# Patient Record
Sex: FEMALE | Race: WHITE | Employment: OTHER | URBAN - METROPOLITAN AREA
[De-identification: names, ages, dates, MRNs, and addresses within clinical notes are randomized per-mention and may not be internally consistent; named-entity substitution may affect disease eponyms.]

---

## 2017-01-10 ENCOUNTER — OFFICE VISIT (OUTPATIENT)
Dept: INTERNAL MEDICINE CLINIC | Age: 72
End: 2017-01-10

## 2017-01-10 VITALS
HEART RATE: 62 BPM | SYSTOLIC BLOOD PRESSURE: 120 MMHG | OXYGEN SATURATION: 97 % | DIASTOLIC BLOOD PRESSURE: 78 MMHG | TEMPERATURE: 97.8 F

## 2017-01-10 DIAGNOSIS — E66.01 MORBID OBESITY DUE TO EXCESS CALORIES (HCC): ICD-10-CM

## 2017-01-10 DIAGNOSIS — E11.9 TYPE 2 DIABETES MELLITUS WITHOUT COMPLICATION, WITHOUT LONG-TERM CURRENT USE OF INSULIN (HCC): ICD-10-CM

## 2017-01-10 DIAGNOSIS — J20.9 ACUTE BRONCHITIS DUE TO INFECTION: Primary | ICD-10-CM

## 2017-01-10 DIAGNOSIS — J45.20 MILD INTERMITTENT ASTHMA WITHOUT COMPLICATION: ICD-10-CM

## 2017-01-10 PROCEDURE — 4040F PNEUMOC VAC/ADMIN/RCVD: CPT | Performed by: FAMILY MEDICINE

## 2017-01-10 PROCEDURE — 1090F PRES/ABSN URINE INCON ASSESS: CPT | Performed by: FAMILY MEDICINE

## 2017-01-10 PROCEDURE — G8399 PT W/DXA RESULTS DOCUMENT: HCPCS | Performed by: FAMILY MEDICINE

## 2017-01-10 PROCEDURE — 1036F TOBACCO NON-USER: CPT | Performed by: FAMILY MEDICINE

## 2017-01-10 PROCEDURE — G8482 FLU IMMUNIZE ORDER/ADMIN: HCPCS | Performed by: FAMILY MEDICINE

## 2017-01-10 PROCEDURE — 3044F HG A1C LEVEL LT 7.0%: CPT | Performed by: FAMILY MEDICINE

## 2017-01-10 PROCEDURE — G8428 CUR MEDS NOT DOCUMENT: HCPCS | Performed by: FAMILY MEDICINE

## 2017-01-10 PROCEDURE — 99213 OFFICE O/P EST LOW 20 MIN: CPT | Performed by: FAMILY MEDICINE

## 2017-01-10 PROCEDURE — 1123F ACP DISCUSS/DSCN MKR DOCD: CPT | Performed by: FAMILY MEDICINE

## 2017-01-10 PROCEDURE — 3017F COLORECTAL CA SCREEN DOC REV: CPT | Performed by: FAMILY MEDICINE

## 2017-01-10 PROCEDURE — 3014F SCREEN MAMMO DOC REV: CPT | Performed by: FAMILY MEDICINE

## 2017-01-10 PROCEDURE — G8419 CALC BMI OUT NRM PARAM NOF/U: HCPCS | Performed by: FAMILY MEDICINE

## 2017-01-10 RX ORDER — PREDNISONE 20 MG/1
20 TABLET ORAL DAILY
Qty: 10 TABLET | Refills: 0 | Status: SHIPPED | OUTPATIENT
Start: 2017-01-10 | End: 2017-01-20

## 2017-01-10 RX ORDER — GUAIFENESIN AND CODEINE PHOSPHATE 100; 10 MG/5ML; MG/5ML
5 SOLUTION ORAL 4 TIMES DAILY PRN
Qty: 240 ML | Refills: 0 | Status: SHIPPED | OUTPATIENT
Start: 2017-01-10 | End: 2017-01-17

## 2017-01-10 RX ORDER — AMOXICILLIN 875 MG/1
875 TABLET, COATED ORAL 2 TIMES DAILY
Qty: 20 TABLET | Refills: 0 | Status: SHIPPED | OUTPATIENT
Start: 2017-01-10 | End: 2017-01-20

## 2017-01-10 ASSESSMENT — ENCOUNTER SYMPTOMS
SHORTNESS OF BREATH: 1
RHINORRHEA: 1
COUGH: 1
SINUS PRESSURE: 1
WHEEZING: 1

## 2017-01-15 ASSESSMENT — ENCOUNTER SYMPTOMS
DIARRHEA: 0
BLOOD IN STOOL: 0
ABDOMINAL PAIN: 0
CONSTIPATION: 0

## 2017-01-23 RX ORDER — ACETAMINOPHEN AND CODEINE PHOSPHATE 300; 30 MG/1; MG/1
TABLET ORAL
Qty: 90 TABLET | Refills: 0 | Status: SHIPPED | OUTPATIENT
Start: 2017-01-23 | End: 2017-03-08 | Stop reason: SDUPTHER

## 2017-01-26 RX ORDER — ATENOLOL 25 MG/1
TABLET ORAL
Qty: 30 TABLET | Refills: 3 | Status: SHIPPED | OUTPATIENT
Start: 2017-01-26 | End: 2017-05-31 | Stop reason: SDUPTHER

## 2017-01-31 RX ORDER — METHYLPREDNISOLONE 4 MG/1
TABLET ORAL
Qty: 1 KIT | Refills: 0 | Status: SHIPPED | OUTPATIENT
Start: 2017-01-31 | End: 2017-02-06

## 2017-02-01 ENCOUNTER — OFFICE VISIT (OUTPATIENT)
Dept: PULMONOLOGY | Age: 72
End: 2017-02-01

## 2017-02-01 VITALS
HEART RATE: 71 BPM | BODY MASS INDEX: 44.57 KG/M2 | TEMPERATURE: 97.6 F | HEIGHT: 60 IN | DIASTOLIC BLOOD PRESSURE: 60 MMHG | WEIGHT: 227 LBS | RESPIRATION RATE: 16 BRPM | SYSTOLIC BLOOD PRESSURE: 130 MMHG | OXYGEN SATURATION: 92 %

## 2017-02-01 DIAGNOSIS — J45.901 ASTHMA EXACERBATION: ICD-10-CM

## 2017-02-01 DIAGNOSIS — J40 BRONCHITIS: Primary | ICD-10-CM

## 2017-02-01 PROCEDURE — 1123F ACP DISCUSS/DSCN MKR DOCD: CPT | Performed by: INTERNAL MEDICINE

## 2017-02-01 PROCEDURE — 4040F PNEUMOC VAC/ADMIN/RCVD: CPT | Performed by: INTERNAL MEDICINE

## 2017-02-01 PROCEDURE — 99214 OFFICE O/P EST MOD 30 MIN: CPT | Performed by: INTERNAL MEDICINE

## 2017-02-01 PROCEDURE — 3014F SCREEN MAMMO DOC REV: CPT | Performed by: INTERNAL MEDICINE

## 2017-02-01 PROCEDURE — 3017F COLORECTAL CA SCREEN DOC REV: CPT | Performed by: INTERNAL MEDICINE

## 2017-02-01 PROCEDURE — 1090F PRES/ABSN URINE INCON ASSESS: CPT | Performed by: INTERNAL MEDICINE

## 2017-02-01 PROCEDURE — 1036F TOBACCO NON-USER: CPT | Performed by: INTERNAL MEDICINE

## 2017-02-01 PROCEDURE — G8399 PT W/DXA RESULTS DOCUMENT: HCPCS | Performed by: INTERNAL MEDICINE

## 2017-02-01 PROCEDURE — G8427 DOCREV CUR MEDS BY ELIG CLIN: HCPCS | Performed by: INTERNAL MEDICINE

## 2017-02-01 PROCEDURE — G8419 CALC BMI OUT NRM PARAM NOF/U: HCPCS | Performed by: INTERNAL MEDICINE

## 2017-02-01 PROCEDURE — G8482 FLU IMMUNIZE ORDER/ADMIN: HCPCS | Performed by: INTERNAL MEDICINE

## 2017-02-01 RX ORDER — AZITHROMYCIN 250 MG/1
250 TABLET, FILM COATED ORAL DAILY
Qty: 6 TABLET | Refills: 0 | Status: SHIPPED | OUTPATIENT
Start: 2017-02-01 | End: 2017-03-21 | Stop reason: ALTCHOICE

## 2017-02-01 RX ORDER — PREDNISONE 10 MG/1
TABLET ORAL
Qty: 30 TABLET | Refills: 0 | Status: SHIPPED | OUTPATIENT
Start: 2017-02-01 | End: 2017-03-21 | Stop reason: ALTCHOICE

## 2017-02-01 ASSESSMENT — ASTHMA QUESTIONNAIRES
QUESTION_3 LAST FOUR WEEKS HOW OFTEN DID YOUR ASTHMA SYMPTOMS (WHEEZING, COUGHING, SHORTNESS OF BREATH, CHEST TIGHTNESS OR PAIN) WAKE YOU UP AT NIGHT OR EARLIER THAN USUAL IN THE MORNING: 1
QUESTION_1 LAST FOUR WEEKS HOW MUCH OF THE TIME DID YOUR ASTHMA KEEP YOU FROM GETTING AS MUCH DONE AT WORK, SCHOOL OR AT HOME: 2
QUESTION_4 LAST FOUR WEEKS HOW OFTEN HAVE YOU USED YOUR RESCUE INHALER OR NEBULIZER MEDICATION (SUCH AS ALBUTEROL): 2
QUESTION_5 LAST FOUR WEEKS HOW WOULD YOU RATE YOUR ASTHMA CONTROL: 1
ACT_TOTALSCORE: 7
QUESTION_2 LAST FOUR WEEKS HOW OFTEN HAVE YOU HAD SHORTNESS OF BREATH: 1

## 2017-02-15 RX ORDER — DULOXETIN HYDROCHLORIDE 60 MG/1
CAPSULE, DELAYED RELEASE ORAL
Qty: 90 CAPSULE | Refills: 1 | Status: SHIPPED | OUTPATIENT
Start: 2017-02-15 | End: 2018-03-09 | Stop reason: SDUPTHER

## 2017-02-17 RX ORDER — GABAPENTIN 600 MG/1
TABLET ORAL
Qty: 360 TABLET | Refills: 1 | Status: SHIPPED | OUTPATIENT
Start: 2017-02-17 | End: 2017-03-21 | Stop reason: SDUPTHER

## 2017-02-17 RX ORDER — ATORVASTATIN CALCIUM 40 MG/1
TABLET, FILM COATED ORAL
Qty: 90 TABLET | Refills: 1 | Status: SHIPPED | OUTPATIENT
Start: 2017-02-17 | End: 2017-09-18 | Stop reason: SDUPTHER

## 2017-02-17 RX ORDER — GLIMEPIRIDE 4 MG/1
TABLET ORAL
Qty: 90 TABLET | Refills: 1 | Status: SHIPPED | OUTPATIENT
Start: 2017-02-17 | End: 2017-08-24 | Stop reason: SDUPTHER

## 2017-02-17 RX ORDER — LOSARTAN POTASSIUM AND HYDROCHLOROTHIAZIDE 25; 100 MG/1; MG/1
TABLET ORAL
Qty: 90 TABLET | Refills: 1 | Status: SHIPPED | OUTPATIENT
Start: 2017-02-17 | End: 2017-09-19 | Stop reason: SDUPTHER

## 2017-03-08 RX ORDER — ACETAMINOPHEN AND CODEINE PHOSPHATE 300; 30 MG/1; MG/1
TABLET ORAL
Qty: 90 TABLET | Refills: 0 | Status: SHIPPED | OUTPATIENT
Start: 2017-03-08 | End: 2017-04-24 | Stop reason: SDUPTHER

## 2017-03-13 ENCOUNTER — TELEPHONE (OUTPATIENT)
Dept: INTERNAL MEDICINE CLINIC | Age: 72
End: 2017-03-13

## 2017-03-16 RX ORDER — GUAIFENESIN AND CODEINE PHOSPHATE 100; 10 MG/5ML; MG/5ML
SOLUTION ORAL
Qty: 240 ML | Refills: 0 | Status: SHIPPED | OUTPATIENT
Start: 2017-03-16 | End: 2017-03-21 | Stop reason: ALTCHOICE

## 2017-03-21 ENCOUNTER — OFFICE VISIT (OUTPATIENT)
Dept: INTERNAL MEDICINE CLINIC | Age: 72
End: 2017-03-21

## 2017-03-21 VITALS
WEIGHT: 228 LBS | RESPIRATION RATE: 20 BRPM | DIASTOLIC BLOOD PRESSURE: 62 MMHG | OXYGEN SATURATION: 96 % | BODY MASS INDEX: 44.53 KG/M2 | SYSTOLIC BLOOD PRESSURE: 130 MMHG | HEART RATE: 58 BPM

## 2017-03-21 DIAGNOSIS — I10 ESSENTIAL HYPERTENSION: ICD-10-CM

## 2017-03-21 DIAGNOSIS — Z99.89 OSA ON CPAP: ICD-10-CM

## 2017-03-21 DIAGNOSIS — E66.01 MORBID OBESITY DUE TO EXCESS CALORIES (HCC): ICD-10-CM

## 2017-03-21 DIAGNOSIS — E11.42 DM TYPE 2 WITH DIABETIC PERIPHERAL NEUROPATHY (HCC): Primary | ICD-10-CM

## 2017-03-21 DIAGNOSIS — G47.33 OSA ON CPAP: ICD-10-CM

## 2017-03-21 DIAGNOSIS — G25.81 RESTLESS LEGS SYNDROME (RLS): ICD-10-CM

## 2017-03-21 LAB — HBA1C MFR BLD: 6.8 %

## 2017-03-21 PROCEDURE — G8417 CALC BMI ABV UP PARAM F/U: HCPCS | Performed by: FAMILY MEDICINE

## 2017-03-21 PROCEDURE — 4040F PNEUMOC VAC/ADMIN/RCVD: CPT | Performed by: FAMILY MEDICINE

## 2017-03-21 PROCEDURE — G8427 DOCREV CUR MEDS BY ELIG CLIN: HCPCS | Performed by: FAMILY MEDICINE

## 2017-03-21 PROCEDURE — 1090F PRES/ABSN URINE INCON ASSESS: CPT | Performed by: FAMILY MEDICINE

## 2017-03-21 PROCEDURE — G8482 FLU IMMUNIZE ORDER/ADMIN: HCPCS | Performed by: FAMILY MEDICINE

## 2017-03-21 PROCEDURE — 3044F HG A1C LEVEL LT 7.0%: CPT | Performed by: FAMILY MEDICINE

## 2017-03-21 PROCEDURE — 83036 HEMOGLOBIN GLYCOSYLATED A1C: CPT | Performed by: FAMILY MEDICINE

## 2017-03-21 PROCEDURE — 1036F TOBACCO NON-USER: CPT | Performed by: FAMILY MEDICINE

## 2017-03-21 PROCEDURE — 99214 OFFICE O/P EST MOD 30 MIN: CPT | Performed by: FAMILY MEDICINE

## 2017-03-21 PROCEDURE — 3017F COLORECTAL CA SCREEN DOC REV: CPT | Performed by: FAMILY MEDICINE

## 2017-03-21 PROCEDURE — 1123F ACP DISCUSS/DSCN MKR DOCD: CPT | Performed by: FAMILY MEDICINE

## 2017-03-21 PROCEDURE — 3014F SCREEN MAMMO DOC REV: CPT | Performed by: FAMILY MEDICINE

## 2017-03-21 PROCEDURE — G8399 PT W/DXA RESULTS DOCUMENT: HCPCS | Performed by: FAMILY MEDICINE

## 2017-03-21 RX ORDER — GABAPENTIN 600 MG/1
600 TABLET ORAL 2 TIMES DAILY
Qty: 180 TABLET | Refills: 1 | Status: SHIPPED | OUTPATIENT
Start: 2017-03-21 | End: 2017-11-21 | Stop reason: SDUPTHER

## 2017-03-21 ASSESSMENT — ENCOUNTER SYMPTOMS
ABDOMINAL PAIN: 0
SHORTNESS OF BREATH: 0
DIARRHEA: 0
BLOOD IN STOOL: 0
CONSTIPATION: 0

## 2017-03-30 RX ORDER — AMLODIPINE BESYLATE 5 MG/1
TABLET ORAL
Qty: 90 TABLET | Refills: 1 | Status: SHIPPED | OUTPATIENT
Start: 2017-03-30 | End: 2017-09-17 | Stop reason: SDUPTHER

## 2017-04-04 ENCOUNTER — OFFICE VISIT (OUTPATIENT)
Dept: INTERNAL MEDICINE CLINIC | Age: 72
End: 2017-04-04

## 2017-04-04 VITALS
SYSTOLIC BLOOD PRESSURE: 130 MMHG | HEART RATE: 66 BPM | RESPIRATION RATE: 18 BRPM | OXYGEN SATURATION: 96 % | DIASTOLIC BLOOD PRESSURE: 70 MMHG

## 2017-04-04 DIAGNOSIS — Z99.89 OSA ON CPAP: ICD-10-CM

## 2017-04-04 DIAGNOSIS — E78.2 MIXED HYPERLIPIDEMIA: ICD-10-CM

## 2017-04-04 DIAGNOSIS — J45.20 MILD INTERMITTENT ASTHMA WITHOUT COMPLICATION: ICD-10-CM

## 2017-04-04 DIAGNOSIS — E11.9 TYPE 2 DIABETES MELLITUS WITHOUT COMPLICATION, WITHOUT LONG-TERM CURRENT USE OF INSULIN (HCC): ICD-10-CM

## 2017-04-04 DIAGNOSIS — I10 ESSENTIAL HYPERTENSION: ICD-10-CM

## 2017-04-04 DIAGNOSIS — G25.81 RESTLESS LEGS SYNDROME (RLS): ICD-10-CM

## 2017-04-04 DIAGNOSIS — G47.33 OSA ON CPAP: ICD-10-CM

## 2017-04-04 PROCEDURE — 99214 OFFICE O/P EST MOD 30 MIN: CPT | Performed by: FAMILY MEDICINE

## 2017-04-04 PROCEDURE — 3014F SCREEN MAMMO DOC REV: CPT | Performed by: FAMILY MEDICINE

## 2017-04-04 PROCEDURE — 3044F HG A1C LEVEL LT 7.0%: CPT | Performed by: FAMILY MEDICINE

## 2017-04-04 PROCEDURE — 3017F COLORECTAL CA SCREEN DOC REV: CPT | Performed by: FAMILY MEDICINE

## 2017-04-04 PROCEDURE — 4040F PNEUMOC VAC/ADMIN/RCVD: CPT | Performed by: FAMILY MEDICINE

## 2017-04-04 PROCEDURE — G8417 CALC BMI ABV UP PARAM F/U: HCPCS | Performed by: FAMILY MEDICINE

## 2017-04-04 PROCEDURE — G8427 DOCREV CUR MEDS BY ELIG CLIN: HCPCS | Performed by: FAMILY MEDICINE

## 2017-04-04 PROCEDURE — 1036F TOBACCO NON-USER: CPT | Performed by: FAMILY MEDICINE

## 2017-04-04 PROCEDURE — 1123F ACP DISCUSS/DSCN MKR DOCD: CPT | Performed by: FAMILY MEDICINE

## 2017-04-04 PROCEDURE — 1090F PRES/ABSN URINE INCON ASSESS: CPT | Performed by: FAMILY MEDICINE

## 2017-04-04 PROCEDURE — G8399 PT W/DXA RESULTS DOCUMENT: HCPCS | Performed by: FAMILY MEDICINE

## 2017-04-04 ASSESSMENT — ENCOUNTER SYMPTOMS
DIARRHEA: 0
ABDOMINAL PAIN: 0
CONSTIPATION: 0
SHORTNESS OF BREATH: 0
BLOOD IN STOOL: 0

## 2017-04-24 RX ORDER — NAPROXEN 500 MG/1
TABLET ORAL
Qty: 60 TABLET | Refills: 2 | Status: SHIPPED | OUTPATIENT
Start: 2017-04-24 | End: 2017-04-24 | Stop reason: SDUPTHER

## 2017-04-24 RX ORDER — ACETAMINOPHEN AND CODEINE PHOSPHATE 300; 30 MG/1; MG/1
TABLET ORAL
Qty: 90 TABLET | Refills: 0 | Status: SHIPPED | OUTPATIENT
Start: 2017-04-24 | End: 2017-06-09 | Stop reason: SDUPTHER

## 2017-04-25 RX ORDER — NAPROXEN 500 MG/1
500 TABLET ORAL 3 TIMES DAILY PRN
Qty: 270 TABLET | Refills: 1 | Status: SHIPPED | OUTPATIENT
Start: 2017-04-25 | End: 2017-07-07

## 2017-05-31 RX ORDER — ATENOLOL 25 MG/1
TABLET ORAL
Qty: 30 TABLET | Refills: 3 | Status: SHIPPED | OUTPATIENT
Start: 2017-05-31 | End: 2017-08-14 | Stop reason: ALTCHOICE

## 2017-06-11 RX ORDER — ACETAMINOPHEN AND CODEINE PHOSPHATE 300; 30 MG/1; MG/1
TABLET ORAL
Qty: 90 TABLET | Refills: 0 | Status: SHIPPED | OUTPATIENT
Start: 2017-06-11 | End: 2017-07-25 | Stop reason: SDUPTHER

## 2017-06-14 RX ORDER — AZITHROMYCIN 250 MG/1
TABLET, FILM COATED ORAL
Qty: 1 PACKET | Refills: 0 | Status: SHIPPED | OUTPATIENT
Start: 2017-06-14 | End: 2017-06-20

## 2017-06-20 ENCOUNTER — OFFICE VISIT (OUTPATIENT)
Dept: INTERNAL MEDICINE CLINIC | Age: 72
End: 2017-06-20

## 2017-06-20 VITALS
BODY MASS INDEX: 43.55 KG/M2 | WEIGHT: 223 LBS | DIASTOLIC BLOOD PRESSURE: 62 MMHG | OXYGEN SATURATION: 98 % | SYSTOLIC BLOOD PRESSURE: 138 MMHG | HEART RATE: 62 BPM | RESPIRATION RATE: 18 BRPM

## 2017-06-20 DIAGNOSIS — Z99.89 OSA ON CPAP: ICD-10-CM

## 2017-06-20 DIAGNOSIS — G25.81 RESTLESS LEGS SYNDROME (RLS): ICD-10-CM

## 2017-06-20 DIAGNOSIS — J45.20 MILD INTERMITTENT ASTHMA WITHOUT COMPLICATION: ICD-10-CM

## 2017-06-20 DIAGNOSIS — I10 ESSENTIAL HYPERTENSION: ICD-10-CM

## 2017-06-20 DIAGNOSIS — E11.9 TYPE 2 DIABETES MELLITUS WITHOUT COMPLICATION, WITHOUT LONG-TERM CURRENT USE OF INSULIN (HCC): Primary | ICD-10-CM

## 2017-06-20 DIAGNOSIS — G47.33 OSA ON CPAP: ICD-10-CM

## 2017-06-20 DIAGNOSIS — E78.2 MIXED HYPERLIPIDEMIA: ICD-10-CM

## 2017-06-20 LAB — HBA1C MFR BLD: 6.6 %

## 2017-06-20 PROCEDURE — 1036F TOBACCO NON-USER: CPT | Performed by: FAMILY MEDICINE

## 2017-06-20 PROCEDURE — 4040F PNEUMOC VAC/ADMIN/RCVD: CPT | Performed by: FAMILY MEDICINE

## 2017-06-20 PROCEDURE — 1090F PRES/ABSN URINE INCON ASSESS: CPT | Performed by: FAMILY MEDICINE

## 2017-06-20 PROCEDURE — G8417 CALC BMI ABV UP PARAM F/U: HCPCS | Performed by: FAMILY MEDICINE

## 2017-06-20 PROCEDURE — G8399 PT W/DXA RESULTS DOCUMENT: HCPCS | Performed by: FAMILY MEDICINE

## 2017-06-20 PROCEDURE — 3046F HEMOGLOBIN A1C LEVEL >9.0%: CPT | Performed by: FAMILY MEDICINE

## 2017-06-20 PROCEDURE — 3017F COLORECTAL CA SCREEN DOC REV: CPT | Performed by: FAMILY MEDICINE

## 2017-06-20 PROCEDURE — G8427 DOCREV CUR MEDS BY ELIG CLIN: HCPCS | Performed by: FAMILY MEDICINE

## 2017-06-20 PROCEDURE — 3014F SCREEN MAMMO DOC REV: CPT | Performed by: FAMILY MEDICINE

## 2017-06-20 PROCEDURE — 99214 OFFICE O/P EST MOD 30 MIN: CPT | Performed by: FAMILY MEDICINE

## 2017-06-20 PROCEDURE — 83036 HEMOGLOBIN GLYCOSYLATED A1C: CPT | Performed by: FAMILY MEDICINE

## 2017-06-20 PROCEDURE — 1123F ACP DISCUSS/DSCN MKR DOCD: CPT | Performed by: FAMILY MEDICINE

## 2017-06-20 RX ORDER — CARBIDOPA/LEVODOPA 25MG-250MG
TABLET ORAL
Qty: 360 TABLET | Refills: 3 | Status: SHIPPED | OUTPATIENT
Start: 2017-06-20 | End: 2017-06-20 | Stop reason: SDUPTHER

## 2017-06-20 RX ORDER — CARBIDOPA/LEVODOPA 25MG-250MG
TABLET ORAL
Qty: 720 TABLET | Refills: 1 | Status: SHIPPED | OUTPATIENT
Start: 2017-06-20 | End: 2017-12-19 | Stop reason: SDUPTHER

## 2017-06-20 ASSESSMENT — ENCOUNTER SYMPTOMS
SHORTNESS OF BREATH: 0
CONSTIPATION: 0
BLOOD IN STOOL: 0
DIARRHEA: 0
ABDOMINAL PAIN: 0

## 2017-07-07 ENCOUNTER — OFFICE VISIT (OUTPATIENT)
Dept: INTERNAL MEDICINE CLINIC | Age: 72
End: 2017-07-07

## 2017-07-07 VITALS
WEIGHT: 220.6 LBS | RESPIRATION RATE: 18 BRPM | HEART RATE: 66 BPM | BODY MASS INDEX: 43.08 KG/M2 | DIASTOLIC BLOOD PRESSURE: 48 MMHG | OXYGEN SATURATION: 98 % | SYSTOLIC BLOOD PRESSURE: 134 MMHG

## 2017-07-07 DIAGNOSIS — E11.9 TYPE 2 DIABETES MELLITUS WITHOUT COMPLICATION, WITHOUT LONG-TERM CURRENT USE OF INSULIN (HCC): ICD-10-CM

## 2017-07-07 DIAGNOSIS — M25.511 ACUTE PAIN OF RIGHT SHOULDER: ICD-10-CM

## 2017-07-07 DIAGNOSIS — I10 ESSENTIAL HYPERTENSION: ICD-10-CM

## 2017-07-07 DIAGNOSIS — G25.81 RESTLESS LEGS SYNDROME (RLS): ICD-10-CM

## 2017-07-07 DIAGNOSIS — Z99.89 OSA ON CPAP: ICD-10-CM

## 2017-07-07 DIAGNOSIS — G47.33 OSA ON CPAP: ICD-10-CM

## 2017-07-07 PROCEDURE — 1123F ACP DISCUSS/DSCN MKR DOCD: CPT | Performed by: FAMILY MEDICINE

## 2017-07-07 PROCEDURE — 1036F TOBACCO NON-USER: CPT | Performed by: FAMILY MEDICINE

## 2017-07-07 PROCEDURE — 1090F PRES/ABSN URINE INCON ASSESS: CPT | Performed by: FAMILY MEDICINE

## 2017-07-07 PROCEDURE — 3014F SCREEN MAMMO DOC REV: CPT | Performed by: FAMILY MEDICINE

## 2017-07-07 PROCEDURE — G8399 PT W/DXA RESULTS DOCUMENT: HCPCS | Performed by: FAMILY MEDICINE

## 2017-07-07 PROCEDURE — G8417 CALC BMI ABV UP PARAM F/U: HCPCS | Performed by: FAMILY MEDICINE

## 2017-07-07 PROCEDURE — 3017F COLORECTAL CA SCREEN DOC REV: CPT | Performed by: FAMILY MEDICINE

## 2017-07-07 PROCEDURE — G8427 DOCREV CUR MEDS BY ELIG CLIN: HCPCS | Performed by: FAMILY MEDICINE

## 2017-07-07 PROCEDURE — 3046F HEMOGLOBIN A1C LEVEL >9.0%: CPT | Performed by: FAMILY MEDICINE

## 2017-07-07 PROCEDURE — 99214 OFFICE O/P EST MOD 30 MIN: CPT | Performed by: FAMILY MEDICINE

## 2017-07-07 PROCEDURE — 4040F PNEUMOC VAC/ADMIN/RCVD: CPT | Performed by: FAMILY MEDICINE

## 2017-07-07 RX ORDER — PREDNISONE 20 MG/1
20 TABLET ORAL DAILY
Qty: 10 TABLET | Refills: 0 | Status: SHIPPED | OUTPATIENT
Start: 2017-07-07 | End: 2017-07-17

## 2017-07-07 RX ORDER — NAPROXEN 375 MG/1
TABLET ORAL
Qty: 270 TABLET | Refills: 1 | Status: SHIPPED | OUTPATIENT
Start: 2017-07-07 | End: 2017-11-03

## 2017-07-07 RX ORDER — NAPROXEN 375 MG/1
375 TABLET ORAL 3 TIMES DAILY PRN
Qty: 60 TABLET | Refills: 1 | Status: SHIPPED | OUTPATIENT
Start: 2017-07-07 | End: 2017-07-07

## 2017-07-07 ASSESSMENT — ENCOUNTER SYMPTOMS
ABDOMINAL PAIN: 0
SHORTNESS OF BREATH: 0
BLOOD IN STOOL: 0
DIARRHEA: 0
CONSTIPATION: 0

## 2017-07-27 RX ORDER — ACETAMINOPHEN AND CODEINE PHOSPHATE 300; 30 MG/1; MG/1
TABLET ORAL
Qty: 90 TABLET | Refills: 0 | Status: SHIPPED | OUTPATIENT
Start: 2017-07-27 | End: 2017-09-07 | Stop reason: SDUPTHER

## 2017-08-11 RX ORDER — DULOXETIN HYDROCHLORIDE 60 MG/1
CAPSULE, DELAYED RELEASE ORAL
Qty: 90 CAPSULE | Refills: 1 | Status: SHIPPED | OUTPATIENT
Start: 2017-08-11 | End: 2017-08-14 | Stop reason: SDUPTHER

## 2017-08-14 ENCOUNTER — OFFICE VISIT (OUTPATIENT)
Dept: PULMONOLOGY | Age: 72
End: 2017-08-14

## 2017-08-14 VITALS
DIASTOLIC BLOOD PRESSURE: 60 MMHG | TEMPERATURE: 98 F | HEIGHT: 60 IN | SYSTOLIC BLOOD PRESSURE: 120 MMHG | WEIGHT: 224 LBS | OXYGEN SATURATION: 96 % | BODY MASS INDEX: 43.98 KG/M2 | HEART RATE: 76 BPM | RESPIRATION RATE: 16 BRPM

## 2017-08-14 DIAGNOSIS — Z99.89 OSA ON CPAP: Primary | ICD-10-CM

## 2017-08-14 DIAGNOSIS — J45.909 MILD ASTHMA: ICD-10-CM

## 2017-08-14 DIAGNOSIS — H61.22 IMPACTED CERUMEN OF LEFT EAR: ICD-10-CM

## 2017-08-14 DIAGNOSIS — G47.33 OSA ON CPAP: Primary | ICD-10-CM

## 2017-08-14 PROCEDURE — G8399 PT W/DXA RESULTS DOCUMENT: HCPCS | Performed by: INTERNAL MEDICINE

## 2017-08-14 PROCEDURE — 1036F TOBACCO NON-USER: CPT | Performed by: INTERNAL MEDICINE

## 2017-08-14 PROCEDURE — 1123F ACP DISCUSS/DSCN MKR DOCD: CPT | Performed by: INTERNAL MEDICINE

## 2017-08-14 PROCEDURE — G8427 DOCREV CUR MEDS BY ELIG CLIN: HCPCS | Performed by: INTERNAL MEDICINE

## 2017-08-14 PROCEDURE — G8417 CALC BMI ABV UP PARAM F/U: HCPCS | Performed by: INTERNAL MEDICINE

## 2017-08-14 PROCEDURE — 3014F SCREEN MAMMO DOC REV: CPT | Performed by: INTERNAL MEDICINE

## 2017-08-14 PROCEDURE — 3017F COLORECTAL CA SCREEN DOC REV: CPT | Performed by: INTERNAL MEDICINE

## 2017-08-14 PROCEDURE — 99214 OFFICE O/P EST MOD 30 MIN: CPT | Performed by: INTERNAL MEDICINE

## 2017-08-14 PROCEDURE — 1090F PRES/ABSN URINE INCON ASSESS: CPT | Performed by: INTERNAL MEDICINE

## 2017-08-14 PROCEDURE — 4040F PNEUMOC VAC/ADMIN/RCVD: CPT | Performed by: INTERNAL MEDICINE

## 2017-08-14 RX ORDER — METOPROLOL SUCCINATE 25 MG/1
TABLET, EXTENDED RELEASE ORAL
COMMUNITY
Start: 2017-06-27 | End: 2017-12-31 | Stop reason: SDUPTHER

## 2017-08-14 ASSESSMENT — SLEEP AND FATIGUE QUESTIONNAIRES
HOW LIKELY ARE YOU TO NOD OFF OR FALL ASLEEP WHILE LYING DOWN TO REST IN THE AFTERNOON WHEN CIRCUMSTANCES PERMIT: 2
HOW LIKELY ARE YOU TO NOD OFF OR FALL ASLEEP WHILE SITTING AND READING: 2
HOW LIKELY ARE YOU TO NOD OFF OR FALL ASLEEP WHILE SITTING AND TALKING TO SOMEONE: 0
ESS TOTAL SCORE: 8
HOW LIKELY ARE YOU TO NOD OFF OR FALL ASLEEP IN A CAR, WHILE STOPPED FOR A FEW MINUTES IN TRAFFIC: 0
HOW LIKELY ARE YOU TO NOD OFF OR FALL ASLEEP WHEN YOU ARE A PASSENGER IN A CAR FOR AN HOUR WITHOUT A BREAK: 1
HOW LIKELY ARE YOU TO NOD OFF OR FALL ASLEEP WHILE SITTING QUIETLY AFTER LUNCH WITHOUT ALCOHOL: 0
HOW LIKELY ARE YOU TO NOD OFF OR FALL ASLEEP WHILE SITTING INACTIVE IN A PUBLIC PLACE: 2
HOW LIKELY ARE YOU TO NOD OFF OR FALL ASLEEP WHILE WATCHING TV: 1
NECK CIRCUMFERENCE (INCHES): 19

## 2017-08-14 ASSESSMENT — ASTHMA QUESTIONNAIRES
ACT_TOTALSCORE: 22
QUESTION_4 LAST FOUR WEEKS HOW OFTEN HAVE YOU USED YOUR RESCUE INHALER OR NEBULIZER MEDICATION (SUCH AS ALBUTEROL): 5
QUESTION_1 LAST FOUR WEEKS HOW MUCH OF THE TIME DID YOUR ASTHMA KEEP YOU FROM GETTING AS MUCH DONE AT WORK, SCHOOL OR AT HOME: 5
QUESTION_5 LAST FOUR WEEKS HOW WOULD YOU RATE YOUR ASTHMA CONTROL: 5
QUESTION_3 LAST FOUR WEEKS HOW OFTEN DID YOUR ASTHMA SYMPTOMS (WHEEZING, COUGHING, SHORTNESS OF BREATH, CHEST TIGHTNESS OR PAIN) WAKE YOU UP AT NIGHT OR EARLIER THAN USUAL IN THE MORNING: 2
QUESTION_2 LAST FOUR WEEKS HOW OFTEN HAVE YOU HAD SHORTNESS OF BREATH: 5

## 2017-08-25 RX ORDER — LOSARTAN POTASSIUM AND HYDROCHLOROTHIAZIDE 25; 100 MG/1; MG/1
TABLET ORAL
Qty: 90 TABLET | Refills: 1 | Status: SHIPPED | OUTPATIENT
Start: 2017-08-25 | End: 2018-03-09 | Stop reason: SDUPTHER

## 2017-08-25 RX ORDER — GLIMEPIRIDE 4 MG/1
TABLET ORAL
Qty: 90 TABLET | Refills: 1 | Status: SHIPPED | OUTPATIENT
Start: 2017-08-25 | End: 2018-02-27 | Stop reason: SDUPTHER

## 2017-08-25 RX ORDER — ATORVASTATIN CALCIUM 40 MG/1
TABLET, FILM COATED ORAL
Qty: 90 TABLET | Refills: 1 | Status: SHIPPED | OUTPATIENT
Start: 2017-08-25 | End: 2018-03-09 | Stop reason: SDUPTHER

## 2017-09-10 RX ORDER — ACETAMINOPHEN AND CODEINE PHOSPHATE 300; 30 MG/1; MG/1
TABLET ORAL
Qty: 90 TABLET | Refills: 0 | Status: SHIPPED | OUTPATIENT
Start: 2017-09-10 | End: 2017-10-24 | Stop reason: SDUPTHER

## 2017-09-18 RX ORDER — AMLODIPINE BESYLATE 5 MG/1
TABLET ORAL
Qty: 90 TABLET | Refills: 1 | Status: SHIPPED | OUTPATIENT
Start: 2017-09-18 | End: 2018-03-20 | Stop reason: SDUPTHER

## 2017-09-19 ENCOUNTER — OFFICE VISIT (OUTPATIENT)
Dept: INTERNAL MEDICINE CLINIC | Age: 72
End: 2017-09-19

## 2017-09-19 VITALS
OXYGEN SATURATION: 98 % | WEIGHT: 219 LBS | DIASTOLIC BLOOD PRESSURE: 70 MMHG | HEART RATE: 74 BPM | SYSTOLIC BLOOD PRESSURE: 148 MMHG | BODY MASS INDEX: 42.77 KG/M2 | RESPIRATION RATE: 18 BRPM

## 2017-09-19 DIAGNOSIS — J45.20 MILD INTERMITTENT ASTHMA WITHOUT COMPLICATION: ICD-10-CM

## 2017-09-19 DIAGNOSIS — Z99.89 OSA ON CPAP: ICD-10-CM

## 2017-09-19 DIAGNOSIS — G47.33 OSA ON CPAP: ICD-10-CM

## 2017-09-19 DIAGNOSIS — I10 ESSENTIAL HYPERTENSION: ICD-10-CM

## 2017-09-19 DIAGNOSIS — E11.9 TYPE 2 DIABETES MELLITUS WITHOUT COMPLICATION, WITHOUT LONG-TERM CURRENT USE OF INSULIN (HCC): ICD-10-CM

## 2017-09-19 DIAGNOSIS — G25.81 RESTLESS LEGS SYNDROME (RLS): ICD-10-CM

## 2017-09-19 DIAGNOSIS — Z23 NEED FOR INFLUENZA VACCINATION: ICD-10-CM

## 2017-09-19 LAB — HBA1C MFR BLD: 6.5 %

## 2017-09-19 PROCEDURE — 1123F ACP DISCUSS/DSCN MKR DOCD: CPT | Performed by: FAMILY MEDICINE

## 2017-09-19 PROCEDURE — G8417 CALC BMI ABV UP PARAM F/U: HCPCS | Performed by: FAMILY MEDICINE

## 2017-09-19 PROCEDURE — 4040F PNEUMOC VAC/ADMIN/RCVD: CPT | Performed by: FAMILY MEDICINE

## 2017-09-19 PROCEDURE — 3046F HEMOGLOBIN A1C LEVEL >9.0%: CPT | Performed by: FAMILY MEDICINE

## 2017-09-19 PROCEDURE — 90662 IIV NO PRSV INCREASED AG IM: CPT | Performed by: FAMILY MEDICINE

## 2017-09-19 PROCEDURE — G0008 ADMIN INFLUENZA VIRUS VAC: HCPCS | Performed by: FAMILY MEDICINE

## 2017-09-19 PROCEDURE — G8427 DOCREV CUR MEDS BY ELIG CLIN: HCPCS | Performed by: FAMILY MEDICINE

## 2017-09-19 PROCEDURE — 99214 OFFICE O/P EST MOD 30 MIN: CPT | Performed by: FAMILY MEDICINE

## 2017-09-19 PROCEDURE — G8399 PT W/DXA RESULTS DOCUMENT: HCPCS | Performed by: FAMILY MEDICINE

## 2017-09-19 PROCEDURE — 1090F PRES/ABSN URINE INCON ASSESS: CPT | Performed by: FAMILY MEDICINE

## 2017-09-19 PROCEDURE — 3014F SCREEN MAMMO DOC REV: CPT | Performed by: FAMILY MEDICINE

## 2017-09-19 PROCEDURE — 3017F COLORECTAL CA SCREEN DOC REV: CPT | Performed by: FAMILY MEDICINE

## 2017-09-19 PROCEDURE — 83036 HEMOGLOBIN GLYCOSYLATED A1C: CPT | Performed by: FAMILY MEDICINE

## 2017-09-19 PROCEDURE — 1036F TOBACCO NON-USER: CPT | Performed by: FAMILY MEDICINE

## 2017-09-19 ASSESSMENT — ENCOUNTER SYMPTOMS
DIARRHEA: 0
ABDOMINAL PAIN: 0
CONSTIPATION: 0
BLOOD IN STOOL: 0
SHORTNESS OF BREATH: 0

## 2017-10-25 RX ORDER — ACETAMINOPHEN AND CODEINE PHOSPHATE 300; 30 MG/1; MG/1
TABLET ORAL
Qty: 90 TABLET | Refills: 0 | Status: SHIPPED | OUTPATIENT
Start: 2017-10-25 | End: 2017-12-07 | Stop reason: SDUPTHER

## 2017-11-03 ENCOUNTER — OFFICE VISIT (OUTPATIENT)
Dept: INTERNAL MEDICINE CLINIC | Age: 72
End: 2017-11-03

## 2017-11-03 VITALS
BODY MASS INDEX: 42.18 KG/M2 | HEART RATE: 86 BPM | DIASTOLIC BLOOD PRESSURE: 58 MMHG | OXYGEN SATURATION: 98 % | SYSTOLIC BLOOD PRESSURE: 109 MMHG | WEIGHT: 216 LBS

## 2017-11-03 DIAGNOSIS — E11.9 TYPE 2 DIABETES MELLITUS WITHOUT COMPLICATION, WITHOUT LONG-TERM CURRENT USE OF INSULIN (HCC): ICD-10-CM

## 2017-11-03 DIAGNOSIS — I10 ESSENTIAL HYPERTENSION: ICD-10-CM

## 2017-11-03 DIAGNOSIS — Z99.89 OSA ON CPAP: ICD-10-CM

## 2017-11-03 DIAGNOSIS — G25.81 RESTLESS LEGS SYNDROME (RLS): ICD-10-CM

## 2017-11-03 DIAGNOSIS — E66.01 MORBID OBESITY DUE TO EXCESS CALORIES (HCC): ICD-10-CM

## 2017-11-03 DIAGNOSIS — K21.9 CHRONIC GERD: ICD-10-CM

## 2017-11-03 DIAGNOSIS — G47.33 OSA ON CPAP: ICD-10-CM

## 2017-11-03 LAB
CREATININE URINE POCT: 45.9
MICROALBUMIN/CREAT 24H UR: <5 MG/G{CREAT}
MICROALBUMIN/CREAT UR-RTO: <10.9

## 2017-11-03 PROCEDURE — 1090F PRES/ABSN URINE INCON ASSESS: CPT | Performed by: FAMILY MEDICINE

## 2017-11-03 PROCEDURE — 3014F SCREEN MAMMO DOC REV: CPT | Performed by: FAMILY MEDICINE

## 2017-11-03 PROCEDURE — G8484 FLU IMMUNIZE NO ADMIN: HCPCS | Performed by: FAMILY MEDICINE

## 2017-11-03 PROCEDURE — G8417 CALC BMI ABV UP PARAM F/U: HCPCS | Performed by: FAMILY MEDICINE

## 2017-11-03 PROCEDURE — 3044F HG A1C LEVEL LT 7.0%: CPT | Performed by: FAMILY MEDICINE

## 2017-11-03 PROCEDURE — 1123F ACP DISCUSS/DSCN MKR DOCD: CPT | Performed by: FAMILY MEDICINE

## 2017-11-03 PROCEDURE — 99214 OFFICE O/P EST MOD 30 MIN: CPT | Performed by: FAMILY MEDICINE

## 2017-11-03 PROCEDURE — 1036F TOBACCO NON-USER: CPT | Performed by: FAMILY MEDICINE

## 2017-11-03 PROCEDURE — G8427 DOCREV CUR MEDS BY ELIG CLIN: HCPCS | Performed by: FAMILY MEDICINE

## 2017-11-03 PROCEDURE — 82044 UR ALBUMIN SEMIQUANTITATIVE: CPT | Performed by: FAMILY MEDICINE

## 2017-11-03 PROCEDURE — 3017F COLORECTAL CA SCREEN DOC REV: CPT | Performed by: FAMILY MEDICINE

## 2017-11-03 PROCEDURE — 4040F PNEUMOC VAC/ADMIN/RCVD: CPT | Performed by: FAMILY MEDICINE

## 2017-11-03 PROCEDURE — G8399 PT W/DXA RESULTS DOCUMENT: HCPCS | Performed by: FAMILY MEDICINE

## 2017-11-03 RX ORDER — PANTOPRAZOLE SODIUM 40 MG/1
40 TABLET, DELAYED RELEASE ORAL DAILY
Qty: 90 TABLET | Refills: 1 | Status: SHIPPED | OUTPATIENT
Start: 2017-11-03 | End: 2018-03-09 | Stop reason: SDUPTHER

## 2017-11-03 RX ORDER — PANTOPRAZOLE SODIUM 40 MG/1
40 TABLET, DELAYED RELEASE ORAL DAILY
Qty: 30 TABLET | Refills: 3 | Status: SHIPPED | OUTPATIENT
Start: 2017-11-03 | End: 2017-11-03 | Stop reason: SDUPTHER

## 2017-11-03 ASSESSMENT — ENCOUNTER SYMPTOMS
SHORTNESS OF BREATH: 0
DIARRHEA: 0
ABDOMINAL PAIN: 0
BLOOD IN STOOL: 0
CONSTIPATION: 0

## 2017-11-21 RX ORDER — GABAPENTIN 600 MG/1
TABLET ORAL
Qty: 180 TABLET | Refills: 1 | Status: SHIPPED | OUTPATIENT
Start: 2017-11-21 | End: 2018-06-06

## 2017-12-08 RX ORDER — ACETAMINOPHEN AND CODEINE PHOSPHATE 300; 30 MG/1; MG/1
TABLET ORAL
Qty: 90 TABLET | Refills: 0 | Status: SHIPPED | OUTPATIENT
Start: 2017-12-08 | End: 2018-01-23 | Stop reason: SDUPTHER

## 2017-12-14 ENCOUNTER — OFFICE VISIT (OUTPATIENT)
Dept: INTERNAL MEDICINE CLINIC | Age: 72
End: 2017-12-14

## 2017-12-14 VITALS
SYSTOLIC BLOOD PRESSURE: 112 MMHG | RESPIRATION RATE: 20 BRPM | OXYGEN SATURATION: 98 % | BODY MASS INDEX: 41.99 KG/M2 | DIASTOLIC BLOOD PRESSURE: 62 MMHG | WEIGHT: 215 LBS | HEART RATE: 74 BPM

## 2017-12-14 DIAGNOSIS — G25.81 RESTLESS LEGS SYNDROME (RLS): ICD-10-CM

## 2017-12-14 DIAGNOSIS — E11.9 TYPE 2 DIABETES MELLITUS WITHOUT COMPLICATION, WITHOUT LONG-TERM CURRENT USE OF INSULIN (HCC): ICD-10-CM

## 2017-12-14 DIAGNOSIS — L30.9 DERMATITIS: ICD-10-CM

## 2017-12-14 DIAGNOSIS — J45.20 MILD INTERMITTENT ASTHMA WITHOUT COMPLICATION: ICD-10-CM

## 2017-12-14 DIAGNOSIS — I10 ESSENTIAL HYPERTENSION: ICD-10-CM

## 2017-12-14 LAB — HBA1C MFR BLD: 6.6 %

## 2017-12-14 PROCEDURE — 3014F SCREEN MAMMO DOC REV: CPT | Performed by: FAMILY MEDICINE

## 2017-12-14 PROCEDURE — 1036F TOBACCO NON-USER: CPT | Performed by: FAMILY MEDICINE

## 2017-12-14 PROCEDURE — G8427 DOCREV CUR MEDS BY ELIG CLIN: HCPCS | Performed by: FAMILY MEDICINE

## 2017-12-14 PROCEDURE — 83036 HEMOGLOBIN GLYCOSYLATED A1C: CPT | Performed by: FAMILY MEDICINE

## 2017-12-14 PROCEDURE — G8417 CALC BMI ABV UP PARAM F/U: HCPCS | Performed by: FAMILY MEDICINE

## 2017-12-14 PROCEDURE — 4040F PNEUMOC VAC/ADMIN/RCVD: CPT | Performed by: FAMILY MEDICINE

## 2017-12-14 PROCEDURE — 99214 OFFICE O/P EST MOD 30 MIN: CPT | Performed by: FAMILY MEDICINE

## 2017-12-14 PROCEDURE — 3017F COLORECTAL CA SCREEN DOC REV: CPT | Performed by: FAMILY MEDICINE

## 2017-12-14 PROCEDURE — 3044F HG A1C LEVEL LT 7.0%: CPT | Performed by: FAMILY MEDICINE

## 2017-12-14 PROCEDURE — G8484 FLU IMMUNIZE NO ADMIN: HCPCS | Performed by: FAMILY MEDICINE

## 2017-12-14 PROCEDURE — 1123F ACP DISCUSS/DSCN MKR DOCD: CPT | Performed by: FAMILY MEDICINE

## 2017-12-14 PROCEDURE — G8399 PT W/DXA RESULTS DOCUMENT: HCPCS | Performed by: FAMILY MEDICINE

## 2017-12-14 PROCEDURE — 1090F PRES/ABSN URINE INCON ASSESS: CPT | Performed by: FAMILY MEDICINE

## 2017-12-14 ASSESSMENT — ENCOUNTER SYMPTOMS
CONSTIPATION: 0
DIARRHEA: 0
ABDOMINAL PAIN: 0
BLOOD IN STOOL: 0
SHORTNESS OF BREATH: 0

## 2017-12-14 NOTE — PROGRESS NOTES
Toprol-XL and amlodipine   4. Mild intermittent asthma without complication - stable continue Symbicort and the albuterol rescue inhaler   5. Dermatitis - Rx Kenalog ointment        Plan:      As above.  RTC in 3 mos and PRN

## 2017-12-20 RX ORDER — CARBIDOPA/LEVODOPA 25MG-250MG
TABLET ORAL
Qty: 720 TABLET | Refills: 1 | Status: SHIPPED | OUTPATIENT
Start: 2017-12-20 | End: 2018-06-26 | Stop reason: SDUPTHER

## 2018-01-02 RX ORDER — METOPROLOL SUCCINATE 25 MG/1
TABLET, EXTENDED RELEASE ORAL
Qty: 90 TABLET | Refills: 1 | Status: SHIPPED | OUTPATIENT
Start: 2018-01-02 | End: 2018-06-26 | Stop reason: SDUPTHER

## 2018-01-09 ENCOUNTER — OFFICE VISIT (OUTPATIENT)
Dept: INTERNAL MEDICINE CLINIC | Age: 73
End: 2018-01-09

## 2018-01-09 VITALS
SYSTOLIC BLOOD PRESSURE: 106 MMHG | OXYGEN SATURATION: 97 % | DIASTOLIC BLOOD PRESSURE: 58 MMHG | HEART RATE: 78 BPM | TEMPERATURE: 98.1 F | RESPIRATION RATE: 20 BRPM

## 2018-01-09 DIAGNOSIS — E66.01 MORBID OBESITY WITH BMI OF 40.0-44.9, ADULT (HCC): ICD-10-CM

## 2018-01-09 DIAGNOSIS — E66.01 MORBID OBESITY DUE TO EXCESS CALORIES (HCC): ICD-10-CM

## 2018-01-09 DIAGNOSIS — E11.9 TYPE 2 DIABETES MELLITUS WITHOUT COMPLICATION, WITHOUT LONG-TERM CURRENT USE OF INSULIN (HCC): ICD-10-CM

## 2018-01-09 DIAGNOSIS — I10 ESSENTIAL HYPERTENSION: ICD-10-CM

## 2018-01-09 DIAGNOSIS — J20.9 ACUTE BRONCHITIS DUE TO INFECTION: Primary | ICD-10-CM

## 2018-01-09 LAB
INFLUENZA A ANTIBODY: NEGATIVE
INFLUENZA B ANTIBODY: NEGATIVE

## 2018-01-09 PROCEDURE — 1036F TOBACCO NON-USER: CPT | Performed by: FAMILY MEDICINE

## 2018-01-09 PROCEDURE — G8417 CALC BMI ABV UP PARAM F/U: HCPCS | Performed by: FAMILY MEDICINE

## 2018-01-09 PROCEDURE — G8427 DOCREV CUR MEDS BY ELIG CLIN: HCPCS | Performed by: FAMILY MEDICINE

## 2018-01-09 PROCEDURE — 3046F HEMOGLOBIN A1C LEVEL >9.0%: CPT | Performed by: FAMILY MEDICINE

## 2018-01-09 PROCEDURE — 87804 INFLUENZA ASSAY W/OPTIC: CPT | Performed by: FAMILY MEDICINE

## 2018-01-09 PROCEDURE — 3014F SCREEN MAMMO DOC REV: CPT | Performed by: FAMILY MEDICINE

## 2018-01-09 PROCEDURE — 3017F COLORECTAL CA SCREEN DOC REV: CPT | Performed by: FAMILY MEDICINE

## 2018-01-09 PROCEDURE — 99214 OFFICE O/P EST MOD 30 MIN: CPT | Performed by: FAMILY MEDICINE

## 2018-01-09 PROCEDURE — G8484 FLU IMMUNIZE NO ADMIN: HCPCS | Performed by: FAMILY MEDICINE

## 2018-01-09 PROCEDURE — 4040F PNEUMOC VAC/ADMIN/RCVD: CPT | Performed by: FAMILY MEDICINE

## 2018-01-09 PROCEDURE — 1090F PRES/ABSN URINE INCON ASSESS: CPT | Performed by: FAMILY MEDICINE

## 2018-01-09 PROCEDURE — G8399 PT W/DXA RESULTS DOCUMENT: HCPCS | Performed by: FAMILY MEDICINE

## 2018-01-09 PROCEDURE — 1123F ACP DISCUSS/DSCN MKR DOCD: CPT | Performed by: FAMILY MEDICINE

## 2018-01-09 RX ORDER — AZITHROMYCIN 250 MG/1
TABLET, FILM COATED ORAL
Qty: 1 PACKET | Refills: 0 | Status: SHIPPED | OUTPATIENT
Start: 2018-01-09 | End: 2018-01-19

## 2018-01-09 RX ORDER — PROMETHAZINE HYDROCHLORIDE AND CODEINE PHOSPHATE 6.25; 1 MG/5ML; MG/5ML
10 SYRUP ORAL NIGHTLY PRN
Qty: 120 ML | Refills: 0 | Status: SHIPPED | OUTPATIENT
Start: 2018-01-09 | End: 2018-01-16

## 2018-01-09 RX ORDER — PREDNISONE 20 MG/1
20 TABLET ORAL DAILY
Qty: 10 TABLET | Refills: 0 | Status: SHIPPED | OUTPATIENT
Start: 2018-01-09 | End: 2018-01-19

## 2018-01-09 ASSESSMENT — ENCOUNTER SYMPTOMS
SHORTNESS OF BREATH: 0
ABDOMINAL PAIN: 0
DIARRHEA: 0
CONSTIPATION: 0
BLOOD IN STOOL: 0

## 2018-01-10 NOTE — PROGRESS NOTES
Z-Chinmay, Phenergan with codeine  when necessary for cough and and prednisone 20 mg daily    2. Type 2 diabetes mellitus without complication, without long-term current use of insulin ([facility]) -Controlled. The last HbA1c was 6.6%. Continue current medication    3. Morbid obesity due to excess calories ([address] - advised diet and exercise    4. Essential hypertension -Controlled continue Toprol-XL 25 mg daily amlodipine 5 mg daily and losartan- HCT  100-25 mg/day            Plan:      As above.  Request to come back in one week if not better otherwise keep up regular appointment

## 2018-01-23 DIAGNOSIS — G89.4 CHRONIC PAIN SYNDROME: Primary | ICD-10-CM

## 2018-01-24 RX ORDER — ACETAMINOPHEN AND CODEINE PHOSPHATE 300; 30 MG/1; MG/1
TABLET ORAL
Qty: 90 TABLET | Refills: 0 | Status: SHIPPED | OUTPATIENT
Start: 2018-01-24 | End: 2018-03-09 | Stop reason: SDUPTHER

## 2018-02-06 RX ORDER — DULOXETIN HYDROCHLORIDE 60 MG/1
CAPSULE, DELAYED RELEASE ORAL
Qty: 90 CAPSULE | Refills: 1 | Status: SHIPPED | OUTPATIENT
Start: 2018-02-06 | End: 2018-08-14

## 2018-02-27 RX ORDER — PANTOPRAZOLE SODIUM 40 MG/1
40 TABLET, DELAYED RELEASE ORAL DAILY
Qty: 90 TABLET | Refills: 1 | Status: SHIPPED | OUTPATIENT
Start: 2018-02-27 | End: 2018-08-28 | Stop reason: SDUPTHER

## 2018-02-27 RX ORDER — LOSARTAN POTASSIUM AND HYDROCHLOROTHIAZIDE 25; 100 MG/1; MG/1
TABLET ORAL
Qty: 90 TABLET | Refills: 1 | Status: SHIPPED | OUTPATIENT
Start: 2018-02-27 | End: 2018-09-19

## 2018-02-27 RX ORDER — ATORVASTATIN CALCIUM 40 MG/1
TABLET, FILM COATED ORAL
Qty: 90 TABLET | Refills: 1 | Status: SHIPPED | OUTPATIENT
Start: 2018-02-27 | End: 2018-09-19

## 2018-02-27 RX ORDER — GLIMEPIRIDE 4 MG/1
TABLET ORAL
Qty: 90 TABLET | Refills: 1 | Status: SHIPPED | OUTPATIENT
Start: 2018-02-27 | End: 2018-08-28 | Stop reason: SDUPTHER

## 2018-03-09 DIAGNOSIS — G89.4 CHRONIC PAIN SYNDROME: ICD-10-CM

## 2018-03-13 DIAGNOSIS — G89.4 CHRONIC PAIN SYNDROME: ICD-10-CM

## 2018-03-13 RX ORDER — ACETAMINOPHEN AND CODEINE PHOSPHATE 300; 30 MG/1; MG/1
TABLET ORAL
Qty: 90 TABLET | Refills: 0 | Status: SHIPPED | OUTPATIENT
Start: 2018-03-13 | End: 2018-04-24 | Stop reason: SDUPTHER

## 2018-03-13 RX ORDER — ACETAMINOPHEN AND CODEINE PHOSPHATE 300; 30 MG/1; MG/1
TABLET ORAL
Qty: 90 TABLET | Refills: 0 | Status: SHIPPED | OUTPATIENT
Start: 2018-03-13 | End: 2018-03-14 | Stop reason: SDUPTHER

## 2018-03-15 ENCOUNTER — OFFICE VISIT (OUTPATIENT)
Dept: INTERNAL MEDICINE CLINIC | Age: 73
End: 2018-03-15

## 2018-03-15 VITALS
BODY MASS INDEX: 41.93 KG/M2 | HEIGHT: 59 IN | DIASTOLIC BLOOD PRESSURE: 62 MMHG | OXYGEN SATURATION: 98 % | HEART RATE: 61 BPM | WEIGHT: 208 LBS | SYSTOLIC BLOOD PRESSURE: 112 MMHG

## 2018-03-15 DIAGNOSIS — L91.8 SKIN TAG: ICD-10-CM

## 2018-03-15 DIAGNOSIS — I10 ESSENTIAL HYPERTENSION: ICD-10-CM

## 2018-03-15 DIAGNOSIS — E55.9 VITAMIN D DEFICIENCY: ICD-10-CM

## 2018-03-15 DIAGNOSIS — E11.9 TYPE 2 DIABETES MELLITUS WITHOUT COMPLICATION, WITHOUT LONG-TERM CURRENT USE OF INSULIN (HCC): Primary | ICD-10-CM

## 2018-03-15 DIAGNOSIS — L53.9 ERYTHEMATOUS CONDITION: ICD-10-CM

## 2018-03-15 DIAGNOSIS — J45.20 MILD INTERMITTENT ASTHMA WITHOUT COMPLICATION: ICD-10-CM

## 2018-03-15 DIAGNOSIS — G47.33 OSA ON CPAP: ICD-10-CM

## 2018-03-15 DIAGNOSIS — Z11.59 NEED FOR HEPATITIS C SCREENING TEST: ICD-10-CM

## 2018-03-15 DIAGNOSIS — Z99.89 OSA ON CPAP: ICD-10-CM

## 2018-03-15 DIAGNOSIS — E78.2 MIXED HYPERLIPIDEMIA: ICD-10-CM

## 2018-03-15 DIAGNOSIS — G25.81 RESTLESS LEGS SYNDROME (RLS): ICD-10-CM

## 2018-03-15 LAB
A/G RATIO: 2.1 (ref 1.1–2.2)
ALBUMIN SERPL-MCNC: 3.9 G/DL (ref 3.4–5)
ALP BLD-CCNC: 84 U/L (ref 40–129)
ALT SERPL-CCNC: 8 U/L (ref 10–40)
ANION GAP SERPL CALCULATED.3IONS-SCNC: 15 MMOL/L (ref 3–16)
AST SERPL-CCNC: 15 U/L (ref 15–37)
BASOPHILS ABSOLUTE: 0.1 K/UL (ref 0–0.2)
BASOPHILS RELATIVE PERCENT: 0.7 %
BILIRUB SERPL-MCNC: <0.2 MG/DL (ref 0–1)
BUN BLDV-MCNC: 13 MG/DL (ref 7–20)
CALCIUM SERPL-MCNC: 9.1 MG/DL (ref 8.3–10.6)
CHLORIDE BLD-SCNC: 99 MMOL/L (ref 99–110)
CHOLESTEROL, TOTAL: 140 MG/DL (ref 0–199)
CO2: 26 MMOL/L (ref 21–32)
CREAT SERPL-MCNC: 0.5 MG/DL (ref 0.6–1.2)
EOSINOPHILS ABSOLUTE: 0.2 K/UL (ref 0–0.6)
EOSINOPHILS RELATIVE PERCENT: 2.6 %
GFR AFRICAN AMERICAN: >60
GFR NON-AFRICAN AMERICAN: >60
GLOBULIN: 1.9 G/DL
GLUCOSE BLD-MCNC: 96 MG/DL (ref 70–99)
HCT VFR BLD CALC: 36 % (ref 36–48)
HDLC SERPL-MCNC: 62 MG/DL (ref 40–60)
HEMOGLOBIN: 11.7 G/DL (ref 12–16)
LDL CHOLESTEROL CALCULATED: 56 MG/DL
LYMPHOCYTES ABSOLUTE: 1.8 K/UL (ref 1–5.1)
LYMPHOCYTES RELATIVE PERCENT: 21.7 %
MCH RBC QN AUTO: 27 PG (ref 26–34)
MCHC RBC AUTO-ENTMCNC: 32.4 G/DL (ref 31–36)
MCV RBC AUTO: 83.2 FL (ref 80–100)
MONOCYTES ABSOLUTE: 0.7 K/UL (ref 0–1.3)
MONOCYTES RELATIVE PERCENT: 8.9 %
NEUTROPHILS ABSOLUTE: 5.4 K/UL (ref 1.7–7.7)
NEUTROPHILS RELATIVE PERCENT: 66.1 %
PDW BLD-RTO: 14.4 % (ref 12.4–15.4)
PLATELET # BLD: 339 K/UL (ref 135–450)
PMV BLD AUTO: 8.3 FL (ref 5–10.5)
POTASSIUM SERPL-SCNC: 3.9 MMOL/L (ref 3.5–5.1)
RBC # BLD: 4.32 M/UL (ref 4–5.2)
SODIUM BLD-SCNC: 140 MMOL/L (ref 136–145)
TOTAL PROTEIN: 5.8 G/DL (ref 6.4–8.2)
TRIGL SERPL-MCNC: 112 MG/DL (ref 0–150)
VLDLC SERPL CALC-MCNC: 22 MG/DL
WBC # BLD: 8.1 K/UL (ref 4–11)

## 2018-03-15 PROCEDURE — 1036F TOBACCO NON-USER: CPT | Performed by: FAMILY MEDICINE

## 2018-03-15 PROCEDURE — 11200 RMVL SKIN TAGS UP TO&INC 15: CPT | Performed by: FAMILY MEDICINE

## 2018-03-15 PROCEDURE — 1123F ACP DISCUSS/DSCN MKR DOCD: CPT | Performed by: FAMILY MEDICINE

## 2018-03-15 PROCEDURE — G8482 FLU IMMUNIZE ORDER/ADMIN: HCPCS | Performed by: FAMILY MEDICINE

## 2018-03-15 PROCEDURE — 99214 OFFICE O/P EST MOD 30 MIN: CPT | Performed by: FAMILY MEDICINE

## 2018-03-15 PROCEDURE — 1090F PRES/ABSN URINE INCON ASSESS: CPT | Performed by: FAMILY MEDICINE

## 2018-03-15 PROCEDURE — G8427 DOCREV CUR MEDS BY ELIG CLIN: HCPCS | Performed by: FAMILY MEDICINE

## 2018-03-15 PROCEDURE — G8417 CALC BMI ABV UP PARAM F/U: HCPCS | Performed by: FAMILY MEDICINE

## 2018-03-15 PROCEDURE — 3014F SCREEN MAMMO DOC REV: CPT | Performed by: FAMILY MEDICINE

## 2018-03-15 PROCEDURE — 36415 COLL VENOUS BLD VENIPUNCTURE: CPT | Performed by: FAMILY MEDICINE

## 2018-03-15 PROCEDURE — G8399 PT W/DXA RESULTS DOCUMENT: HCPCS | Performed by: FAMILY MEDICINE

## 2018-03-15 PROCEDURE — 4040F PNEUMOC VAC/ADMIN/RCVD: CPT | Performed by: FAMILY MEDICINE

## 2018-03-15 PROCEDURE — 3017F COLORECTAL CA SCREEN DOC REV: CPT | Performed by: FAMILY MEDICINE

## 2018-03-15 PROCEDURE — 3046F HEMOGLOBIN A1C LEVEL >9.0%: CPT | Performed by: FAMILY MEDICINE

## 2018-03-15 RX ORDER — ALBUTEROL SULFATE 2.5 MG/3ML
2.5 SOLUTION RESPIRATORY (INHALATION) 4 TIMES DAILY
Qty: 120 EACH | Refills: 5 | Status: SHIPPED | OUTPATIENT
Start: 2018-03-15 | End: 2019-09-12

## 2018-03-15 RX ORDER — ALBUTEROL SULFATE 2.5 MG/3ML
2.5 SOLUTION RESPIRATORY (INHALATION) 4 TIMES DAILY
Qty: 120 EACH | Refills: 5 | Status: SHIPPED | OUTPATIENT
Start: 2018-03-15 | End: 2018-03-15 | Stop reason: SDUPTHER

## 2018-03-15 ASSESSMENT — PATIENT HEALTH QUESTIONNAIRE - PHQ9
SUM OF ALL RESPONSES TO PHQ9 QUESTIONS 1 & 2: 0
2. FEELING DOWN, DEPRESSED OR HOPELESS: 0
SUM OF ALL RESPONSES TO PHQ QUESTIONS 1-9: 0
1. LITTLE INTEREST OR PLEASURE IN DOING THINGS: 0

## 2018-03-15 ASSESSMENT — ENCOUNTER SYMPTOMS
CONSTIPATION: 0
SHORTNESS OF BREATH: 0
BLOOD IN STOOL: 0
DIARRHEA: 0
ABDOMINAL PAIN: 0

## 2018-03-15 NOTE — PROGRESS NOTES
hyperlipidemia -Controlled continue Lipitor 40 mg daily. Will check lipid panel    6. Skin tag -3 skin tags were removed via electrocautery    7. Restless legs syndrome (RLS) -Stable continue Sinemet    8. Vitamin D deficiency -Controlled continue vitamin D supplement    9. Need for hepatitis C screening test -Draw blood for hepatitis C screening             Plan:      As above.  RTC in 3 mos

## 2018-03-15 NOTE — LETTER
reduced coughing, which are especially dangerous for patients with lung disease. Overdose or dangerous interactions with alcohol and other medications may occur, leading to death. Hyperalgesia may develop, in which patients receiving opioids for the treatment of pain may actually become more sensitive to certain painful stimuli, and in some cases, experience pain from ordinarily non-painful stimuli. Women between the ages of 14-53 who could become pregnant should carefully weigh the risks and benefits of opioids with their physicians, as these medications increase the risk of pregnancy complications, including miscarriage,  delivery and stillbirth. It is also possible for babies to be born addicted to opioids. Opioid dependence withdrawal symptoms may include; feelings of uneasiness, increased pain, irritability, belly pain, diarrhea, sweats and goose-flesh. Benzodiazepines and non-benzodiazepine sleep medications: These medications can lead to problems such as addiction/dependence, sedation, fatigue, lightheadedness, dizziness, incoordination, falls, depression, hallucinations, and impaired judgment, memory and concentration. The ability to drive and operate machinery may also be affected. Abnormal sleep-related behaviors have been reported, including sleep walking, driving, making telephone calls, eating, or having sex while not fully awake. These medications can suppress breathing and worsen sleep apnea, particularly when combined with alcohol or other sedating medications, potentially leading to death. Dependence withdrawal symptoms may include tremors, anxiety, hallucinations and seizures. Stimulants:  Common adverse effects include addiction/dependence, increased blood pressure and heart rate, decreased appetite, nausea, involuntary weight loss, insomnia, irritability, and headaches.   These risks may increase when these medications are combined with other stimulants, such as caffeine pills or energy drinks, certain weight loss supplements and oral decongestants. Dependence withdrawal symptoms may include depressed mood, loss of interest, suicidal thoughts, anxiety, fatigue, appetite changes and agitation. Testosterone replacement therapy:  Potential side effects include increased risk of stroke and heart attack, blood clots, increased blood pressure, increased cholesterol, enlarged prostate, sleep apnea, irritability/aggression and other mood disorders, and decreased fertility. Other:     1. I understand that I have the following responsibilities:  · I will take medications at the dose and frequency prescribed. · I will not increase or change how I take my medications without the approval of the health care provider who signs this Medication Agreement. · I will arrange for refills at the prescribed interval ONLY during regular office hours. I will not ask for refills earlier than agreed, after-hours, on holidays or on weekends. · I will obtain all refills for these medications at [place] [phone] (phone number ), with full consent for my provider and pharmacist to exchange information in writing or verbally. · I will not request any pain medications or controlled substances from other providers and will inform this provider of all other medications I am taking. · I will inform my other health care providers that I am taking these medications and of the existence of this [id number]. In the event of an emergency, I will provide the same information to the emergency department providers. · I will protect my prescriptions and medications. I understand that lost or misplaced prescriptions will not be replaced. · I will keep medications only for my own use and will not share them with others. I will keep all medications away from children.   · I agree to participate in any medical, psychological or psychiatric assessments recommended by my provider. · I will actively participate in any program designed to improve function, including social, physical, psychological and daily or work activities. 2. I will not use illegal or street drugs or another person's prescription. If I have an addiction problem with drugs or alcohol and my provider asks me to enter a program to address this issue, I agree to follow through. Such programs may include:  · 12-Step program and securing a sponsor  · Individual counseling   · Inpatient or outpatient treatment  · Other:_____________________________________________________________________________________________________________________________________________    If in treatment, I will request that a copy of the programs initial evaluation and treatment recommendations be sent to this provider and will not expect refills until that is received. I will also request written monthly updates be sent to this provider to verify my continuing treatment. 3. I will consent to drug screening upon my providers request to assure I am only taking the prescribed drugs, described in this MEDICATION AGREEMENT. I understand that a drug screen is a laboratory test in which a sample of my urine, blood or saliva is checked to see what drugs I have been taking. 4. I agree that I will treat the providers and staff at this office with respect at all times. I will keep all of my scheduled appointments, but if I need to cancel my appointment, I will do so a minimum of 24 hours before it is scheduled. 5. I understand that this provider may stop prescribing the medications listed if:  · I do not show any improvement in pain, or my activity has not improved. · I develop rapid tolerance or loss of improvement, as described in my treatment plan. · I develop significant side effects from the medication.   · My behavior is inconsistent with the responsibilities outlined above,

## 2018-03-16 LAB
ESTIMATED AVERAGE GLUCOSE: 137 MG/DL
HBA1C MFR BLD: 6.4 %
T4 FREE: 1 NG/DL (ref 0.9–1.8)
TSH SERPL DL<=0.05 MIU/L-ACNC: 2.44 UIU/ML (ref 0.27–4.2)
VITAMIN D 25-HYDROXY: 57.1 NG/ML

## 2018-03-17 LAB — HEPATITIS C ANTIBODY INTERPRETATION: NORMAL

## 2018-03-21 RX ORDER — AMLODIPINE BESYLATE 5 MG/1
TABLET ORAL
Qty: 90 TABLET | Refills: 1 | Status: SHIPPED | OUTPATIENT
Start: 2018-03-21 | End: 2018-09-21 | Stop reason: SDUPTHER

## 2018-04-24 DIAGNOSIS — G89.4 CHRONIC PAIN SYNDROME: ICD-10-CM

## 2018-04-25 RX ORDER — ACETAMINOPHEN AND CODEINE PHOSPHATE 300; 30 MG/1; MG/1
TABLET ORAL
Qty: 90 TABLET | Refills: 0 | Status: SHIPPED | OUTPATIENT
Start: 2018-04-25 | End: 2018-06-06 | Stop reason: SDUPTHER

## 2018-05-29 RX ORDER — GABAPENTIN 600 MG/1
TABLET ORAL
Qty: 180 TABLET | Refills: 1 | Status: SHIPPED | OUTPATIENT
Start: 2018-05-29 | End: 2018-12-19

## 2018-06-06 DIAGNOSIS — G89.4 CHRONIC PAIN SYNDROME: ICD-10-CM

## 2018-06-06 RX ORDER — ACETAMINOPHEN AND CODEINE PHOSPHATE 300; 30 MG/1; MG/1
TABLET ORAL
Qty: 90 TABLET | Refills: 0 | Status: SHIPPED | OUTPATIENT
Start: 2018-06-06 | End: 2018-07-24 | Stop reason: SDUPTHER

## 2018-06-21 ENCOUNTER — OFFICE VISIT (OUTPATIENT)
Dept: INTERNAL MEDICINE CLINIC | Age: 73
End: 2018-06-21

## 2018-06-21 VITALS
RESPIRATION RATE: 18 BRPM | BODY MASS INDEX: 42.73 KG/M2 | WEIGHT: 208 LBS | DIASTOLIC BLOOD PRESSURE: 61 MMHG | SYSTOLIC BLOOD PRESSURE: 117 MMHG | HEART RATE: 71 BPM | OXYGEN SATURATION: 91 %

## 2018-06-21 DIAGNOSIS — I10 ESSENTIAL HYPERTENSION: ICD-10-CM

## 2018-06-21 DIAGNOSIS — E78.2 MIXED HYPERLIPIDEMIA: ICD-10-CM

## 2018-06-21 DIAGNOSIS — Z99.89 OSA ON CPAP: ICD-10-CM

## 2018-06-21 DIAGNOSIS — E11.9 TYPE 2 DIABETES MELLITUS WITHOUT COMPLICATION, WITHOUT LONG-TERM CURRENT USE OF INSULIN (HCC): Primary | ICD-10-CM

## 2018-06-21 DIAGNOSIS — G47.33 OSA ON CPAP: ICD-10-CM

## 2018-06-21 DIAGNOSIS — J45.20 MILD INTERMITTENT ASTHMA WITHOUT COMPLICATION: ICD-10-CM

## 2018-06-21 DIAGNOSIS — E66.01 MORBID OBESITY DUE TO EXCESS CALORIES (HCC): ICD-10-CM

## 2018-06-21 LAB — HBA1C MFR BLD: 6.8 %

## 2018-06-21 PROCEDURE — 83036 HEMOGLOBIN GLYCOSYLATED A1C: CPT | Performed by: FAMILY MEDICINE

## 2018-06-21 PROCEDURE — 1090F PRES/ABSN URINE INCON ASSESS: CPT | Performed by: FAMILY MEDICINE

## 2018-06-21 PROCEDURE — G8417 CALC BMI ABV UP PARAM F/U: HCPCS | Performed by: FAMILY MEDICINE

## 2018-06-21 PROCEDURE — 4040F PNEUMOC VAC/ADMIN/RCVD: CPT | Performed by: FAMILY MEDICINE

## 2018-06-21 PROCEDURE — 1036F TOBACCO NON-USER: CPT | Performed by: FAMILY MEDICINE

## 2018-06-21 PROCEDURE — 99214 OFFICE O/P EST MOD 30 MIN: CPT | Performed by: FAMILY MEDICINE

## 2018-06-21 PROCEDURE — G8399 PT W/DXA RESULTS DOCUMENT: HCPCS | Performed by: FAMILY MEDICINE

## 2018-06-21 PROCEDURE — 1123F ACP DISCUSS/DSCN MKR DOCD: CPT | Performed by: FAMILY MEDICINE

## 2018-06-21 PROCEDURE — G8427 DOCREV CUR MEDS BY ELIG CLIN: HCPCS | Performed by: FAMILY MEDICINE

## 2018-06-21 PROCEDURE — 3044F HG A1C LEVEL LT 7.0%: CPT | Performed by: FAMILY MEDICINE

## 2018-06-21 PROCEDURE — 3017F COLORECTAL CA SCREEN DOC REV: CPT | Performed by: FAMILY MEDICINE

## 2018-06-21 PROCEDURE — 2022F DILAT RTA XM EVC RTNOPTHY: CPT | Performed by: FAMILY MEDICINE

## 2018-06-21 ASSESSMENT — ENCOUNTER SYMPTOMS
ABDOMINAL PAIN: 0
SHORTNESS OF BREATH: 0
CONSTIPATION: 0
DIARRHEA: 0
BLOOD IN STOOL: 0

## 2018-06-26 RX ORDER — METOPROLOL SUCCINATE 25 MG/1
25 TABLET, EXTENDED RELEASE ORAL DAILY
Qty: 90 TABLET | Refills: 1 | Status: SHIPPED | OUTPATIENT
Start: 2018-06-26 | End: 2018-12-20 | Stop reason: SDUPTHER

## 2018-06-26 RX ORDER — CARBIDOPA/LEVODOPA 25MG-250MG
2 TABLET ORAL 4 TIMES DAILY
Qty: 720 TABLET | Refills: 1 | Status: SHIPPED | OUTPATIENT
Start: 2018-06-26 | End: 2018-12-20 | Stop reason: SDUPTHER

## 2018-07-24 DIAGNOSIS — G89.4 CHRONIC PAIN SYNDROME: ICD-10-CM

## 2018-07-24 RX ORDER — ACETAMINOPHEN AND CODEINE PHOSPHATE 300; 30 MG/1; MG/1
1 TABLET ORAL 3 TIMES DAILY PRN
Qty: 90 TABLET | Refills: 0 | Status: SHIPPED | OUTPATIENT
Start: 2018-07-24 | End: 2018-09-18 | Stop reason: SDUPTHER

## 2018-08-06 RX ORDER — DULOXETIN HYDROCHLORIDE 60 MG/1
60 CAPSULE, DELAYED RELEASE ORAL DAILY
Qty: 90 CAPSULE | Refills: 1 | Status: SHIPPED | OUTPATIENT
Start: 2018-08-06 | End: 2019-02-08 | Stop reason: SDUPTHER

## 2018-08-14 ENCOUNTER — OFFICE VISIT (OUTPATIENT)
Dept: INTERNAL MEDICINE CLINIC | Age: 73
End: 2018-08-14

## 2018-08-14 VITALS — HEART RATE: 68 BPM | DIASTOLIC BLOOD PRESSURE: 58 MMHG | SYSTOLIC BLOOD PRESSURE: 128 MMHG | RESPIRATION RATE: 18 BRPM

## 2018-08-14 DIAGNOSIS — Z99.89 OSA ON CPAP: ICD-10-CM

## 2018-08-14 DIAGNOSIS — E11.9 TYPE 2 DIABETES MELLITUS WITHOUT COMPLICATION, WITHOUT LONG-TERM CURRENT USE OF INSULIN (HCC): ICD-10-CM

## 2018-08-14 DIAGNOSIS — M54.41 ACUTE RIGHT-SIDED LOW BACK PAIN WITH RIGHT-SIDED SCIATICA: Primary | ICD-10-CM

## 2018-08-14 DIAGNOSIS — E66.01 MORBID OBESITY DUE TO EXCESS CALORIES (HCC): ICD-10-CM

## 2018-08-14 DIAGNOSIS — G47.33 OSA ON CPAP: ICD-10-CM

## 2018-08-14 DIAGNOSIS — I10 ESSENTIAL HYPERTENSION: ICD-10-CM

## 2018-08-14 LAB
BILIRUBIN, POC: NEGATIVE
BLOOD URINE, POC: NEGATIVE
CLARITY, POC: NORMAL
COLOR, POC: YELLOW
GLUCOSE URINE, POC: NEGATIVE
KETONES, POC: NEGATIVE
LEUKOCYTE EST, POC: NEGATIVE
NITRITE, POC: NEGATIVE
PH, POC: 5
PROTEIN, POC: NEGATIVE
SPECIFIC GRAVITY, POC: 1.01
UROBILINOGEN, POC: 0.2

## 2018-08-14 PROCEDURE — 81002 URINALYSIS NONAUTO W/O SCOPE: CPT | Performed by: FAMILY MEDICINE

## 2018-08-14 PROCEDURE — G8399 PT W/DXA RESULTS DOCUMENT: HCPCS | Performed by: FAMILY MEDICINE

## 2018-08-14 PROCEDURE — 1123F ACP DISCUSS/DSCN MKR DOCD: CPT | Performed by: FAMILY MEDICINE

## 2018-08-14 PROCEDURE — 3044F HG A1C LEVEL LT 7.0%: CPT | Performed by: FAMILY MEDICINE

## 2018-08-14 PROCEDURE — 4040F PNEUMOC VAC/ADMIN/RCVD: CPT | Performed by: FAMILY MEDICINE

## 2018-08-14 PROCEDURE — 2022F DILAT RTA XM EVC RTNOPTHY: CPT | Performed by: FAMILY MEDICINE

## 2018-08-14 PROCEDURE — 1101F PT FALLS ASSESS-DOCD LE1/YR: CPT | Performed by: FAMILY MEDICINE

## 2018-08-14 PROCEDURE — 1036F TOBACCO NON-USER: CPT | Performed by: FAMILY MEDICINE

## 2018-08-14 PROCEDURE — G8417 CALC BMI ABV UP PARAM F/U: HCPCS | Performed by: FAMILY MEDICINE

## 2018-08-14 PROCEDURE — 99214 OFFICE O/P EST MOD 30 MIN: CPT | Performed by: FAMILY MEDICINE

## 2018-08-14 PROCEDURE — 1090F PRES/ABSN URINE INCON ASSESS: CPT | Performed by: FAMILY MEDICINE

## 2018-08-14 PROCEDURE — 3017F COLORECTAL CA SCREEN DOC REV: CPT | Performed by: FAMILY MEDICINE

## 2018-08-14 PROCEDURE — G8427 DOCREV CUR MEDS BY ELIG CLIN: HCPCS | Performed by: FAMILY MEDICINE

## 2018-08-14 RX ORDER — IBUPROFEN 600 MG/1
600 TABLET ORAL 3 TIMES DAILY PRN
Qty: 60 TABLET | Refills: 1 | Status: SHIPPED | OUTPATIENT
Start: 2018-08-14 | End: 2019-06-13 | Stop reason: SDUPTHER

## 2018-08-14 RX ORDER — PREDNISONE 20 MG/1
20 TABLET ORAL DAILY
Qty: 10 TABLET | Refills: 0 | Status: SHIPPED | OUTPATIENT
Start: 2018-08-14 | End: 2018-08-22 | Stop reason: ALTCHOICE

## 2018-08-14 ASSESSMENT — ENCOUNTER SYMPTOMS
BLOOD IN STOOL: 0
CONSTIPATION: 0
DIARRHEA: 0
ABDOMINAL PAIN: 0
SHORTNESS OF BREATH: 0

## 2018-08-14 NOTE — PROGRESS NOTES
2018     Philip Lilly (:  1945) is a 72 y.o. female, here for evaluation of the following medical concerns:    HPI  Patient presented to the office for follow-up. For the past several days been experiencing dull achy sensation on the right side of her body from the thoracic area down to the lumbar area down to the right hip and down to the right thigh. She denies fever and chills, she denies bowel or urinary disturbance, she denies respiratory symptoms. She has no recent injury and denies recent heavy lifting. The pain is somewhat worse with certain movement there is no related relieving symptoms but the naproxen somewhat help. She is morbidly obese which does not help her problem. She struggled to lose weight. She has obstructive be sleep apnea and has chronic fatigue. She wears CPAP at night and very compliant She also has restless leg syndrome and takes Sinemet . She has diabetes with peripheral neuropathy and takes  metformin and Amaryl plus gabapentin. She does not check blood sugar regularly but denies hypoglycemic episode    Review of Systems   Constitutional: Negative for activity change and appetite change. Eyes: Negative for visual disturbance. Respiratory: Negative for shortness of breath. Cardiovascular: Negative for chest pain and leg swelling. Gastrointestinal: Negative for abdominal pain, blood in stool, constipation and diarrhea. Genitourinary: Negative for difficulty urinating, frequency, hematuria, menstrual problem and urgency. Neurological: Negative for dizziness and syncope. Psychiatric/Behavioral: Negative for behavioral problems. Prior to Visit Medications    Medication Sig Taking? Authorizing Provider   predniSONE (DELTASONE) 20 MG tablet Take 1 tablet by mouth daily for 10 days Yes Doris Morocho MD   ibuprofen (ADVIL;MOTRIN) 600 MG tablet Take 1 tablet by mouth 3 times daily as needed for Pain Take with food.  Yes Doris Morocho MD   DULoxetine Toprol-XL 25 mg daily, amlodipine 5 mg daily and Hyzaar 100-25 mg daily      Follow-up in 3 mos and PRN.     An electronic signature was used to authenticate this note.    --Marianna Mortensen MD on 8/14/2018 at 3:30 PM

## 2018-08-22 ENCOUNTER — OFFICE VISIT (OUTPATIENT)
Dept: PULMONOLOGY | Age: 73
End: 2018-08-22

## 2018-08-22 VITALS
TEMPERATURE: 97.7 F | HEART RATE: 70 BPM | SYSTOLIC BLOOD PRESSURE: 110 MMHG | RESPIRATION RATE: 16 BRPM | DIASTOLIC BLOOD PRESSURE: 66 MMHG | OXYGEN SATURATION: 97 % | BODY MASS INDEX: 40.44 KG/M2 | WEIGHT: 206 LBS | HEIGHT: 60 IN

## 2018-08-22 DIAGNOSIS — R06.02 SOB (SHORTNESS OF BREATH): ICD-10-CM

## 2018-08-22 DIAGNOSIS — R63.4 WEIGHT LOSS: ICD-10-CM

## 2018-08-22 DIAGNOSIS — G47.33 OSA ON CPAP: Primary | ICD-10-CM

## 2018-08-22 DIAGNOSIS — J45.909 MILD ASTHMA WITHOUT COMPLICATION, UNSPECIFIED WHETHER PERSISTENT: ICD-10-CM

## 2018-08-22 DIAGNOSIS — Z99.89 OSA ON CPAP: Primary | ICD-10-CM

## 2018-08-22 PROCEDURE — G8399 PT W/DXA RESULTS DOCUMENT: HCPCS | Performed by: INTERNAL MEDICINE

## 2018-08-22 PROCEDURE — 4040F PNEUMOC VAC/ADMIN/RCVD: CPT | Performed by: INTERNAL MEDICINE

## 2018-08-22 PROCEDURE — 3017F COLORECTAL CA SCREEN DOC REV: CPT | Performed by: INTERNAL MEDICINE

## 2018-08-22 PROCEDURE — 1101F PT FALLS ASSESS-DOCD LE1/YR: CPT | Performed by: INTERNAL MEDICINE

## 2018-08-22 PROCEDURE — 1123F ACP DISCUSS/DSCN MKR DOCD: CPT | Performed by: INTERNAL MEDICINE

## 2018-08-22 PROCEDURE — 1090F PRES/ABSN URINE INCON ASSESS: CPT | Performed by: INTERNAL MEDICINE

## 2018-08-22 PROCEDURE — 99214 OFFICE O/P EST MOD 30 MIN: CPT | Performed by: INTERNAL MEDICINE

## 2018-08-22 PROCEDURE — G8427 DOCREV CUR MEDS BY ELIG CLIN: HCPCS | Performed by: INTERNAL MEDICINE

## 2018-08-22 PROCEDURE — G8417 CALC BMI ABV UP PARAM F/U: HCPCS | Performed by: INTERNAL MEDICINE

## 2018-08-22 PROCEDURE — 1036F TOBACCO NON-USER: CPT | Performed by: INTERNAL MEDICINE

## 2018-08-22 ASSESSMENT — SLEEP AND FATIGUE QUESTIONNAIRES
HOW LIKELY ARE YOU TO NOD OFF OR FALL ASLEEP WHILE SITTING INACTIVE IN A PUBLIC PLACE: 1
HOW LIKELY ARE YOU TO NOD OFF OR FALL ASLEEP IN A CAR, WHILE STOPPED FOR A FEW MINUTES IN TRAFFIC: 0
HOW LIKELY ARE YOU TO NOD OFF OR FALL ASLEEP WHILE LYING DOWN TO REST IN THE AFTERNOON WHEN CIRCUMSTANCES PERMIT: 2
HOW LIKELY ARE YOU TO NOD OFF OR FALL ASLEEP WHEN YOU ARE A PASSENGER IN A CAR FOR AN HOUR WITHOUT A BREAK: 1
HOW LIKELY ARE YOU TO NOD OFF OR FALL ASLEEP WHILE SITTING AND READING: 2
HOW LIKELY ARE YOU TO NOD OFF OR FALL ASLEEP WHILE SITTING AND TALKING TO SOMEONE: 0
HOW LIKELY ARE YOU TO NOD OFF OR FALL ASLEEP WHILE WATCHING TV: 1
ESS TOTAL SCORE: 7
NECK CIRCUMFERENCE (INCHES): 17.5
HOW LIKELY ARE YOU TO NOD OFF OR FALL ASLEEP WHILE SITTING QUIETLY AFTER LUNCH WITHOUT ALCOHOL: 0

## 2018-08-22 ASSESSMENT — ASTHMA QUESTIONNAIRES
QUESTION_5 LAST FOUR WEEKS HOW WOULD YOU RATE YOUR ASTHMA CONTROL: 5
ACT_TOTALSCORE: 23
QUESTION_4 LAST FOUR WEEKS HOW OFTEN HAVE YOU USED YOUR RESCUE INHALER OR NEBULIZER MEDICATION (SUCH AS ALBUTEROL): 5
QUESTION_1 LAST FOUR WEEKS HOW MUCH OF THE TIME DID YOUR ASTHMA KEEP YOU FROM GETTING AS MUCH DONE AT WORK, SCHOOL OR AT HOME: 4
QUESTION_3 LAST FOUR WEEKS HOW OFTEN DID YOUR ASTHMA SYMPTOMS (WHEEZING, COUGHING, SHORTNESS OF BREATH, CHEST TIGHTNESS OR PAIN) WAKE YOU UP AT NIGHT OR EARLIER THAN USUAL IN THE MORNING: 5
QUESTION_2 LAST FOUR WEEKS HOW OFTEN HAVE YOU HAD SHORTNESS OF BREATH: 4

## 2018-08-22 NOTE — PATIENT INSTRUCTIONS
Use albuterol as needed    Continue with CPAP    Chest x-ray at hospital at your convenience     Follow up in 6 months

## 2018-08-22 NOTE — PROGRESS NOTES
Chief complaint  This is a 72y.o. year old female  who comes to see me with a chief complaint of   Chief Complaint   Patient presents with    Asthma    Sleep Apnea    . HPI  Follow up on DOYLE.  and asthma      Machine:  Patient is using a cpap machine at [facility] [address]. Average usage is 9 hrs 23 min 32 sec. Patient is averaging 4 or more hours for 100% of the time. Average AHI is 2.5. Great compliance. NO issues    Denies being SOB every. Has albuterol but does not use it. Has lost weight for no reason.   She is not worried about it as she is feeling better but does not have any idea why    Last time she had impacted cerumen and now her ear and dizziness are better      Sleep Medicine 8/14/2017 7/18/2016 1/25/2016 9/1/2015   Sitting and reading 2 1 1 2   Watching TV 1 1 2 2   Sitting, inactive in a public place (e.g. a theatre or a meeting) 2 1 2 2   As a passenger in a car for an hour without a break 1 1 2 2   Lying down to rest in the afternoon when circumstances permit 2 1 2 3   Sitting and talking to someone 0 0 1 1   Sitting quietly after a lunch without alcohol 0 1 0 1   In a car, while stopped for a few minutes in traffic 0 0 0 1   Total score 8 6 10 14   Neck circumference 19 19 - 19       Past Medical History:   Diagnosis Date    Asthma     Benign tumor lacrimal gland     Greater trochanteric bursitis of left hip     Hyperlipidemia     Hypertension     Pseudophakia     RLS (restless legs syndrome)     Type II or unspecified type diabetes mellitus without mention of complication, not stated as uncontrolled     Unspecified sleep apnea     sleep study done [id number]    Vitamin D deficiency        Past Surgical History:   Procedure Laterality Date    BREAST SURGERY      CHOLECYSTECTOMY  [id number]    HYSTERECTOMY      NASAL SEPTUM SURGERY      TONSILLECTOMY AND ADENOIDECTOMY         Social History     Social History    Marital status:      Spouse name: N/A    Number of (such as albuterol)? 5 2   How would you rate your asthma control during the past 4 weeks? 5 1   Asthma Control Test Total Score 22 7       Lab Results   Component Value Date    WBC 8.1 03/15/2018    HGB 11.7 (L) 03/15/2018    HCT 36.0 03/15/2018    MCV 83.2 03/15/2018     03/15/2018       No results found for: BNP    Lab Results   Component Value Date    CREATININE 0.5 (L) 03/15/2018    BUN 13 03/15/2018     03/15/2018    K 3.9 03/15/2018    CL 99 03/15/2018    CO2 26 03/15/2018           Assessment/Plan  1. DOYLE on CPAP  Benefiting and compliant. Benefiting from therapy by a low Bradford score, good energy levels, low fatigue, and control of chronic medical problems    2. Mild asthma without complication, unspecified whether persistent  Controlled. Could have mild COPD as well, but does not require albuterol    3. SOB (shortness of breath)  CXR today. History of tobacco with weight loss. Outside of criteria for lung cancer CT due to > 15 years since she quit  - XR CHEST STANDARD (2 VW); Future    4. Weight loss  CXR today vs tomorrow   - XR CHEST STANDARD (2 VW);  Future    Follow up in 6 months

## 2018-08-23 ENCOUNTER — HOSPITAL ENCOUNTER (OUTPATIENT)
Dept: OTHER | Age: 73
Discharge: HOME | End: 2018-08-23
Attending: INTERNAL MEDICINE | Admitting: INTERNAL MEDICINE

## 2018-08-23 DIAGNOSIS — R06.02 SOB (SHORTNESS OF BREATH): ICD-10-CM

## 2018-08-23 DIAGNOSIS — R63.4 WEIGHT LOSS: ICD-10-CM

## 2018-08-29 RX ORDER — GLIMEPIRIDE 4 MG/1
4 TABLET ORAL
Qty: 90 TABLET | Refills: 1 | Status: SHIPPED | OUTPATIENT
Start: 2018-08-29 | End: 2019-02-18 | Stop reason: SDUPTHER

## 2018-08-29 RX ORDER — LOSARTAN POTASSIUM AND HYDROCHLOROTHIAZIDE 25; 100 MG/1; MG/1
1 TABLET ORAL DAILY
Qty: 90 TABLET | Refills: 1 | Status: SHIPPED | OUTPATIENT
Start: 2018-08-29 | End: 2019-03-21

## 2018-08-29 RX ORDER — ATORVASTATIN CALCIUM 40 MG/1
40 TABLET, FILM COATED ORAL DAILY
Qty: 90 TABLET | Refills: 1 | Status: SHIPPED | OUTPATIENT
Start: 2018-08-29 | End: 2019-02-08 | Stop reason: SDUPTHER

## 2018-08-29 RX ORDER — PANTOPRAZOLE SODIUM 40 MG/1
40 TABLET, DELAYED RELEASE ORAL DAILY
Qty: 90 TABLET | Refills: 1 | Status: SHIPPED | OUTPATIENT
Start: 2018-08-29 | End: 2019-02-08 | Stop reason: SDUPTHER

## 2018-09-07 ENCOUNTER — TELEPHONE (OUTPATIENT)
Dept: INTERNAL MEDICINE CLINIC | Age: 73
End: 2018-09-07

## 2018-09-07 RX ORDER — BUSPIRONE HYDROCHLORIDE 7.5 MG/1
7.5 TABLET ORAL 2 TIMES DAILY
Qty: 180 TABLET | Refills: 1 | Status: SHIPPED | OUTPATIENT
Start: 2018-09-07 | End: 2018-09-20

## 2018-09-07 RX ORDER — BUSPIRONE HYDROCHLORIDE 7.5 MG/1
7.5 TABLET ORAL 2 TIMES DAILY
Qty: 60 TABLET | Refills: 2 | Status: SHIPPED | OUTPATIENT
Start: 2018-09-07 | End: 2018-09-07 | Stop reason: SDUPTHER

## 2018-09-07 NOTE — TELEPHONE ENCOUNTER
Per DR Radha Smith doesn't want to restart Wellbutrin,. Try Buspar Rx E scribed to pharmacy  The patient has been notified of this information and all questions answered.

## 2018-09-18 DIAGNOSIS — G89.4 CHRONIC PAIN SYNDROME: ICD-10-CM

## 2018-09-18 RX ORDER — ACETAMINOPHEN AND CODEINE PHOSPHATE 300; 30 MG/1; MG/1
1 TABLET ORAL 3 TIMES DAILY PRN
Qty: 90 TABLET | Refills: 0 | Status: SHIPPED | OUTPATIENT
Start: 2018-09-18 | End: 2018-10-30 | Stop reason: SDUPTHER

## 2018-09-20 ENCOUNTER — OFFICE VISIT (OUTPATIENT)
Dept: INTERNAL MEDICINE CLINIC | Age: 73
End: 2018-09-20

## 2018-09-20 VITALS
SYSTOLIC BLOOD PRESSURE: 122 MMHG | DIASTOLIC BLOOD PRESSURE: 61 MMHG | BODY MASS INDEX: 40.7 KG/M2 | HEART RATE: 68 BPM | RESPIRATION RATE: 18 BRPM | WEIGHT: 208.4 LBS | OXYGEN SATURATION: 93 %

## 2018-09-20 DIAGNOSIS — E66.01 MORBID OBESITY DUE TO EXCESS CALORIES (HCC): ICD-10-CM

## 2018-09-20 DIAGNOSIS — I10 ESSENTIAL HYPERTENSION: ICD-10-CM

## 2018-09-20 DIAGNOSIS — Z23 NEED FOR INFLUENZA VACCINATION: Primary | ICD-10-CM

## 2018-09-20 DIAGNOSIS — E11.9 TYPE 2 DIABETES MELLITUS WITHOUT COMPLICATION, WITHOUT LONG-TERM CURRENT USE OF INSULIN (HCC): ICD-10-CM

## 2018-09-20 DIAGNOSIS — Z23 NEED FOR PNEUMOCOCCAL VACCINATION: ICD-10-CM

## 2018-09-20 DIAGNOSIS — Z99.89 OSA ON CPAP: ICD-10-CM

## 2018-09-20 DIAGNOSIS — G47.33 OSA ON CPAP: ICD-10-CM

## 2018-09-20 DIAGNOSIS — E78.2 MIXED HYPERLIPIDEMIA: ICD-10-CM

## 2018-09-20 DIAGNOSIS — G25.81 RESTLESS LEGS SYNDROME (RLS): ICD-10-CM

## 2018-09-20 DIAGNOSIS — J45.20 MILD INTERMITTENT ASTHMA WITHOUT COMPLICATION: ICD-10-CM

## 2018-09-20 LAB — HBA1C MFR BLD: 6.8 %

## 2018-09-20 PROCEDURE — G0008 ADMIN INFLUENZA VIRUS VAC: HCPCS | Performed by: FAMILY MEDICINE

## 2018-09-20 PROCEDURE — G8427 DOCREV CUR MEDS BY ELIG CLIN: HCPCS | Performed by: FAMILY MEDICINE

## 2018-09-20 PROCEDURE — 1123F ACP DISCUSS/DSCN MKR DOCD: CPT | Performed by: FAMILY MEDICINE

## 2018-09-20 PROCEDURE — 3044F HG A1C LEVEL LT 7.0%: CPT | Performed by: FAMILY MEDICINE

## 2018-09-20 PROCEDURE — G8417 CALC BMI ABV UP PARAM F/U: HCPCS | Performed by: FAMILY MEDICINE

## 2018-09-20 PROCEDURE — 99214 OFFICE O/P EST MOD 30 MIN: CPT | Performed by: FAMILY MEDICINE

## 2018-09-20 PROCEDURE — 1101F PT FALLS ASSESS-DOCD LE1/YR: CPT | Performed by: FAMILY MEDICINE

## 2018-09-20 PROCEDURE — 83036 HEMOGLOBIN GLYCOSYLATED A1C: CPT | Performed by: FAMILY MEDICINE

## 2018-09-20 PROCEDURE — 4040F PNEUMOC VAC/ADMIN/RCVD: CPT | Performed by: FAMILY MEDICINE

## 2018-09-20 PROCEDURE — 1036F TOBACCO NON-USER: CPT | Performed by: FAMILY MEDICINE

## 2018-09-20 PROCEDURE — 1090F PRES/ABSN URINE INCON ASSESS: CPT | Performed by: FAMILY MEDICINE

## 2018-09-20 PROCEDURE — G8399 PT W/DXA RESULTS DOCUMENT: HCPCS | Performed by: FAMILY MEDICINE

## 2018-09-20 PROCEDURE — G0009 ADMIN PNEUMOCOCCAL VACCINE: HCPCS | Performed by: FAMILY MEDICINE

## 2018-09-20 PROCEDURE — 3017F COLORECTAL CA SCREEN DOC REV: CPT | Performed by: FAMILY MEDICINE

## 2018-09-20 PROCEDURE — 90682 RIV4 VACC RECOMBINANT DNA IM: CPT | Performed by: FAMILY MEDICINE

## 2018-09-20 PROCEDURE — 2022F DILAT RTA XM EVC RTNOPTHY: CPT | Performed by: FAMILY MEDICINE

## 2018-09-20 PROCEDURE — 90732 PPSV23 VACC 2 YRS+ SUBQ/IM: CPT | Performed by: FAMILY MEDICINE

## 2018-09-20 RX ORDER — BUSPIRONE HYDROCHLORIDE 15 MG/1
15 TABLET ORAL 2 TIMES DAILY
Qty: 180 TABLET | Refills: 1 | Status: SHIPPED | OUTPATIENT
Start: 2018-09-20 | End: 2019-04-14 | Stop reason: SDUPTHER

## 2018-09-20 RX ORDER — BUSPIRONE HYDROCHLORIDE 15 MG/1
15 TABLET ORAL 2 TIMES DAILY
Qty: 60 TABLET | Refills: 3 | Status: SHIPPED | OUTPATIENT
Start: 2018-09-20 | End: 2018-09-20 | Stop reason: SDUPTHER

## 2018-09-20 ASSESSMENT — ENCOUNTER SYMPTOMS
BLOOD IN STOOL: 0
ABDOMINAL PAIN: 0
SHORTNESS OF BREATH: 0
DIARRHEA: 0
CONSTIPATION: 0

## 2018-09-20 NOTE — PROGRESS NOTES
Vaccine Information Sheet, \"Influenza - Inactivated\"  given to Dyke Course, or parent/legal guardian of  Dyke Course and verbalized understanding. Patient responses:    Have you ever had a reaction to a flu vaccine? No  Are you able to eat eggs without adverse effects? Yes  Do you have any current illness? No  Have you ever had Guillian Eckerty Syndrome? No    Flu vaccine given per order. Please see immunization tab.

## 2018-09-20 NOTE — PROGRESS NOTES
by mouth daily Yes Hugo Martin MD   aspirin 81 MG tablet Take 81 mg by mouth daily. Yes Historical Provider, MD Josephine Freeman TEST strip TEST Bert Looney MD   TRUEPLUS LANCETS 28G MISC TEST EVERY DAY  Huog Martin MD        Social History   Substance Use Topics    Smoking status: Former Smoker     Packs/day: 2.00     Years: 36.00     Types: Cigarettes     Quit date: 4/23/1999    Smokeless tobacco: Never Used    Alcohol use No        Vitals:    09/20/18 1037   BP: 122/61   Pulse: 68   Resp: 18   SpO2: 93%   Weight: 208 lb 6.4 oz (94.5 kg)     Estimated body mass index is 40.7 kg/m² as calculated from the following:    Height as of 8/22/18: 5' (1.524 m). Weight as of this encounter: 208 lb 6.4 oz (94.5 kg). Physical Exam   Constitutional: She is oriented to person, place, and time. She appears well-developed and well-nourished. No distress. HENT:   Head: Normocephalic. Eyes: Conjunctivae are normal.   Neck: Neck supple. No thyromegaly present. Cardiovascular: Normal rate, regular rhythm and normal heart sounds. No murmur heard. Pulmonary/Chest: Breath sounds normal. No respiratory distress. She has no wheezes. She has no rales. Abdominal: Soft. She exhibits no distension. Musculoskeletal: Normal range of motion. She exhibits no edema. Neurological: She is alert and oriented to person, place, and time. Skin: Skin is warm. No rash noted. Psychiatric: She has a normal mood and affect. Her behavior is normal.       ASSESSMENT/PLAN:  1. Type 2 diabetes mellitus without complication, without long-term current use of insulin ([facility])  Controlled. HbA1c today 6.8%. Continue current medication  - POCT glycosylated hemoglobin (Hb A1C)    2. Essential hypertension  Controlled. Continue Hyzaar, Toprol    3. Mixed hyperlipidemia  Tolerating statin, takes Lipitor 40 mg daily. LDL was 56 last 3/15/18    4. DOYLE on CPAP  Encouraged regular use of CPAP    5.  Mild intermittent asthma without complication  Controlled. Continue current medication    6. Restless legs syndrome (RLS)  Controlled continue current medication- Sinemet    7. Morbid obesity due to excess calories ([address]  Encouraged to lose weight with diet and exercise    8. Need for influenza vaccination    - INFLUENZA, QUADV, RECOMBINANT, 18 YRS AND OLDER, IM, PF, PREFILL SYR OR SDV, 0.5ML (FLUBLOK QUADV, PF)    9. Need for pneumococcal vaccination  Pneumonia 23 given    Addendum: For her anxiety will increase BuSpar from 7.5 mg to 15 mg twice a day. Continue Cymbalta 60 mg daily     Follow-up on file.     An electronic signature was used to authenticate this note.    --Edu Ramsey MD on 9/20/2018 at 2:13 PM

## 2018-09-22 RX ORDER — AMLODIPINE BESYLATE 5 MG/1
5 TABLET ORAL DAILY
Qty: 90 TABLET | Refills: 1 | Status: SHIPPED | OUTPATIENT
Start: 2018-09-22 | End: 2019-03-18 | Stop reason: SDUPTHER

## 2018-10-16 RX ORDER — BUSPIRONE HYDROCHLORIDE 15 MG/1
TABLET ORAL
Qty: 180 TABLET | Refills: 3 | OUTPATIENT
Start: 2018-10-16

## 2018-10-30 DIAGNOSIS — G89.4 CHRONIC PAIN SYNDROME: ICD-10-CM

## 2018-10-31 RX ORDER — ACETAMINOPHEN AND CODEINE PHOSPHATE 300; 30 MG/1; MG/1
1 TABLET ORAL 3 TIMES DAILY PRN
Qty: 90 TABLET | Refills: 0 | Status: SHIPPED | OUTPATIENT
Start: 2018-10-31 | End: 2018-12-13 | Stop reason: SDUPTHER

## 2018-11-26 RX ORDER — GABAPENTIN 600 MG/1
600 TABLET ORAL 2 TIMES DAILY
Qty: 180 TABLET | Refills: 1 | Status: SHIPPED | OUTPATIENT
Start: 2018-11-26 | End: 2019-05-18 | Stop reason: SDUPTHER

## 2018-12-13 DIAGNOSIS — G89.4 CHRONIC PAIN SYNDROME: ICD-10-CM

## 2018-12-14 RX ORDER — ACETAMINOPHEN AND CODEINE PHOSPHATE 300; 30 MG/1; MG/1
1 TABLET ORAL 3 TIMES DAILY PRN
Qty: 90 TABLET | Refills: 0 | Status: SHIPPED | OUTPATIENT
Start: 2018-12-14 | End: 2019-01-29 | Stop reason: SDUPTHER

## 2018-12-20 ENCOUNTER — OFFICE VISIT (OUTPATIENT)
Dept: INTERNAL MEDICINE CLINIC | Age: 73
End: 2018-12-20
Payer: MEDICARE

## 2018-12-20 VITALS
BODY MASS INDEX: 40.93 KG/M2 | SYSTOLIC BLOOD PRESSURE: 118 MMHG | RESPIRATION RATE: 18 BRPM | DIASTOLIC BLOOD PRESSURE: 58 MMHG | WEIGHT: 209.6 LBS | OXYGEN SATURATION: 99 % | HEART RATE: 82 BPM

## 2018-12-20 DIAGNOSIS — G25.81 RESTLESS LEGS SYNDROME (RLS): Primary | ICD-10-CM

## 2018-12-20 DIAGNOSIS — Z99.89 OSA ON CPAP: ICD-10-CM

## 2018-12-20 DIAGNOSIS — E11.9 TYPE 2 DIABETES MELLITUS WITHOUT COMPLICATION, WITHOUT LONG-TERM CURRENT USE OF INSULIN (HCC): ICD-10-CM

## 2018-12-20 DIAGNOSIS — J45.20 MILD INTERMITTENT ASTHMA WITHOUT COMPLICATION: ICD-10-CM

## 2018-12-20 DIAGNOSIS — G47.33 OSA ON CPAP: ICD-10-CM

## 2018-12-20 DIAGNOSIS — I10 ESSENTIAL HYPERTENSION: ICD-10-CM

## 2018-12-20 LAB — HBA1C MFR BLD: 7.2 %

## 2018-12-20 PROCEDURE — 2022F DILAT RTA XM EVC RTNOPTHY: CPT | Performed by: FAMILY MEDICINE

## 2018-12-20 PROCEDURE — 1036F TOBACCO NON-USER: CPT | Performed by: FAMILY MEDICINE

## 2018-12-20 PROCEDURE — G8399 PT W/DXA RESULTS DOCUMENT: HCPCS | Performed by: FAMILY MEDICINE

## 2018-12-20 PROCEDURE — 1090F PRES/ABSN URINE INCON ASSESS: CPT | Performed by: FAMILY MEDICINE

## 2018-12-20 PROCEDURE — 1123F ACP DISCUSS/DSCN MKR DOCD: CPT | Performed by: FAMILY MEDICINE

## 2018-12-20 PROCEDURE — 1101F PT FALLS ASSESS-DOCD LE1/YR: CPT | Performed by: FAMILY MEDICINE

## 2018-12-20 PROCEDURE — 3045F PR MOST RECENT HEMOGLOBIN A1C LEVEL 7.0-9.0%: CPT | Performed by: FAMILY MEDICINE

## 2018-12-20 PROCEDURE — 99214 OFFICE O/P EST MOD 30 MIN: CPT | Performed by: FAMILY MEDICINE

## 2018-12-20 PROCEDURE — G8427 DOCREV CUR MEDS BY ELIG CLIN: HCPCS | Performed by: FAMILY MEDICINE

## 2018-12-20 PROCEDURE — 3017F COLORECTAL CA SCREEN DOC REV: CPT | Performed by: FAMILY MEDICINE

## 2018-12-20 PROCEDURE — G8417 CALC BMI ABV UP PARAM F/U: HCPCS | Performed by: FAMILY MEDICINE

## 2018-12-20 PROCEDURE — 4040F PNEUMOC VAC/ADMIN/RCVD: CPT | Performed by: FAMILY MEDICINE

## 2018-12-20 PROCEDURE — G8482 FLU IMMUNIZE ORDER/ADMIN: HCPCS | Performed by: FAMILY MEDICINE

## 2018-12-20 PROCEDURE — 83036 HEMOGLOBIN GLYCOSYLATED A1C: CPT | Performed by: FAMILY MEDICINE

## 2018-12-20 ASSESSMENT — ENCOUNTER SYMPTOMS
SHORTNESS OF BREATH: 0
ABDOMINAL PAIN: 0
DIARRHEA: 0
CONSTIPATION: 0
BLOOD IN STOOL: 0

## 2018-12-20 NOTE — PROGRESS NOTES
2018     Job Sterling Lilly (:  1945) is a 72 y.o. female, here for evaluation of the following medical concerns:    HPI  Asthma:  Current treatment includes nebulized beta agonists, combination beta agonists/steroid inhalers. Using preventive medication(s) consistently: yes. Residual symptoms: none. Patient denies wheezing. She requires her rescue inhaler 1 time(s) per day. Diabetes Mellitus Type 2: Current symptoms/problems include  neuropathy. Medication compliance:  compliant most of the time  Diabetic diet compliance:  compliant most of the time,  Weight trend: stable  Current exercise: no regular exercise  Barriers: none    Home blood sugar records: patient does not test  Any episodes of hypoglycemia? no  Eye exam current (within one year): unknown   reports that she quit smoking about 19 years ago. Her smoking use included Cigarettes. She has a 72.00 pack-year smoking history. She has never used smokeless tobacco.   Daily Aspirin? Yes    Lab Results   Component Value Date    LABA1C 7.2 2018    LABA1C 6.8 2018    LABA1C 6.8 2018     Lab Results   Component Value Date    CREATININE 0.5 (L) 03/15/2018     Lab Results   Component Value Date    ALT 8 (L) 03/15/2018    AST 15 03/15/2018     Lab Results   Component Value Date    CHOL 140 03/15/2018    TRIG 112 03/15/2018    HDL 62 (H) 03/15/2018    LDLCALC 56 03/15/2018        Hypertension:  Home blood pressure monitoring: No.  She is adherent to a low sodium diet. Patient denies chest pain. Antihypertensive medication side effects: no medication side effects noted. Use of agents associated with hypertension: none.                                         Sodium (mmol/L)   Date Value   03/15/2018 140    BUN (mg/dL)   Date Value   03/15/2018 13    Glucose (mg/dL)   Date Value   03/15/2018 96      Potassium (mmol/L)   Date Value   03/15/2018 3.9    CREATININE (mg/dL)   Date Value   03/15/2018 0.5 (L)         Sleep Apnea: MD Aurelia   carbidopa-levodopa (SINEMET)  MG per tablet Take 2 tablets by mouth 4 times daily 4 hrs apart Yes Beba Zazueta MD   metoprolol succinate (TOPROL XL) 25 MG extended release tablet Take 1 tablet by mouth daily Yes Beba Zazueta MD   albuterol (PROVENTIL) (2.5 MG/3ML) 0.083% nebulizer solution Take 3 mLs by nebulization 4 times daily Yes Beba Zazueta MD   aspirin 81 MG tablet Take 81 mg by mouth daily. Yes Historical MD Sb   Cholecalciferol (VITAMIN D3) 5000 units CAPS Take 1 capsule by mouth daily  Beba Zazueta MD   TRUETEST TEST strip TEST EVERY DAY  Beba Zazueta MD   TRUEPLUS LANCETS 28G MISC TEST EVERY DAY  Beba Zazueta MD        Social History   Substance Use Topics    Smoking status: Former Smoker     Packs/day: 2.00     Years: 36.00     Types: Cigarettes     Quit date: 4/23/1999    Smokeless tobacco: Never Used    Alcohol use No        Vitals:    12/20/18 1213   BP: (!) 118/58   Pulse: 82   Resp: 18   SpO2: 99%   Weight: 209 lb 9.6 oz (95.1 kg)     Estimated body mass index is 40.93 kg/m² as calculated from the following:    Height as of 8/22/18: 5' (1.524 m). Weight as of this encounter: 209 lb 9.6 oz (95.1 kg). Physical Exam   Constitutional: She is oriented to person, place, and time. She appears well-developed and well-nourished. No distress. HENT:   Head: Normocephalic. Eyes: Conjunctivae are normal.   Neck: Neck supple. No thyromegaly present. Cardiovascular: Normal rate, regular rhythm and normal heart sounds. No murmur heard. Pulmonary/Chest: Breath sounds normal. No respiratory distress. She has no wheezes. She has no rales. Abdominal: Soft. She exhibits no distension. Musculoskeletal: Normal range of motion. She exhibits no edema. Neurological: She is alert and oriented to person, place, and time. Skin: Skin is warm. No rash noted. Psychiatric: She has a normal mood and affect.  Her behavior is normal.

## 2018-12-21 RX ORDER — METOPROLOL SUCCINATE 25 MG/1
25 TABLET, EXTENDED RELEASE ORAL DAILY
Qty: 90 TABLET | Refills: 0 | Status: SHIPPED | OUTPATIENT
Start: 2018-12-21 | End: 2019-03-18 | Stop reason: SDUPTHER

## 2018-12-21 RX ORDER — CARBIDOPA/LEVODOPA 25MG-250MG
TABLET ORAL
Qty: 720 TABLET | Refills: 0 | Status: SHIPPED | OUTPATIENT
Start: 2018-12-21 | End: 2019-03-18 | Stop reason: SDUPTHER

## 2019-01-25 ENCOUNTER — TELEPHONE (OUTPATIENT)
Dept: INTERNAL MEDICINE CLINIC | Age: 74
End: 2019-01-25

## 2019-01-29 DIAGNOSIS — G89.4 CHRONIC PAIN SYNDROME: ICD-10-CM

## 2019-01-29 RX ORDER — ACETAMINOPHEN AND CODEINE PHOSPHATE 300; 30 MG/1; MG/1
1 TABLET ORAL 3 TIMES DAILY PRN
Qty: 90 TABLET | Refills: 0 | Status: SHIPPED | OUTPATIENT
Start: 2019-01-29 | End: 2019-03-18 | Stop reason: SDUPTHER

## 2019-02-08 RX ORDER — PANTOPRAZOLE SODIUM 40 MG/1
40 TABLET, DELAYED RELEASE ORAL DAILY
Qty: 90 TABLET | Refills: 1 | Status: SHIPPED | OUTPATIENT
Start: 2019-02-08 | End: 2019-08-05 | Stop reason: SDUPTHER

## 2019-02-08 RX ORDER — ATORVASTATIN CALCIUM 40 MG/1
40 TABLET, FILM COATED ORAL DAILY
Qty: 90 TABLET | Refills: 1 | Status: SHIPPED | OUTPATIENT
Start: 2019-02-08 | End: 2019-08-05 | Stop reason: SDUPTHER

## 2019-02-08 RX ORDER — DULOXETIN HYDROCHLORIDE 60 MG/1
60 CAPSULE, DELAYED RELEASE ORAL DAILY
Qty: 90 CAPSULE | Refills: 1 | Status: SHIPPED | OUTPATIENT
Start: 2019-02-08 | End: 2019-08-05 | Stop reason: SDUPTHER

## 2019-02-18 RX ORDER — LOSARTAN POTASSIUM AND HYDROCHLOROTHIAZIDE 25; 100 MG/1; MG/1
1 TABLET ORAL DAILY
Qty: 90 TABLET | Refills: 1 | Status: SHIPPED | OUTPATIENT
Start: 2019-02-18 | End: 2019-06-21 | Stop reason: SDUPTHER

## 2019-02-18 RX ORDER — GLIMEPIRIDE 4 MG/1
4 TABLET ORAL
Qty: 90 TABLET | Refills: 1 | Status: SHIPPED | OUTPATIENT
Start: 2019-02-18 | End: 2019-11-18 | Stop reason: SDUPTHER

## 2019-02-25 ENCOUNTER — OFFICE VISIT (OUTPATIENT)
Dept: PULMONOLOGY | Age: 74
End: 2019-02-25
Payer: MEDICARE

## 2019-02-25 VITALS
OXYGEN SATURATION: 99 % | HEIGHT: 60 IN | DIASTOLIC BLOOD PRESSURE: 61 MMHG | SYSTOLIC BLOOD PRESSURE: 115 MMHG | TEMPERATURE: 97.4 F | BODY MASS INDEX: 40.25 KG/M2 | RESPIRATION RATE: 16 BRPM | HEART RATE: 74 BPM | WEIGHT: 205 LBS

## 2019-02-25 DIAGNOSIS — J06.9 UPPER RESPIRATORY TRACT INFECTION, UNSPECIFIED TYPE: Primary | ICD-10-CM

## 2019-02-25 DIAGNOSIS — G47.33 OSA ON CPAP: ICD-10-CM

## 2019-02-25 DIAGNOSIS — J45.909 MILD ASTHMA WITHOUT COMPLICATION, UNSPECIFIED WHETHER PERSISTENT: ICD-10-CM

## 2019-02-25 DIAGNOSIS — Z99.89 OSA ON CPAP: ICD-10-CM

## 2019-02-25 PROCEDURE — 3017F COLORECTAL CA SCREEN DOC REV: CPT | Performed by: INTERNAL MEDICINE

## 2019-02-25 PROCEDURE — G8482 FLU IMMUNIZE ORDER/ADMIN: HCPCS | Performed by: INTERNAL MEDICINE

## 2019-02-25 PROCEDURE — 99214 OFFICE O/P EST MOD 30 MIN: CPT | Performed by: INTERNAL MEDICINE

## 2019-02-25 PROCEDURE — 4040F PNEUMOC VAC/ADMIN/RCVD: CPT | Performed by: INTERNAL MEDICINE

## 2019-02-25 PROCEDURE — 1036F TOBACCO NON-USER: CPT | Performed by: INTERNAL MEDICINE

## 2019-02-25 PROCEDURE — 1090F PRES/ABSN URINE INCON ASSESS: CPT | Performed by: INTERNAL MEDICINE

## 2019-02-25 PROCEDURE — 1123F ACP DISCUSS/DSCN MKR DOCD: CPT | Performed by: INTERNAL MEDICINE

## 2019-02-25 PROCEDURE — 1101F PT FALLS ASSESS-DOCD LE1/YR: CPT | Performed by: INTERNAL MEDICINE

## 2019-02-25 PROCEDURE — G8417 CALC BMI ABV UP PARAM F/U: HCPCS | Performed by: INTERNAL MEDICINE

## 2019-02-25 PROCEDURE — G8399 PT W/DXA RESULTS DOCUMENT: HCPCS | Performed by: INTERNAL MEDICINE

## 2019-02-25 PROCEDURE — G8427 DOCREV CUR MEDS BY ELIG CLIN: HCPCS | Performed by: INTERNAL MEDICINE

## 2019-02-25 RX ORDER — ALBUTEROL SULFATE 90 UG/1
2 AEROSOL, METERED RESPIRATORY (INHALATION) EVERY 4 HOURS PRN
Qty: 1 INHALER | Refills: 3 | Status: SHIPPED | OUTPATIENT
Start: 2019-02-25 | End: 2021-05-04

## 2019-02-25 RX ORDER — PNV NO.95/FERROUS FUM/FOLIC AC 28MG-0.8MG
TABLET ORAL 2 TIMES DAILY
COMMUNITY
End: 2019-09-12

## 2019-02-25 ASSESSMENT — SLEEP AND FATIGUE QUESTIONNAIRES
HOW LIKELY ARE YOU TO NOD OFF OR FALL ASLEEP WHILE SITTING QUIETLY AFTER LUNCH WITHOUT ALCOHOL: 0
HOW LIKELY ARE YOU TO NOD OFF OR FALL ASLEEP IN A CAR, WHILE STOPPED FOR A FEW MINUTES IN TRAFFIC: 0
HOW LIKELY ARE YOU TO NOD OFF OR FALL ASLEEP WHILE SITTING INACTIVE IN A PUBLIC PLACE: 1
HOW LIKELY ARE YOU TO NOD OFF OR FALL ASLEEP WHILE SITTING AND TALKING TO SOMEONE: 0
HOW LIKELY ARE YOU TO NOD OFF OR FALL ASLEEP WHILE WATCHING TV: 1
ESS TOTAL SCORE: 6
HOW LIKELY ARE YOU TO NOD OFF OR FALL ASLEEP WHILE LYING DOWN TO REST IN THE AFTERNOON WHEN CIRCUMSTANCES PERMIT: 2
HOW LIKELY ARE YOU TO NOD OFF OR FALL ASLEEP WHILE SITTING AND READING: 1
HOW LIKELY ARE YOU TO NOD OFF OR FALL ASLEEP WHEN YOU ARE A PASSENGER IN A CAR FOR AN HOUR WITHOUT A BREAK: 1

## 2019-02-25 ASSESSMENT — ASTHMA QUESTIONNAIRES
QUESTION_3 LAST FOUR WEEKS HOW OFTEN DID YOUR ASTHMA SYMPTOMS (WHEEZING, COUGHING, SHORTNESS OF BREATH, CHEST TIGHTNESS OR PAIN) WAKE YOU UP AT NIGHT OR EARLIER THAN USUAL IN THE MORNING: 2
QUESTION_2 LAST FOUR WEEKS HOW OFTEN HAVE YOU HAD SHORTNESS OF BREATH: 4
ACT_TOTALSCORE: 16
QUESTION_4 LAST FOUR WEEKS HOW OFTEN HAVE YOU USED YOUR RESCUE INHALER OR NEBULIZER MEDICATION (SUCH AS ALBUTEROL): 3
QUESTION_5 LAST FOUR WEEKS HOW WOULD YOU RATE YOUR ASTHMA CONTROL: 3
QUESTION_1 LAST FOUR WEEKS HOW MUCH OF THE TIME DID YOUR ASTHMA KEEP YOU FROM GETTING AS MUCH DONE AT WORK, SCHOOL OR AT HOME: 4

## 2019-03-18 ENCOUNTER — PATIENT MESSAGE (OUTPATIENT)
Dept: INTERNAL MEDICINE CLINIC | Age: 74
End: 2019-03-18

## 2019-03-18 DIAGNOSIS — G89.4 CHRONIC PAIN SYNDROME: ICD-10-CM

## 2019-03-18 RX ORDER — AMLODIPINE BESYLATE 5 MG/1
5 TABLET ORAL DAILY
Qty: 90 TABLET | Refills: 0 | Status: SHIPPED | OUTPATIENT
Start: 2019-03-18 | End: 2019-06-21 | Stop reason: SDUPTHER

## 2019-03-18 RX ORDER — ACETAMINOPHEN AND CODEINE PHOSPHATE 300; 30 MG/1; MG/1
1 TABLET ORAL 3 TIMES DAILY PRN
Qty: 90 TABLET | Refills: 0 | Status: CANCELLED | OUTPATIENT
Start: 2019-03-18 | End: 2019-04-17

## 2019-03-18 RX ORDER — METOPROLOL SUCCINATE 25 MG/1
25 TABLET, EXTENDED RELEASE ORAL DAILY
Qty: 90 TABLET | Refills: 0 | Status: SHIPPED | OUTPATIENT
Start: 2019-03-18 | End: 2019-07-08 | Stop reason: SDUPTHER

## 2019-03-18 RX ORDER — CARBIDOPA/LEVODOPA 25MG-250MG
TABLET ORAL
Qty: 720 TABLET | Refills: 0 | Status: SHIPPED | OUTPATIENT
Start: 2019-03-18 | End: 2019-06-19 | Stop reason: SDUPTHER

## 2019-03-18 RX ORDER — ACETAMINOPHEN AND CODEINE PHOSPHATE 300; 30 MG/1; MG/1
1 TABLET ORAL 3 TIMES DAILY PRN
Qty: 90 TABLET | Refills: 0 | Status: SHIPPED | OUTPATIENT
Start: 2019-03-18 | End: 2019-05-06 | Stop reason: SDUPTHER

## 2019-03-22 ENCOUNTER — OFFICE VISIT (OUTPATIENT)
Dept: INTERNAL MEDICINE CLINIC | Age: 74
End: 2019-03-22
Payer: MEDICARE

## 2019-03-22 VITALS
HEART RATE: 70 BPM | BODY MASS INDEX: 39.92 KG/M2 | SYSTOLIC BLOOD PRESSURE: 113 MMHG | WEIGHT: 204.4 LBS | OXYGEN SATURATION: 99 % | RESPIRATION RATE: 18 BRPM | DIASTOLIC BLOOD PRESSURE: 60 MMHG

## 2019-03-22 DIAGNOSIS — I10 ESSENTIAL HYPERTENSION: ICD-10-CM

## 2019-03-22 DIAGNOSIS — E55.9 VITAMIN D DEFICIENCY: ICD-10-CM

## 2019-03-22 DIAGNOSIS — E11.9 TYPE 2 DIABETES MELLITUS WITHOUT COMPLICATION, WITHOUT LONG-TERM CURRENT USE OF INSULIN (HCC): Primary | ICD-10-CM

## 2019-03-22 DIAGNOSIS — J45.20 MILD INTERMITTENT ASTHMA WITHOUT COMPLICATION: ICD-10-CM

## 2019-03-22 DIAGNOSIS — G47.33 OSA ON CPAP: ICD-10-CM

## 2019-03-22 DIAGNOSIS — Z99.89 OSA ON CPAP: ICD-10-CM

## 2019-03-22 DIAGNOSIS — E78.2 MIXED HYPERLIPIDEMIA: ICD-10-CM

## 2019-03-22 DIAGNOSIS — Z13.31 POSITIVE DEPRESSION SCREENING: ICD-10-CM

## 2019-03-22 LAB
A/G RATIO: 1.7 (ref 1.1–2.2)
ALBUMIN SERPL-MCNC: 3.9 G/DL (ref 3.4–5)
ALP BLD-CCNC: 82 U/L (ref 40–129)
ALT SERPL-CCNC: 8 U/L (ref 10–40)
ANION GAP SERPL CALCULATED.3IONS-SCNC: 15 MMOL/L (ref 3–16)
AST SERPL-CCNC: 18 U/L (ref 15–37)
BASOPHILS ABSOLUTE: 0.1 K/UL (ref 0–0.2)
BASOPHILS RELATIVE PERCENT: 0.8 %
BILIRUB SERPL-MCNC: <0.2 MG/DL (ref 0–1)
BUN BLDV-MCNC: 13 MG/DL (ref 7–20)
CALCIUM SERPL-MCNC: 10 MG/DL (ref 8.3–10.6)
CHLORIDE BLD-SCNC: 98 MMOL/L (ref 99–110)
CHOLESTEROL, FASTING: 143 MG/DL (ref 0–199)
CO2: 26 MMOL/L (ref 21–32)
CREAT SERPL-MCNC: 0.6 MG/DL (ref 0.6–1.2)
EOSINOPHILS ABSOLUTE: 0.2 K/UL (ref 0–0.6)
EOSINOPHILS RELATIVE PERCENT: 2.6 %
GFR AFRICAN AMERICAN: >60
GFR NON-AFRICAN AMERICAN: >60
GLOBULIN: 2.3 G/DL
GLUCOSE FASTING: 124 MG/DL (ref 70–99)
HBA1C MFR BLD: 6 %
HCT VFR BLD CALC: 36 % (ref 36–48)
HDLC SERPL-MCNC: 55 MG/DL (ref 40–60)
HEMOGLOBIN: 10.9 G/DL (ref 12–16)
LDL CHOLESTEROL CALCULATED: 59 MG/DL
LYMPHOCYTES ABSOLUTE: 1.5 K/UL (ref 1–5.1)
LYMPHOCYTES RELATIVE PERCENT: 20.4 %
MCH RBC QN AUTO: 22.6 PG (ref 26–34)
MCHC RBC AUTO-ENTMCNC: 30.3 G/DL (ref 31–36)
MCV RBC AUTO: 74.5 FL (ref 80–100)
MONOCYTES ABSOLUTE: 0.6 K/UL (ref 0–1.3)
MONOCYTES RELATIVE PERCENT: 8.5 %
NEUTROPHILS ABSOLUTE: 5.1 K/UL (ref 1.7–7.7)
NEUTROPHILS RELATIVE PERCENT: 67.7 %
PDW BLD-RTO: 22.8 % (ref 12.4–15.4)
PLATELET # BLD: 434 K/UL (ref 135–450)
PMV BLD AUTO: 7.7 FL (ref 5–10.5)
POTASSIUM SERPL-SCNC: 3.8 MMOL/L (ref 3.5–5.1)
RBC # BLD: 4.83 M/UL (ref 4–5.2)
SODIUM BLD-SCNC: 139 MMOL/L (ref 136–145)
T4 FREE: 0.9 NG/DL (ref 0.9–1.8)
TOTAL PROTEIN: 6.2 G/DL (ref 6.4–8.2)
TRIGLYCERIDE, FASTING: 144 MG/DL (ref 0–150)
TSH SERPL DL<=0.05 MIU/L-ACNC: 1.84 UIU/ML (ref 0.27–4.2)
VITAMIN D 25-HYDROXY: 33.8 NG/ML
VLDLC SERPL CALC-MCNC: 29 MG/DL
WBC # BLD: 7.6 K/UL (ref 4–11)

## 2019-03-22 PROCEDURE — G8482 FLU IMMUNIZE ORDER/ADMIN: HCPCS | Performed by: FAMILY MEDICINE

## 2019-03-22 PROCEDURE — 1123F ACP DISCUSS/DSCN MKR DOCD: CPT | Performed by: FAMILY MEDICINE

## 2019-03-22 PROCEDURE — G8417 CALC BMI ABV UP PARAM F/U: HCPCS | Performed by: FAMILY MEDICINE

## 2019-03-22 PROCEDURE — G8427 DOCREV CUR MEDS BY ELIG CLIN: HCPCS | Performed by: FAMILY MEDICINE

## 2019-03-22 PROCEDURE — 1101F PT FALLS ASSESS-DOCD LE1/YR: CPT | Performed by: FAMILY MEDICINE

## 2019-03-22 PROCEDURE — 4040F PNEUMOC VAC/ADMIN/RCVD: CPT | Performed by: FAMILY MEDICINE

## 2019-03-22 PROCEDURE — G8431 POS CLIN DEPRES SCRN F/U DOC: HCPCS | Performed by: FAMILY MEDICINE

## 2019-03-22 PROCEDURE — 99214 OFFICE O/P EST MOD 30 MIN: CPT | Performed by: FAMILY MEDICINE

## 2019-03-22 PROCEDURE — G0444 DEPRESSION SCREEN ANNUAL: HCPCS | Performed by: FAMILY MEDICINE

## 2019-03-22 PROCEDURE — 2022F DILAT RTA XM EVC RTNOPTHY: CPT | Performed by: FAMILY MEDICINE

## 2019-03-22 PROCEDURE — 83036 HEMOGLOBIN GLYCOSYLATED A1C: CPT | Performed by: FAMILY MEDICINE

## 2019-03-22 PROCEDURE — 1090F PRES/ABSN URINE INCON ASSESS: CPT | Performed by: FAMILY MEDICINE

## 2019-03-22 PROCEDURE — G8399 PT W/DXA RESULTS DOCUMENT: HCPCS | Performed by: FAMILY MEDICINE

## 2019-03-22 PROCEDURE — 3044F HG A1C LEVEL LT 7.0%: CPT | Performed by: FAMILY MEDICINE

## 2019-03-22 PROCEDURE — 3017F COLORECTAL CA SCREEN DOC REV: CPT | Performed by: FAMILY MEDICINE

## 2019-03-22 PROCEDURE — 1036F TOBACCO NON-USER: CPT | Performed by: FAMILY MEDICINE

## 2019-03-22 PROCEDURE — 36415 COLL VENOUS BLD VENIPUNCTURE: CPT | Performed by: FAMILY MEDICINE

## 2019-03-22 ASSESSMENT — PATIENT HEALTH QUESTIONNAIRE - PHQ9
4. FEELING TIRED OR HAVING LITTLE ENERGY: 3
3. TROUBLE FALLING OR STAYING ASLEEP: 3
2. FEELING DOWN, DEPRESSED OR HOPELESS: 3
SUM OF ALL RESPONSES TO PHQ9 QUESTIONS 1 & 2: 6
SUM OF ALL RESPONSES TO PHQ QUESTIONS 1-9: 14
6. FEELING BAD ABOUT YOURSELF - OR THAT YOU ARE A FAILURE OR HAVE LET YOURSELF OR YOUR FAMILY DOWN: 0
9. THOUGHTS THAT YOU WOULD BE BETTER OFF DEAD, OR OF HURTING YOURSELF: 0
1. LITTLE INTEREST OR PLEASURE IN DOING THINGS: 3
8. MOVING OR SPEAKING SO SLOWLY THAT OTHER PEOPLE COULD HAVE NOTICED. OR THE OPPOSITE, BEING SO FIGETY OR RESTLESS THAT YOU HAVE BEEN MOVING AROUND A LOT MORE THAN USUAL: 0
SUM OF ALL RESPONSES TO PHQ QUESTIONS 1-9: 14
7. TROUBLE CONCENTRATING ON THINGS, SUCH AS READING THE NEWSPAPER OR WATCHING TELEVISION: 1
10. IF YOU CHECKED OFF ANY PROBLEMS, HOW DIFFICULT HAVE THESE PROBLEMS MADE IT FOR YOU TO DO YOUR WORK, TAKE CARE OF THINGS AT HOME, OR GET ALONG WITH OTHER PEOPLE: 1
5. POOR APPETITE OR OVEREATING: 1

## 2019-03-22 ASSESSMENT — ENCOUNTER SYMPTOMS
CONSTIPATION: 0
DIARRHEA: 0
BLOOD IN STOOL: 0
ABDOMINAL PAIN: 0
SHORTNESS OF BREATH: 0

## 2019-04-01 RX ORDER — LORATADINE 10 MG/1
10 TABLET ORAL DAILY
Qty: 30 TABLET | Refills: 0 | Status: SHIPPED | OUTPATIENT
Start: 2019-04-01 | End: 2019-05-01

## 2019-04-01 RX ORDER — METHYLPREDNISOLONE 4 MG/1
TABLET ORAL
Qty: 1 KIT | Refills: 0 | Status: SHIPPED | OUTPATIENT
Start: 2019-04-01 | End: 2019-04-07

## 2019-04-01 RX ORDER — GUAIFENESIN 600 MG/1
1200 TABLET, EXTENDED RELEASE ORAL 2 TIMES DAILY PRN
Qty: 30 TABLET | Refills: 1 | Status: SHIPPED | OUTPATIENT
Start: 2019-04-01 | End: 2019-06-21

## 2019-04-16 RX ORDER — BUSPIRONE HYDROCHLORIDE 15 MG/1
15 TABLET ORAL 2 TIMES DAILY
Qty: 180 TABLET | Refills: 1 | Status: SHIPPED | OUTPATIENT
Start: 2019-04-16 | End: 2019-10-15 | Stop reason: SDUPTHER

## 2019-05-06 ENCOUNTER — PATIENT MESSAGE (OUTPATIENT)
Dept: INTERNAL MEDICINE CLINIC | Age: 74
End: 2019-05-06

## 2019-05-06 DIAGNOSIS — G89.4 CHRONIC PAIN SYNDROME: ICD-10-CM

## 2019-05-06 RX ORDER — ACETAMINOPHEN AND CODEINE PHOSPHATE 300; 30 MG/1; MG/1
1 TABLET ORAL 3 TIMES DAILY PRN
Qty: 90 TABLET | Refills: 0 | Status: SHIPPED | OUTPATIENT
Start: 2019-05-06 | End: 2019-06-21 | Stop reason: SDUPTHER

## 2019-05-22 RX ORDER — GABAPENTIN 600 MG/1
600 TABLET ORAL 2 TIMES DAILY
Qty: 180 TABLET | Refills: 1 | Status: SHIPPED | OUTPATIENT
Start: 2019-05-22 | End: 2019-11-18 | Stop reason: SDUPTHER

## 2019-06-12 NOTE — TELEPHONE ENCOUNTER
Patient requesting a medication refill.   Medication ibuprofen (ADVIL;MOTRIN) 600 MG tablet  Pharmacy [facility] [address] [place] [address], [address] [place] [address] Maira [phone]   Last office visit: 3/22/19  Next office visit: 6/21/2019

## 2019-06-13 RX ORDER — IBUPROFEN 600 MG/1
600 TABLET ORAL 3 TIMES DAILY PRN
Qty: 60 TABLET | Refills: 1 | Status: SHIPPED | OUTPATIENT
Start: 2019-06-13 | End: 2019-09-12

## 2019-06-19 RX ORDER — CARBIDOPA/LEVODOPA 25MG-250MG
2 TABLET ORAL 4 TIMES DAILY
Qty: 720 TABLET | Refills: 1 | Status: SHIPPED | OUTPATIENT
Start: 2019-06-19 | End: 2019-09-12 | Stop reason: SDUPTHER

## 2019-06-21 ENCOUNTER — OFFICE VISIT (OUTPATIENT)
Dept: INTERNAL MEDICINE CLINIC | Age: 74
End: 2019-06-21
Payer: MEDICARE

## 2019-06-21 VITALS
HEART RATE: 66 BPM | SYSTOLIC BLOOD PRESSURE: 108 MMHG | WEIGHT: 211.8 LBS | DIASTOLIC BLOOD PRESSURE: 58 MMHG | OXYGEN SATURATION: 97 % | BODY MASS INDEX: 41.36 KG/M2 | RESPIRATION RATE: 18 BRPM

## 2019-06-21 DIAGNOSIS — G25.81 RESTLESS LEGS SYNDROME (RLS): ICD-10-CM

## 2019-06-21 DIAGNOSIS — E66.01 MORBID OBESITY DUE TO EXCESS CALORIES (HCC): ICD-10-CM

## 2019-06-21 DIAGNOSIS — I10 ESSENTIAL HYPERTENSION: ICD-10-CM

## 2019-06-21 DIAGNOSIS — E78.2 MIXED HYPERLIPIDEMIA: ICD-10-CM

## 2019-06-21 DIAGNOSIS — G47.33 OSA ON CPAP: ICD-10-CM

## 2019-06-21 DIAGNOSIS — E11.9 TYPE 2 DIABETES MELLITUS WITHOUT COMPLICATION, WITHOUT LONG-TERM CURRENT USE OF INSULIN (HCC): Primary | ICD-10-CM

## 2019-06-21 DIAGNOSIS — J45.20 MILD INTERMITTENT ASTHMA WITHOUT COMPLICATION: ICD-10-CM

## 2019-06-21 DIAGNOSIS — G89.4 CHRONIC PAIN SYNDROME: ICD-10-CM

## 2019-06-21 DIAGNOSIS — Z99.89 OSA ON CPAP: ICD-10-CM

## 2019-06-21 LAB — HBA1C MFR BLD: 6.9 %

## 2019-06-21 PROCEDURE — G8399 PT W/DXA RESULTS DOCUMENT: HCPCS | Performed by: FAMILY MEDICINE

## 2019-06-21 PROCEDURE — 3044F HG A1C LEVEL LT 7.0%: CPT | Performed by: FAMILY MEDICINE

## 2019-06-21 PROCEDURE — 3017F COLORECTAL CA SCREEN DOC REV: CPT | Performed by: FAMILY MEDICINE

## 2019-06-21 PROCEDURE — G8417 CALC BMI ABV UP PARAM F/U: HCPCS | Performed by: FAMILY MEDICINE

## 2019-06-21 PROCEDURE — 1036F TOBACCO NON-USER: CPT | Performed by: FAMILY MEDICINE

## 2019-06-21 PROCEDURE — 1090F PRES/ABSN URINE INCON ASSESS: CPT | Performed by: FAMILY MEDICINE

## 2019-06-21 PROCEDURE — 99214 OFFICE O/P EST MOD 30 MIN: CPT | Performed by: FAMILY MEDICINE

## 2019-06-21 PROCEDURE — 83036 HEMOGLOBIN GLYCOSYLATED A1C: CPT | Performed by: FAMILY MEDICINE

## 2019-06-21 PROCEDURE — 1123F ACP DISCUSS/DSCN MKR DOCD: CPT | Performed by: FAMILY MEDICINE

## 2019-06-21 PROCEDURE — 4040F PNEUMOC VAC/ADMIN/RCVD: CPT | Performed by: FAMILY MEDICINE

## 2019-06-21 PROCEDURE — G8427 DOCREV CUR MEDS BY ELIG CLIN: HCPCS | Performed by: FAMILY MEDICINE

## 2019-06-21 PROCEDURE — 2022F DILAT RTA XM EVC RTNOPTHY: CPT | Performed by: FAMILY MEDICINE

## 2019-06-21 RX ORDER — ACETAMINOPHEN AND CODEINE PHOSPHATE 300; 30 MG/1; MG/1
1 TABLET ORAL 3 TIMES DAILY PRN
Qty: 90 TABLET | Refills: 0 | Status: SHIPPED | OUTPATIENT
Start: 2019-06-21 | End: 2019-08-05 | Stop reason: SDUPTHER

## 2019-06-21 RX ORDER — AMLODIPINE BESYLATE 5 MG/1
5 TABLET ORAL DAILY
Qty: 90 TABLET | Refills: 1 | Status: SHIPPED | OUTPATIENT
Start: 2019-06-21 | End: 2019-12-16 | Stop reason: SDUPTHER

## 2019-06-21 RX ORDER — LOSARTAN POTASSIUM AND HYDROCHLOROTHIAZIDE 25; 100 MG/1; MG/1
1 TABLET ORAL DAILY
Qty: 90 TABLET | Refills: 1 | Status: SHIPPED | OUTPATIENT
Start: 2019-06-21 | End: 2019-08-19 | Stop reason: ALTCHOICE

## 2019-06-21 ASSESSMENT — ENCOUNTER SYMPTOMS
CONSTIPATION: 0
DIARRHEA: 0
ABDOMINAL PAIN: 0
BLOOD IN STOOL: 0
SHORTNESS OF BREATH: 0

## 2019-06-21 NOTE — PROGRESS NOTES
disturbance. Respiratory: Negative for shortness of breath. Cardiovascular: Negative for chest pain and leg swelling. Gastrointestinal: Negative for abdominal pain, blood in stool, constipation and diarrhea. Genitourinary: Negative for difficulty urinating, frequency, hematuria, menstrual problem and urgency. Neurological: Negative for dizziness and syncope. Psychiatric/Behavioral: Negative for behavioral problems. Prior to Visit Medications    Medication Sig Taking? Authorizing Provider   losartan-hydrochlorothiazide (HYZAAR) 100-25 MG per tablet Take 1 tablet by mouth daily Yes Anthony Damon MD   amLODIPine (NORVASC) 5 MG tablet Take 1 tablet by mouth daily Yes Anthony Damon MD   acetaminophen-codeine (TYLENOL #3) 300-30 MG per tablet Take 1 tablet by mouth 3 times daily as needed for Pain for up to 30 days. Yes Anthony Damon MD   carbidopa-levodopa (SINEMET)  MG per tablet Take 2 tablets by mouth 4 times daily Yes Anthony Damon MD   ibuprofen (ADVIL;MOTRIN) 600 MG tablet Take 1 tablet by mouth 3 times daily as needed for Pain Take with food. Yes Anthony Damon MD   gabapentin (NEURONTIN) 600 MG tablet Take 1 tablet by mouth 2 times daily for 180 days.  Yes Anthony Damon MD   busPIRone (BUSPAR) 15 MG tablet Take 15 mg by mouth 2 times daily Yes Anthony Damon MD   metFORMIN (GLUCOPHAGE) 1000 MG tablet Take 1 tablet by mouth 2 times daily (with meals) Yes Anthony Damon MD   metoprolol succinate (TOPROL XL) 25 MG extended release tablet Take 1 tablet by mouth daily Yes Anthony Damon MD   Ferrous Sulfate (IRON) 325 (65 Fe) MG TABS Take by mouth 2 times daily  Yes Tash Basurto MD   glimepiride (AMARYL) 4 MG tablet Take 1 tablet by mouth every morning (before breakfast) Yes Anthony Damon MD   DULoxetine (CYMBALTA) 60 MG extended release capsule Take 1 capsule by mouth daily Yes Anthony Damon MD   pantoprazole (PROTONIX) 40 MG tablet Take 1 tablet by mouth daily Yes Adilson Pepper MD   atorvastatin (LIPITOR) 40 MG tablet Take 1 tablet by mouth daily Yes Adilson Pepper MD   Cholecalciferol (VITAMIN D3) 5000 units CAPS Take 1 capsule by mouth daily Yes Adilson Pepper MD   aspirin 81 MG tablet Take 81 mg by mouth daily. Yes Historical Provider, MD   guaiFENesin (MUCINEX) 600 MG extended release tablet Take 2 tablets by mouth 2 times daily as needed for Congestion  Adilson Pepper MD   albuterol sulfate HFA (PROAIR HFA) 108 (90 Base) MCG/ACT inhaler Inhale 2 puffs into the lungs every 4 hours as needed for Wheezing or Shortness of Breath  Marcus Vasquez DO   albuterol (PROVENTIL) (2.5 MG/3ML) 0.083% nebulizer solution Take 3 mLs by nebulization 4 times daily  Adilson Pepper MD   TRUETEST TEST strip TEST EVERY Dawson Villanueva MD   TRUEPLUS LANCETS 28G MISC TEST EVERY Dawson Villanueva MD        Social History     Tobacco Use    Smoking status: Former Smoker     Packs/day: 2.00     Years: 36.00     Pack years: 72.00     Types: Cigarettes     Last attempt to quit: 1999     Years since quittin.1    Smokeless tobacco: Never Used   Substance Use Topics    Alcohol use: No     Alcohol/week: 0.0 oz        Vitals:    19 1038   BP: (!) 108/58   Pulse: 66   Resp: 18   SpO2: 97%   Weight: 211 lb 12.8 oz (96.1 kg)     Estimated body mass index is 41.36 kg/m² as calculated from the following:    Height as of 19: 5' (1.524 m). Weight as of this encounter: 211 lb 12.8 oz (96.1 kg). Physical Exam   Constitutional: She is oriented to person, place, and time. She appears well-developed and well-nourished. No distress. HENT:   Head: Normocephalic. Eyes: Conjunctivae are normal.   Neck: Neck supple. No thyromegaly present. Cardiovascular: Normal rate, regular rhythm and normal heart sounds. No murmur heard. Pulmonary/Chest: Breath sounds normal. No respiratory distress. She has no wheezes. She has no rales.    Abdominal: Soft. She exhibits no distension. Musculoskeletal: Normal range of motion. She exhibits no edema. Neurological: She is alert and oriented to person, place, and time. Skin: Skin is warm. No rash noted. Psychiatric: She has a normal mood and affect. Her behavior is normal.       ASSESSMENT/PLAN:  1. Type 2 diabetes mellitus without complication, without long-term current use of insulin (HCC)  Controlled. HbA1c today is 6.9% continue current medication.  - POCT glycosylated hemoglobin (Hb A1C)    2. Restless legs syndrome (RLS)  Patient takes Sinemet  mg 2 tablets 4 times a day. She declined to go back to the neurologist at [facility] neuroscience. 3. Essential hypertension  Controlled. Continue Toprol-XL 25 mg daily ,amlodipine 5 mg days  and Hyzaar 100-25 mg daily    4. Mixed hyperlipidemia  Controlled. Continue Lipitor 40 mg daily    5. Mild intermittent asthma without complication  Controlled. Continue Pro Air as needed    6. DOYLE on CPAP  Encourage regular use of CPAP. 7. Morbid obesity due to excess calories ([address]  Encouraged to lose weight with diet and exercise. 8. Chronic pain syndrome  Controlled. Continue current medication  - acetaminophen-codeine (TYLENOL #3) 300-30 MG per tablet; Take 1 tablet by mouth 3 times daily as needed for Pain for up to 30 days. Dispense: 90 tablet;  Refill: 0    RTC in 3 mos    An electronic signature was used to authenticate this note.    --Samia Stephens MD on 6/21/2019 at 11:21 AM

## 2019-06-28 ENCOUNTER — TELEPHONE (OUTPATIENT)
Dept: INTERNAL MEDICINE CLINIC | Age: 74
End: 2019-06-28

## 2019-06-28 RX ORDER — TELMISARTAN 40 MG/1
40 TABLET ORAL DAILY
Qty: 90 TABLET | Refills: 1 | Status: SHIPPED | OUTPATIENT
Start: 2019-06-28 | End: 2020-03-27

## 2019-06-28 NOTE — TELEPHONE ENCOUNTER
Pt is requesting to change her Losartan meds, pt stated the pharmacy doesn't have it in stock anywhere and pt just took her last pill.  Please Advise

## 2019-07-09 RX ORDER — METOPROLOL SUCCINATE 25 MG/1
25 TABLET, EXTENDED RELEASE ORAL DAILY
Qty: 90 TABLET | Refills: 1 | Status: SHIPPED | OUTPATIENT
Start: 2019-07-09 | End: 2020-01-05 | Stop reason: SDUPTHER

## 2019-08-05 DIAGNOSIS — G89.4 CHRONIC PAIN SYNDROME: ICD-10-CM

## 2019-08-05 RX ORDER — ACETAMINOPHEN AND CODEINE PHOSPHATE 300; 30 MG/1; MG/1
1 TABLET ORAL 3 TIMES DAILY PRN
Qty: 90 TABLET | Refills: 0 | Status: SHIPPED | OUTPATIENT
Start: 2019-08-05 | End: 2019-09-12 | Stop reason: SDUPTHER

## 2019-08-07 RX ORDER — PANTOPRAZOLE SODIUM 40 MG/1
40 TABLET, DELAYED RELEASE ORAL DAILY
Qty: 90 TABLET | Refills: 1 | Status: SHIPPED | OUTPATIENT
Start: 2019-08-07 | End: 2020-03-06 | Stop reason: SDUPTHER

## 2019-08-07 RX ORDER — DULOXETIN HYDROCHLORIDE 60 MG/1
60 CAPSULE, DELAYED RELEASE ORAL DAILY
Qty: 90 CAPSULE | Refills: 1 | Status: SHIPPED | OUTPATIENT
Start: 2019-08-07 | End: 2019-11-18 | Stop reason: SDUPTHER

## 2019-08-07 RX ORDER — ATORVASTATIN CALCIUM 40 MG/1
40 TABLET, FILM COATED ORAL DAILY
Qty: 90 TABLET | Refills: 1 | Status: SHIPPED | OUTPATIENT
Start: 2019-08-07 | End: 2020-03-20 | Stop reason: SDUPTHER

## 2019-08-19 ENCOUNTER — OFFICE VISIT (OUTPATIENT)
Dept: PULMONOLOGY | Age: 74
End: 2019-08-19
Payer: MEDICARE

## 2019-08-19 VITALS
RESPIRATION RATE: 20 BRPM | WEIGHT: 207 LBS | HEART RATE: 72 BPM | HEIGHT: 60 IN | SYSTOLIC BLOOD PRESSURE: 129 MMHG | TEMPERATURE: 97.3 F | DIASTOLIC BLOOD PRESSURE: 68 MMHG | BODY MASS INDEX: 40.64 KG/M2 | OXYGEN SATURATION: 96 %

## 2019-08-19 DIAGNOSIS — G47.33 OSA ON CPAP: Primary | ICD-10-CM

## 2019-08-19 DIAGNOSIS — J45.909 MILD ASTHMA WITHOUT COMPLICATION, UNSPECIFIED WHETHER PERSISTENT: ICD-10-CM

## 2019-08-19 DIAGNOSIS — Z99.89 OSA ON CPAP: Primary | ICD-10-CM

## 2019-08-19 DIAGNOSIS — K11.7 XEROSTOMIA: ICD-10-CM

## 2019-08-19 PROCEDURE — 3017F COLORECTAL CA SCREEN DOC REV: CPT | Performed by: INTERNAL MEDICINE

## 2019-08-19 PROCEDURE — 1090F PRES/ABSN URINE INCON ASSESS: CPT | Performed by: INTERNAL MEDICINE

## 2019-08-19 PROCEDURE — G8417 CALC BMI ABV UP PARAM F/U: HCPCS | Performed by: INTERNAL MEDICINE

## 2019-08-19 PROCEDURE — G8427 DOCREV CUR MEDS BY ELIG CLIN: HCPCS | Performed by: INTERNAL MEDICINE

## 2019-08-19 PROCEDURE — 99213 OFFICE O/P EST LOW 20 MIN: CPT | Performed by: INTERNAL MEDICINE

## 2019-08-19 PROCEDURE — 1036F TOBACCO NON-USER: CPT | Performed by: INTERNAL MEDICINE

## 2019-08-19 PROCEDURE — 4040F PNEUMOC VAC/ADMIN/RCVD: CPT | Performed by: INTERNAL MEDICINE

## 2019-08-19 PROCEDURE — 1123F ACP DISCUSS/DSCN MKR DOCD: CPT | Performed by: INTERNAL MEDICINE

## 2019-08-19 PROCEDURE — G8399 PT W/DXA RESULTS DOCUMENT: HCPCS | Performed by: INTERNAL MEDICINE

## 2019-08-19 ASSESSMENT — SLEEP AND FATIGUE QUESTIONNAIRES
HOW LIKELY ARE YOU TO NOD OFF OR FALL ASLEEP WHILE SITTING AND TALKING TO SOMEONE: 1
HOW LIKELY ARE YOU TO NOD OFF OR FALL ASLEEP WHILE SITTING AND READING: 1
HOW LIKELY ARE YOU TO NOD OFF OR FALL ASLEEP WHILE SITTING INACTIVE IN A PUBLIC PLACE: 1
HOW LIKELY ARE YOU TO NOD OFF OR FALL ASLEEP IN A CAR, WHILE STOPPED FOR A FEW MINUTES IN TRAFFIC: 0
HOW LIKELY ARE YOU TO NOD OFF OR FALL ASLEEP WHILE LYING DOWN TO REST IN THE AFTERNOON WHEN CIRCUMSTANCES PERMIT: 3
HOW LIKELY ARE YOU TO NOD OFF OR FALL ASLEEP WHILE WATCHING TV: 1
HOW LIKELY ARE YOU TO NOD OFF OR FALL ASLEEP WHEN YOU ARE A PASSENGER IN A CAR FOR AN HOUR WITHOUT A BREAK: 2
ESS TOTAL SCORE: 10
HOW LIKELY ARE YOU TO NOD OFF OR FALL ASLEEP WHILE SITTING QUIETLY AFTER LUNCH WITHOUT ALCOHOL: 1

## 2019-08-19 ASSESSMENT — ASTHMA QUESTIONNAIRES
QUESTION_3 LAST FOUR WEEKS HOW OFTEN DID YOUR ASTHMA SYMPTOMS (WHEEZING, COUGHING, SHORTNESS OF BREATH, CHEST TIGHTNESS OR PAIN) WAKE YOU UP AT NIGHT OR EARLIER THAN USUAL IN THE MORNING: 4
QUESTION_4 LAST FOUR WEEKS HOW OFTEN HAVE YOU USED YOUR RESCUE INHALER OR NEBULIZER MEDICATION (SUCH AS ALBUTEROL): 5
QUESTION_1 LAST FOUR WEEKS HOW MUCH OF THE TIME DID YOUR ASTHMA KEEP YOU FROM GETTING AS MUCH DONE AT WORK, SCHOOL OR AT HOME: 4
QUESTION_5 LAST FOUR WEEKS HOW WOULD YOU RATE YOUR ASTHMA CONTROL: 4
QUESTION_2 LAST FOUR WEEKS HOW OFTEN HAVE YOU HAD SHORTNESS OF BREATH: 4
ACT_TOTALSCORE: 21

## 2019-08-19 NOTE — PROGRESS NOTES
Chief complaint  This is a 69y.o. year old female  who comes to see me with a chief complaint of   Chief Complaint   Patient presents with    Sleep Apnea    Asthma    . HPI  Follow up on DOYLE.  and asthma      Machine:  Patient is using a CPAP machine. Average usage is 8 hrs 52 min 52 sec. She is meeting 4 or more hours per night 100% of the time. Average AHI is 2.1. Patient admits to dry mouth and snoring . She tried a chin strap and it did not work. Not sure what else to    Breathing is fine.   Using albuterol nebs prn which is not a lot    Has a lot of stress at home          Sleep Medicine 8/19/2019 2/25/2019 8/22/2018 8/14/2017 7/18/2016 1/25/2016 9/1/2015   Sitting and reading 1 1 2 2 1 1 2   Watching TV 1 1 1 1 1 2 2   Sitting, inactive in a public place (e.g. a theatre or a meeting) 1 1 1 2 1 2 2   As a passenger in a car for an hour without a break 2 1 1 1 1 2 2   Lying down to rest in the afternoon when circumstances permit 3 2 2 2 1 2 3   Sitting and talking to someone 1 0 0 0 0 1 1   Sitting quietly after a lunch without alcohol 1 0 0 0 1 0 1   In a car, while stopped for a few minutes in traffic 0 0 0 0 0 0 1   Total score 10 6 7 8 6 10 14   Neck circumference - - 17.5 19 19 - 19       Past Medical History:   Diagnosis Date    Asthma     Benign tumor lacrimal gland     Greater trochanteric bursitis of left hip     Hyperlipidemia     Hypertension     Pseudophakia     RLS (restless legs syndrome)     Type II or unspecified type diabetes mellitus without mention of complication, not stated as uncontrolled     Unspecified sleep apnea     sleep study done [id number]    Vitamin D deficiency        Past Surgical History:   Procedure Laterality Date    BREAST SURGERY      CHOLECYSTECTOMY  [id number]    HYSTERECTOMY      NASAL SEPTUM SURGERY      TONSILLECTOMY AND ADENOIDECTOMY         Social History     Socioeconomic History    Marital status:      Spouse name: None    intact    Chest imaging from 8/2018 is reviewed. My impression is no acute process    ASTHMA CONTROL TEST 8/19/2019 2/25/2019 8/22/2018 8/14/2017 2/1/2017   In the past 4 weeks, how much of the time did your asthma keep you from getting as much done at work, school or at home? 4 4 4 5 2   During the past 4 weeks, how often have you had shortness of breath? 4 4 4 5 1   During the past 4 weeks, how often did your asthma symptoms (wheezing, coughing, shortness of breath, chest tightness or pain) wake you up at night or earlier than usual in the morning? 4 2 5 2 1   During the past 4 weeks, how often have you used your rescue inhaler or nebulizer medication (such as albuterol)? 5 3 5 5 2   How would you rate your asthma control during the past 4 weeks? 4 3 5 5 1   Asthma Control Test Total Score 21 16 23 22 7       Lab Results   Component Value Date    WBC 7.6 03/22/2019    HGB 10.9 (L) 03/22/2019    HCT 36.0 03/22/2019    MCV 74.5 (L) 03/22/2019     03/22/2019       No results found for: BNP    Lab Results   Component Value Date    CREATININE 0.6 03/22/2019    BUN 13 03/22/2019     03/22/2019    K 3.8 03/22/2019    CL 98 (L) 03/22/2019    CO2 26 03/22/2019       Assessment/Plan  1. DOYLE on CPAP  Benefiting and compliant. Benefiting from therapy by a low Golden score, good energy levels, low fatigue, and control of chronic medical problems    Needs to try a full face mask due to mouth opening and snoring. Will send order    2. Mild asthma without complication, unspecified whether persistent  Use albuterol prn. 3. Xerostomia  Due to mouth opening on nasal mask.   Trial of full face mask      Follow up in 6 months

## 2019-09-12 ENCOUNTER — OFFICE VISIT (OUTPATIENT)
Dept: INTERNAL MEDICINE CLINIC | Age: 74
End: 2019-09-12
Payer: MEDICARE

## 2019-09-12 VITALS
BODY MASS INDEX: 40.43 KG/M2 | WEIGHT: 207 LBS | SYSTOLIC BLOOD PRESSURE: 134 MMHG | DIASTOLIC BLOOD PRESSURE: 68 MMHG | HEART RATE: 72 BPM | RESPIRATION RATE: 18 BRPM

## 2019-09-12 DIAGNOSIS — Z23 NEED FOR INFLUENZA VACCINATION: ICD-10-CM

## 2019-09-12 DIAGNOSIS — M70.61 GREATER TROCHANTERIC BURSITIS OF RIGHT HIP: Primary | ICD-10-CM

## 2019-09-12 DIAGNOSIS — I10 ESSENTIAL HYPERTENSION: ICD-10-CM

## 2019-09-12 DIAGNOSIS — G89.4 CHRONIC PAIN SYNDROME: ICD-10-CM

## 2019-09-12 DIAGNOSIS — E11.9 TYPE 2 DIABETES MELLITUS WITHOUT COMPLICATION, WITHOUT LONG-TERM CURRENT USE OF INSULIN (HCC): ICD-10-CM

## 2019-09-12 DIAGNOSIS — J45.20 MILD INTERMITTENT ASTHMA WITHOUT COMPLICATION: ICD-10-CM

## 2019-09-12 DIAGNOSIS — E66.01 MORBID OBESITY DUE TO EXCESS CALORIES (HCC): ICD-10-CM

## 2019-09-12 LAB
BILIRUBIN, POC: NEGATIVE
BLOOD URINE, POC: NEGATIVE
CLARITY, POC: NORMAL
COLOR, POC: YELLOW
GLUCOSE URINE, POC: NEGATIVE
KETONES, POC: NEGATIVE
LEUKOCYTE EST, POC: NEGATIVE
NITRITE, POC: NEGATIVE
PH, POC: 5
PROTEIN, POC: NEGATIVE
SPECIFIC GRAVITY, POC: 1.02
UROBILINOGEN, POC: 0.2

## 2019-09-12 PROCEDURE — 1090F PRES/ABSN URINE INCON ASSESS: CPT | Performed by: FAMILY MEDICINE

## 2019-09-12 PROCEDURE — 3017F COLORECTAL CA SCREEN DOC REV: CPT | Performed by: FAMILY MEDICINE

## 2019-09-12 PROCEDURE — 1123F ACP DISCUSS/DSCN MKR DOCD: CPT | Performed by: FAMILY MEDICINE

## 2019-09-12 PROCEDURE — G8417 CALC BMI ABV UP PARAM F/U: HCPCS | Performed by: FAMILY MEDICINE

## 2019-09-12 PROCEDURE — 3044F HG A1C LEVEL LT 7.0%: CPT | Performed by: FAMILY MEDICINE

## 2019-09-12 PROCEDURE — 81002 URINALYSIS NONAUTO W/O SCOPE: CPT | Performed by: FAMILY MEDICINE

## 2019-09-12 PROCEDURE — G8399 PT W/DXA RESULTS DOCUMENT: HCPCS | Performed by: FAMILY MEDICINE

## 2019-09-12 PROCEDURE — G0008 ADMIN INFLUENZA VIRUS VAC: HCPCS | Performed by: FAMILY MEDICINE

## 2019-09-12 PROCEDURE — 1036F TOBACCO NON-USER: CPT | Performed by: FAMILY MEDICINE

## 2019-09-12 PROCEDURE — 4040F PNEUMOC VAC/ADMIN/RCVD: CPT | Performed by: FAMILY MEDICINE

## 2019-09-12 PROCEDURE — 2022F DILAT RTA XM EVC RTNOPTHY: CPT | Performed by: FAMILY MEDICINE

## 2019-09-12 PROCEDURE — 90653 IIV ADJUVANT VACCINE IM: CPT | Performed by: FAMILY MEDICINE

## 2019-09-12 PROCEDURE — 99214 OFFICE O/P EST MOD 30 MIN: CPT | Performed by: FAMILY MEDICINE

## 2019-09-12 PROCEDURE — G8427 DOCREV CUR MEDS BY ELIG CLIN: HCPCS | Performed by: FAMILY MEDICINE

## 2019-09-12 RX ORDER — PREDNISONE 20 MG/1
20 TABLET ORAL DAILY
Qty: 10 TABLET | Refills: 0 | Status: SHIPPED | OUTPATIENT
Start: 2019-09-12 | End: 2019-09-22

## 2019-09-12 RX ORDER — CARBIDOPA/LEVODOPA 25MG-250MG
2 TABLET ORAL 4 TIMES DAILY
Qty: 720 TABLET | Refills: 1 | Status: SHIPPED | OUTPATIENT
Start: 2019-09-12 | End: 2020-03-06 | Stop reason: SDUPTHER

## 2019-09-12 RX ORDER — ACETAMINOPHEN AND CODEINE PHOSPHATE 300; 30 MG/1; MG/1
1 TABLET ORAL 3 TIMES DAILY PRN
Qty: 90 TABLET | Refills: 0 | Status: SHIPPED | OUTPATIENT
Start: 2019-09-12 | End: 2019-10-18 | Stop reason: SDUPTHER

## 2019-09-12 RX ORDER — NAPROXEN 500 MG/1
500 TABLET ORAL 2 TIMES DAILY WITH MEALS
Qty: 60 TABLET | Refills: 1 | Status: SHIPPED | OUTPATIENT
Start: 2019-09-12 | End: 2019-11-18 | Stop reason: SDUPTHER

## 2019-09-12 ASSESSMENT — ENCOUNTER SYMPTOMS
CONSTIPATION: 0
SHORTNESS OF BREATH: 0
DIARRHEA: 0
BLOOD IN STOOL: 0
ABDOMINAL PAIN: 0
BACK PAIN: 1

## 2019-09-12 NOTE — PROGRESS NOTES
2019     Fitz Lilly (:  1945) is a 69 y.o. female, here for evaluation of the following medical concerns:    HPI   Patient presented to the office for follow-up of her multiple medical problems. She complain of low back pain especially on the right side with associated pain on the side of the right hip. It's lingering discomfort for the past 1 week. Patient denies recent injury or heavy lifting. She denies bowel or urinary disturbance. She is morbidly obese and is struggling to lose weight. She has chronic pain and takes Tylenol #3 as needed for pain. Asthma:  Current treatment includes beta agonist inhalers, combination beta agonists/steroid inhalers. Using preventive medication(s) consistently: yes. Residual symptoms: none. Patient denies wheezing. She requires her rescue inhaler 0 time(s) per day. Diabetes Mellitus Type 2: Current symptoms/problems include none. Medication compliance:  compliant most of the time  Diabetic diet compliance:  compliant most of the time,  Weight trend: stable  Current exercise: no regular exercise  Barriers: none    Home blood sugar records: patient does not test  Any episodes of hypoglycemia? no  Eye exam current (within one year): unknown   reports that she quit smoking about 20 years ago. Her smoking use included cigarettes. She has a 72.00 pack-year smoking history. She has never used smokeless tobacco.   Daily Aspirin? Yes    Lab Results   Component Value Date    LABA1C 6.9 2019    LABA1C 6.0 2019    LABA1C 7.2 2018     Lab Results   Component Value Date    CREATININE 0.6 2019     Lab Results   Component Value Date    ALT 8 (L) 2019    AST 18 2019     Lab Results   Component Value Date    CHOL 140 03/15/2018    TRIG 112 03/15/2018    HDL 55 2019    LDLCALC 59 2019        Hypertension:  Home blood pressure monitoring: No.  She is adherent to a low sodium diet. Patient denies chest pain. Antihypertensive medication side effects: no medication side effects noted. Use of agents associated with hypertension: none. Sodium (mmol/L)   Date Value   03/22/2019 139    BUN (mg/dL)   Date Value   03/22/2019 13    Glucose (mg/dL)   Date Value   03/15/2018 96      Potassium (mmol/L)   Date Value   03/22/2019 3.8    CREATININE (mg/dL)   Date Value   03/22/2019 0.6           Review of Systems   Constitutional: Negative for activity change and appetite change. Eyes: Negative for visual disturbance. Respiratory: Negative for shortness of breath. Cardiovascular: Negative for chest pain and leg swelling. Gastrointestinal: Negative for abdominal pain, blood in stool, constipation and diarrhea. Genitourinary: Negative for difficulty urinating, frequency, hematuria, menstrual problem and urgency. Musculoskeletal: Positive for back pain. Neurological: Negative for dizziness and syncope. Psychiatric/Behavioral: Negative for behavioral problems. Prior to Visit Medications    Medication Sig Taking? Authorizing Provider   DULoxetine (CYMBALTA) 60 MG extended release capsule Take 1 capsule by mouth daily  Alise Mata MD   atorvastatin (LIPITOR) 40 MG tablet Take 1 tablet by mouth daily  Alise Mata MD   pantoprazole (PROTONIX) 40 MG tablet Take 1 tablet by mouth daily  Alise Mata MD   silver sulfADIAZINE (SILVADENE) 1 % cream Apply topically daily.   Alise Mata MD   metoprolol succinate (TOPROL XL) 25 MG extended release tablet Take 1 tablet by mouth daily  Alise Mata MD   telmisartan (MICARDIS) 40 MG tablet Take 1 tablet by mouth daily This replaces the Arunajuan diego Rutherford MD   amLODIPine (NORVASC) 5 MG tablet Take 1 tablet by mouth daily  Alise Mata MD   carbidopa-levodopa (SINEMET)  MG per tablet Take 2 tablets by mouth 4 times daily  Alise Mata MD   ibuprofen (ADVIL;MOTRIN) 600 MG tablet Take 1 tablet by mouth and well-nourished. No distress. HENT:   Head: Normocephalic. Right Ear: External ear normal.   Nose: Nose normal.   Mouth/Throat: Oropharynx is clear and moist.   Eyes: Pupils are equal, round, and reactive to light. Conjunctivae are normal.   Neck: Normal range of motion. Neck supple. No thyromegaly present. Cardiovascular: Normal rate, regular rhythm and normal heart sounds. Exam reveals no friction rub. No murmur heard. Pulmonary/Chest: Effort normal and breath sounds normal. No respiratory distress. She has no wheezes. She has no rales. Abdominal: Soft. Bowel sounds are normal. She exhibits no distension and no mass. Musculoskeletal: Normal range of motion. She exhibits no edema. Lymphadenopathy:     She has no cervical adenopathy. Neurological: She is alert and oriented to person, place, and time. Skin: Skin is warm. No rash noted. Psychiatric: She has a normal mood and affect. Her behavior is normal.       ASSESSMENT/PLAN:  1. Greater trochanteric bursitis of right hip  She exhibited local tenderness at the side of the right hip and lumbar area consistent with trochanteric bursitis. Will prescribe prednisone 20 mg daily for 10 days and naproxen 500 mg twice daily as needed for pain. Continue Tylenol No. 4.  She may also use warm compress  2. Chronic pain syndrome  Continue Tylenol 3 as needed for pain and gabapentin 600 mg twice daily  - acetaminophen-codeine (TYLENOL #3) 300-30 MG per tablet; Take 1 tablet by mouth 3 times daily as needed for Pain for up to 30 days. Dispense: 90 tablet; Refill: 0    3. Type 2 diabetes mellitus without complication, without long-term current use of insulin (HCC)  Controlled. HbA1c was 6.9% 3 months ago . Watch out for fluctuation of blood sugar while in prednisone. Continue metformin 1000 mg twice daily and Amaryl 4 mg daily. .Will check HbA1c next visit    4. Essential hypertension  Controlled.   Continue Toprol-XL 25 mg daily, Micardis 40 mg

## 2019-09-20 ENCOUNTER — OFFICE VISIT (OUTPATIENT)
Dept: INTERNAL MEDICINE CLINIC | Age: 74
End: 2019-09-20
Payer: MEDICARE

## 2019-09-20 VITALS
DIASTOLIC BLOOD PRESSURE: 76 MMHG | OXYGEN SATURATION: 97 % | HEART RATE: 69 BPM | SYSTOLIC BLOOD PRESSURE: 128 MMHG | RESPIRATION RATE: 20 BRPM

## 2019-09-20 DIAGNOSIS — M70.61 GREATER TROCHANTERIC BURSITIS OF RIGHT HIP: ICD-10-CM

## 2019-09-20 DIAGNOSIS — E11.9 TYPE 2 DIABETES MELLITUS WITHOUT COMPLICATION, WITHOUT LONG-TERM CURRENT USE OF INSULIN (HCC): ICD-10-CM

## 2019-09-20 LAB — HBA1C MFR BLD: 6.1 %

## 2019-09-20 PROCEDURE — 2022F DILAT RTA XM EVC RTNOPTHY: CPT | Performed by: FAMILY MEDICINE

## 2019-09-20 PROCEDURE — 99212 OFFICE O/P EST SF 10 MIN: CPT | Performed by: FAMILY MEDICINE

## 2019-09-20 PROCEDURE — 3044F HG A1C LEVEL LT 7.0%: CPT | Performed by: FAMILY MEDICINE

## 2019-09-20 PROCEDURE — G8399 PT W/DXA RESULTS DOCUMENT: HCPCS | Performed by: FAMILY MEDICINE

## 2019-09-20 PROCEDURE — 1123F ACP DISCUSS/DSCN MKR DOCD: CPT | Performed by: FAMILY MEDICINE

## 2019-09-20 PROCEDURE — 4040F PNEUMOC VAC/ADMIN/RCVD: CPT | Performed by: FAMILY MEDICINE

## 2019-09-20 PROCEDURE — 1090F PRES/ABSN URINE INCON ASSESS: CPT | Performed by: FAMILY MEDICINE

## 2019-09-20 PROCEDURE — 83036 HEMOGLOBIN GLYCOSYLATED A1C: CPT | Performed by: FAMILY MEDICINE

## 2019-09-20 PROCEDURE — G8427 DOCREV CUR MEDS BY ELIG CLIN: HCPCS | Performed by: FAMILY MEDICINE

## 2019-09-20 PROCEDURE — 3017F COLORECTAL CA SCREEN DOC REV: CPT | Performed by: FAMILY MEDICINE

## 2019-09-20 PROCEDURE — G8417 CALC BMI ABV UP PARAM F/U: HCPCS | Performed by: FAMILY MEDICINE

## 2019-09-20 PROCEDURE — 1036F TOBACCO NON-USER: CPT | Performed by: FAMILY MEDICINE

## 2019-09-20 ASSESSMENT — ENCOUNTER SYMPTOMS
CONSTIPATION: 0
ABDOMINAL PAIN: 0
DIARRHEA: 0
SHORTNESS OF BREATH: 0
BLOOD IN STOOL: 0

## 2019-09-20 NOTE — PROGRESS NOTES
A1C)      RTC in 3 mos    An electronic signature was used to authenticate this note.    --Gia Hunt MD on 9/20/2019 at 6:20 PM

## 2019-10-02 ENCOUNTER — HOSPITAL ENCOUNTER (OUTPATIENT)
Age: 74
Setting detail: OUTPATIENT SURGERY
Discharge: HOME | End: 2019-10-02
Attending: PHYSICAL MEDICINE & REHABILITATION | Admitting: PHYSICAL MEDICINE & REHABILITATION
Payer: MEDICARE

## 2019-10-02 ENCOUNTER — APPOINTMENT (OUTPATIENT)
Dept: GENERAL RADIOLOGY | Age: 74
End: 2019-10-02
Attending: PHYSICAL MEDICINE & REHABILITATION
Payer: MEDICARE

## 2019-10-02 VITALS
HEIGHT: 60 IN | SYSTOLIC BLOOD PRESSURE: 114 MMHG | OXYGEN SATURATION: 96 % | DIASTOLIC BLOOD PRESSURE: 56 MMHG | BODY MASS INDEX: 40.05 KG/M2 | RESPIRATION RATE: 16 BRPM | WEIGHT: 204 LBS | HEART RATE: 55 BPM

## 2019-10-02 LAB
GLUCOSE BLD-MCNC: 82 MG/DL (ref 70–99)
PERFORMED ON: NORMAL

## 2019-10-02 PROCEDURE — 2709999900 HC NON-CHARGEABLE SUPPLY: Performed by: PHYSICAL MEDICINE & REHABILITATION

## 2019-10-02 PROCEDURE — 2500000003 HC RX 250 WO HCPCS: Performed by: PHYSICAL MEDICINE & REHABILITATION

## 2019-10-02 PROCEDURE — 3610000054 HC PAIN LEVEL 3 BASE (NON-OR): Performed by: PHYSICAL MEDICINE & REHABILITATION

## 2019-10-02 PROCEDURE — 99152 MOD SED SAME PHYS/QHP 5/>YRS: CPT | Performed by: PHYSICAL MEDICINE & REHABILITATION

## 2019-10-02 PROCEDURE — 77003 FLUOROGUIDE FOR SPINE INJECT: CPT

## 2019-10-02 PROCEDURE — 6360000002 HC RX W HCPCS: Performed by: PHYSICAL MEDICINE & REHABILITATION

## 2019-10-02 RX ORDER — FENTANYL CITRATE 50 UG/ML
INJECTION, SOLUTION INTRAMUSCULAR; INTRAVENOUS
Status: COMPLETED | OUTPATIENT
Start: 2019-10-02 | End: 2019-10-02

## 2019-10-02 RX ORDER — BUPIVACAINE HYDROCHLORIDE 5 MG/ML
INJECTION, SOLUTION EPIDURAL; INTRACAUDAL
Status: COMPLETED | OUTPATIENT
Start: 2019-10-02 | End: 2019-10-02

## 2019-10-02 RX ORDER — TELMISARTAN 40 MG/1
40 TABLET ORAL DAILY
Qty: 90 TABLET | Refills: 1 | Status: CANCELLED | OUTPATIENT
Start: 2019-10-02

## 2019-10-02 RX ORDER — LIDOCAINE HYDROCHLORIDE 10 MG/ML
INJECTION, SOLUTION EPIDURAL; INFILTRATION; INTRACAUDAL; PERINEURAL
Status: COMPLETED | OUTPATIENT
Start: 2019-10-02 | End: 2019-10-02

## 2019-10-02 RX ORDER — MIDAZOLAM HYDROCHLORIDE 1 MG/ML
INJECTION INTRAMUSCULAR; INTRAVENOUS
Status: COMPLETED | OUTPATIENT
Start: 2019-10-02 | End: 2019-10-02

## 2019-10-02 ASSESSMENT — PAIN SCALES - GENERAL
PAINLEVEL_OUTOF10: 0
PAINLEVEL_OUTOF10: 0

## 2019-10-02 ASSESSMENT — PAIN DESCRIPTION - DESCRIPTORS: DESCRIPTORS: SHARP

## 2019-10-02 ASSESSMENT — PAIN - FUNCTIONAL ASSESSMENT: PAIN_FUNCTIONAL_ASSESSMENT: 0-10

## 2019-10-03 RX ORDER — IRBESARTAN 150 MG/1
150 TABLET ORAL DAILY
Qty: 90 TABLET | Refills: 1 | Status: SHIPPED | OUTPATIENT
Start: 2019-10-03 | End: 2020-01-05 | Stop reason: SDUPTHER

## 2019-10-17 DIAGNOSIS — G89.4 CHRONIC PAIN SYNDROME: ICD-10-CM

## 2019-10-18 RX ORDER — BUSPIRONE HYDROCHLORIDE 15 MG/1
15 TABLET ORAL 2 TIMES DAILY
Qty: 180 TABLET | Refills: 1 | Status: SHIPPED | OUTPATIENT
Start: 2019-10-18 | End: 2020-02-19

## 2019-10-19 RX ORDER — ACETAMINOPHEN AND CODEINE PHOSPHATE 300; 30 MG/1; MG/1
1 TABLET ORAL 3 TIMES DAILY PRN
Qty: 90 TABLET | Refills: 0 | Status: SHIPPED | OUTPATIENT
Start: 2019-10-19 | End: 2019-11-18 | Stop reason: SDUPTHER

## 2019-11-18 DIAGNOSIS — G89.4 CHRONIC PAIN SYNDROME: ICD-10-CM

## 2019-11-19 RX ORDER — NAPROXEN 500 MG/1
500 TABLET ORAL 2 TIMES DAILY WITH MEALS
Qty: 180 TABLET | Refills: 1 | Status: SHIPPED | OUTPATIENT
Start: 2019-11-19 | End: 2020-03-20 | Stop reason: SDUPTHER

## 2019-11-19 RX ORDER — GABAPENTIN 600 MG/1
600 TABLET ORAL 2 TIMES DAILY
Qty: 180 TABLET | Refills: 1 | Status: SHIPPED | OUTPATIENT
Start: 2019-11-19 | End: 2020-03-20 | Stop reason: SDUPTHER

## 2019-11-19 RX ORDER — GLIMEPIRIDE 4 MG/1
4 TABLET ORAL
Qty: 90 TABLET | Refills: 1 | Status: SHIPPED | OUTPATIENT
Start: 2019-11-19 | End: 2020-03-20 | Stop reason: SDUPTHER

## 2019-11-19 RX ORDER — ACETAMINOPHEN AND CODEINE PHOSPHATE 300; 30 MG/1; MG/1
1 TABLET ORAL 3 TIMES DAILY PRN
Qty: 90 TABLET | Refills: 0 | Status: SHIPPED | OUTPATIENT
Start: 2019-11-19 | End: 2020-01-06 | Stop reason: SDUPTHER

## 2019-11-19 RX ORDER — DULOXETIN HYDROCHLORIDE 60 MG/1
60 CAPSULE, DELAYED RELEASE ORAL DAILY
Qty: 90 CAPSULE | Refills: 1 | Status: SHIPPED | OUTPATIENT
Start: 2019-11-19 | End: 2020-02-24

## 2019-11-27 ENCOUNTER — OFFICE VISIT (OUTPATIENT)
Dept: INTERNAL MEDICINE CLINIC | Age: 74
End: 2019-11-27
Payer: MEDICARE

## 2019-11-27 VITALS
HEART RATE: 76 BPM | OXYGEN SATURATION: 97 % | BODY MASS INDEX: 39.68 KG/M2 | SYSTOLIC BLOOD PRESSURE: 124 MMHG | DIASTOLIC BLOOD PRESSURE: 58 MMHG | RESPIRATION RATE: 18 BRPM | WEIGHT: 203.2 LBS

## 2019-11-27 DIAGNOSIS — Z00.00 MEDICARE ANNUAL WELLNESS VISIT, SUBSEQUENT: Primary | ICD-10-CM

## 2019-11-27 DIAGNOSIS — E11.9 TYPE 2 DIABETES MELLITUS WITHOUT COMPLICATION, WITHOUT LONG-TERM CURRENT USE OF INSULIN (HCC): ICD-10-CM

## 2019-11-27 LAB
CREATININE URINE POCT: 326
HBA1C MFR BLD: 6.2 %
MICROALBUMIN/CREAT 24H UR: 16 MG/G{CREAT}
MICROALBUMIN/CREAT UR-RTO: 4.9

## 2019-11-27 PROCEDURE — G8482 FLU IMMUNIZE ORDER/ADMIN: HCPCS | Performed by: FAMILY MEDICINE

## 2019-11-27 PROCEDURE — 1123F ACP DISCUSS/DSCN MKR DOCD: CPT | Performed by: FAMILY MEDICINE

## 2019-11-27 PROCEDURE — 4040F PNEUMOC VAC/ADMIN/RCVD: CPT | Performed by: FAMILY MEDICINE

## 2019-11-27 PROCEDURE — 3017F COLORECTAL CA SCREEN DOC REV: CPT | Performed by: FAMILY MEDICINE

## 2019-11-27 PROCEDURE — 82044 UR ALBUMIN SEMIQUANTITATIVE: CPT | Performed by: FAMILY MEDICINE

## 2019-11-27 PROCEDURE — G0439 PPPS, SUBSEQ VISIT: HCPCS | Performed by: FAMILY MEDICINE

## 2019-11-27 PROCEDURE — 3044F HG A1C LEVEL LT 7.0%: CPT | Performed by: FAMILY MEDICINE

## 2019-11-27 PROCEDURE — 83036 HEMOGLOBIN GLYCOSYLATED A1C: CPT | Performed by: FAMILY MEDICINE

## 2019-11-27 RX ORDER — BUPROPION HYDROCHLORIDE 150 MG/1
150 TABLET, EXTENDED RELEASE ORAL 2 TIMES DAILY
Qty: 60 TABLET | Refills: 3 | Status: SHIPPED | OUTPATIENT
Start: 2019-11-27 | End: 2020-03-20 | Stop reason: SDUPTHER

## 2019-11-27 ASSESSMENT — LIFESTYLE VARIABLES: HOW OFTEN DO YOU HAVE A DRINK CONTAINING ALCOHOL: 0

## 2019-11-27 ASSESSMENT — VISUAL ACUITY
OD_CC: BLIND
OS_CC: 20/20

## 2019-11-27 ASSESSMENT — PATIENT HEALTH QUESTIONNAIRE - PHQ9
SUM OF ALL RESPONSES TO PHQ QUESTIONS 1-9: 2
SUM OF ALL RESPONSES TO PHQ QUESTIONS 1-9: 2

## 2019-12-17 RX ORDER — AMLODIPINE BESYLATE 5 MG/1
5 TABLET ORAL DAILY
Qty: 90 TABLET | Refills: 1 | Status: SHIPPED | OUTPATIENT
Start: 2019-12-17 | End: 2020-03-06 | Stop reason: SDUPTHER

## 2020-01-06 RX ORDER — ACETAMINOPHEN AND CODEINE PHOSPHATE 300; 30 MG/1; MG/1
1 TABLET ORAL 3 TIMES DAILY PRN
Qty: 90 TABLET | Refills: 0 | Status: SHIPPED | OUTPATIENT
Start: 2020-01-06 | End: 2020-02-13 | Stop reason: SDUPTHER

## 2020-01-06 RX ORDER — METOPROLOL SUCCINATE 25 MG/1
25 TABLET, EXTENDED RELEASE ORAL DAILY
Qty: 90 TABLET | Refills: 1 | Status: SHIPPED | OUTPATIENT
Start: 2020-01-06 | End: 2020-03-20 | Stop reason: SDUPTHER

## 2020-01-06 RX ORDER — IRBESARTAN 150 MG/1
150 TABLET ORAL DAILY
Qty: 90 TABLET | Refills: 1 | Status: SHIPPED | OUTPATIENT
Start: 2020-01-06 | End: 2020-03-20 | Stop reason: SDUPTHER

## 2020-02-13 RX ORDER — ACETAMINOPHEN AND CODEINE PHOSPHATE 300; 30 MG/1; MG/1
1 TABLET ORAL 3 TIMES DAILY PRN
Qty: 90 TABLET | Refills: 0 | Status: SHIPPED | OUTPATIENT
Start: 2020-02-13 | End: 2020-03-12 | Stop reason: SDUPTHER

## 2020-02-19 RX ORDER — BUSPIRONE HYDROCHLORIDE 15 MG/1
15 TABLET ORAL 2 TIMES DAILY
Qty: 180 TABLET | Refills: 1 | Status: SHIPPED | OUTPATIENT
Start: 2020-02-19 | End: 2020-03-20 | Stop reason: SDUPTHER

## 2020-02-24 ENCOUNTER — OFFICE VISIT (OUTPATIENT)
Dept: INTERNAL MEDICINE CLINIC | Age: 75
End: 2020-02-24
Payer: MEDICARE

## 2020-02-24 VITALS
RESPIRATION RATE: 20 BRPM | DIASTOLIC BLOOD PRESSURE: 56 MMHG | WEIGHT: 193 LBS | HEART RATE: 64 BPM | BODY MASS INDEX: 37.69 KG/M2 | OXYGEN SATURATION: 96 % | SYSTOLIC BLOOD PRESSURE: 116 MMHG

## 2020-02-24 LAB — HBA1C MFR BLD: 6.2 %

## 2020-02-24 PROCEDURE — 99214 OFFICE O/P EST MOD 30 MIN: CPT | Performed by: FAMILY MEDICINE

## 2020-02-24 PROCEDURE — 4040F PNEUMOC VAC/ADMIN/RCVD: CPT | Performed by: FAMILY MEDICINE

## 2020-02-24 PROCEDURE — G8427 DOCREV CUR MEDS BY ELIG CLIN: HCPCS | Performed by: FAMILY MEDICINE

## 2020-02-24 PROCEDURE — 1036F TOBACCO NON-USER: CPT | Performed by: FAMILY MEDICINE

## 2020-02-24 PROCEDURE — 2022F DILAT RTA XM EVC RTNOPTHY: CPT | Performed by: FAMILY MEDICINE

## 2020-02-24 PROCEDURE — 3017F COLORECTAL CA SCREEN DOC REV: CPT | Performed by: FAMILY MEDICINE

## 2020-02-24 PROCEDURE — G8417 CALC BMI ABV UP PARAM F/U: HCPCS | Performed by: FAMILY MEDICINE

## 2020-02-24 PROCEDURE — 1090F PRES/ABSN URINE INCON ASSESS: CPT | Performed by: FAMILY MEDICINE

## 2020-02-24 PROCEDURE — 3046F HEMOGLOBIN A1C LEVEL >9.0%: CPT | Performed by: FAMILY MEDICINE

## 2020-02-24 PROCEDURE — 1123F ACP DISCUSS/DSCN MKR DOCD: CPT | Performed by: FAMILY MEDICINE

## 2020-02-24 PROCEDURE — G8399 PT W/DXA RESULTS DOCUMENT: HCPCS | Performed by: FAMILY MEDICINE

## 2020-02-24 PROCEDURE — G8482 FLU IMMUNIZE ORDER/ADMIN: HCPCS | Performed by: FAMILY MEDICINE

## 2020-02-24 SDOH — ECONOMIC STABILITY: FOOD INSECURITY: WITHIN THE PAST 12 MONTHS, YOU WORRIED THAT YOUR FOOD WOULD RUN OUT BEFORE YOU GOT MONEY TO BUY MORE.: NEVER TRUE

## 2020-02-24 SDOH — ECONOMIC STABILITY: TRANSPORTATION INSECURITY
IN THE PAST 12 MONTHS, HAS LACK OF TRANSPORTATION KEPT YOU FROM MEETINGS, WORK, OR FROM GETTING THINGS NEEDED FOR DAILY LIVING?: NO

## 2020-02-24 SDOH — ECONOMIC STABILITY: TRANSPORTATION INSECURITY
IN THE PAST 12 MONTHS, HAS THE LACK OF TRANSPORTATION KEPT YOU FROM MEDICAL APPOINTMENTS OR FROM GETTING MEDICATIONS?: NO

## 2020-02-24 SDOH — ECONOMIC STABILITY: FOOD INSECURITY: WITHIN THE PAST 12 MONTHS, THE FOOD YOU BOUGHT JUST DIDN'T LAST AND YOU DIDN'T HAVE MONEY TO GET MORE.: NEVER TRUE

## 2020-02-24 SDOH — ECONOMIC STABILITY: INCOME INSECURITY: HOW HARD IS IT FOR YOU TO PAY FOR THE VERY BASICS LIKE FOOD, HOUSING, MEDICAL CARE, AND HEATING?: NOT HARD AT ALL

## 2020-02-24 ASSESSMENT — PATIENT HEALTH QUESTIONNAIRE - PHQ9
1. LITTLE INTEREST OR PLEASURE IN DOING THINGS: 1
SUM OF ALL RESPONSES TO PHQ QUESTIONS 1-9: 2
2. FEELING DOWN, DEPRESSED OR HOPELESS: 1
SUM OF ALL RESPONSES TO PHQ QUESTIONS 1-9: 2
SUM OF ALL RESPONSES TO PHQ9 QUESTIONS 1 & 2: 2

## 2020-02-24 NOTE — PROGRESS NOTES
(92.2 kg)   10/02/19 204 lb (92.5 kg)         Medication Review:  Current Outpatient Medications   Medication Sig Dispense Refill    busPIRone (BUSPAR) 15 MG tablet Take 15 mg by mouth 2 times daily 180 tablet 1    acetaminophen-codeine (TYLENOL #3) 300-30 MG per tablet Take 1 tablet by mouth 3 times daily as needed for Pain for up to 30 days. 90 tablet 0    metFORMIN (GLUCOPHAGE) 1000 MG tablet Take 1 tablet by mouth 2 times daily (with meals) 180 tablet 1    metoprolol succinate (TOPROL XL) 25 MG extended release tablet Take 1 tablet by mouth daily 90 tablet 1    irbesartan (AVAPRO) 150 MG tablet Take 1 tablet by mouth daily 90 tablet 1    amLODIPine (NORVASC) 5 MG tablet Take 1 tablet by mouth daily 90 tablet 1    buPROPion (WELLBUTRIN SR) 150 MG extended release tablet Take 1 tablet by mouth 2 times daily 60 tablet 3    glimepiride (AMARYL) 4 MG tablet Take 1 tablet by mouth every morning (before breakfast) 90 tablet 1    gabapentin (NEURONTIN) 600 MG tablet Take 1 tablet by mouth 2 times daily for 180 days. 180 tablet 1    naproxen (NAPROSYN) 500 MG tablet Take 1 tablet by mouth 2 times daily (with meals) Take with food. 180 tablet 1    carbidopa-levodopa (SINEMET)  MG per tablet Take 2 tablets by mouth 4 times daily 720 tablet 1    atorvastatin (LIPITOR) 40 MG tablet Take 1 tablet by mouth daily 90 tablet 1    pantoprazole (PROTONIX) 40 MG tablet Take 1 tablet by mouth daily 90 tablet 1    telmisartan (MICARDIS) 40 MG tablet Take 1 tablet by mouth daily This replaces the Losartan 90 tablet 1    albuterol sulfate HFA (PROAIR HFA) 108 (90 Base) MCG/ACT inhaler Inhale 2 puffs into the lungs every 4 hours as needed for Wheezing or Shortness of Breath 1 Inhaler 3    Cholecalciferol (VITAMIN D3) 5000 units CAPS Take 1 capsule by mouth daily 90 capsule 3    aspirin 81 MG tablet Take 81 mg by mouth daily. No current facility-administered medications for this visit.           Patient member of club or organization: Not on file     Attends meetings of clubs or organizations: Not on file     Relationship status: Not on file    Intimate partner violence:     Fear of current or ex partner: Not on file     Emotionally abused: Not on file     Physically abused: Not on file     Forced sexual activity: Not on file   Other Topics Concern    Not on file   Social History Narrative    Not on file        Family History   Problem Relation Age of Onset    Cancer Mother         multiple myeloma    Heart Failure Mother     Hypertension Mother     Cancer Father         head and neck    Heart Failure Father     Hypertension Father     Heart Failure Sister     Hypertension Sister     Heart Failure Brother     Hypertension Brother           Physical Exam:  Physical Exam  Vitals signs and nursing note reviewed. Constitutional:       General: She is not in acute distress. Appearance: Normal appearance. HENT:      Head: Normocephalic and atraumatic. Right Ear: External ear normal.      Left Ear: External ear normal.      Mouth/Throat:      Mouth: Mucous membranes are moist.      Pharynx: Oropharynx is clear. No oropharyngeal exudate. Eyes:      General: No scleral icterus. Conjunctiva/sclera: Conjunctivae normal.   Neck:      Musculoskeletal: Normal range of motion and neck supple. Vascular: No carotid bruit. Cardiovascular:      Rate and Rhythm: Normal rate and regular rhythm. Heart sounds: Normal heart sounds. No murmur. Pulmonary:      Effort: Pulmonary effort is normal. No respiratory distress. Breath sounds: Normal breath sounds. No wheezing. Abdominal:      General: There is no distension. Palpations: Abdomen is soft. There is no mass. Tenderness: There is no abdominal tenderness. There is no guarding or rebound. Musculoskeletal:         General: No swelling. Arms:    Skin:     General: Skin is warm and dry. Findings: No rash. Comments: Maculopapular lesions with flaky white surface on bilateral elbows and arms. Neurological:      Mental Status: She is alert and oriented to person, place, and time. Psychiatric:         Mood and Affect: Mood normal.         Behavior: Behavior normal.            Laboratory Studies:  Lab Results   Component Value Date    LABA1C 6.2 11/27/2019    LABA1C 6.1 09/20/2019    LABA1C 6.9 06/21/2019        Lab Results   Component Value Date    WBC 7.6 03/22/2019    HGB 10.9 (L) 03/22/2019    HCT 36.0 03/22/2019    MCV 74.5 (L) 03/22/2019     03/22/2019        Lab Results   Component Value Date     03/22/2019    K 3.8 03/22/2019    CL 98 (L) 03/22/2019    CO2 26 03/22/2019    BUN 13 03/22/2019    CREATININE 0.6 03/22/2019    GLUCOSE 96 03/15/2018    CALCIUM 10.0 03/22/2019    PROT 6.2 (L) 03/22/2019    LABALBU 3.9 03/22/2019    BILITOT <0.2 03/22/2019    ALKPHOS 82 03/22/2019    AST 18 03/22/2019    ALT 8 (L) 03/22/2019    LABGLOM >60 03/22/2019    GFRAA >60 03/22/2019    AGRATIO 1.7 03/22/2019    GLOB 2.3 03/22/2019       Lab Results   Component Value Date    CHOL 140 03/15/2018    TRIG 112 03/15/2018    HDL 55 03/22/2019    LDLCALC 59 03/22/2019    LABVLDL 29 03/22/2019       Lab Results   Component Value Date    TSH 1.84 03/22/2019    T4FREE 0.9 03/22/2019        Lab Results   Component Value Date    VITD25 33.8 03/22/2019    VITD25 57.1 03/15/2018    VITD25 27.4 (L) 12/20/2016          Health Maintenance Review:  Health Maintenance Due   Topic Date Due    DTaP/Tdap/Td vaccine (1 - Tdap) 01/27/1958    Hepatitis B vaccine (1 of 3 - Risk 3-dose series) 01/27/1966    Shingles Vaccine (2 of 3) 12/03/2013    Diabetic foot exam  09/19/2018    Breast cancer screen  03/02/2020          Assessment/Plan:  1. Type 2 diabetes mellitus without complication, without long-term current use of insulin (HCC)  Controlled. Today's A1C is 6.2%. Continue taking Metformin 1000 mg BID and Glimepiride 4 mg daily.

## 2020-03-06 RX ORDER — CARBIDOPA/LEVODOPA 25MG-250MG
2 TABLET ORAL 4 TIMES DAILY
Qty: 720 TABLET | Refills: 1 | Status: SHIPPED | OUTPATIENT
Start: 2020-03-06 | End: 2020-07-06

## 2020-03-06 RX ORDER — PANTOPRAZOLE SODIUM 40 MG/1
40 TABLET, DELAYED RELEASE ORAL DAILY
Qty: 90 TABLET | Refills: 1 | Status: SHIPPED | OUTPATIENT
Start: 2020-03-06 | End: 2020-07-06

## 2020-03-06 RX ORDER — AMLODIPINE BESYLATE 5 MG/1
5 TABLET ORAL DAILY
Qty: 90 TABLET | Refills: 1 | Status: SHIPPED | OUTPATIENT
Start: 2020-03-06 | End: 2020-07-06

## 2020-03-13 RX ORDER — ACETAMINOPHEN AND CODEINE PHOSPHATE 300; 30 MG/1; MG/1
1 TABLET ORAL 3 TIMES DAILY PRN
Qty: 90 TABLET | Refills: 0 | Status: SHIPPED | OUTPATIENT
Start: 2020-03-13 | End: 2020-05-01 | Stop reason: SDUPTHER

## 2020-03-20 RX ORDER — NAPROXEN 500 MG/1
500 TABLET ORAL 2 TIMES DAILY WITH MEALS
Qty: 180 TABLET | Refills: 1 | Status: SHIPPED | OUTPATIENT
Start: 2020-03-20 | End: 2020-07-06

## 2020-03-20 RX ORDER — ATORVASTATIN CALCIUM 40 MG/1
40 TABLET, FILM COATED ORAL DAILY
Qty: 90 TABLET | Refills: 1 | Status: SHIPPED | OUTPATIENT
Start: 2020-03-20 | End: 2020-09-15

## 2020-03-20 RX ORDER — BUSPIRONE HYDROCHLORIDE 15 MG/1
15 TABLET ORAL 2 TIMES DAILY
Qty: 180 TABLET | Refills: 1 | Status: SHIPPED | OUTPATIENT
Start: 2020-03-20 | End: 2020-10-26

## 2020-03-20 RX ORDER — IRBESARTAN 150 MG/1
150 TABLET ORAL DAILY
Qty: 90 TABLET | Refills: 1 | Status: SHIPPED | OUTPATIENT
Start: 2020-03-20 | End: 2020-03-27 | Stop reason: SDUPTHER

## 2020-03-20 RX ORDER — BUPROPION HYDROCHLORIDE 150 MG/1
150 TABLET, EXTENDED RELEASE ORAL 2 TIMES DAILY
Qty: 180 TABLET | Refills: 1 | Status: SHIPPED | OUTPATIENT
Start: 2020-03-20 | End: 2020-09-08

## 2020-03-20 RX ORDER — GLIMEPIRIDE 4 MG/1
4 TABLET ORAL
Qty: 90 TABLET | Refills: 1 | Status: SHIPPED | OUTPATIENT
Start: 2020-03-20 | End: 2020-09-08

## 2020-03-20 RX ORDER — GABAPENTIN 600 MG/1
600 TABLET ORAL 2 TIMES DAILY
Qty: 180 TABLET | Refills: 1 | Status: SHIPPED | OUTPATIENT
Start: 2020-03-20 | End: 2020-09-08

## 2020-03-20 RX ORDER — METOPROLOL SUCCINATE 25 MG/1
25 TABLET, EXTENDED RELEASE ORAL DAILY
Qty: 90 TABLET | Refills: 1 | Status: SHIPPED | OUTPATIENT
Start: 2020-03-20 | End: 2020-09-08

## 2020-03-27 RX ORDER — IRBESARTAN 150 MG/1
150 TABLET ORAL DAILY
Qty: 30 TABLET | Refills: 0 | Status: SHIPPED | OUTPATIENT
Start: 2020-03-27 | End: 2020-03-27

## 2020-03-27 RX ORDER — IRBESARTAN 150 MG/1
150 TABLET ORAL DAILY
Qty: 90 TABLET | Refills: 0 | Status: SHIPPED | OUTPATIENT
Start: 2020-03-27 | End: 2020-06-30

## 2020-04-16 ENCOUNTER — VIRTUAL VISIT (OUTPATIENT)
Dept: INTERNAL MEDICINE CLINIC | Age: 75
End: 2020-04-16
Payer: MEDICARE

## 2020-04-16 ENCOUNTER — NURSE TRIAGE (OUTPATIENT)
Dept: OTHER | Facility: CLINIC | Age: 75
End: 2020-04-16

## 2020-04-16 PROCEDURE — 99443 PR PHYS/QHP TELEPHONE EVALUATION 21-30 MIN: CPT | Performed by: FAMILY MEDICINE

## 2020-04-16 RX ORDER — NAPROXEN 500 MG/1
500 TABLET ORAL 2 TIMES DAILY WITH MEALS
Qty: 60 TABLET | Refills: 0 | Status: SHIPPED | OUTPATIENT
Start: 2020-04-16 | End: 2020-09-08

## 2020-04-16 RX ORDER — PREDNISONE 20 MG/1
20 TABLET ORAL DAILY
Qty: 10 TABLET | Refills: 0 | Status: SHIPPED | OUTPATIENT
Start: 2020-04-16 | End: 2020-04-26

## 2020-04-16 NOTE — PROGRESS NOTES
Jesus Rosales is a 70 y.o. female evaluated via telephone on 4/16/2020. Consent:  She and/or health care decision maker is aware that that she may receive a bill for this telephone service, depending on her insurance coverage, and has provided verbal consent to proceed: Yes      Documentation:  I communicated with the patient over the phone about her left hip pain. Details of this discussion including any medical advice provided:     TELEHEALTH EVALUATION -- Audio/Phone (During [id number] public health emergency)  Patient has history of chronic pain as well as greater trochanteric bursitis of the left hip. She called the office today because left hip pain radiating down to the left ankle. Apparently started Sunday, April 12, 2020 when patient lifted the mattress and that when the pain began. She denies any numbness of arms and legs. She denies weakness of arms and legs. Denies bowel or urinary disturbance. She has no fever and chills. Review of Systems  Denies fever and chills. Denies chest pain or shortness of breath. Denies bowel or urinary disturbance. Prior to Visit Medications    Medication Sig Taking? Authorizing Provider   irbesartan (AVAPRO) 150 MG tablet Take 1 tablet by mouth daily  Christine Varma MD   busPIRone (BUSPAR) 15 MG tablet Take 15 mg by mouth 2 times daily  Christine Varma MD   metoprolol succinate (TOPROL XL) 25 MG extended release tablet Take 1 tablet by mouth daily  Christine Varma MD   naproxen (NAPROSYN) 500 MG tablet Take 1 tablet by mouth 2 times daily (with meals) Take with food. Christine Varma MD   buPROPion [facility] SR) 150 MG extended release tablet Take 1 tablet by mouth 2 times daily  Christine Varma MD   atorvastatin (LIPITOR) 40 MG tablet Take 1 tablet by mouth daily  Christine Varma MD   gabapentin (NEURONTIN) 600 MG tablet Take 1 tablet by mouth 2 times daily for 180 days.   Christine Varma MD   glimepiride (AMARYL) 4 MG tablet Take 1 tablet by mouth every morning (before breakfast)  Skylar Newton MD   metFORMIN (GLUCOPHAGE) 1000 MG tablet Take 1 tablet by mouth 2 times daily (with meals)  Skylar Newton MD   carbidopa-levodopa (SINEMET)  MG per tablet Take 2 tablets by mouth 4 times daily  Skylar Newton MD   amLODIPine (NORVASC) 5 MG tablet Take 1 tablet by mouth daily  Skylar Newton MD   pantoprazole (PROTONIX) 40 MG tablet Take 1 tablet by mouth daily  Skylar Newton MD   albuterol sulfate HFA (PROAIR HFA) 108 (90 Base) MCG/ACT inhaler Inhale 2 puffs into the lungs every 4 hours as needed for Wheezing or Shortness of Breath  Gemini Grider DO   Cholecalciferol (VITAMIN D3) 5000 units CAPS Take 1 capsule by mouth daily  Skylar Newton MD   aspirin 81 MG tablet Take 81 mg by mouth daily.   Historical Provider, MD       Social History     Tobacco Use    Smoking status: Former Smoker     Packs/day: 2.00     Years: 36.00     Pack years: 72.00     Types: Cigarettes     Last attempt to quit: 1999     Years since quittin.9    Smokeless tobacco: Never Used   Substance Use Topics    Alcohol use: No     Alcohol/week: 0.0 standard drinks    Drug use: No        Allergies   Allergen Reactions    Meloxicam      Heart rate drops very low    Quinine Derivatives      Fever, chills, vomiting, diarrhea, dizziness    Darvon [Fd&C Red #40-Fd&C Yellow #10-Propoxyphene]      Nausea, vomiting    Vicodin [Hydrocodone-Acetaminophen] Anxiety     Hyperactivity, nausea   ,   Past Medical History:   Diagnosis Date    Asthma     Benign tumor lacrimal gland     Greater trochanteric bursitis of left hip     Hyperlipidemia     Hypertension     Pseudophakia     RLS (restless legs syndrome)     Type II or unspecified type diabetes mellitus without mention of complication, not stated as uncontrolled     Unspecified sleep apnea     sleep study done [id number]    Vitamin D deficiency    ,   Past Surgical History:   Procedure Laterality

## 2020-04-16 NOTE — TELEPHONE ENCOUNTER
Reason for Disposition   Thigh or calf pain in only one leg and present > 1 hour    Answer Assessment - Initial Assessment Questions  1. ONSET: \"When did the pain start? \"   Sunday, April 12th, 2020    2. LOCATION: \"Where is the pain located? \"   Left hip radiating down to her left ankle    3. PAIN: \"How bad is the pain? \"    (Scale 1-10; or mild, moderate, severe)  LEFT THIGH: constant pain 6/10  LEFT HIP TO LEFT ANKLE:  intermittent with movement (bending, twisting, pressure when sitting/laying on it a certain way) 26/21    4. WORK OR EXERCISE: \"Has there been any recent work or exercise that involved this part of the body? \"   Was lifting a mattress Sunday and that is when pain began    5. CAUSE: \"What do you think is causing the leg pain? \"   Was lifting a mattress Sunday and that is when pain began. Pt stated that she has had both a pinched nerve and sciata in the past and believes this is likely sciata as it is presenting simul ar to previous occurances. 6. OTHER SYMPTOMS: \"Do you have any other symptoms? \" (e.g., chest pain, back pain, breathing difficulty, swelling, rash, fever, numbness, weakness)  Denies warmth, redness, swelling, coolness, change in color, fever, SOB, chest pain, or hx of clots. 7. PREGNANCY: \"Is there any chance you are pregnant? \" \"When was your last menstrual period? \"  N/A    Protocols used: LEG PAIN-ADULT-[place]    * 2nd triage completed with Yuliya Christensen CNP. Discussed patients medical history and symptoms and was advised to see if the patient was able to go to a green clinic to be seen in person for assessment. Pt agreed with this plan and was sent over to the call center to be scheduled for either today or tomorrow at the latest.     *Patient advised to head to the Emergency Department to be assessed if she experiences any of the following, blood in urine or stool, decreased urination, cant stand, constant excruciating pain. Patient verbalized understanding.      Carisa Bob given

## 2020-04-24 ENCOUNTER — TELEPHONE (OUTPATIENT)
Dept: INTERNAL MEDICINE CLINIC | Age: 75
End: 2020-04-24

## 2020-05-01 RX ORDER — ACETAMINOPHEN AND CODEINE PHOSPHATE 300; 30 MG/1; MG/1
1 TABLET ORAL 3 TIMES DAILY PRN
Qty: 90 TABLET | Refills: 0 | Status: SHIPPED | OUTPATIENT
Start: 2020-05-01 | End: 2020-06-15 | Stop reason: SDUPTHER

## 2020-05-27 ENCOUNTER — TELEPHONE (OUTPATIENT)
Dept: INTERNAL MEDICINE CLINIC | Age: 75
End: 2020-05-27

## 2020-05-27 PROBLEM — H91.93 BILATERAL HEARING LOSS: Status: ACTIVE | Noted: 2020-05-27

## 2020-05-28 RX ORDER — ATORVASTATIN CALCIUM 40 MG/1
40 TABLET, FILM COATED ORAL DAILY
Qty: 90 TABLET | Refills: 1 | OUTPATIENT
Start: 2020-05-28

## 2020-06-15 RX ORDER — ACETAMINOPHEN AND CODEINE PHOSPHATE 300; 30 MG/1; MG/1
1 TABLET ORAL 3 TIMES DAILY PRN
Qty: 90 TABLET | Refills: 0 | Status: SHIPPED | OUTPATIENT
Start: 2020-06-15 | End: 2020-07-24 | Stop reason: SDUPTHER

## 2020-06-30 RX ORDER — IRBESARTAN 150 MG/1
150 TABLET ORAL DAILY
Qty: 90 TABLET | Refills: 1 | Status: SHIPPED | OUTPATIENT
Start: 2020-06-30 | End: 2020-12-28

## 2020-07-06 RX ORDER — PANTOPRAZOLE SODIUM 40 MG/1
40 TABLET, DELAYED RELEASE ORAL DAILY
Qty: 90 TABLET | Refills: 1 | Status: SHIPPED | OUTPATIENT
Start: 2020-07-06 | End: 2020-12-17 | Stop reason: SDUPTHER

## 2020-07-06 RX ORDER — CARBIDOPA/LEVODOPA 25MG-250MG
2 TABLET ORAL 4 TIMES DAILY
Qty: 720 TABLET | Refills: 1 | Status: SHIPPED | OUTPATIENT
Start: 2020-07-06 | End: 2020-12-17

## 2020-07-06 RX ORDER — AMLODIPINE BESYLATE 5 MG/1
5 TABLET ORAL DAILY
Qty: 90 TABLET | Refills: 1 | Status: SHIPPED | OUTPATIENT
Start: 2020-07-06 | End: 2020-12-17

## 2020-07-24 RX ORDER — ACETAMINOPHEN AND CODEINE PHOSPHATE 300; 30 MG/1; MG/1
1 TABLET ORAL 3 TIMES DAILY PRN
Qty: 90 TABLET | Refills: 0 | Status: SHIPPED | OUTPATIENT
Start: 2020-07-24 | End: 2020-09-01 | Stop reason: SDUPTHER

## 2020-07-27 ENCOUNTER — OFFICE VISIT (OUTPATIENT)
Dept: INTERNAL MEDICINE CLINIC | Age: 75
End: 2020-07-27
Payer: MEDICARE

## 2020-07-27 VITALS
WEIGHT: 188.2 LBS | HEART RATE: 54 BPM | BODY MASS INDEX: 36.76 KG/M2 | DIASTOLIC BLOOD PRESSURE: 56 MMHG | OXYGEN SATURATION: 96 % | SYSTOLIC BLOOD PRESSURE: 124 MMHG | RESPIRATION RATE: 18 BRPM | TEMPERATURE: 96.9 F

## 2020-07-27 LAB
A/G RATIO: 2 (ref 1.1–2.2)
ALBUMIN SERPL-MCNC: 4 G/DL (ref 3.4–5)
ALP BLD-CCNC: 71 U/L (ref 40–129)
ALT SERPL-CCNC: <5 U/L (ref 10–40)
ANION GAP SERPL CALCULATED.3IONS-SCNC: 13 MMOL/L (ref 3–16)
AST SERPL-CCNC: 13 U/L (ref 15–37)
BASOPHILS ABSOLUTE: 0 K/UL (ref 0–0.2)
BASOPHILS RELATIVE PERCENT: 0.7 %
BILIRUB SERPL-MCNC: <0.2 MG/DL (ref 0–1)
BUN BLDV-MCNC: 14 MG/DL (ref 7–20)
CALCIUM SERPL-MCNC: 9.9 MG/DL (ref 8.3–10.6)
CHLORIDE BLD-SCNC: 102 MMOL/L (ref 99–110)
CHOLESTEROL, FASTING: 131 MG/DL (ref 0–199)
CO2: 25 MMOL/L (ref 21–32)
CREAT SERPL-MCNC: 0.6 MG/DL (ref 0.6–1.2)
EOSINOPHILS ABSOLUTE: 0.1 K/UL (ref 0–0.6)
EOSINOPHILS RELATIVE PERCENT: 2.5 %
GFR AFRICAN AMERICAN: >60
GFR NON-AFRICAN AMERICAN: >60
GLOBULIN: 2 G/DL
GLUCOSE FASTING: 82 MG/DL (ref 70–99)
HBA1C MFR BLD: 5.7 %
HCT VFR BLD CALC: 37.6 % (ref 36–48)
HDLC SERPL-MCNC: 54 MG/DL (ref 40–60)
HEMOGLOBIN: 12 G/DL (ref 12–16)
LDL CHOLESTEROL CALCULATED: 60 MG/DL
LYMPHOCYTES ABSOLUTE: 1.4 K/UL (ref 1–5.1)
LYMPHOCYTES RELATIVE PERCENT: 24.2 %
MCH RBC QN AUTO: 27.3 PG (ref 26–34)
MCHC RBC AUTO-ENTMCNC: 32 G/DL (ref 31–36)
MCV RBC AUTO: 85.3 FL (ref 80–100)
MONOCYTES ABSOLUTE: 0.7 K/UL (ref 0–1.3)
MONOCYTES RELATIVE PERCENT: 11.6 %
NEUTROPHILS ABSOLUTE: 3.5 K/UL (ref 1.7–7.7)
NEUTROPHILS RELATIVE PERCENT: 61 %
PDW BLD-RTO: 15.1 % (ref 12.4–15.4)
PLATELET # BLD: 313 K/UL (ref 135–450)
PMV BLD AUTO: 8 FL (ref 5–10.5)
POTASSIUM SERPL-SCNC: 4.4 MMOL/L (ref 3.5–5.1)
RBC # BLD: 4.41 M/UL (ref 4–5.2)
SODIUM BLD-SCNC: 140 MMOL/L (ref 136–145)
T4 FREE: 1 NG/DL (ref 0.9–1.8)
TOTAL PROTEIN: 6 G/DL (ref 6.4–8.2)
TRIGLYCERIDE, FASTING: 84 MG/DL (ref 0–150)
VLDLC SERPL CALC-MCNC: 17 MG/DL
WBC # BLD: 5.8 K/UL (ref 4–11)

## 2020-07-27 PROCEDURE — 36415 COLL VENOUS BLD VENIPUNCTURE: CPT | Performed by: FAMILY MEDICINE

## 2020-07-27 PROCEDURE — 1090F PRES/ABSN URINE INCON ASSESS: CPT | Performed by: FAMILY MEDICINE

## 2020-07-27 PROCEDURE — G8399 PT W/DXA RESULTS DOCUMENT: HCPCS | Performed by: FAMILY MEDICINE

## 2020-07-27 PROCEDURE — G8417 CALC BMI ABV UP PARAM F/U: HCPCS | Performed by: FAMILY MEDICINE

## 2020-07-27 PROCEDURE — G8427 DOCREV CUR MEDS BY ELIG CLIN: HCPCS | Performed by: FAMILY MEDICINE

## 2020-07-27 PROCEDURE — 83036 HEMOGLOBIN GLYCOSYLATED A1C: CPT | Performed by: FAMILY MEDICINE

## 2020-07-27 PROCEDURE — 2022F DILAT RTA XM EVC RTNOPTHY: CPT | Performed by: FAMILY MEDICINE

## 2020-07-27 PROCEDURE — 1123F ACP DISCUSS/DSCN MKR DOCD: CPT | Performed by: FAMILY MEDICINE

## 2020-07-27 PROCEDURE — 3017F COLORECTAL CA SCREEN DOC REV: CPT | Performed by: FAMILY MEDICINE

## 2020-07-27 PROCEDURE — 99214 OFFICE O/P EST MOD 30 MIN: CPT | Performed by: FAMILY MEDICINE

## 2020-07-27 PROCEDURE — 4040F PNEUMOC VAC/ADMIN/RCVD: CPT | Performed by: FAMILY MEDICINE

## 2020-07-27 PROCEDURE — 3044F HG A1C LEVEL LT 7.0%: CPT | Performed by: FAMILY MEDICINE

## 2020-07-27 PROCEDURE — 1036F TOBACCO NON-USER: CPT | Performed by: FAMILY MEDICINE

## 2020-07-27 ASSESSMENT — ENCOUNTER SYMPTOMS
CONSTIPATION: 0
BLOOD IN STOOL: 0
DIARRHEA: 0
SHORTNESS OF BREATH: 0
ABDOMINAL PAIN: 0

## 2020-07-27 NOTE — PROGRESS NOTES
2020     Mindy Lilly (:  1945) is a 70 y.o. female, here for evaluation of the following medical concerns:    HPI  Patient presented to the office for regular follow-up. She has diabetes controlled with current medication. She denies hypoglycemic episode. She has hypertension controlled with amlodipine, Avapro and Toprol. She has hyperlipidemia tolerating statin. She takes Lipitor 40 mg daily. She has asthma controlled with rescue inhaler. She has sleep apnea and wears C-pap nightly  Review of Systems   Constitutional: Negative for activity change and appetite change. Eyes: Negative for visual disturbance. Respiratory: Negative for shortness of breath. Cardiovascular: Negative for chest pain and leg swelling. Gastrointestinal: Negative for abdominal pain, blood in stool, constipation and diarrhea. Genitourinary: Negative for difficulty urinating, frequency, hematuria, menstrual problem and urgency. Neurological: Negative for dizziness and syncope. Psychiatric/Behavioral: Negative for behavioral problems. Prior to Visit Medications    Medication Sig Taking? Authorizing Provider   Tdap (ADACEL) 5-2-15.5 LF-MCG/0.5 injection Inject 0.5 mLs into the muscle once for 1 dose Yes Doe Champion MD   zoster recombinant adjuvanted vaccine [facility]) 50 MCG/0.5ML SUSR injection Inject 0.5 mLs into the muscle once for 1 dose Yes Doe Champion MD   acetaminophen-codeine (TYLENOL #3) 300-30 MG per tablet Take 1 tablet by mouth 3 times daily as needed for Pain for up to 30 days.  Yes Doe Champion MD   carbidopa-levodopa (SINEMET)  MG per tablet Take 2 tablets by mouth 4 times daily Yes Doe Champion MD   pantoprazole (PROTONIX) 40 MG tablet Take 1 tablet by mouth daily Mario Champion MD   amLODIPine (NORVASC) 5 MG tablet Take 1 tablet by mouth daily Mario Champion MD   irbesartan (AVAPRO) 150 MG tablet Take 1 tablet by mouth daily Yes Doe Champion MD busPIRone (BUSPAR) 15 MG tablet Take 15 mg by mouth 2 times daily Yes Carlos Bernard MD   metoprolol succinate (TOPROL XL) 25 MG extended release tablet Take 1 tablet by mouth daily Yes Carlos Bernard MD   buPROPion (WELLBUTRIN SR) 150 MG extended release tablet Take 1 tablet by mouth 2 times daily Yes Carlos Bernard MD   atorvastatin (LIPITOR) 40 MG tablet Take 1 tablet by mouth daily Yes Carlos Bernard MD   gabapentin (NEURONTIN) 600 MG tablet Take 1 tablet by mouth 2 times daily for 180 days. Yes Carlos Bernard MD   glimepiride (AMARYL) 4 MG tablet Take 1 tablet by mouth every morning (before breakfast) Yes Carlos Bernard MD   metFORMIN (GLUCOPHAGE) 1000 MG tablet Take 1 tablet by mouth 2 times daily (with meals) Yes Carlos Bernard MD   Cholecalciferol (VITAMIN D3) 5000 units CAPS Take 1 capsule by mouth daily Yes Carlos Bernard MD   aspirin 81 MG tablet Take 81 mg by mouth daily. Yes Tash Basurto MD   naproxen (NAPROSYN) 500 MG tablet Take 1 tablet by mouth 2 times daily (with meals) Take with food. Carlos Bernard MD   albuterol sulfate HFA (PROAIR HFA) 108 (90 Base) MCG/ACT inhaler Inhale 2 puffs into the lungs every 4 hours as needed for Wheezing or Shortness of Breath  Patient not taking: Reported on 2020  Trevor Calvert DO        Social History     Tobacco Use    Smoking status: Former Smoker     Packs/day: 2.00     Years: 36.00     Pack years: 72.00     Types: Cigarettes     Last attempt to quit: 1999     Years since quittin.2    Smokeless tobacco: Never Used   Substance Use Topics    Alcohol use: No     Alcohol/week: 0.0 standard drinks        Vitals:    20 0904   BP: (!) 124/56   Pulse: 54   Resp: 18   Temp: 96.9 °F (36.1 °C)   TempSrc: Temporal   SpO2: 96%   Weight: 188 lb 3.2 oz (85.4 kg)     Estimated body mass index is 36.76 kg/m² as calculated from the following:    Height as of 10/2/19: 5' (1.524 m).     Weight as of this encounter: 188 lb 3.2 oz (85.4 kg). Physical Exam  Constitutional:       Appearance: She is well-developed. HENT:      Head: Normocephalic. Right Ear: External ear normal.      Nose: Nose normal.   Eyes:      Conjunctiva/sclera: Conjunctivae normal.      Pupils: Pupils are equal, round, and reactive to light. Neck:      Musculoskeletal: Normal range of motion and neck supple. Thyroid: No thyromegaly. Cardiovascular:      Rate and Rhythm: Normal rate and regular rhythm. Heart sounds: Normal heart sounds. No murmur. No friction rub. Pulmonary:      Effort: Pulmonary effort is normal.      Breath sounds: Normal breath sounds. Abdominal:      General: Bowel sounds are normal.      Palpations: Abdomen is soft. There is no mass. Musculoskeletal: Normal range of motion. Lymphadenopathy:      Cervical: No cervical adenopathy. Skin:     General: Skin is warm. Findings: No rash. Neurological:      Mental Status: She is alert and oriented to person, place, and time. Bilateral foot: normal microcirculation and capillary reflow. Neurovascular exam normal.    ASSESSMENT/PLAN:  1. Type 2 diabetes mellitus without complication, without long-term current use of insulin (HCC)  Controlled. Continue current medications, glimepiride 4 mg daily and metformin 1000 mg twice daily.  - POCT glycosylated hemoglobin (Hb A1C)  - Comprehensive Metabolic Panel, Fasting  - Diabetic Foot Exam    2. Essential hypertension  Controlled. Continue amlodipine 5 mg daily, Avapro 150 mg daily and Toprol-XL 25 mg daily.  - CBC Auto Differential    3. Mixed hyperlipidemia  Tolerating statin. Continue Lipitor 40 mg daily. Will check lipid panel today. - T4, Free  - TSH without Reflex  - Lipid, Fasting    4. Mild intermittent asthma without complication  Controlled. Continue albuterol inhaler as needed. 5. DOYLE on CPAP  Encouraged regular use of CPAP. 6. Restless legs syndrome (RLS)  Controlled.   Continue Sinemet 2 tablets 4 times a day. 7. Breast cancer screening  - JORGE DIGITAL SCREEN W OR WO CAD BILATERAL; Future    8. Vitamin D deficiency  Continue vitamin D supplement 5000 units daily. Will check vitamin D level today. - Vitamin D 25 Hydroxy      RTC in 3 mos.     An electronic signature was used to authenticate this note.    --Luz Elena Cramer MD on 7/27/2020 at 9:56 AM

## 2020-07-28 LAB
TSH SERPL DL<=0.05 MIU/L-ACNC: 1.93 UIU/ML (ref 0.27–4.2)
VITAMIN D 25-HYDROXY: 58.4 NG/ML

## 2020-09-01 RX ORDER — ACETAMINOPHEN AND CODEINE PHOSPHATE 300; 30 MG/1; MG/1
1 TABLET ORAL 3 TIMES DAILY PRN
Qty: 90 TABLET | Refills: 0 | Status: SHIPPED | OUTPATIENT
Start: 2020-09-01 | End: 2020-10-16 | Stop reason: SDUPTHER

## 2020-09-08 RX ORDER — GABAPENTIN 600 MG/1
600 TABLET ORAL 2 TIMES DAILY
Qty: 180 TABLET | Refills: 1 | Status: SHIPPED | OUTPATIENT
Start: 2020-09-08 | End: 2021-02-15

## 2020-09-08 RX ORDER — METOPROLOL SUCCINATE 25 MG/1
25 TABLET, EXTENDED RELEASE ORAL DAILY
Qty: 90 TABLET | Refills: 1 | Status: SHIPPED | OUTPATIENT
Start: 2020-09-08 | End: 2021-02-08

## 2020-09-08 RX ORDER — GLIMEPIRIDE 4 MG/1
4 TABLET ORAL
Qty: 90 TABLET | Refills: 1 | Status: SHIPPED | OUTPATIENT
Start: 2020-09-08 | End: 2020-10-26 | Stop reason: SDUPTHER

## 2020-09-08 RX ORDER — BUPROPION HYDROCHLORIDE 150 MG/1
150 TABLET, EXTENDED RELEASE ORAL 2 TIMES DAILY
Qty: 180 TABLET | Refills: 1 | Status: SHIPPED | OUTPATIENT
Start: 2020-09-08 | End: 2021-02-15

## 2020-09-08 RX ORDER — NAPROXEN 500 MG/1
500 TABLET ORAL 2 TIMES DAILY WITH MEALS
Qty: 180 TABLET | Refills: 0 | Status: SHIPPED | OUTPATIENT
Start: 2020-09-08 | End: 2020-11-25

## 2020-09-10 ENCOUNTER — TELEPHONE (OUTPATIENT)
Dept: INTERNAL MEDICINE CLINIC | Age: 75
End: 2020-09-10

## 2020-09-10 RX ORDER — BLOOD-GLUCOSE METER
1 EACH MISCELLANEOUS DAILY
Qty: 1 KIT | Refills: 0 | Status: SHIPPED | OUTPATIENT
Start: 2020-09-10 | End: 2022-06-17

## 2020-09-10 RX ORDER — LANCETS 30 GAUGE
1 EACH MISCELLANEOUS DAILY
Qty: 100 EACH | Refills: 6 | Status: SHIPPED | OUTPATIENT
Start: 2020-09-10 | End: 2022-06-17

## 2020-09-10 RX ORDER — GLUCOSAMINE HCL/CHONDROITIN SU 500-400 MG
CAPSULE ORAL
Qty: 100 STRIP | Refills: 6 | Status: SHIPPED | OUTPATIENT
Start: 2020-09-10 | End: 2022-06-17

## 2020-09-11 ENCOUNTER — NURSE ONLY (OUTPATIENT)
Dept: INTERNAL MEDICINE CLINIC | Age: 75
End: 2020-09-11
Payer: MEDICARE

## 2020-09-11 PROCEDURE — 90694 VACC AIIV4 NO PRSRV 0.5ML IM: CPT | Performed by: FAMILY MEDICINE

## 2020-09-11 PROCEDURE — G0008 ADMIN INFLUENZA VIRUS VAC: HCPCS | Performed by: FAMILY MEDICINE

## 2020-09-11 NOTE — PROGRESS NOTES
Vaccine Information Sheet, \"Influenza - Inactivated\"  given to Chitra Forbes, or parent/legal guardian of  Chitra Forbes and verbalized understanding. Patient responses:    Have you ever had a reaction to a flu vaccine? No  Do you have any current illness? No  Have you ever had [place] Syndrome? No  Do you have a serious allergy to any of the follow: Neomycin, Polymyxin, Thimerosal, eggs or egg products? No    Flu vaccine given per order. Please see immunization tab. Risks and benefits explained. Current VIS given.

## 2020-09-15 RX ORDER — ATORVASTATIN CALCIUM 40 MG/1
40 TABLET, FILM COATED ORAL DAILY
Qty: 90 TABLET | Refills: 3 | Status: SHIPPED | OUTPATIENT
Start: 2020-09-15 | End: 2021-08-07

## 2020-10-19 RX ORDER — ACETAMINOPHEN AND CODEINE PHOSPHATE 300; 30 MG/1; MG/1
1 TABLET ORAL 3 TIMES DAILY PRN
Qty: 90 TABLET | Refills: 0 | Status: SHIPPED | OUTPATIENT
Start: 2020-10-19 | End: 2020-11-25 | Stop reason: SDUPTHER

## 2020-10-26 ENCOUNTER — OFFICE VISIT (OUTPATIENT)
Dept: INTERNAL MEDICINE CLINIC | Age: 75
End: 2020-10-26
Payer: MEDICARE

## 2020-10-26 VITALS
TEMPERATURE: 97 F | BODY MASS INDEX: 36.48 KG/M2 | RESPIRATION RATE: 18 BRPM | WEIGHT: 186.8 LBS | DIASTOLIC BLOOD PRESSURE: 61 MMHG | OXYGEN SATURATION: 97 % | HEART RATE: 50 BPM | SYSTOLIC BLOOD PRESSURE: 125 MMHG

## 2020-10-26 LAB — HBA1C MFR BLD: 5.5 %

## 2020-10-26 PROCEDURE — 1036F TOBACCO NON-USER: CPT | Performed by: FAMILY MEDICINE

## 2020-10-26 PROCEDURE — 83036 HEMOGLOBIN GLYCOSYLATED A1C: CPT | Performed by: FAMILY MEDICINE

## 2020-10-26 PROCEDURE — G8399 PT W/DXA RESULTS DOCUMENT: HCPCS | Performed by: FAMILY MEDICINE

## 2020-10-26 PROCEDURE — 3017F COLORECTAL CA SCREEN DOC REV: CPT | Performed by: FAMILY MEDICINE

## 2020-10-26 PROCEDURE — G8427 DOCREV CUR MEDS BY ELIG CLIN: HCPCS | Performed by: FAMILY MEDICINE

## 2020-10-26 PROCEDURE — 1090F PRES/ABSN URINE INCON ASSESS: CPT | Performed by: FAMILY MEDICINE

## 2020-10-26 PROCEDURE — 3044F HG A1C LEVEL LT 7.0%: CPT | Performed by: FAMILY MEDICINE

## 2020-10-26 PROCEDURE — 2022F DILAT RTA XM EVC RTNOPTHY: CPT | Performed by: FAMILY MEDICINE

## 2020-10-26 PROCEDURE — 4040F PNEUMOC VAC/ADMIN/RCVD: CPT | Performed by: FAMILY MEDICINE

## 2020-10-26 PROCEDURE — G8417 CALC BMI ABV UP PARAM F/U: HCPCS | Performed by: FAMILY MEDICINE

## 2020-10-26 PROCEDURE — 1123F ACP DISCUSS/DSCN MKR DOCD: CPT | Performed by: FAMILY MEDICINE

## 2020-10-26 PROCEDURE — G8484 FLU IMMUNIZE NO ADMIN: HCPCS | Performed by: FAMILY MEDICINE

## 2020-10-26 PROCEDURE — 99214 OFFICE O/P EST MOD 30 MIN: CPT | Performed by: FAMILY MEDICINE

## 2020-10-26 RX ORDER — GLIMEPIRIDE 2 MG/1
2 TABLET ORAL
Qty: 90 TABLET | Refills: 1 | Status: SHIPPED | OUTPATIENT
Start: 2020-10-26 | End: 2020-12-15

## 2020-10-26 ASSESSMENT — ENCOUNTER SYMPTOMS
CONSTIPATION: 0
BLOOD IN STOOL: 0
DIARRHEA: 0
ABDOMINAL PAIN: 0
SHORTNESS OF BREATH: 0

## 2020-10-26 NOTE — PROGRESS NOTES
10/26/2020     Dona Lilly (:  1945) is a 70 y.o. female, here for evaluation of the following medical concerns:    HPI  Patient has diabetes controlled with metformin and glimepiride. She denies hypoglycemic episode. She has hypertension controlled with Toprol-XL 25 mg daily, Avapro and amlodipine. She has hyperlipidemia and tolerating statin. She takes Lipitor 40 mg daily. She has a sleep apnea and uses CPAP. She has restless leg syndrome controlled with Sinemet. Review of Systems   Constitutional: Negative for activity change and appetite change. Eyes: Negative for visual disturbance. Respiratory: Negative for shortness of breath. Cardiovascular: Negative for chest pain and leg swelling. Gastrointestinal: Negative for abdominal pain, blood in stool, constipation and diarrhea. Genitourinary: Negative for difficulty urinating, frequency, hematuria, menstrual problem and urgency. Neurological: Negative for dizziness and syncope. Psychiatric/Behavioral: Negative for behavioral problems. Prior to Visit Medications    Medication Sig Taking? Authorizing Provider   glimepiride (AMARYL) 2 MG tablet Take 1 tablet by mouth every morning (before breakfast) Yes Wes Slater MD   acetaminophen-codeine (TYLENOL #3) 300-30 MG per tablet Take 1 tablet by mouth 3 times daily as needed for Pain for up to 30 days. Wes lSater MD   metFORMIN (GLUCOPHAGE) 1000 MG tablet TAKE 1 TABLET BY MOUTH  TWICE DAILY WITH MEALS  Wes Slater MD   atorvastatin (LIPITOR) 40 MG tablet TAKE 1 TABLET BY MOUTH  DAILY  Wes Slater MD   Blood Glucose Monitoring Suppl (ONE TOUCH ULTRA 2) w/Device KIT 1 kit by Does not apply route daily  Wes Slater MD   blood glucose monitor strips Test 1 times a day & as needed for symptoms of irregular blood glucose.  Donnette Bosworth, MD   Lancets MISC 1 each by Does not apply route daily One touch ultra  Wes Slater MD   metoprolol succinate (TOPROL XL) 25 MG extended release tablet Take 1 tablet by mouth daily  Aristeo Pacheco MD   buPROPion [facility] SR) 150 MG extended release tablet Take 1 tablet by mouth 2 times daily  Aristeo Pacheco MD   naproxen (NAPROSYN) 500 MG tablet Take 1 tablet by mouth 2 times daily (with meals)  Aristeo Pahceco MD   gabapentin (NEURONTIN) 600 MG tablet Take 1 tablet by mouth 2 times daily for 180 days. Aristeo Pacheco MD   carbidopa-levodopa (SINEMET)  MG per tablet Take 2 tablets by mouth 4 times daily  Aristeo Pacheco MD   pantoprazole (PROTONIX) 40 MG tablet Take 1 tablet by mouth daily  Aristeo Pacheco MD   amLODIPine (NORVASC) 5 MG tablet Take 1 tablet by mouth daily  Aristeo Pacheco MD   irbesartan (AVAPRO) 150 MG tablet Take 1 tablet by mouth daily  Aristeo Pacheco MD   busPIRone (BUSPAR) 15 MG tablet Take 15 mg by mouth 2 times daily  Aristeo Pacheco MD   albuterol sulfate HFA (PROAIR HFA) 108 (90 Base) MCG/ACT inhaler Inhale 2 puffs into the lungs every 4 hours as needed for Wheezing or Shortness of Breath  Patient not taking: Reported on 2020  Celi Vaca DO   Cholecalciferol (VITAMIN D3) 5000 units CAPS Take 1 capsule by mouth daily  Aristeo Pacheco MD   aspirin 81 MG tablet Take 81 mg by mouth daily. Historical Provider, MD        Social History     Tobacco Use    Smoking status: Former Smoker     Packs/day: 2.00     Years: 36.00     Pack years: 72.00     Types: Cigarettes     Last attempt to quit: 1999     Years since quittin.5    Smokeless tobacco: Never Used   Substance Use Topics    Alcohol use: No     Alcohol/week: 0.0 standard drinks        Vitals:    10/26/20 1046   BP: 125/61   Pulse: 50   Resp: 18   Temp: 97 °F (36.1 °C)   TempSrc: Temporal   SpO2: 97%   Weight: 186 lb 12.8 oz (84.7 kg)     Estimated body mass index is 36.48 kg/m² as calculated from the following:    Height as of 10/2/19: 5' (1.524 m).     Weight as of this encounter: 186 lb 12.8 oz (84.7 kg). Physical Exam  Constitutional:       Appearance: She is well-developed. HENT:      Head: Normocephalic. Right Ear: External ear normal.      Nose: Nose normal.   Eyes:      Conjunctiva/sclera: Conjunctivae normal.      Pupils: Pupils are equal, round, and reactive to light. Neck:      Musculoskeletal: Normal range of motion and neck supple. Thyroid: No thyromegaly. Cardiovascular:      Rate and Rhythm: Normal rate and regular rhythm. Heart sounds: Normal heart sounds. No murmur. No friction rub. Pulmonary:      Effort: Pulmonary effort is normal.      Breath sounds: Normal breath sounds. Abdominal:      General: Bowel sounds are normal.      Palpations: Abdomen is soft. There is no mass. Musculoskeletal: Normal range of motion. Lymphadenopathy:      Cervical: No cervical adenopathy. Skin:     General: Skin is warm. Findings: No rash. Neurological:      Mental Status: She is alert and oriented to person, place, and time. ASSESSMENT/PLAN:  1. Type 2 diabetes mellitus without complication, without long-term current use of insulin (HCC)  Controlled. HbA1c today is 5.6%. .  Will reduce glimepiride from 4 mg to 2 mg daily. Continue Metformin 1000 mg twice daily  - POCT glycosylated hemoglobin (Hb A1C)    2. Essential hypertension  Controlled. Continue Avapro 150 mg daily, Toprol-XL 25 mg daily and amlodipine 5 mg daily    3. Mild intermittent asthma without complication  Controlled. Continue Proair as needed. 4. Mixed hyperlipidemia  Continue Lipitor 40 mg daily    5. DOYLE on CPAP  Encourage regular use of CPAP. 6. Restless legs syndrome (RLS)  Doing fairly well on Sinemet  two tablets 4 times a day.       RTC in 3 mos    An electronic signature was used to authenticate this note.    --Jose Kumar MD on 10/26/2020 at 11:39 AM

## 2020-11-25 RX ORDER — ACETAMINOPHEN AND CODEINE PHOSPHATE 300; 30 MG/1; MG/1
1 TABLET ORAL 3 TIMES DAILY PRN
Qty: 90 TABLET | Refills: 0 | Status: SHIPPED | OUTPATIENT
Start: 2020-11-25 | End: 2020-12-01 | Stop reason: SDUPTHER

## 2020-11-25 RX ORDER — NAPROXEN 500 MG/1
500 TABLET ORAL 2 TIMES DAILY WITH MEALS
Qty: 180 TABLET | Refills: 1 | Status: SHIPPED | OUTPATIENT
Start: 2020-11-25 | End: 2021-01-04 | Stop reason: SDUPTHER

## 2020-11-25 NOTE — TELEPHONE ENCOUNTER
Medication:   Requested Prescriptions     Pending Prescriptions Disp Refills    naproxen (NAPROSYN) 500 MG tablet [Pharmacy Med Name: NAPROXEN  500MG  TAB] 180 tablet 3     Sig: TAKE 1 TABLET BY MOUTH  TWICE DAILY WITH MEALS        Last Filled:      Patient Phone Number: [phone] (home)     Last appt: Visit date not found   Next appt: 2/2/2021    Last OARRS: No flowsheet data found.

## 2020-12-01 ENCOUNTER — TELEPHONE (OUTPATIENT)
Dept: PRIMARY CARE CLINIC | Age: 75
End: 2020-12-01

## 2020-12-01 RX ORDER — ACETAMINOPHEN AND CODEINE PHOSPHATE 300; 30 MG/1; MG/1
1 TABLET ORAL 3 TIMES DAILY PRN
Qty: 90 TABLET | Refills: 0 | Status: SHIPPED | OUTPATIENT
Start: 2020-12-01 | End: 2021-01-05 | Stop reason: SDUPTHER

## 2020-12-01 NOTE — TELEPHONE ENCOUNTER
Patient called concerning prescription for Tylenol #3 Transmission to pharmacy failed. It needs to be resubmitted. Margaret Jara

## 2020-12-15 ENCOUNTER — VIRTUAL VISIT (OUTPATIENT)
Dept: PRIMARY CARE CLINIC | Age: 75
End: 2020-12-15
Payer: MEDICARE

## 2020-12-15 PROCEDURE — 4040F PNEUMOC VAC/ADMIN/RCVD: CPT | Performed by: FAMILY MEDICINE

## 2020-12-15 PROCEDURE — G8484 FLU IMMUNIZE NO ADMIN: HCPCS | Performed by: FAMILY MEDICINE

## 2020-12-15 PROCEDURE — 3017F COLORECTAL CA SCREEN DOC REV: CPT | Performed by: FAMILY MEDICINE

## 2020-12-15 PROCEDURE — 3044F HG A1C LEVEL LT 7.0%: CPT | Performed by: FAMILY MEDICINE

## 2020-12-15 PROCEDURE — 1036F TOBACCO NON-USER: CPT | Performed by: FAMILY MEDICINE

## 2020-12-15 PROCEDURE — G8399 PT W/DXA RESULTS DOCUMENT: HCPCS | Performed by: FAMILY MEDICINE

## 2020-12-15 PROCEDURE — G8428 CUR MEDS NOT DOCUMENT: HCPCS | Performed by: FAMILY MEDICINE

## 2020-12-15 PROCEDURE — G8417 CALC BMI ABV UP PARAM F/U: HCPCS | Performed by: FAMILY MEDICINE

## 2020-12-15 PROCEDURE — 99214 OFFICE O/P EST MOD 30 MIN: CPT | Performed by: FAMILY MEDICINE

## 2020-12-15 PROCEDURE — 1090F PRES/ABSN URINE INCON ASSESS: CPT | Performed by: FAMILY MEDICINE

## 2020-12-15 PROCEDURE — 1123F ACP DISCUSS/DSCN MKR DOCD: CPT | Performed by: FAMILY MEDICINE

## 2020-12-15 PROCEDURE — 2022F DILAT RTA XM EVC RTNOPTHY: CPT | Performed by: FAMILY MEDICINE

## 2020-12-15 ASSESSMENT — ENCOUNTER SYMPTOMS
BLOOD IN STOOL: 0
CONSTIPATION: 0
SHORTNESS OF BREATH: 0
ABDOMINAL PAIN: 0
DIARRHEA: 0

## 2020-12-15 NOTE — PROGRESS NOTES
12/15/2020    TELEHEALTH EVALUATION -- Audio/Visual (During [facility]-74 public health emergency)    HPI:    Georgia Lilly (:  1945) has requested an audio/video evaluation for the following concern(s):    Patient presented today at the office via virtual visit for follow-up. She has diabetes and reported to have few episodes of hypoglycemia with blood sugar dropped to around 40's. Patient takes glimepiride and Metformin. HbA1c last visit was 5.6%. She has hypertension controlled with Toprol-XL, amlodipine and Avapro. She has hyperlipidemia and tolerating statin. She takes Lipitor 40 mg daily. She has restless leg syndrome controlled with Sinemet. She has asthma and rarely uses a nebulizer or albuterol inhaler. Review of Systems   Constitutional: Negative for activity change and appetite change. Eyes: Negative for visual disturbance. Respiratory: Negative for shortness of breath. Cardiovascular: Negative for chest pain and leg swelling. Gastrointestinal: Negative for abdominal pain, blood in stool, constipation and diarrhea. Genitourinary: Negative for difficulty urinating, frequency, hematuria, menstrual problem and urgency. Neurological: Negative for dizziness and syncope. Psychiatric/Behavioral: Negative for behavioral problems. Prior to Visit Medications    Medication Sig Taking? Authorizing Provider   acetaminophen-codeine (TYLENOL #3) 300-30 MG per tablet Take 1 tablet by mouth 3 times daily as needed for Pain for up to 30 days.   Casey Rothman MD   naproxen (NAPROSYN) 500 MG tablet Take 1 tablet by mouth 2 times daily (with meals)  Casey Rothman MD   glimepiride (AMARYL) 2 MG tablet Take 1 tablet by mouth every morning (before breakfast)  Casey Rothman MD   metFORMIN (GLUCOPHAGE) 1000 MG tablet TAKE 1 TABLET BY MOUTH  TWICE DAILY WITH MEALS  Casey Rothman MD   atorvastatin (LIPITOR) 40 MG tablet TAKE 1 TABLET BY MOUTH  DAILY  Casey Rothman MD Blood Glucose Monitoring Suppl (ONE TOUCH ULTRA 2) w/Device KIT 1 kit by Does not apply route daily  Gina Finn MD   blood glucose monitor strips Test 1 times a day & as needed for symptoms of irregular blood glucose. Mau Camargo MD   Lancets MISC 1 each by Does not apply route daily One touch ultra  Gina Finn MD   metoprolol succinate (TOPROL XL) 25 MG extended release tablet Take 1 tablet by mouth daily  Gina Finn MD   buPROPion [facility] SR) 150 MG extended release tablet Take 1 tablet by mouth 2 times daily  Gina Finn MD   gabapentin (NEURONTIN) 600 MG tablet Take 1 tablet by mouth 2 times daily for 180 days. Gina Finn MD   carbidopa-levodopa (SINEMET)  MG per tablet Take 2 tablets by mouth 4 times daily  Gina Finn MD   pantoprazole (PROTONIX) 40 MG tablet Take 1 tablet by mouth daily  Gina Finn MD   amLODIPine (NORVASC) 5 MG tablet Take 1 tablet by mouth daily  Gina Finn MD   irbesartan (AVAPRO) 150 MG tablet Take 1 tablet by mouth daily  Gina Finn MD   albuterol sulfate HFA (PROAIR HFA) 108 (90 Base) MCG/ACT inhaler Inhale 2 puffs into the lungs every 4 hours as needed for Wheezing or Shortness of Breath  Patient not taking: Reported on 2020  Obed Presley DO   Cholecalciferol (VITAMIN D3) 5000 units CAPS Take 1 capsule by mouth daily  Gina Finn MD   aspirin 81 MG tablet Take 81 mg by mouth daily.   Historical Provider, MD       Social History     Tobacco Use    Smoking status: Former Smoker     Packs/day: 2.00     Years: 36.00     Pack years: 72.00     Types: Cigarettes     Quit date: 1999     Years since quittin.6    Smokeless tobacco: Never Used   Substance Use Topics    Alcohol use: No     Alcohol/week: 0.0 standard drinks    Drug use: No        Allergies   Allergen Reactions    Meloxicam      Heart rate drops very low    Quinine Derivatives Pulmonary/Chest: [] Respiratory effort normal.  [x] No visualized signs of difficulty breathing or respiratory distress        [] Abnormal-      Musculoskeletal:   [x] Normal gait with no signs of ataxia         [] Normal range of motion of neck        [] Abnormal-       Neurological:        [x] No Facial Asymmetry (Cranial nerve 7 motor function) (limited exam to video visit)          [] No gaze palsy        [] Abnormal-         Skin:        [x] No significant exanthematous lesions or discoloration noted on facial skin         [] Abnormal-            Psychiatric:       [x] Normal Affect [x] No Hallucinations        [] Abnormal-     Other pertinent observable physical exam findings-     ASSESSMENT/PLAN:  1. Type 2 diabetes mellitus without complication, without long-term current use of insulin (HCC)  Due to hypoglycemic episode will discontinue glimepiride. Continue Metformin 1000 mg twice daily. Will check HbA1c next visit. 2. Essential hypertension  Controlled. Continue Toprol-XL 25 daily, amlodipine 5 mg daily and Avapro 150 mg daily. 3. Mixed hyperlipidemia  Controlled. Continue Lipitor 40 mg daily. 4. DOYLE on CPAP  Doing fairly well. She reported that she cannot sleep without CPAP. 5. Restless legs syndrome (RLS)  Controlled with Sinemet. Continue current medication. 6. Mild intermittent asthma without complication  She rarely uses inhaler. Albuterol every 4 hours as needed.       RTC in 2-3 mos and PRN Antonio Gonzalez is a 70 y.o. female being evaluated by a Virtual Visit (video visit) encounter to address concerns as mentioned above. A caregiver was present when appropriate. Due to this being a TeleHealth encounter (During [facility] public health emergency), evaluation of the following organ systems was limited: Vitals/Constitutional/EENT/Resp/CV/GI//MS/Neuro/Skin/Heme-Lymph-Imm. Pursuant to the emergency declaration under the [address] [place] [address] and the [employer] Act, this Virtual Visit was conducted with patient's (and/or legal guardian's) consent, to reduce the patient's risk of exposure to COVID-19 and provide necessary medical care. The patient (and/or legal guardian) has also been advised to contact this office for worsening conditions or problems, and seek emergency medical treatment and/or call 911 if deemed necessary. Patient identification was verified at the start of the visit: Yes    Total time spent on this encounter: 21 minutes    Services were provided through a video synchronous discussion virtually to substitute for in-person clinic visit. Patient and provider were located at their individual homes. --Declan Camacho MD on 12/15/2020 at 1:15 PM    An electronic signature was used to authenticate this note.

## 2020-12-17 RX ORDER — PANTOPRAZOLE SODIUM 40 MG/1
40 TABLET, DELAYED RELEASE ORAL DAILY
Qty: 90 TABLET | Refills: 1 | Status: SHIPPED | OUTPATIENT
Start: 2020-12-17 | End: 2021-05-24

## 2020-12-17 RX ORDER — AMLODIPINE BESYLATE 5 MG/1
5 TABLET ORAL DAILY
Qty: 90 TABLET | Refills: 1 | Status: SHIPPED | OUTPATIENT
Start: 2020-12-17 | End: 2021-05-24

## 2020-12-17 RX ORDER — CARBIDOPA/LEVODOPA 25MG-250MG
2 TABLET ORAL 4 TIMES DAILY
Qty: 720 TABLET | Refills: 1 | Status: SHIPPED | OUTPATIENT
Start: 2020-12-17 | End: 2021-05-24

## 2021-01-04 ENCOUNTER — TELEPHONE (OUTPATIENT)
Dept: PRIMARY CARE CLINIC | Age: 76
End: 2021-01-04

## 2021-01-04 RX ORDER — IRBESARTAN 150 MG/1
150 TABLET ORAL DAILY
Qty: 90 TABLET | Refills: 1 | Status: SHIPPED | OUTPATIENT
Start: 2021-01-04 | End: 2021-06-07

## 2021-01-04 RX ORDER — NAPROXEN 500 MG/1
500 TABLET ORAL 2 TIMES DAILY WITH MEALS
Qty: 180 TABLET | Refills: 1 | Status: SHIPPED | OUTPATIENT
Start: 2021-01-04 | End: 2021-03-29

## 2021-01-05 DIAGNOSIS — G89.4 CHRONIC PAIN SYNDROME: ICD-10-CM

## 2021-01-05 RX ORDER — ACETAMINOPHEN AND CODEINE PHOSPHATE 300; 30 MG/1; MG/1
1 TABLET ORAL 3 TIMES DAILY PRN
Qty: 90 TABLET | Refills: 0 | Status: SHIPPED | OUTPATIENT
Start: 2021-01-05 | End: 2021-02-17 | Stop reason: SDUPTHER

## 2021-01-13 ENCOUNTER — OFFICE VISIT (OUTPATIENT)
Dept: PULMONOLOGY | Age: 76
End: 2021-01-13
Payer: MEDICARE

## 2021-01-13 VITALS
TEMPERATURE: 96.4 F | HEIGHT: 60 IN | WEIGHT: 178 LBS | DIASTOLIC BLOOD PRESSURE: 74 MMHG | OXYGEN SATURATION: 99 % | BODY MASS INDEX: 34.95 KG/M2 | SYSTOLIC BLOOD PRESSURE: 115 MMHG | HEART RATE: 62 BPM | RESPIRATION RATE: 16 BRPM

## 2021-01-13 DIAGNOSIS — Z99.89 OSA ON CPAP: Primary | ICD-10-CM

## 2021-01-13 DIAGNOSIS — G47.33 OSA ON CPAP: Primary | ICD-10-CM

## 2021-01-13 DIAGNOSIS — J45.20 MILD INTERMITTENT ASTHMA WITHOUT COMPLICATION: ICD-10-CM

## 2021-01-13 PROCEDURE — 99213 OFFICE O/P EST LOW 20 MIN: CPT | Performed by: INTERNAL MEDICINE

## 2021-01-13 PROCEDURE — G8417 CALC BMI ABV UP PARAM F/U: HCPCS | Performed by: INTERNAL MEDICINE

## 2021-01-13 PROCEDURE — G8399 PT W/DXA RESULTS DOCUMENT: HCPCS | Performed by: INTERNAL MEDICINE

## 2021-01-13 PROCEDURE — 1036F TOBACCO NON-USER: CPT | Performed by: INTERNAL MEDICINE

## 2021-01-13 PROCEDURE — 1123F ACP DISCUSS/DSCN MKR DOCD: CPT | Performed by: INTERNAL MEDICINE

## 2021-01-13 PROCEDURE — 3017F COLORECTAL CA SCREEN DOC REV: CPT | Performed by: INTERNAL MEDICINE

## 2021-01-13 PROCEDURE — G8484 FLU IMMUNIZE NO ADMIN: HCPCS | Performed by: INTERNAL MEDICINE

## 2021-01-13 PROCEDURE — G8427 DOCREV CUR MEDS BY ELIG CLIN: HCPCS | Performed by: INTERNAL MEDICINE

## 2021-01-13 PROCEDURE — 4040F PNEUMOC VAC/ADMIN/RCVD: CPT | Performed by: INTERNAL MEDICINE

## 2021-01-13 PROCEDURE — 1090F PRES/ABSN URINE INCON ASSESS: CPT | Performed by: INTERNAL MEDICINE

## 2021-01-13 ASSESSMENT — SLEEP AND FATIGUE QUESTIONNAIRES
HOW LIKELY ARE YOU TO NOD OFF OR FALL ASLEEP WHILE WATCHING TV: 2
HOW LIKELY ARE YOU TO NOD OFF OR FALL ASLEEP IN A CAR, WHILE STOPPED FOR A FEW MINUTES IN TRAFFIC: 0
HOW LIKELY ARE YOU TO NOD OFF OR FALL ASLEEP WHILE SITTING AND TALKING TO SOMEONE: 0
HOW LIKELY ARE YOU TO NOD OFF OR FALL ASLEEP WHEN YOU ARE A PASSENGER IN A CAR FOR AN HOUR WITHOUT A BREAK: 2
HOW LIKELY ARE YOU TO NOD OFF OR FALL ASLEEP WHILE SITTING AND READING: 2
HOW LIKELY ARE YOU TO NOD OFF OR FALL ASLEEP WHILE SITTING INACTIVE IN A PUBLIC PLACE: 2

## 2021-01-13 ASSESSMENT — ASTHMA QUESTIONNAIRES
QUESTION_5 LAST FOUR WEEKS HOW WOULD YOU RATE YOUR ASTHMA CONTROL: 5
ACT_TOTALSCORE: 25
QUESTION_4 LAST FOUR WEEKS HOW OFTEN HAVE YOU USED YOUR RESCUE INHALER OR NEBULIZER MEDICATION (SUCH AS ALBUTEROL): 5
QUESTION_2 LAST FOUR WEEKS HOW OFTEN HAVE YOU HAD SHORTNESS OF BREATH: 5

## 2021-01-13 NOTE — PROGRESS NOTES
Chief complaint  This is a 70y.o. year old female  who comes to see me with a chief complaint of   Chief Complaint   Patient presents with    Follow-up     asthma & cpap     . HPI  Follow up on DOYLE.  and asthma      Machine:  Unable to access information. Either chip is not working or site is down    Uses machine every night     Breathing is fine. Rare use of albuterol.   Uses mainly when she has bronchitis             Sleep Medicine 1/13/2021 8/19/2019 2/25/2019 8/22/2018 8/14/2017 7/18/2016 1/25/2016   Sitting and reading 2 1 1 2 2 1 1   Watching TV 2 1 1 1 1 1 2   Sitting, inactive in a public place (e.g. a theatre or a meeting) 2 1 1 1 2 1 2   As a passenger in a car for an hour without a break 2 2 1 1 1 1 2   Lying down to rest in the afternoon when circumstances permit 1 3 2 2 2 1 2   Sitting and talking to someone 0 1 0 0 0 0 1   Sitting quietly after a lunch without alcohol 1 1 0 0 0 1 0   In a car, while stopped for a few minutes in traffic 0 0 0 0 0 0 0   Total score 10 10 6 7 8 6 10   Neck circumference - - - 17.5 19 19 -       Past Medical History:   Diagnosis Date    Asthma     Benign tumor lacrimal gland     Greater trochanteric bursitis of left hip     Hyperlipidemia     Hypertension     Pseudophakia     RLS (restless legs syndrome)     Type II or unspecified type diabetes mellitus without mention of complication, not stated as uncontrolled     Unspecified sleep apnea     sleep study done [id number]    Vitamin D deficiency        Past Surgical History:   Procedure Laterality Date    BREAST SURGERY      CHOLECYSTECTOMY  [id number]    [facility]. INJECTION PROCEDURE FOR SACROILIAC JOINT Right 10/2/2019    RIGHT SACROILIAC JOINT INJECTION WITH FLUOROSCOPY performed by Jennifer Doe MD at [address]      OTHER SURGICAL HISTORY      fatty tumors on arms excised    TONSILLECTOMY AND ADENOIDECTOMY         Social History     Socioeconomic History daily 90 tablet 1    metFORMIN (GLUCOPHAGE) 1000 MG tablet TAKE 1 TABLET BY MOUTH  TWICE DAILY WITH MEALS 180 tablet 3    atorvastatin (LIPITOR) 40 MG tablet TAKE 1 TABLET BY MOUTH  DAILY 90 tablet 3    Blood Glucose Monitoring Suppl (ONE TOUCH ULTRA 2) w/Device KIT 1 kit by Does not apply route daily 1 kit 0    blood glucose monitor strips Test 1 times a day & as needed for symptoms of irregular blood glucose. . 100 strip 6    Lancets MISC 1 each by Does not apply route daily One touch ultra 100 each 6    metoprolol succinate (TOPROL XL) 25 MG extended release tablet Take 1 tablet by mouth daily 90 tablet 1    buPROPion (WELLBUTRIN SR) 150 MG extended release tablet Take 1 tablet by mouth 2 times daily 180 tablet 1    gabapentin (NEURONTIN) 600 MG tablet Take 1 tablet by mouth 2 times daily for 180 days. 180 tablet 1    Cholecalciferol (VITAMIN D3) 5000 units CAPS Take 1 capsule by mouth daily 90 capsule 3    aspirin 81 MG tablet Take 81 mg by mouth daily.  albuterol sulfate HFA (PROAIR HFA) 108 (90 Base) MCG/ACT inhaler Inhale 2 puffs into the lungs every 4 hours as needed for Wheezing or Shortness of Breath (Patient not taking: Reported on 1/13/2021) 1 Inhaler 3     No current facility-administered medications for this visit. PHYSICAL EXAM:  GENERAL: Slightly lethargic appearing   HEENT:  No scleral icterus, no conjunctival irritation, Mallampati IV  NECK:  No thyromegaly, no bruits, large neck  LYMPH:  No cervical or supraclavicular adenopathy  HEART:  Regular rate and rhythm, no murmurs  LUNGS:  Clear   ABDOMEN:  No distention, no organomegaly. Hyperactive bowels   EXTREMITIES:  No edema, no digital clubbing  NEURO:  No localizing deficits, CN II-XII intact    Chest imaging from 8/2018 is reviewed.   My impression is no acute process    ASTHMA CONTROL TEST 1/13/2021 8/19/2019 2/25/2019 8/22/2018 8/14/2017 2/1/2017   In the past 4 weeks, how much of the time did your asthma keep you from

## 2021-01-27 RX ORDER — FLUCONAZOLE 150 MG/1
150 TABLET ORAL ONCE
Qty: 2 TABLET | Refills: 0 | Status: SHIPPED | OUTPATIENT
Start: 2021-01-27 | End: 2021-01-27

## 2021-01-27 NOTE — TELEPHONE ENCOUNTER
Please send prescription for yeast infectious, it's pretty bad, please send to [facility] on [address]

## 2021-01-28 RX ORDER — CLOTRIMAZOLE AND BETAMETHASONE DIPROPIONATE 10; .64 MG/G; MG/G
CREAM TOPICAL
Qty: 45 G | Refills: 1 | Status: SHIPPED | OUTPATIENT
Start: 2021-01-28 | End: 2022-04-04

## 2021-01-28 NOTE — TELEPHONE ENCOUNTER
Per Dr Nely Maloney:  1) D/C Diflucan   2) Lotrisone ream apply to affected areas BID 1 tube. Rx sent.

## 2021-01-28 NOTE — TELEPHONE ENCOUNTER
Pt said the yeast infection she has is under her breast and down her arm. She had this once before and Dr. Jennifer Seymour told her it was a yeast infection. She said the script that was sent yesterday is for a UTI.      PATIENT:  [phone]

## 2021-02-08 ENCOUNTER — OFFICE VISIT (OUTPATIENT)
Dept: PRIMARY CARE CLINIC | Age: 76
End: 2021-02-08
Payer: MEDICARE

## 2021-02-08 VITALS
HEART RATE: 59 BPM | SYSTOLIC BLOOD PRESSURE: 118 MMHG | WEIGHT: 177.8 LBS | TEMPERATURE: 96 F | BODY MASS INDEX: 34.72 KG/M2 | DIASTOLIC BLOOD PRESSURE: 70 MMHG | RESPIRATION RATE: 18 BRPM

## 2021-02-08 DIAGNOSIS — J45.20 MILD INTERMITTENT ASTHMA WITHOUT COMPLICATION: ICD-10-CM

## 2021-02-08 DIAGNOSIS — Z99.89 OSA ON CPAP: ICD-10-CM

## 2021-02-08 DIAGNOSIS — E11.9 TYPE 2 DIABETES MELLITUS WITHOUT COMPLICATION, WITHOUT LONG-TERM CURRENT USE OF INSULIN (HCC): ICD-10-CM

## 2021-02-08 DIAGNOSIS — I10 ESSENTIAL HYPERTENSION: Primary | ICD-10-CM

## 2021-02-08 DIAGNOSIS — G25.81 RESTLESS LEGS SYNDROME (RLS): ICD-10-CM

## 2021-02-08 DIAGNOSIS — E78.2 MIXED HYPERLIPIDEMIA: ICD-10-CM

## 2021-02-08 DIAGNOSIS — G47.33 OSA ON CPAP: ICD-10-CM

## 2021-02-08 LAB — HBA1C MFR BLD: 5.9 %

## 2021-02-08 PROCEDURE — G8417 CALC BMI ABV UP PARAM F/U: HCPCS | Performed by: FAMILY MEDICINE

## 2021-02-08 PROCEDURE — 83036 HEMOGLOBIN GLYCOSYLATED A1C: CPT | Performed by: FAMILY MEDICINE

## 2021-02-08 PROCEDURE — G8399 PT W/DXA RESULTS DOCUMENT: HCPCS | Performed by: FAMILY MEDICINE

## 2021-02-08 PROCEDURE — G8484 FLU IMMUNIZE NO ADMIN: HCPCS | Performed by: FAMILY MEDICINE

## 2021-02-08 PROCEDURE — 1090F PRES/ABSN URINE INCON ASSESS: CPT | Performed by: FAMILY MEDICINE

## 2021-02-08 PROCEDURE — 1036F TOBACCO NON-USER: CPT | Performed by: FAMILY MEDICINE

## 2021-02-08 PROCEDURE — 2022F DILAT RTA XM EVC RTNOPTHY: CPT | Performed by: FAMILY MEDICINE

## 2021-02-08 PROCEDURE — 1123F ACP DISCUSS/DSCN MKR DOCD: CPT | Performed by: FAMILY MEDICINE

## 2021-02-08 PROCEDURE — 3017F COLORECTAL CA SCREEN DOC REV: CPT | Performed by: FAMILY MEDICINE

## 2021-02-08 PROCEDURE — 99214 OFFICE O/P EST MOD 30 MIN: CPT | Performed by: FAMILY MEDICINE

## 2021-02-08 PROCEDURE — 4040F PNEUMOC VAC/ADMIN/RCVD: CPT | Performed by: FAMILY MEDICINE

## 2021-02-08 PROCEDURE — 3046F HEMOGLOBIN A1C LEVEL >9.0%: CPT | Performed by: FAMILY MEDICINE

## 2021-02-08 PROCEDURE — G8427 DOCREV CUR MEDS BY ELIG CLIN: HCPCS | Performed by: FAMILY MEDICINE

## 2021-02-08 ASSESSMENT — ENCOUNTER SYMPTOMS
ABDOMINAL PAIN: 0
CONSTIPATION: 0
SHORTNESS OF BREATH: 0
DIARRHEA: 0
BLOOD IN STOOL: 0

## 2021-02-08 ASSESSMENT — PATIENT HEALTH QUESTIONNAIRE - PHQ9: 1. LITTLE INTEREST OR PLEASURE IN DOING THINGS: 0

## 2021-02-08 NOTE — PROGRESS NOTES
2021     Alexandra Lilly (:  1945) is a 78 y.o. female, here for evaluation of the following medical concerns:    HPI  Patient presented to the office for regular follow-up. She has hypertension and blood pressure is somewhat low at 90/43. Patient is asymptomatic. She takes Toprol, amlodipine and Avapro. She has DM with history of hypoglycemia now improved since glimepiride was discontinued. She takes Metformin 1000 mg twice daily. She has asthma controlled with albuterol inhaler which she uses very rarely. She has hyperlipidemia and tolerating statin. She takes Lipitor 40 mg daily. She has a sleep apnea doing well on CPAP. She has restless leg syndrome controlled with Sinemet    Review of Systems   Constitutional: Negative for activity change and appetite change. Eyes: Negative for visual disturbance. Respiratory: Negative for shortness of breath. Cardiovascular: Negative for chest pain and leg swelling. Gastrointestinal: Negative for abdominal pain, blood in stool, constipation and diarrhea. Genitourinary: Negative for difficulty urinating, frequency, hematuria, menstrual problem and urgency. Neurological: Negative for dizziness and syncope. Psychiatric/Behavioral: Negative for behavioral problems. Prior to Visit Medications    Medication Sig Taking? Authorizing Provider   clotrimazole-betamethasone (LOTRISONE) 1-0.05 % cream Apply topically 2 times daily.   Nazanin Castro MD   naproxen (NAPROSYN) 500 MG tablet Take 1 tablet by mouth 2 times daily (with meals)  ZACKERY Frias CNP   irbesartan (AVAPRO) 150 MG tablet Take 1 tablet by mouth daily  ZACKERY Frias CNP   amLODIPine (NORVASC) 5 MG tablet Take 1 tablet by mouth daily  Nazanin Castro MD   carbidopa-levodopa (SINEMET)  MG per tablet Take 2 tablets by mouth 4 times daily  Nazanin Castro MD   pantoprazole (PROTONIX) 40 MG tablet Take 1 tablet by mouth daily  Nazanin Castro MD metFORMIN (GLUCOPHAGE) 1000 MG tablet TAKE 1 TABLET BY MOUTH  TWICE DAILY WITH MEALS  Shelley Wong MD   atorvastatin (LIPITOR) 40 MG tablet TAKE 1 TABLET BY MOUTH  DAILY  Shelley Wong MD   Blood Glucose Monitoring Suppl (ONE TOUCH ULTRA 2) w/Device KIT 1 kit by Does not apply route daily  Shelley Wong MD   blood glucose monitor strips Test 1 times a day & as needed for symptoms of irregular blood glucose. Mesha Torre MD   Lancets MISC 1 each by Does not apply route daily One touch ultra  Shelley Wong MD   buPROPion [facility] SR) 150 MG extended release tablet Take 1 tablet by mouth 2 times daily  Shelley Wong MD   gabapentin (NEURONTIN) 600 MG tablet Take 1 tablet by mouth 2 times daily for 180 days. Shelley Wong MD   albuterol sulfate HFA (PROAIR HFA) 108 (90 Base) MCG/ACT inhaler Inhale 2 puffs into the lungs every 4 hours as needed for Wheezing or Shortness of Breath  Patient not taking: Reported on 2021  Shreya Ahmadi DO   Cholecalciferol (VITAMIN D3) 5000 units CAPS Take 1 capsule by mouth daily  Shelley Wong MD   aspirin 81 MG tablet Take 81 mg by mouth daily. Historical Provider, MD        Social History     Tobacco Use    Smoking status: Former Smoker     Packs/day: 2.00     Years: 36.00     Pack years: 72.00     Types: Cigarettes     Quit date: 1999     Years since quittin.8    Smokeless tobacco: Never Used   Substance Use Topics    Alcohol use: No     Alcohol/week: 0.0 standard drinks        Vitals:    21 1043   BP: (!) 90/43   Pulse: 55   Resp: 18   Temp: 96 °F (35.6 °C)   TempSrc: Temporal   Weight: 177 lb 12.8 oz (80.6 kg)     Estimated body mass index is 34.72 kg/m² as calculated from the following:    Height as of 21: 5' (1.524 m). Weight as of this encounter: 177 lb 12.8 oz (80.6 kg). Physical Exam  Constitutional:       General: She is not in acute distress. Appearance: She is well-developed.    HENT: Head: Normocephalic. Eyes:      Conjunctiva/sclera: Conjunctivae normal.   Neck:      Musculoskeletal: Neck supple. Thyroid: No thyromegaly. Cardiovascular:      Rate and Rhythm: Normal rate and regular rhythm. Heart sounds: Normal heart sounds. No murmur. Pulmonary:      Effort: No respiratory distress. Breath sounds: Normal breath sounds. No wheezing or rales. Abdominal:      General: There is no distension. Palpations: Abdomen is soft. Musculoskeletal: Normal range of motion. Skin:     General: Skin is warm. Findings: No rash. Neurological:      Mental Status: She is alert and oriented to person, place, and time. Psychiatric:         Behavior: Behavior normal.         ASSESSMENT/PLAN:  1. Essential hypertension  Blood pressure is somewhat low 90/43- asymptomatic. Will discontinue Toprol-XL 25 mg daily. Continue amlodipine 5 mg daily and Avapro 150 mg daily. 2. Type 2 diabetes mellitus without complication, without long-term current use of insulin (HCC)  Controlled. Continue Metformin 1000 mg twice daily.  - POCT glycosylated hemoglobin (Hb A1C)    3. Mild intermittent asthma without complication  Controlled. Continue proair as needed    4. Mixed hyperlipidemia  Tolerating statin. Continue Lipitor 40 mg daily    5. DOYLE on CPAP  Doing fairly well. Encouraged regular use of CPAP. 6. Restless legs syndrome (RLS)  Controlled.   Continue Sinemet      RTC in 3 mos    An electronic signature was used to authenticate this note.    --Vladimir Malcolm MD on 2/8/2021 at 10:59 AM

## 2021-02-15 RX ORDER — BUPROPION HYDROCHLORIDE 150 MG/1
150 TABLET, EXTENDED RELEASE ORAL 2 TIMES DAILY
Qty: 180 TABLET | Refills: 1 | Status: SHIPPED | OUTPATIENT
Start: 2021-02-15 | End: 2021-03-08 | Stop reason: SDUPTHER

## 2021-02-15 RX ORDER — GLIMEPIRIDE 4 MG/1
TABLET ORAL
Qty: 90 TABLET | Refills: 3 | OUTPATIENT
Start: 2021-02-15

## 2021-02-15 RX ORDER — GABAPENTIN 600 MG/1
600 TABLET ORAL 2 TIMES DAILY
Qty: 180 TABLET | Refills: 1 | Status: SHIPPED | OUTPATIENT
Start: 2021-02-15 | End: 2021-07-26

## 2021-02-15 RX ORDER — METOPROLOL SUCCINATE 25 MG/1
TABLET, EXTENDED RELEASE ORAL
Qty: 90 TABLET | Refills: 3 | OUTPATIENT
Start: 2021-02-15

## 2021-02-16 DIAGNOSIS — G89.4 CHRONIC PAIN SYNDROME: ICD-10-CM

## 2021-02-17 RX ORDER — ACETAMINOPHEN AND CODEINE PHOSPHATE 300; 30 MG/1; MG/1
1 TABLET ORAL 3 TIMES DAILY PRN
Qty: 90 TABLET | Refills: 0 | Status: SHIPPED | OUTPATIENT
Start: 2021-02-17 | End: 2021-03-29 | Stop reason: SDUPTHER

## 2021-03-02 RX ORDER — GLIMEPIRIDE 4 MG/1
TABLET ORAL
Qty: 90 TABLET | Refills: 3 | Status: SHIPPED | OUTPATIENT
Start: 2021-03-02 | End: 2021-03-29

## 2021-03-02 RX ORDER — METOPROLOL SUCCINATE 25 MG/1
TABLET, EXTENDED RELEASE ORAL
Qty: 90 TABLET | Refills: 3 | Status: SHIPPED | OUTPATIENT
Start: 2021-03-02 | End: 2021-02-09 | Stop reason: HOSPADM

## 2021-03-05 ENCOUNTER — TELEPHONE (OUTPATIENT)
Dept: PRIMARY CARE CLINIC | Age: 76
End: 2021-03-05

## 2021-03-05 NOTE — TELEPHONE ENCOUNTER
Pt  Is needing a refill on the following med:   ~Buspiron   Also pt states that she is not supposed to be on metoprolol medication. Last ov: 2/8/21     Pt is with [facility].  Thank you

## 2021-03-08 RX ORDER — BUPROPION HYDROCHLORIDE 150 MG/1
150 TABLET, EXTENDED RELEASE ORAL 2 TIMES DAILY
Qty: 180 TABLET | Refills: 3 | Status: SHIPPED | OUTPATIENT
Start: 2021-03-08 | End: 2021-12-27

## 2021-03-10 RX ORDER — BUSPIRONE HYDROCHLORIDE 15 MG/1
15 TABLET ORAL 2 TIMES DAILY
Qty: 180 TABLET | Refills: 1 | Status: SHIPPED | OUTPATIENT
Start: 2021-03-10 | End: 2021-08-16

## 2021-03-29 ENCOUNTER — HOSPITAL ENCOUNTER (OUTPATIENT)
Dept: GENERAL RADIOLOGY | Age: 76
Discharge: HOME | End: 2021-03-29
Payer: MEDICARE

## 2021-03-29 ENCOUNTER — HOSPITAL ENCOUNTER (OUTPATIENT)
Age: 76
Discharge: HOME | End: 2021-03-29
Payer: MEDICARE

## 2021-03-29 ENCOUNTER — OFFICE VISIT (OUTPATIENT)
Dept: PRIMARY CARE CLINIC | Age: 76
End: 2021-03-29
Payer: MEDICARE

## 2021-03-29 VITALS
TEMPERATURE: 96.9 F | RESPIRATION RATE: 18 BRPM | SYSTOLIC BLOOD PRESSURE: 139 MMHG | HEART RATE: 65 BPM | DIASTOLIC BLOOD PRESSURE: 72 MMHG | OXYGEN SATURATION: 98 %

## 2021-03-29 DIAGNOSIS — E11.9 TYPE 2 DIABETES MELLITUS WITHOUT COMPLICATION, WITHOUT LONG-TERM CURRENT USE OF INSULIN (HCC): ICD-10-CM

## 2021-03-29 DIAGNOSIS — M70.62 GREATER TROCHANTERIC BURSITIS OF LEFT HIP: ICD-10-CM

## 2021-03-29 DIAGNOSIS — R35.0 URINARY FREQUENCY: ICD-10-CM

## 2021-03-29 DIAGNOSIS — G89.4 CHRONIC PAIN SYNDROME: ICD-10-CM

## 2021-03-29 DIAGNOSIS — I10 ESSENTIAL HYPERTENSION: ICD-10-CM

## 2021-03-29 LAB
BILIRUBIN, POC: NEGATIVE
BLOOD URINE, POC: NEGATIVE
CLARITY, POC: NORMAL
COLOR, POC: NORMAL
GLUCOSE URINE, POC: NEGATIVE
KETONES, POC: 80
LEUKOCYTE EST, POC: NEGATIVE
NITRITE, POC: NEGATIVE
PH, POC: 5
PROTEIN, POC: NORMAL
SPECIFIC GRAVITY, POC: 1.02
UROBILINOGEN, POC: 0.2

## 2021-03-29 PROCEDURE — 1123F ACP DISCUSS/DSCN MKR DOCD: CPT | Performed by: FAMILY MEDICINE

## 2021-03-29 PROCEDURE — G8399 PT W/DXA RESULTS DOCUMENT: HCPCS | Performed by: FAMILY MEDICINE

## 2021-03-29 PROCEDURE — 1036F TOBACCO NON-USER: CPT | Performed by: FAMILY MEDICINE

## 2021-03-29 PROCEDURE — 4040F PNEUMOC VAC/ADMIN/RCVD: CPT | Performed by: FAMILY MEDICINE

## 2021-03-29 PROCEDURE — 3044F HG A1C LEVEL LT 7.0%: CPT | Performed by: FAMILY MEDICINE

## 2021-03-29 PROCEDURE — G8484 FLU IMMUNIZE NO ADMIN: HCPCS | Performed by: FAMILY MEDICINE

## 2021-03-29 PROCEDURE — 73502 X-RAY EXAM HIP UNI 2-3 VIEWS: CPT

## 2021-03-29 PROCEDURE — 81002 URINALYSIS NONAUTO W/O SCOPE: CPT | Performed by: FAMILY MEDICINE

## 2021-03-29 PROCEDURE — 3017F COLORECTAL CA SCREEN DOC REV: CPT | Performed by: FAMILY MEDICINE

## 2021-03-29 PROCEDURE — G8417 CALC BMI ABV UP PARAM F/U: HCPCS | Performed by: FAMILY MEDICINE

## 2021-03-29 PROCEDURE — 2022F DILAT RTA XM EVC RTNOPTHY: CPT | Performed by: FAMILY MEDICINE

## 2021-03-29 PROCEDURE — 1090F PRES/ABSN URINE INCON ASSESS: CPT | Performed by: FAMILY MEDICINE

## 2021-03-29 PROCEDURE — 99214 OFFICE O/P EST MOD 30 MIN: CPT | Performed by: FAMILY MEDICINE

## 2021-03-29 PROCEDURE — G8428 CUR MEDS NOT DOCUMENT: HCPCS | Performed by: FAMILY MEDICINE

## 2021-03-29 RX ORDER — PREDNISONE 20 MG/1
20 TABLET ORAL DAILY
Qty: 10 TABLET | Refills: 0 | Status: SHIPPED | OUTPATIENT
Start: 2021-03-29 | End: 2021-04-08

## 2021-03-29 RX ORDER — ACETAMINOPHEN AND CODEINE PHOSPHATE 300; 30 MG/1; MG/1
1 TABLET ORAL 3 TIMES DAILY PRN
Qty: 90 TABLET | Refills: 0 | Status: SHIPPED | OUTPATIENT
Start: 2021-03-29 | End: 2021-05-19 | Stop reason: SDUPTHER

## 2021-03-29 RX ORDER — IBUPROFEN 600 MG/1
600 TABLET ORAL 3 TIMES DAILY PRN
Qty: 60 TABLET | Refills: 1 | Status: SHIPPED | OUTPATIENT
Start: 2021-03-29 | End: 2021-08-19 | Stop reason: SDUPTHER

## 2021-03-29 ASSESSMENT — ENCOUNTER SYMPTOMS
ABDOMINAL PAIN: 0
CONSTIPATION: 0
SHORTNESS OF BREATH: 0
BLOOD IN STOOL: 0
DIARRHEA: 0
BACK PAIN: 1

## 2021-03-29 NOTE — PROGRESS NOTES
3/29/2021     Poly Lilly (:  1945) is a 78 y.o. female, here for evaluation of the following medical concerns:    HPI  Patient presented to the office complaining of low back and left hip pain the pain is dull moderately severe. The pain at the lower back is present to the left hip and down to the side and to the left knee. Patient denies urinary or bowel disturbance except for urinary frequency. Patient reported that few days ago she slept while sitting in the toilet bowl and slumped forward. .  She does not remember hitting the left hip or back. She has a chronic pain and takes Tylenol. She has hypertension controlled with current medication. She takes amlodipine and Avapro. Blood pressure borderline elevated today because of the back and hip discomfort. She has diabetes. Controlled with Metformin 1000 mg twice daily. She is no longer on glimepiride due to episode of hypoglycemia. Review of Systems   Constitutional: Negative for activity change and appetite change. Eyes: Negative for visual disturbance. Respiratory: Negative for shortness of breath. Cardiovascular: Negative for chest pain and leg swelling. Gastrointestinal: Negative for abdominal pain, blood in stool, constipation and diarrhea. Genitourinary: Negative for difficulty urinating, frequency, hematuria, menstrual problem and urgency. Musculoskeletal: Positive for back pain. Neurological: Negative for dizziness and syncope. Psychiatric/Behavioral: Negative for behavioral problems. Prior to Visit Medications    Medication Sig Taking? Authorizing Provider   acetaminophen-codeine (TYLENOL #3) 300-30 MG per tablet Take 1 tablet by mouth 3 times daily as needed for Pain for up to 30 days.  Yes Gladys Kuhn MD   predniSONE (DELTASONE) 20 MG tablet Take 1 tablet by mouth daily for 10 days Yes Gladys Kuhn MD   ibuprofen (ADVIL;MOTRIN) 600 MG tablet Take 1 tablet by mouth 3 times daily as needed for Pain Take with food. Yes Debbie Mendenhall MD   busPIRone (BUSPAR) 15 MG tablet Take 15 mg by mouth 2 times daily  Debbie Mendenhall MD   buPROPion [facility] SR) 150 MG extended release tablet Take 1 tablet by mouth 2 times daily  Debbie Mendenhall MD   glimepiride (AMARYL) 4 MG tablet TAKE 1 TABLET BY MOUTH IN  THE MORNING BEFORE  BREAKFAST  Debbie Mendenhall MD   gabapentin (NEURONTIN) 600 MG tablet Take 1 tablet by mouth 2 times daily for 180 days. Debbie Mendenhall MD   clotrimazole-betamethasone (LOTRISONE) 1-0.05 % cream Apply topically 2 times daily. Debbie Mendenhall MD   naproxen (NAPROSYN) 500 MG tablet Take 1 tablet by mouth 2 times daily (with meals)  ZACKERY Poe CNP   irbesartan (AVAPRO) 150 MG tablet Take 1 tablet by mouth daily  ZACKERY Poe CNP   amLODIPine (NORVASC) 5 MG tablet Take 1 tablet by mouth daily  Debbie Mendenhall MD   carbidopa-levodopa (SINEMET)  MG per tablet Take 2 tablets by mouth 4 times daily  Debbie Mendenhall MD   pantoprazole (PROTONIX) 40 MG tablet Take 1 tablet by mouth daily  Debbie Mendenhall MD   metFORMIN (GLUCOPHAGE) 1000 MG tablet TAKE 1 TABLET BY MOUTH  TWICE DAILY WITH MEALS  Debbie Mendenhall MD   atorvastatin (LIPITOR) 40 MG tablet TAKE 1 TABLET BY MOUTH  DAILY  Debbie Mendenhall MD   Blood Glucose Monitoring Suppl (ONE TOUCH ULTRA 2) w/Device KIT 1 kit by Does not apply route daily  Debbie Mendenhall MD   blood glucose monitor strips Test 1 times a day & as needed for symptoms of irregular blood glucose.  Qian Smallwood MD   Lancets MISC 1 each by Does not apply route daily One touch ultra  Debbie Mendenhall MD   albuterol sulfate HFA (PROAIR HFA) 108 (90 Base) MCG/ACT inhaler Inhale 2 puffs into the lungs every 4 hours as needed for Wheezing or Shortness of Breath  Patient not taking: Reported on 1/13/2021  Beata Cruz DO   Cholecalciferol (VITAMIN D3) 5000 units CAPS Take 1 capsule by mouth daily  Debbie Mendenhall MD   aspirin 81 MG tablet Take 81 mg by mouth daily. Historical Provider, MD        Social History     Tobacco Use    Smoking status: Former Smoker     Packs/day: 2.00     Years: 36.00     Pack years: 72.00     Types: Cigarettes     Quit date: 1999     Years since quittin.9    Smokeless tobacco: Never Used   Substance Use Topics    Alcohol use: No     Alcohol/week: 0.0 standard drinks        Vitals:    21 1423   BP:  139/72   Pulse: 65   Resp: 18   Temp: 96.9 °F (36.1 °C)   TempSrc: Temporal   SpO2: 98%     Estimated body mass index is 34.72 kg/m² as calculated from the following:    Height as of 21: 5' (1.524 m). Weight as of 21: 177 lb 12.8 oz (80.6 kg). Physical Exam  Skin:               ASSESSMENT/PLAN:  1. Greater trochanteric bursitis of left hip  Will order prednisone 20 mg daily for 10 days and ibuprofen 600 mg 3 times a day as needed for pain. May use warm compress or topical cream or ointment such as Aspercreme, icy hot, Voltaren gel or BenGay. Will order x-ray of the left hip. If not improved in 2 to 4 weeks consider referral to physical medicine  - XR HIP LEFT (2-3 VIEWS); Future    2. Chronic pain syndrome  Continue Tylenol 3 as needed  - acetaminophen-codeine (TYLENOL #3) 300-30 MG per tablet; Take 1 tablet by mouth 3 times daily as needed for Pain for up to 30 days. Dispense: 90 tablet; Refill: 0    3. Urinary frequency  UA is negative  - POCT Urinalysis no Micro    4. Essential hypertension  Better. Continue Avapro 150 mg daily and amlodipine 5 mg daily    5. Type 2 diabetes mellitus without complication, without long-term current use of insulin (HCC)  Controlled. Continue Metformin 1000 mg twice daily. .  She is up glimepiride due to hypoglycemia      RTC in 4 weeks and PRN    An electronic signature was used to authenticate this note.    --Jorge Barba MD on 3/29/2021 at 2:57 PM

## 2021-03-30 ENCOUNTER — TELEPHONE (OUTPATIENT)
Dept: PRIMARY CARE CLINIC | Age: 76
End: 2021-03-30

## 2021-03-30 NOTE — TELEPHONE ENCOUNTER
Results per Dr Elizabeth Cantu:  1) has bursitis in left hip  2) OA in hip and back per x ray; negative x ray. Called and D/W pt.

## 2021-03-30 NOTE — TELEPHONE ENCOUNTER
----- Message from Keith Ge sent at 3/30/2021 11:02 AM EDT -----  Subject: Results Request    QUESTIONS  Which lab or imaging result is the patient calling about? XR HIP LEFT   Which provider ordered the test? Gonzalo Wu   At what location was the test performed? Date the test was performed? 2021-03-29  Additional Information for Provider? Alesia Perez called to results of hip XRAY   from 3/29  ---------------------------------------------------------------------------  --------------  CALL BACK INFO  What is the best way for the office to contact you? OK to leave message on   voicemail  Preferred Call Back Phone Number?  [phone]

## 2021-05-04 ENCOUNTER — OFFICE VISIT (OUTPATIENT)
Dept: PRIMARY CARE CLINIC | Age: 76
End: 2021-05-04
Payer: MEDICARE

## 2021-05-04 VITALS
OXYGEN SATURATION: 95 % | DIASTOLIC BLOOD PRESSURE: 71 MMHG | WEIGHT: 177 LBS | TEMPERATURE: 97.2 F | SYSTOLIC BLOOD PRESSURE: 104 MMHG | HEART RATE: 64 BPM | RESPIRATION RATE: 18 BRPM | BODY MASS INDEX: 34.57 KG/M2

## 2021-05-04 DIAGNOSIS — G25.81 RESTLESS LEGS SYNDROME (RLS): ICD-10-CM

## 2021-05-04 DIAGNOSIS — E11.9 TYPE 2 DIABETES MELLITUS WITHOUT COMPLICATION, WITHOUT LONG-TERM CURRENT USE OF INSULIN (HCC): ICD-10-CM

## 2021-05-04 DIAGNOSIS — J45.909 MILD ASTHMA WITHOUT COMPLICATION, UNSPECIFIED WHETHER PERSISTENT: ICD-10-CM

## 2021-05-04 DIAGNOSIS — J45.20 MILD INTERMITTENT ASTHMA WITHOUT COMPLICATION: ICD-10-CM

## 2021-05-04 DIAGNOSIS — I10 ESSENTIAL HYPERTENSION: Primary | ICD-10-CM

## 2021-05-04 DIAGNOSIS — Z99.89 OSA ON CPAP: ICD-10-CM

## 2021-05-04 DIAGNOSIS — E78.2 MIXED HYPERLIPIDEMIA: ICD-10-CM

## 2021-05-04 DIAGNOSIS — G47.33 OSA ON CPAP: ICD-10-CM

## 2021-05-04 LAB — HBA1C MFR BLD: 6.5 %

## 2021-05-04 PROCEDURE — 99214 OFFICE O/P EST MOD 30 MIN: CPT | Performed by: FAMILY MEDICINE

## 2021-05-04 PROCEDURE — G8427 DOCREV CUR MEDS BY ELIG CLIN: HCPCS | Performed by: FAMILY MEDICINE

## 2021-05-04 PROCEDURE — 3017F COLORECTAL CA SCREEN DOC REV: CPT | Performed by: FAMILY MEDICINE

## 2021-05-04 PROCEDURE — G8417 CALC BMI ABV UP PARAM F/U: HCPCS | Performed by: FAMILY MEDICINE

## 2021-05-04 PROCEDURE — 2022F DILAT RTA XM EVC RTNOPTHY: CPT | Performed by: FAMILY MEDICINE

## 2021-05-04 PROCEDURE — 1123F ACP DISCUSS/DSCN MKR DOCD: CPT | Performed by: FAMILY MEDICINE

## 2021-05-04 PROCEDURE — 1090F PRES/ABSN URINE INCON ASSESS: CPT | Performed by: FAMILY MEDICINE

## 2021-05-04 PROCEDURE — G8399 PT W/DXA RESULTS DOCUMENT: HCPCS | Performed by: FAMILY MEDICINE

## 2021-05-04 PROCEDURE — 1036F TOBACCO NON-USER: CPT | Performed by: FAMILY MEDICINE

## 2021-05-04 PROCEDURE — 4040F PNEUMOC VAC/ADMIN/RCVD: CPT | Performed by: FAMILY MEDICINE

## 2021-05-04 PROCEDURE — 3044F HG A1C LEVEL LT 7.0%: CPT | Performed by: FAMILY MEDICINE

## 2021-05-04 RX ORDER — ALBUTEROL SULFATE 90 UG/1
2 AEROSOL, METERED RESPIRATORY (INHALATION) EVERY 4 HOURS PRN
Qty: 1 INHALER | Refills: 3
Start: 2021-05-04 | End: 2021-08-19

## 2021-05-04 ASSESSMENT — ENCOUNTER SYMPTOMS
BLOOD IN STOOL: 0
DIARRHEA: 0
ABDOMINAL PAIN: 0
SHORTNESS OF BREATH: 0
CONSTIPATION: 0

## 2021-05-04 NOTE — PROGRESS NOTES
2021     Leila Lilly (:  1945) is a 78 y.o. female, here for evaluation of the following medical concerns:    HPI  Patient presented to the office for follow-up. She has hypertension controlled with amlodipine 5 mg daily and Avapro 150 mg daily. She has hyperlipidemia and reported to be compliant with statin. She takes Lipitor 40 mg daily. She has diabetes fairly controlled with Metformin 1000 mg twice daily. She denies hypoglycemic episode. She has a sleep apnea and wears CPAP at night. .  She has restless leg syndrome controlled with Sinemet which she takes 4 times a day. .  She has back pain and takes Tylenol 3 as well as gabapentin. Otherwise patient reported to be doing fairly well. Denies chest pain or shortness of breath. Denies bowel or urinary disturbance. Review of Systems   Constitutional: Negative for activity change and appetite change. Eyes: Negative for visual disturbance. Respiratory: Negative for shortness of breath. Cardiovascular: Negative for chest pain and leg swelling. Gastrointestinal: Negative for abdominal pain, blood in stool, constipation and diarrhea. Genitourinary: Negative for difficulty urinating, frequency, hematuria, menstrual problem and urgency. Neurological: Negative for dizziness and syncope. Psychiatric/Behavioral: Negative for behavioral problems. Prior to Visit Medications    Medication Sig Taking? Authorizing Provider   ibuprofen (ADVIL;MOTRIN) 600 MG tablet Take 1 tablet by mouth 3 times daily as needed for Pain Take with food. Yes George Vasquez MD   busPIRone (BUSPAR) 15 MG tablet Take 15 mg by mouth 2 times daily Yes George Vasqeuz MD   buPROPion [facility] SR) 150 MG extended release tablet Take 1 tablet by mouth 2 times daily Yes George Vasquez MD   gabapentin (NEURONTIN) 600 MG tablet Take 1 tablet by mouth 2 times daily for 180 days.  Yes George Vasquez MD   irbesartan (AVAPRO) 150 MG tablet Take 1 tablet by mouth daily Yes Tarun Chapman APRN - CNP   amLODIPine (NORVASC) 5 MG tablet Take 1 tablet by mouth daily Yes Maya Vu MD   carbidopa-levodopa (SINEMET)  MG per tablet Take 2 tablets by mouth 4 times daily Yes Maya Vu MD   pantoprazole (PROTONIX) 40 MG tablet Take 1 tablet by mouth daily Yes Maya Vu MD   metFORMIN (GLUCOPHAGE) 1000 MG tablet TAKE 1 TABLET BY MOUTH  TWICE DAILY WITH MEALS Yes Maya Vu MD   atorvastatin (LIPITOR) 40 MG tablet TAKE 1 TABLET BY MOUTH  DAILY Yes Maya Vu MD   Cholecalciferol (VITAMIN D3) 5000 units CAPS Take 1 capsule by mouth daily Yes Maya Vu MD   aspirin 81 MG tablet Take 81 mg by mouth daily. Yes Historical Provider, MD   clotrimazole-betamethasone (LOTRISONE) 1-0.05 % cream Apply topically 2 times daily. Maya Vu MD   Blood Glucose Monitoring Suppl (ONE TOUCH ULTRA 2) w/Device KIT 1 kit by Does not apply route daily  Maya Vu MD   blood glucose monitor strips Test 1 times a day & as needed for symptoms of irregular blood glucose. Evelyn Martino MD   Lancets MISC 1 each by Does not apply route daily One touch ultra  Maya Vu MD        Social History     Tobacco Use    Smoking status: Former Smoker     Packs/day: 2.00     Years: 36.00     Pack years: 72.00     Types: Cigarettes     Quit date: 1999     Years since quittin.0    Smokeless tobacco: Never Used   Substance Use Topics    Alcohol use: No     Alcohol/week: 0.0 standard drinks        Vitals:    21 1123   BP: 104/71   Pulse: 64   Resp: 18   Temp: 97.2 °F (36.2 °C)   TempSrc: Temporal   SpO2: 95%   Weight: 177 lb (80.3 kg)     Estimated body mass index is 34.57 kg/m² as calculated from the following:    Height as of 21: 5' (1.524 m). Weight as of this encounter: 177 lb (80.3 kg). Physical Exam  Constitutional:       Appearance: She is well-developed. HENT:      Head: Normocephalic.       Right Ear: External ear normal.      Nose: Nose normal.   Eyes:      Conjunctiva/sclera: Conjunctivae normal.      Pupils: Pupils are equal, round, and reactive to light. Neck:      Musculoskeletal: Normal range of motion and neck supple. Thyroid: No thyromegaly. Cardiovascular:      Rate and Rhythm: Normal rate and regular rhythm. Heart sounds: Normal heart sounds. No murmur. No friction rub. Pulmonary:      Effort: Pulmonary effort is normal.      Breath sounds: Normal breath sounds. Abdominal:      General: Bowel sounds are normal.      Palpations: Abdomen is soft. There is no mass. Musculoskeletal: Normal range of motion. Lymphadenopathy:      Cervical: No cervical adenopathy. Skin:     General: Skin is warm. Findings: No rash. Neurological:      Mental Status: She is alert and oriented to person, place, and time. ASSESSMENT/PLAN:  1. Essential hypertension  Controlled . Continue Avapro 150 mg daily and amlodipine 5 mg daily    2. Mixed hyperlipidemia  Controlled. Continue Lipitor 40 mg daily. Will check lipid panel next blood draw. 3. Type 2 diabetes mellitus without complication, without long-term current use of insulin (HCC)  Controlled. Continue Metformin 1000 mg twice daily.  - POCT glycosylated hemoglobin (Hb A1C)    4. DOYLE on CPAP  Encouraged regular use of CPAP. 5. Mild intermittent asthma without complication  Controlled. She rarely use albuterol inhaler as needed. 6. Restless legs syndrome (RLS)  Controlled. Continue Sinemet 4 times a day.       RTC in 3 mos Blood test next visit    An electronic signature was used to authenticate this note.    --Ortiz Dunham MD on 5/4/2021 at 11:44 AM

## 2021-05-17 NOTE — TELEPHONE ENCOUNTER
Patient would like refill on acetaminophen-codeine (TYLENOL #3) 300-30 MG per tablet send to [facility] pharmacy on [address].

## 2021-05-18 DIAGNOSIS — G89.4 CHRONIC PAIN SYNDROME: ICD-10-CM

## 2021-05-19 RX ORDER — ACETAMINOPHEN AND CODEINE PHOSPHATE 300; 30 MG/1; MG/1
1 TABLET ORAL 3 TIMES DAILY PRN
Qty: 90 TABLET | Refills: 0 | Status: SHIPPED | OUTPATIENT
Start: 2021-05-19 | End: 2021-06-18

## 2021-05-24 RX ORDER — AMLODIPINE BESYLATE 5 MG/1
5 TABLET ORAL DAILY
Qty: 90 TABLET | Refills: 1 | Status: SHIPPED | OUTPATIENT
Start: 2021-05-24 | End: 2021-11-01

## 2021-05-24 RX ORDER — PANTOPRAZOLE SODIUM 40 MG/1
40 TABLET, DELAYED RELEASE ORAL DAILY
Qty: 90 TABLET | Refills: 1 | Status: SHIPPED | OUTPATIENT
Start: 2021-05-24 | End: 2021-11-01

## 2021-05-24 RX ORDER — CARBIDOPA/LEVODOPA 25MG-250MG
2 TABLET ORAL 4 TIMES DAILY
Qty: 720 TABLET | Refills: 1 | Status: SHIPPED | OUTPATIENT
Start: 2021-05-24 | End: 2021-11-01

## 2021-05-26 ENCOUNTER — HOSPITAL ENCOUNTER (OUTPATIENT)
Dept: GENERAL RADIOLOGY | Age: 76
Discharge: HOME | End: 2021-05-26
Payer: MEDICARE

## 2021-05-26 ENCOUNTER — HOSPITAL ENCOUNTER (OUTPATIENT)
Age: 76
Discharge: HOME | End: 2021-05-26
Payer: MEDICARE

## 2021-05-26 ENCOUNTER — OFFICE VISIT (OUTPATIENT)
Dept: PRIMARY CARE CLINIC | Age: 76
End: 2021-05-26
Payer: MEDICARE

## 2021-05-26 VITALS
DIASTOLIC BLOOD PRESSURE: 57 MMHG | RESPIRATION RATE: 18 BRPM | SYSTOLIC BLOOD PRESSURE: 113 MMHG | OXYGEN SATURATION: 97 % | HEART RATE: 75 BPM

## 2021-05-26 DIAGNOSIS — M54.50 ACUTE RIGHT-SIDED LOW BACK PAIN, UNSPECIFIED WHETHER SCIATICA PRESENT: ICD-10-CM

## 2021-05-26 DIAGNOSIS — I10 ESSENTIAL HYPERTENSION: ICD-10-CM

## 2021-05-26 DIAGNOSIS — M54.50 ACUTE RIGHT-SIDED LOW BACK PAIN, UNSPECIFIED WHETHER SCIATICA PRESENT: Primary | ICD-10-CM

## 2021-05-26 DIAGNOSIS — E11.9 TYPE 2 DIABETES MELLITUS WITHOUT COMPLICATION, WITHOUT LONG-TERM CURRENT USE OF INSULIN (HCC): ICD-10-CM

## 2021-05-26 PROCEDURE — 2022F DILAT RTA XM EVC RTNOPTHY: CPT | Performed by: FAMILY MEDICINE

## 2021-05-26 PROCEDURE — 4040F PNEUMOC VAC/ADMIN/RCVD: CPT | Performed by: FAMILY MEDICINE

## 2021-05-26 PROCEDURE — 1123F ACP DISCUSS/DSCN MKR DOCD: CPT | Performed by: FAMILY MEDICINE

## 2021-05-26 PROCEDURE — G8399 PT W/DXA RESULTS DOCUMENT: HCPCS | Performed by: FAMILY MEDICINE

## 2021-05-26 PROCEDURE — 1036F TOBACCO NON-USER: CPT | Performed by: FAMILY MEDICINE

## 2021-05-26 PROCEDURE — G8427 DOCREV CUR MEDS BY ELIG CLIN: HCPCS | Performed by: FAMILY MEDICINE

## 2021-05-26 PROCEDURE — 99214 OFFICE O/P EST MOD 30 MIN: CPT | Performed by: FAMILY MEDICINE

## 2021-05-26 PROCEDURE — G8417 CALC BMI ABV UP PARAM F/U: HCPCS | Performed by: FAMILY MEDICINE

## 2021-05-26 PROCEDURE — 3017F COLORECTAL CA SCREEN DOC REV: CPT | Performed by: FAMILY MEDICINE

## 2021-05-26 PROCEDURE — 1090F PRES/ABSN URINE INCON ASSESS: CPT | Performed by: FAMILY MEDICINE

## 2021-05-26 PROCEDURE — 72100 X-RAY EXAM L-S SPINE 2/3 VWS: CPT

## 2021-05-26 PROCEDURE — 3044F HG A1C LEVEL LT 7.0%: CPT | Performed by: FAMILY MEDICINE

## 2021-05-26 RX ORDER — PREDNISONE 20 MG/1
20 TABLET ORAL DAILY
Qty: 10 TABLET | Refills: 0 | Status: SHIPPED | OUTPATIENT
Start: 2021-05-26 | End: 2021-06-05

## 2021-05-26 ASSESSMENT — ENCOUNTER SYMPTOMS
DIARRHEA: 0
CONSTIPATION: 0
BLOOD IN STOOL: 0
SHORTNESS OF BREATH: 0
ABDOMINAL PAIN: 0

## 2021-05-26 NOTE — PROGRESS NOTES
2021     Dakota Lilly (:  1945) is a 78 y.o. female, here for evaluation of the following medical concerns:    HPI  She presented to the office because of low back pain for the past 1 to 2 weeks. The pain is dull spread on both sides but primarily on the right side of the lumbar area but there is some radiation down to the right hip and thigh. She denies recent injury or heavy lifting. She denies bowel or urinary disturbance. She has diabetes fairly controlled with the Metformin 1000 twice a day she also have high blood pressure controlled with the amlodipine 5 mg daily. Review of Systems   Constitutional: Negative for activity change and appetite change. Eyes: Negative for visual disturbance. Respiratory: Negative for shortness of breath. Cardiovascular: Negative for chest pain and leg swelling. Gastrointestinal: Negative for abdominal pain, blood in stool, constipation and diarrhea. Genitourinary: Negative for difficulty urinating, frequency, hematuria, menstrual problem and urgency. Neurological: Negative for dizziness and syncope. Psychiatric/Behavioral: Negative for behavioral problems. Prior to Visit Medications    Medication Sig Taking? Authorizing Provider   predniSONE (DELTASONE) 20 MG tablet Take 1 tablet by mouth daily for 10 days Yes Aimeeysees Score, MD   amLODIPine (NORVASC) 5 MG tablet Take 1 tablet by mouth daily  Aimeeysees Score, MD   pantoprazole (PROTONIX) 40 MG tablet Take 1 tablet by mouth daily  Aimeeysees Score, MD   carbidopa-levodopa (SINEMET)  MG per tablet Take 2 tablets by mouth 4 times daily  Aimeeysees Score, MD   acetaminophen-codeine (TYLENOL #3) 300-30 MG per tablet Take 1 tablet by mouth 3 times daily as needed for Pain for up to 30 days.   Ed Score, MD   albuterol sulfate HFA (PROAIR HFA) 108 (90 Base) MCG/ACT inhaler Inhale 2 puffs into the lungs every 4 hours as needed for Wheezing or Shortness of Breath  Aimeeysees Score MD   ibuprofen (ADVIL;MOTRIN) 600 MG tablet Take 1 tablet by mouth 3 times daily as needed for Pain Take with food. Vance Chávez MD   busPIRone (BUSPAR) 15 MG tablet Take 15 mg by mouth 2 times daily  Vance Chávez MD   buPROPion [facility] SR) 150 MG extended release tablet Take 1 tablet by mouth 2 times daily  Vance Chávez MD   gabapentin (NEURONTIN) 600 MG tablet Take 1 tablet by mouth 2 times daily for 180 days. Vance Chávez MD   clotrimazole-betamethasone (LOTRISONE) 1-0.05 % cream Apply topically 2 times daily. Vance Chávez MD   irbesartan (AVAPRO) 150 MG tablet Take 1 tablet by mouth daily  ZACKERY Head - CNP   metFORMIN (GLUCOPHAGE) 1000 MG tablet TAKE 1 TABLET BY MOUTH  TWICE DAILY WITH MEALS  Vance Chávez MD   atorvastatin (LIPITOR) 40 MG tablet TAKE 1 TABLET BY MOUTH  DAILY  Vance Chávez MD   Blood Glucose Monitoring Suppl (ONE TOUCH ULTRA 2) w/Device KIT 1 kit by Does not apply route daily  Vance Chávez MD   blood glucose monitor strips Test 1 times a day & as needed for symptoms of irregular blood glucose. Gisele Davis MD   Lancets MISC 1 each by Does not apply route daily One touch ultra  Vance Chávez MD   Cholecalciferol (VITAMIN D3) 5000 units CAPS Take 1 capsule by mouth daily  Vance Chávez MD   aspirin 81 MG tablet Take 81 mg by mouth daily. Historical Provider, MD        Social History     Tobacco Use    Smoking status: Former Smoker     Packs/day: 2.00     Years: 36.00     Pack years: 72.00     Types: Cigarettes     Quit date: 1999     Years since quittin.1    Smokeless tobacco: Never Used   Substance Use Topics    Alcohol use: No     Alcohol/week: 0.0 standard drinks        Vitals:    21 1109   BP: (!) 113/57   Pulse: 75   Resp: 18   SpO2: 97%     Estimated body mass index is 34.57 kg/m² as calculated from the following:    Height as of 21: 5' (1.524 m). Weight as of 21: 177 lb (80.3 kg).     Physical Exam  Constitutional:       Appearance: She is well-developed. HENT:      Head: Normocephalic. Right Ear: External ear normal.      Nose: Nose normal.   Eyes:      Conjunctiva/sclera: Conjunctivae normal.      Pupils: Pupils are equal, round, and reactive to light. Neck:      Thyroid: No thyromegaly. Cardiovascular:      Rate and Rhythm: Normal rate and regular rhythm. Heart sounds: Normal heart sounds. No murmur heard. No friction rub. Pulmonary:      Effort: Pulmonary effort is normal.      Breath sounds: Normal breath sounds. Abdominal:      General: Bowel sounds are normal.      Palpations: Abdomen is soft. There is no mass. Musculoskeletal:         General: Normal range of motion. Cervical back: Normal range of motion and neck supple. Lymphadenopathy:      Cervical: No cervical adenopathy. Skin:     General: Skin is warm. Findings: No rash. Neurological:      Mental Status: She is alert and oriented to person, place, and time. ASSESSMENT/PLAN:  1. Acute right-sided low back pain, unspecified whether sciatica present  Will order prednisone 20 mg daily for 10 days. Continue ibuprofen and Tylenol No. 3 as needed for pain. May also use warm compress or over-the-counter topical cream like Voltaren gel or aspercreme or Salonpas will order x-ray of thoracolumbar spine to rule out compression fracture    2. Type 2 diabetes mellitus without complication, without long-term current use of insulin ([address]  Fairly controlled. Continue current medication. Watch out for fluctuation of blood sugar due to prednisone. 3. Essential hypertension  Controlled. Continue amlodipine 5 mg daily. Watch out for fluctuation of blood pressure due to discomfort.       RTC in 2-4 weeks if not better    An electronic signature was used to authenticate this note.    --Silviano Li MD on 5/26/2021 at 6:48 PM

## 2021-06-01 ENCOUNTER — TELEPHONE (OUTPATIENT)
Dept: PRIMARY CARE CLINIC | Age: 76
End: 2021-06-01

## 2021-06-02 ENCOUNTER — TELEPHONE (OUTPATIENT)
Dept: PRIMARY CARE CLINIC | Age: 76
End: 2021-06-02

## 2021-06-03 NOTE — TELEPHONE ENCOUNTER
Called and spoke with pt's daughter. Informed her of negative results per Dr Vince Trujillo.  (continue current meds)

## 2021-06-07 RX ORDER — IRBESARTAN 150 MG/1
150 TABLET ORAL DAILY
Qty: 90 TABLET | Refills: 3 | Status: SHIPPED | OUTPATIENT
Start: 2021-06-07 | End: 2022-04-21

## 2021-06-07 NOTE — TELEPHONE ENCOUNTER
Medication:   Requested Prescriptions     Pending Prescriptions Disp Refills    irbesartan (AVAPRO) 150 MG tablet [Pharmacy Med Name: IRBESARTAN  150MG  TAB] 90 tablet 3     Sig: TAKE 1 TABLET BY MOUTH  DAILY        Last Filled:      Patient Phone Number: [phone] (home)     Last appt: 5/26/2021   Next appt: 8/19/2021    Last OARRS: No flowsheet data found.

## 2021-07-01 DIAGNOSIS — G89.4 CHRONIC PAIN SYNDROME: Primary | ICD-10-CM

## 2021-07-01 RX ORDER — ACETAMINOPHEN AND CODEINE PHOSPHATE 300; 30 MG/1; MG/1
1 TABLET ORAL EVERY 4 HOURS PRN
Qty: 90 TABLET | Refills: 0 | Status: SHIPPED | OUTPATIENT
Start: 2021-07-01 | End: 2021-08-11 | Stop reason: SDUPTHER

## 2021-07-01 NOTE — TELEPHONE ENCOUNTER
Medication:   Requested Prescriptions     Pending Prescriptions Disp Refills    acetaminophen-codeine (TYLENOL/CODEINE #3) 300-30 MG per tablet 90 tablet 0     Sig: Take 1 tablet by mouth every 4 hours as needed for Pain for up to 30 days. Intended supply: 5 days. Take lowest dose possible to manage pain        Last Filled:      Patient Phone Number: [phone] (home)     Last appt: 5/26/2021   Next appt: 8/19/2021    Last OARRS: No flowsheet data found.

## 2021-07-07 ENCOUNTER — OFFICE VISIT (OUTPATIENT)
Dept: PULMONOLOGY | Age: 76
End: 2021-07-07
Payer: MEDICARE

## 2021-07-07 VITALS
OXYGEN SATURATION: 97 % | HEIGHT: 60 IN | TEMPERATURE: 98 F | BODY MASS INDEX: 34.55 KG/M2 | WEIGHT: 176 LBS | SYSTOLIC BLOOD PRESSURE: 120 MMHG | HEART RATE: 78 BPM | RESPIRATION RATE: 16 BRPM | DIASTOLIC BLOOD PRESSURE: 68 MMHG

## 2021-07-07 DIAGNOSIS — J45.20 MILD INTERMITTENT ASTHMA WITHOUT COMPLICATION: ICD-10-CM

## 2021-07-07 DIAGNOSIS — Z99.89 OSA ON CPAP: Primary | ICD-10-CM

## 2021-07-07 DIAGNOSIS — G47.33 OSA ON CPAP: Primary | ICD-10-CM

## 2021-07-07 PROCEDURE — 4040F PNEUMOC VAC/ADMIN/RCVD: CPT | Performed by: INTERNAL MEDICINE

## 2021-07-07 PROCEDURE — G8399 PT W/DXA RESULTS DOCUMENT: HCPCS | Performed by: INTERNAL MEDICINE

## 2021-07-07 PROCEDURE — G8417 CALC BMI ABV UP PARAM F/U: HCPCS | Performed by: INTERNAL MEDICINE

## 2021-07-07 PROCEDURE — G8427 DOCREV CUR MEDS BY ELIG CLIN: HCPCS | Performed by: INTERNAL MEDICINE

## 2021-07-07 PROCEDURE — 1036F TOBACCO NON-USER: CPT | Performed by: INTERNAL MEDICINE

## 2021-07-07 PROCEDURE — 99214 OFFICE O/P EST MOD 30 MIN: CPT | Performed by: INTERNAL MEDICINE

## 2021-07-07 PROCEDURE — 1123F ACP DISCUSS/DSCN MKR DOCD: CPT | Performed by: INTERNAL MEDICINE

## 2021-07-07 PROCEDURE — 3017F COLORECTAL CA SCREEN DOC REV: CPT | Performed by: INTERNAL MEDICINE

## 2021-07-07 PROCEDURE — 1090F PRES/ABSN URINE INCON ASSESS: CPT | Performed by: INTERNAL MEDICINE

## 2021-07-07 ASSESSMENT — ASTHMA QUESTIONNAIRES
QUESTION_3 LAST FOUR WEEKS HOW OFTEN DID YOUR ASTHMA SYMPTOMS (WHEEZING, COUGHING, SHORTNESS OF BREATH, CHEST TIGHTNESS OR PAIN) WAKE YOU UP AT NIGHT OR EARLIER THAN USUAL IN THE MORNING: 4
QUESTION_4 LAST FOUR WEEKS HOW OFTEN HAVE YOU USED YOUR RESCUE INHALER OR NEBULIZER MEDICATION (SUCH AS ALBUTEROL): 5
QUESTION_5 LAST FOUR WEEKS HOW WOULD YOU RATE YOUR ASTHMA CONTROL: 4
QUESTION_1 LAST FOUR WEEKS HOW MUCH OF THE TIME DID YOUR ASTHMA KEEP YOU FROM GETTING AS MUCH DONE AT WORK, SCHOOL OR AT HOME: 4
QUESTION_2 LAST FOUR WEEKS HOW OFTEN HAVE YOU HAD SHORTNESS OF BREATH: 5
ACT_TOTALSCORE: 22

## 2021-07-07 NOTE — PROGRESS NOTES
Chief complaint  This is a 78y.o. year old female  who comes to see me with a chief complaint of   Chief Complaint   Patient presents with    Asthma    Sleep Apnea    . HPI  Follow up on DOYLE.  and asthma      Machine:  Unable to obtain information from her machine. Machine is >10years old. Says she uses it    No SOB. She has not used albuterol in over one year. Does have some sinus issues however    Sleep Medicine 1/13/2021 8/19/2019 2/25/2019 8/22/2018 8/14/2017 7/18/2016 1/25/2016   Sitting and reading 2 1 1 2 2 1 1   Watching TV 2 1 1 1 1 1 2   Sitting, inactive in a public place (e.g. a theatre or a meeting) 2 1 1 1 2 1 2   As a passenger in a car for an hour without a break 2 2 1 1 1 1 2   Lying down to rest in the afternoon when circumstances permit 1 3 2 2 2 1 2   Sitting and talking to someone 0 1 0 0 0 0 1   Sitting quietly after a lunch without alcohol 1 1 0 0 0 1 0   In a car, while stopped for a few minutes in traffic 0 0 0 0 0 0 0   Total score 10 10 6 7 8 6 10   Neck circumference - - - 17.5 19 19 -         Current Outpatient Medications   Medication Sig Dispense Refill    acetaminophen-codeine (TYLENOL/CODEINE #3) 300-30 MG per tablet Take 1 tablet by mouth every 4 hours as needed for Pain for up to 30 days. Intended supply: 5 days.  Take lowest dose possible to manage pain 90 tablet 0    irbesartan (AVAPRO) 150 MG tablet TAKE 1 TABLET BY MOUTH  DAILY 90 tablet 3    amLODIPine (NORVASC) 5 MG tablet Take 1 tablet by mouth daily 90 tablet 1    pantoprazole (PROTONIX) 40 MG tablet Take 1 tablet by mouth daily 90 tablet 1    carbidopa-levodopa (SINEMET)  MG per tablet Take 2 tablets by mouth 4 times daily 720 tablet 1    albuterol sulfate HFA (PROAIR HFA) 108 (90 Base) MCG/ACT inhaler Inhale 2 puffs into the lungs every 4 hours as needed for Wheezing or Shortness of Breath 1 Inhaler 3    ibuprofen (ADVIL;MOTRIN) 600 MG tablet Take 1 tablet by mouth 3 times daily as needed for Pain Take with food. 60 tablet 1    busPIRone (BUSPAR) 15 MG tablet Take 15 mg by mouth 2 times daily 180 tablet 1    buPROPion (WELLBUTRIN SR) 150 MG extended release tablet Take 1 tablet by mouth 2 times daily 180 tablet 3    gabapentin (NEURONTIN) 600 MG tablet Take 1 tablet by mouth 2 times daily for 180 days. 180 tablet 1    clotrimazole-betamethasone (LOTRISONE) 1-0.05 % cream Apply topically 2 times daily. 45 g 1    metFORMIN (GLUCOPHAGE) 1000 MG tablet TAKE 1 TABLET BY MOUTH  TWICE DAILY WITH MEALS 180 tablet 3    atorvastatin (LIPITOR) 40 MG tablet TAKE 1 TABLET BY MOUTH  DAILY 90 tablet 3    Blood Glucose Monitoring Suppl (ONE TOUCH ULTRA 2) w/Device KIT 1 kit by Does not apply route daily 1 kit 0    blood glucose monitor strips Test 1 times a day & as needed for symptoms of irregular blood glucose. . 100 strip 6    Lancets MISC 1 each by Does not apply route daily One touch ultra 100 each 6    Cholecalciferol (VITAMIN D3) 5000 units CAPS Take 1 capsule by mouth daily 90 capsule 3    aspirin 81 MG tablet Take 81 mg by mouth daily. No current facility-administered medications for this visit. PHYSICAL EXAM:  GENERAL: Awake and alert   HEENT:  No scleral icterus, no conjunctival irritation, Mallampati IV  NECK:  No thyromegaly, no bruits, large neck  LYMPH:  No cervical or supraclavicular adenopathy  HEART:  Regular rate and rhythm, no murmurs  LUNGS:  Clear but diminished   ABDOMEN:  No distention, no organomegaly  EXTREMITIES:  No edema, no digital clubbing  NEURO:  No localizing deficits, CN II-XII intact    Chest imaging from 8/2018 is reviewed. My impression is no acute process    ASTHMA CONTROL TEST 7/7/2021 1/13/2021 8/19/2019 2/25/2019 8/22/2018 8/14/2017 2/1/2017   In the past 4 weeks, how much of the time did your asthma keep you from getting as much done at work, school or at home?  4 5 4 4 4 5 2   During the past 4 weeks, how often have you had shortness of breath? 5 5 4 4 4 5 1   During the past 4 weeks, how often did your asthma symptoms (wheezing, coughing, shortness of breath, chest tightness or pain) wake you up at night or earlier than usual in the morning? 4 5 4 2 5 2 1   During the past 4 weeks, how often have you used your rescue inhaler or nebulizer medication (such as albuterol)? 5 5 5 3 5 5 2   How would you rate your asthma control during the past 4 weeks? 4 5 4 3 5 5 1   Asthma Control Test Total Score 22 25 21 16 23 22 7       Assessment/Plan  1. DOYLE on CPAP  Unable to evaluate compliance due to old machine. I am not able to access information on the chip. Will order new machine since hers is >6 years and not able to get information off of it. She has historically benefited from using it    2. Mild intermittent asthma without complication  Controlled. Rarely has to use albuterol.   May be more RADS      Will need 90 day follow up after receiving new machine

## 2021-07-26 RX ORDER — GABAPENTIN 600 MG/1
600 TABLET ORAL 2 TIMES DAILY
Qty: 180 TABLET | Refills: 1 | Status: SHIPPED | OUTPATIENT
Start: 2021-07-26 | End: 2021-12-30

## 2021-07-26 RX ORDER — NAPROXEN 500 MG/1
TABLET ORAL
Qty: 180 TABLET | Refills: 3 | OUTPATIENT
Start: 2021-07-26

## 2021-08-06 DIAGNOSIS — E78.2 MIXED HYPERLIPIDEMIA: ICD-10-CM

## 2021-08-06 DIAGNOSIS — E11.9 TYPE 2 DIABETES MELLITUS WITHOUT COMPLICATION, WITHOUT LONG-TERM CURRENT USE OF INSULIN (HCC): ICD-10-CM

## 2021-08-07 RX ORDER — ATORVASTATIN CALCIUM 40 MG/1
40 TABLET, FILM COATED ORAL DAILY
Qty: 90 TABLET | Refills: 1 | Status: SHIPPED | OUTPATIENT
Start: 2021-08-07 | End: 2022-01-12

## 2021-08-09 DIAGNOSIS — G89.4 CHRONIC PAIN SYNDROME: ICD-10-CM

## 2021-08-09 NOTE — TELEPHONE ENCOUNTER
Refill on acetaminophen-codeine (TYLENOL/CODEINE #3) 300-30 MG please send to [facility] pharmacy on [address]

## 2021-08-11 RX ORDER — NAPROXEN 500 MG/1
TABLET ORAL
Qty: 180 TABLET | Refills: 3 | OUTPATIENT
Start: 2021-08-11

## 2021-08-11 RX ORDER — ACETAMINOPHEN AND CODEINE PHOSPHATE 300; 30 MG/1; MG/1
1 TABLET ORAL EVERY 4 HOURS PRN
Qty: 90 TABLET | Refills: 0 | Status: SHIPPED | OUTPATIENT
Start: 2021-08-11 | End: 2021-09-23 | Stop reason: SDUPTHER

## 2021-08-11 NOTE — TELEPHONE ENCOUNTER
Medication:   Requested Prescriptions     Pending Prescriptions Disp Refills    naproxen (NAPROSYN) 500 MG tablet [Pharmacy Med Name: NAPROXEN  500MG  TAB] 180 tablet 3     Sig: TAKE 1 TABLET BY MOUTH  TWICE DAILY WITH MEALS        Last Filled:      Patient Phone Number: [phone] (home)     Last appt: 5/26/2021   Next appt: 8/19/2021    Last OARRS: No flowsheet data found.

## 2021-08-16 RX ORDER — BUSPIRONE HYDROCHLORIDE 15 MG/1
15 TABLET ORAL 2 TIMES DAILY
Qty: 180 TABLET | Refills: 1 | Status: SHIPPED | OUTPATIENT
Start: 2021-08-16 | End: 2021-11-12 | Stop reason: SDUPTHER

## 2021-08-19 ENCOUNTER — OFFICE VISIT (OUTPATIENT)
Dept: PRIMARY CARE CLINIC | Age: 76
End: 2021-08-19
Payer: MEDICARE

## 2021-08-19 VITALS
HEART RATE: 78 BPM | RESPIRATION RATE: 18 BRPM | DIASTOLIC BLOOD PRESSURE: 65 MMHG | WEIGHT: 175.6 LBS | OXYGEN SATURATION: 97 % | BODY MASS INDEX: 34.29 KG/M2 | SYSTOLIC BLOOD PRESSURE: 139 MMHG | TEMPERATURE: 96.6 F

## 2021-08-19 DIAGNOSIS — E78.2 MIXED HYPERLIPIDEMIA: ICD-10-CM

## 2021-08-19 DIAGNOSIS — E55.9 VITAMIN D DEFICIENCY: ICD-10-CM

## 2021-08-19 DIAGNOSIS — I10 ESSENTIAL HYPERTENSION: ICD-10-CM

## 2021-08-19 DIAGNOSIS — E11.9 TYPE 2 DIABETES MELLITUS WITHOUT COMPLICATION, WITHOUT LONG-TERM CURRENT USE OF INSULIN (HCC): ICD-10-CM

## 2021-08-19 DIAGNOSIS — F34.1 DYSTHYMIA: ICD-10-CM

## 2021-08-19 LAB — HBA1C MFR BLD: 6 %

## 2021-08-19 PROCEDURE — 3044F HG A1C LEVEL LT 7.0%: CPT | Performed by: FAMILY MEDICINE

## 2021-08-19 PROCEDURE — G8399 PT W/DXA RESULTS DOCUMENT: HCPCS | Performed by: FAMILY MEDICINE

## 2021-08-19 PROCEDURE — 2022F DILAT RTA XM EVC RTNOPTHY: CPT | Performed by: FAMILY MEDICINE

## 2021-08-19 PROCEDURE — G8427 DOCREV CUR MEDS BY ELIG CLIN: HCPCS | Performed by: FAMILY MEDICINE

## 2021-08-19 PROCEDURE — G8417 CALC BMI ABV UP PARAM F/U: HCPCS | Performed by: FAMILY MEDICINE

## 2021-08-19 PROCEDURE — 83036 HEMOGLOBIN GLYCOSYLATED A1C: CPT | Performed by: FAMILY MEDICINE

## 2021-08-19 PROCEDURE — 1123F ACP DISCUSS/DSCN MKR DOCD: CPT | Performed by: FAMILY MEDICINE

## 2021-08-19 PROCEDURE — 1090F PRES/ABSN URINE INCON ASSESS: CPT | Performed by: FAMILY MEDICINE

## 2021-08-19 PROCEDURE — 99214 OFFICE O/P EST MOD 30 MIN: CPT | Performed by: FAMILY MEDICINE

## 2021-08-19 PROCEDURE — 3017F COLORECTAL CA SCREEN DOC REV: CPT | Performed by: FAMILY MEDICINE

## 2021-08-19 PROCEDURE — 1036F TOBACCO NON-USER: CPT | Performed by: FAMILY MEDICINE

## 2021-08-19 PROCEDURE — 4040F PNEUMOC VAC/ADMIN/RCVD: CPT | Performed by: FAMILY MEDICINE

## 2021-08-19 RX ORDER — IBUPROFEN 600 MG/1
600 TABLET ORAL 3 TIMES DAILY PRN
Qty: 60 TABLET | Refills: 1 | Status: SHIPPED | OUTPATIENT
Start: 2021-08-19 | End: 2022-04-04

## 2021-08-19 ASSESSMENT — ENCOUNTER SYMPTOMS
CONSTIPATION: 0
DIARRHEA: 0
BLOOD IN STOOL: 0
SHORTNESS OF BREATH: 0
ABDOMINAL PAIN: 0

## 2021-08-19 NOTE — PROGRESS NOTES
2021     Georgia Lilly (:  1945) is a 78 y.o. female, here for evaluation of the following medical concerns:    HPI  Patient presented to the office for follow-up. She reported that she is stressed out at home. Her daughter who is unemployed and her son weree living with the patient and not doing much at home not helping the patient with house chores. Patient has anxiety and takes Wellbutrin SR as well as BuSpar. She has diabetes fairly controlled. She takes Metformin 1000 mg twice daily. HbA1c today 6%. Patient has no hypoglycemic episode. She has hypertension controlled with Avapro and amlodipine. She has hyperlipidemia reported to be compliant with medicine. She takes Lipitor 40 mg daily and tolerating statin. She has vitamin D deficiency and takes vitamin D supplement 5000 units daily. She denies chest pain or shortness of breath. Denies bowel or urinary disturbance. Review of Systems   Constitutional: Negative for activity change and appetite change. Eyes: Negative for visual disturbance. Respiratory: Negative for shortness of breath. Cardiovascular: Negative for chest pain and leg swelling. Gastrointestinal: Negative for abdominal pain, blood in stool, constipation and diarrhea. Genitourinary: Negative for difficulty urinating, frequency, hematuria, menstrual problem and urgency. Neurological: Negative for dizziness and syncope. Psychiatric/Behavioral: Positive for decreased concentration and dysphoric mood. Negative for behavioral problems. The patient is nervous/anxious. Prior to Visit Medications    Medication Sig Taking? Authorizing Provider   ibuprofen (ADVIL;MOTRIN) 600 MG tablet Take 1 tablet by mouth 3 times daily as needed for Pain Take with food.  Yes Casey Rothman MD   Tdap (ADACEL) 5-2-15.5 LF-MCG/0.5 injection Inject 0.5 mLs into the muscle once for 1 dose Yes Casey Rothman MD   zoster recombinant adjuvanted vaccine [facility]) 50 MCG/0.5ML SUSR injection Inject 0.5 mLs into the muscle once for 1 dose Yes General Marshal MD   busPIRone (BUSPAR) 15 MG tablet Take 15 mg by mouth 2 times daily Yes General Marshal MD   acetaminophen-codeine (TYLENOL/CODEINE #3) 300-30 MG per tablet Take 1 tablet by mouth every 4 hours as needed for Pain for up to 30 days. Intended supply: 5 days. Take lowest dose possible to manage pain Yes General Marshal MD   metFORMIN (GLUCOPHAGE) 1000 MG tablet Take 1 tablet by mouth 2 times daily (with meals) Yes General Marshal MD   atorvastatin (LIPITOR) 40 MG tablet Take 1 tablet by mouth daily Yes General Marshal MD   gabapentin (NEURONTIN) 600 MG tablet Take 1 tablet by mouth 2 times daily for 180 days. Yes General Marshal MD   irbesartan (AVAPRO) 150 MG tablet TAKE 1 TABLET BY MOUTH  DAILY Yes General Marshal MD   amLODIPine (NORVASC) 5 MG tablet Take 1 tablet by mouth daily Yes General Masrhal MD   pantoprazole (PROTONIX) 40 MG tablet Take 1 tablet by mouth daily Yes General Marshal MD   carbidopa-levodopa (SINEMET)  MG per tablet Take 2 tablets by mouth 4 times daily Yes General Marshal MD   buPROPion (WELLBUTRIN SR) 150 MG extended release tablet Take 1 tablet by mouth 2 times daily Yes General Marshal MD   Cholecalciferol (VITAMIN D3) 5000 units CAPS Take 1 capsule by mouth daily Yes General Marshal MD   aspirin 81 MG tablet Take 81 mg by mouth daily. Yes Historical Provider, MD   clotrimazole-betamethasone (LOTRISONE) 1-0.05 % cream Apply topically 2 times daily. General Marshal MD   Blood Glucose Monitoring Suppl (ONE TOUCH ULTRA 2) w/Device KIT 1 kit by Does not apply route daily  General Marshal MD   blood glucose monitor strips Test 1 times a day & as needed for symptoms of irregular blood glucose.  Harrison Lion MD   Lancets MISC 1 each by Does not apply route daily One touch ultra  General Marshal MD        Social History     Tobacco Use    Smoking status: Former Smoker Packs/day: 2.00     Years: 36.00     Pack years: 72.00     Types: Cigarettes     Quit date: 1999     Years since quittin.3    Smokeless tobacco: Never Used   Substance Use Topics    Alcohol use: No     Alcohol/week: 0.0 standard drinks        Vitals:    21 0941   BP: 139/65   Pulse: 78   Resp: 18   Temp: 96.6 °F (35.9 °C)   TempSrc: Temporal   SpO2: 97%   Weight: 175 lb 9.6 oz (79.7 kg)     Estimated body mass index is 34.29 kg/m² as calculated from the following:    Height as of 21: 5' (1.524 m). Weight as of this encounter: 175 lb 9.6 oz (79.7 kg). Physical Exam  Constitutional:       Appearance: She is well-developed. HENT:      Head: Normocephalic. Right Ear: External ear normal.      Nose: Nose normal.   Eyes:      Conjunctiva/sclera: Conjunctivae normal.      Pupils: Pupils are equal, round, and reactive to light. Neck:      Thyroid: No thyromegaly. Cardiovascular:      Rate and Rhythm: Normal rate and regular rhythm. Heart sounds: Normal heart sounds. No murmur heard. No friction rub. Pulmonary:      Effort: Pulmonary effort is normal.      Breath sounds: Normal breath sounds. Abdominal:      General: Bowel sounds are normal.      Palpations: Abdomen is soft. There is no mass. Musculoskeletal:         General: Normal range of motion. Cervical back: Normal range of motion and neck supple. Lymphadenopathy:      Cervical: No cervical adenopathy. Skin:     General: Skin is warm. Findings: No rash. Neurological:      Mental Status: She is alert and oriented to person, place, and time. ASSESSMENT/PLAN:  1. Type 2 diabetes mellitus without complication, without long-term current use of insulin (HCC)  Controlled. HbA1c today 6%. Continue Metformin 1000 mg twice daily. - Comprehensive Metabolic Panel, Fasting; Future  - POCT glycosylated hemoglobin (Hb A1C)    2. Essential hypertension  Controlled.   Continue Avapro 150 mg daily and amlodipine 5 mg daily.  - CBC Auto Differential; Future    3. Mixed hyperlipidemia  Controlled. Continue Lipitor 40 mg daily. Will check lipid panel today. - T4, Free; Future  - TSH without Reflex; Future  - Lipid, Fasting; Future    4. Vitamin D deficiency  Continue vitamin D supplement 5000 units daily. Will check vitamin D level today. - Vitamin D 25 Hydroxy; Future    5. Dysthymia  She is stressed out. Daughter is unemployed and her son is living with the patient but not helping patient with house chores.   Continue Wellbutrin  mg twice a day and BuSpar 15 mg twice daily      RTC in 3 mos for AWV    An electronic signature was used to authenticate this note.    --Gifty Naranjo MD on 8/19/2021 at 12:41 PM

## 2021-09-13 ENCOUNTER — TELEPHONE (OUTPATIENT)
Dept: PRIMARY CARE CLINIC | Age: 76
End: 2021-09-13

## 2021-09-13 NOTE — TELEPHONE ENCOUNTER
Called pt. She is very upset because she is going to be taking care of someone at her home and she doesn't feel she is able to do it. D/W Dr Bryn Tejeda. Called pt back.  Left detailed VM: stay on current meds (including Buspar and Wellbutrin) contact the other persons's PCP and see if they can help provide Home Health services, etc.

## 2021-09-17 ENCOUNTER — TELEPHONE (OUTPATIENT)
Dept: PULMONOLOGY | Age: 76
End: 2021-09-17

## 2021-09-17 NOTE — TELEPHONE ENCOUNTER
Patient calling stating she is at the 30 day sanjeev with her new CPAP. She is needing follow up before the 90 day sanjeev.       Dr Charan Chadwick has no openings in the next 60 days (1st available is 11/16) so will FWD message to him to ask where to put her? bul

## 2021-09-23 DIAGNOSIS — G89.4 CHRONIC PAIN SYNDROME: ICD-10-CM

## 2021-09-23 RX ORDER — ACETAMINOPHEN AND CODEINE PHOSPHATE 300; 30 MG/1; MG/1
1 TABLET ORAL EVERY 4 HOURS PRN
Qty: 90 TABLET | Refills: 0 | Status: SHIPPED | OUTPATIENT
Start: 2021-09-23 | End: 2021-11-03 | Stop reason: SDUPTHER

## 2021-10-08 ENCOUNTER — OFFICE VISIT (OUTPATIENT)
Dept: PULMONOLOGY | Age: 76
End: 2021-10-08
Payer: MEDICARE

## 2021-10-08 VITALS
BODY MASS INDEX: 33.06 KG/M2 | DIASTOLIC BLOOD PRESSURE: 70 MMHG | OXYGEN SATURATION: 97 % | WEIGHT: 168.4 LBS | RESPIRATION RATE: 18 BRPM | HEART RATE: 80 BPM | TEMPERATURE: 95.1 F | SYSTOLIC BLOOD PRESSURE: 126 MMHG | HEIGHT: 60 IN

## 2021-10-08 DIAGNOSIS — Z99.89 OSA ON CPAP: Primary | ICD-10-CM

## 2021-10-08 DIAGNOSIS — J45.20 MILD INTERMITTENT ASTHMA WITHOUT COMPLICATION: ICD-10-CM

## 2021-10-08 DIAGNOSIS — G47.33 OSA ON CPAP: Primary | ICD-10-CM

## 2021-10-08 PROCEDURE — 1123F ACP DISCUSS/DSCN MKR DOCD: CPT | Performed by: INTERNAL MEDICINE

## 2021-10-08 PROCEDURE — 1090F PRES/ABSN URINE INCON ASSESS: CPT | Performed by: INTERNAL MEDICINE

## 2021-10-08 PROCEDURE — 1036F TOBACCO NON-USER: CPT | Performed by: INTERNAL MEDICINE

## 2021-10-08 PROCEDURE — G8484 FLU IMMUNIZE NO ADMIN: HCPCS | Performed by: INTERNAL MEDICINE

## 2021-10-08 PROCEDURE — 99213 OFFICE O/P EST LOW 20 MIN: CPT | Performed by: INTERNAL MEDICINE

## 2021-10-08 PROCEDURE — G8399 PT W/DXA RESULTS DOCUMENT: HCPCS | Performed by: INTERNAL MEDICINE

## 2021-10-08 PROCEDURE — 3017F COLORECTAL CA SCREEN DOC REV: CPT | Performed by: INTERNAL MEDICINE

## 2021-10-08 PROCEDURE — G8417 CALC BMI ABV UP PARAM F/U: HCPCS | Performed by: INTERNAL MEDICINE

## 2021-10-08 PROCEDURE — 4040F PNEUMOC VAC/ADMIN/RCVD: CPT | Performed by: INTERNAL MEDICINE

## 2021-10-08 PROCEDURE — G8427 DOCREV CUR MEDS BY ELIG CLIN: HCPCS | Performed by: INTERNAL MEDICINE

## 2021-10-08 ASSESSMENT — SLEEP AND FATIGUE QUESTIONNAIRES
HOW LIKELY ARE YOU TO NOD OFF OR FALL ASLEEP WHILE SITTING INACTIVE IN A PUBLIC PLACE: 0
HOW LIKELY ARE YOU TO NOD OFF OR FALL ASLEEP WHILE SITTING AND READING: 1
HOW LIKELY ARE YOU TO NOD OFF OR FALL ASLEEP WHILE WATCHING TV: 0
HOW LIKELY ARE YOU TO NOD OFF OR FALL ASLEEP IN A CAR, WHILE STOPPED FOR A FEW MINUTES IN TRAFFIC: 0
HOW LIKELY ARE YOU TO NOD OFF OR FALL ASLEEP WHEN YOU ARE A PASSENGER IN A CAR FOR AN HOUR WITHOUT A BREAK: 0
HOW LIKELY ARE YOU TO NOD OFF OR FALL ASLEEP WHILE SITTING QUIETLY AFTER LUNCH WITHOUT ALCOHOL: 0
HOW LIKELY ARE YOU TO NOD OFF OR FALL ASLEEP WHILE LYING DOWN TO REST IN THE AFTERNOON WHEN CIRCUMSTANCES PERMIT: 0
HOW LIKELY ARE YOU TO NOD OFF OR FALL ASLEEP WHILE SITTING AND TALKING TO SOMEONE: 0
ESS TOTAL SCORE: 1

## 2021-10-08 NOTE — PROGRESS NOTES
Chief complaint  This is a 78y.o. year old female  who comes to see me with a chief complaint of   Chief Complaint   Patient presents with    Sleep Apnea    . HPI  Follow up on DOYLE.  and asthma    Received a new machine. Information obtained from the machine and not the chip    Machine:  Patient is using a APAP 10-20 machine. Average usage is 8 hrs 30 min 00 sec. She is meeting 4 or more hours per night 100% of the time. Average AHI is 0.7. Patient admits to good use  . Still losing weight. Breathing is fine     Sleep Medicine 10/8/2021 1/13/2021 8/19/2019 2/25/2019 8/22/2018 8/14/2017 7/18/2016   Sitting and reading 1 2 1 1 2 2 1   Watching TV 0 2 1 1 1 1 1   Sitting, inactive in a public place (e.g. a theatre or a meeting) 0 2 1 1 1 2 1   As a passenger in a car for an hour without a break 0 2 2 1 1 1 1   Lying down to rest in the afternoon when circumstances permit 0 1 3 2 2 2 1   Sitting and talking to someone 0 0 1 0 0 0 0   Sitting quietly after a lunch without alcohol 0 1 1 0 0 0 1   In a car, while stopped for a few minutes in traffic 0 0 0 0 0 0 0   Total score 1 10 10 6 7 8 6   Neck circumference - - - - 17.5 19 19         Current Outpatient Medications   Medication Sig Dispense Refill    acetaminophen-codeine (TYLENOL/CODEINE #3) 300-30 MG per tablet Take 1 tablet by mouth every 4 hours as needed for Pain for up to 30 days. Take lowest dose possible to manage pain 90 tablet 0    ibuprofen (ADVIL;MOTRIN) 600 MG tablet Take 1 tablet by mouth 3 times daily as needed for Pain Take with food. 60 tablet 1    busPIRone (BUSPAR) 15 MG tablet Take 15 mg by mouth 2 times daily 180 tablet 1    metFORMIN (GLUCOPHAGE) 1000 MG tablet Take 1 tablet by mouth 2 times daily (with meals) 180 tablet 1    atorvastatin (LIPITOR) 40 MG tablet Take 1 tablet by mouth daily 90 tablet 1    gabapentin (NEURONTIN) 600 MG tablet Take 1 tablet by mouth 2 times daily for 180 days.  180 tablet 1    irbesartan (AVAPRO) 150 MG tablet TAKE 1 TABLET BY MOUTH  DAILY 90 tablet 3    amLODIPine (NORVASC) 5 MG tablet Take 1 tablet by mouth daily 90 tablet 1    pantoprazole (PROTONIX) 40 MG tablet Take 1 tablet by mouth daily 90 tablet 1    carbidopa-levodopa (SINEMET)  MG per tablet Take 2 tablets by mouth 4 times daily 720 tablet 1    buPROPion (WELLBUTRIN SR) 150 MG extended release tablet Take 1 tablet by mouth 2 times daily 180 tablet 3    clotrimazole-betamethasone (LOTRISONE) 1-0.05 % cream Apply topically 2 times daily. 45 g 1    Blood Glucose Monitoring Suppl (ONE TOUCH ULTRA 2) w/Device KIT 1 kit by Does not apply route daily 1 kit 0    blood glucose monitor strips Test 1 times a day & as needed for symptoms of irregular blood glucose. . 100 strip 6    Lancets MISC 1 each by Does not apply route daily One touch ultra 100 each 6    Cholecalciferol (VITAMIN D3) 5000 units CAPS Take 1 capsule by mouth daily 90 capsule 3    aspirin 81 MG tablet Take 81 mg by mouth daily. No current facility-administered medications for this visit. PHYSICAL EXAM:  GENERAL: Awake and alert, visible weight loss   HEENT:  No scleral icterus, no conjunctival irritation, Mallampati IV  NECK:  No thyromegaly, no bruits, large neck  LYMPH:  No cervical or supraclavicular adenopathy  HEART:  Regular rate and rhythm, + murmurs  LUNGS:  Clear but diminished   ABDOMEN:  No distention, no organomegaly  EXTREMITIES:  No edema, no digital clubbing  NEURO:  No localizing deficits, CN II-XII intact    Chest imaging from 8/2018 is reviewed. My impression is no acute process      Assessment/Plan  1. DOYLE on CPAP  Benefiting and compliant. Benefiting from therapy by a low [place] score, good energy levels, low fatigue, and control of chronic medical problems    2. Mild intermittent asthma without complication  Controlled.   PRN albuterol     Follow up in 12 months

## 2021-11-01 RX ORDER — AMLODIPINE BESYLATE 5 MG/1
5 TABLET ORAL DAILY
Qty: 90 TABLET | Refills: 1 | Status: SHIPPED | OUTPATIENT
Start: 2021-11-01 | End: 2022-02-28 | Stop reason: SDUPTHER

## 2021-11-01 RX ORDER — CARBIDOPA/LEVODOPA 25MG-250MG
2 TABLET ORAL 4 TIMES DAILY
Qty: 720 TABLET | Refills: 1 | Status: SHIPPED | OUTPATIENT
Start: 2021-11-01 | End: 2022-02-28 | Stop reason: SDUPTHER

## 2021-11-01 RX ORDER — PANTOPRAZOLE SODIUM 40 MG/1
40 TABLET, DELAYED RELEASE ORAL DAILY
Qty: 90 TABLET | Refills: 1 | Status: SHIPPED | OUTPATIENT
Start: 2021-11-01 | End: 2022-02-28 | Stop reason: SDUPTHER

## 2021-11-03 DIAGNOSIS — G89.4 CHRONIC PAIN SYNDROME: ICD-10-CM

## 2021-11-03 RX ORDER — ACETAMINOPHEN AND CODEINE PHOSPHATE 300; 30 MG/1; MG/1
1 TABLET ORAL EVERY 4 HOURS PRN
Qty: 90 TABLET | Refills: 0 | Status: SHIPPED | OUTPATIENT
Start: 2021-11-03 | End: 2021-12-14 | Stop reason: SDUPTHER

## 2021-11-03 RX ORDER — AMMONIUM LACTATE 12 G/100G
LOTION TOPICAL
Qty: 500 G | Refills: 1 | Status: SHIPPED | OUTPATIENT
Start: 2021-11-03 | End: 2022-04-04

## 2021-11-03 NOTE — TELEPHONE ENCOUNTER
Refill on acetaminophen-codeine (TYLENOL/CODEINE #3) 300-30 MG & patient would like a foot lotion the name of if is Ammonium 12% applied to 2 times daily, please send to [address] on [address]

## 2021-11-12 ENCOUNTER — OFFICE VISIT (OUTPATIENT)
Dept: PRIMARY CARE CLINIC | Age: 76
End: 2021-11-12
Payer: MEDICARE

## 2021-11-12 VITALS
HEART RATE: 74 BPM | WEIGHT: 169 LBS | BODY MASS INDEX: 33.01 KG/M2 | SYSTOLIC BLOOD PRESSURE: 149 MMHG | DIASTOLIC BLOOD PRESSURE: 73 MMHG

## 2021-11-12 DIAGNOSIS — M54.2 NECK PAIN, MUSCULOSKELETAL: Primary | ICD-10-CM

## 2021-11-12 DIAGNOSIS — F34.1 DYSTHYMIA: ICD-10-CM

## 2021-11-12 PROCEDURE — 4040F PNEUMOC VAC/ADMIN/RCVD: CPT | Performed by: FAMILY MEDICINE

## 2021-11-12 PROCEDURE — G8427 DOCREV CUR MEDS BY ELIG CLIN: HCPCS | Performed by: FAMILY MEDICINE

## 2021-11-12 PROCEDURE — 1036F TOBACCO NON-USER: CPT | Performed by: FAMILY MEDICINE

## 2021-11-12 PROCEDURE — G8399 PT W/DXA RESULTS DOCUMENT: HCPCS | Performed by: FAMILY MEDICINE

## 2021-11-12 PROCEDURE — 1090F PRES/ABSN URINE INCON ASSESS: CPT | Performed by: FAMILY MEDICINE

## 2021-11-12 PROCEDURE — G8417 CALC BMI ABV UP PARAM F/U: HCPCS | Performed by: FAMILY MEDICINE

## 2021-11-12 PROCEDURE — G8484 FLU IMMUNIZE NO ADMIN: HCPCS | Performed by: FAMILY MEDICINE

## 2021-11-12 PROCEDURE — 1123F ACP DISCUSS/DSCN MKR DOCD: CPT | Performed by: FAMILY MEDICINE

## 2021-11-12 PROCEDURE — 99213 OFFICE O/P EST LOW 20 MIN: CPT | Performed by: FAMILY MEDICINE

## 2021-11-12 PROCEDURE — 3017F COLORECTAL CA SCREEN DOC REV: CPT | Performed by: FAMILY MEDICINE

## 2021-11-12 RX ORDER — BUSPIRONE HYDROCHLORIDE 30 MG/1
30 TABLET ORAL 2 TIMES DAILY
Qty: 60 TABLET | Refills: 1 | Status: SHIPPED | OUTPATIENT
Start: 2021-11-12 | End: 2022-03-02 | Stop reason: SDUPTHER

## 2021-11-12 ASSESSMENT — ENCOUNTER SYMPTOMS
BLOOD IN STOOL: 0
ABDOMINAL PAIN: 0
DIARRHEA: 0
CONSTIPATION: 0
SHORTNESS OF BREATH: 0
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
## 2021-12-02 ENCOUNTER — OFFICE VISIT (OUTPATIENT)
Dept: PRIMARY CARE CLINIC | Age: 76
End: 2021-12-02
Payer: MEDICARE

## 2021-12-02 VITALS
OXYGEN SATURATION: 97 % | RESPIRATION RATE: 18 BRPM | DIASTOLIC BLOOD PRESSURE: 46 MMHG | SYSTOLIC BLOOD PRESSURE: 105 MMHG | HEART RATE: 66 BPM | BODY MASS INDEX: 33.47 KG/M2 | WEIGHT: 166 LBS | HEIGHT: 59 IN

## 2021-12-02 DIAGNOSIS — E11.9 TYPE 2 DIABETES MELLITUS WITHOUT COMPLICATION, WITHOUT LONG-TERM CURRENT USE OF INSULIN (HCC): ICD-10-CM

## 2021-12-02 DIAGNOSIS — Z00.00 ROUTINE GENERAL MEDICAL EXAMINATION AT A HEALTH CARE FACILITY: ICD-10-CM

## 2021-12-02 LAB — HBA1C MFR BLD: 6.1 %

## 2021-12-02 PROCEDURE — 4040F PNEUMOC VAC/ADMIN/RCVD: CPT | Performed by: FAMILY MEDICINE

## 2021-12-02 PROCEDURE — G0439 PPPS, SUBSEQ VISIT: HCPCS | Performed by: FAMILY MEDICINE

## 2021-12-02 PROCEDURE — 3044F HG A1C LEVEL LT 7.0%: CPT | Performed by: FAMILY MEDICINE

## 2021-12-02 PROCEDURE — 3017F COLORECTAL CA SCREEN DOC REV: CPT | Performed by: FAMILY MEDICINE

## 2021-12-02 PROCEDURE — 1123F ACP DISCUSS/DSCN MKR DOCD: CPT | Performed by: FAMILY MEDICINE

## 2021-12-02 PROCEDURE — G8484 FLU IMMUNIZE NO ADMIN: HCPCS | Performed by: FAMILY MEDICINE

## 2021-12-02 PROCEDURE — 83036 HEMOGLOBIN GLYCOSYLATED A1C: CPT | Performed by: FAMILY MEDICINE

## 2021-12-02 ASSESSMENT — PATIENT HEALTH QUESTIONNAIRE - PHQ9
9. THOUGHTS THAT YOU WOULD BE BETTER OFF DEAD, OR OF HURTING YOURSELF: 0
SUM OF ALL RESPONSES TO PHQ QUESTIONS 1-9: 12
5. POOR APPETITE OR OVEREATING: 1
2. FEELING DOWN, DEPRESSED OR HOPELESS: 2
6. FEELING BAD ABOUT YOURSELF - OR THAT YOU ARE A FAILURE OR HAVE LET YOURSELF OR YOUR FAMILY DOWN: 0
SUM OF ALL RESPONSES TO PHQ QUESTIONS 1-9: 12
4. FEELING TIRED OR HAVING LITTLE ENERGY: 3
8. MOVING OR SPEAKING SO SLOWLY THAT OTHER PEOPLE COULD HAVE NOTICED. OR THE OPPOSITE, BEING SO FIGETY OR RESTLESS THAT YOU HAVE BEEN MOVING AROUND A LOT MORE THAN USUAL: 0
SUM OF ALL RESPONSES TO PHQ QUESTIONS 1-9: 12
10. IF YOU CHECKED OFF ANY PROBLEMS, HOW DIFFICULT HAVE THESE PROBLEMS MADE IT FOR YOU TO DO YOUR WORK, TAKE CARE OF THINGS AT HOME, OR GET ALONG WITH OTHER PEOPLE: 1
1. LITTLE INTEREST OR PLEASURE IN DOING THINGS: 2
3. TROUBLE FALLING OR STAYING ASLEEP: 3
SUM OF ALL RESPONSES TO PHQ9 QUESTIONS 1 & 2: 4
7. TROUBLE CONCENTRATING ON THINGS, SUCH AS READING THE NEWSPAPER OR WATCHING TELEVISION: 1

## 2021-12-02 ASSESSMENT — COLUMBIA-SUICIDE SEVERITY RATING SCALE - C-SSRS
6. HAVE YOU EVER DONE ANYTHING, STARTED TO DO ANYTHING, OR PREPARED TO DO ANYTHING TO END YOUR LIFE?: NO
2. HAVE YOU ACTUALLY HAD ANY THOUGHTS OF KILLING YOURSELF?: NO
1. WITHIN THE PAST MONTH, HAVE YOU WISHED YOU WERE DEAD OR WISHED YOU COULD GO TO SLEEP AND NOT WAKE UP?: NO

## 2021-12-02 ASSESSMENT — LIFESTYLE VARIABLES: HOW OFTEN DO YOU HAVE A DRINK CONTAINING ALCOHOL: 0

## 2021-12-02 NOTE — PROGRESS NOTES
Medicare Annual Wellness Visit  Name: Doreen Flores Date: 2021   MRN: [id number] Sex: Female   Age: 78 y.o. Ethnicity: Non- / Non    : 1945 Race: White (non-)      Coleman Bosworth Sweet is here for Medicare AWV    Screenings for behavioral, psychosocial and functional/safety risks, and cognitive dysfunction are all negative except as indicated below. These results, as well as other patient data from the [address] form, are documented in Flowsheets linked to this Encounter. Allergies   Allergen Reactions    Meloxicam      Heart rate drops very low    Quinine Derivatives      Fever, chills, vomiting, diarrhea, dizziness    Codeine     Darvon [Fd&C Red #40-Fd&C Yellow #10-Propoxyphene]      Nausea, vomiting    Quinine     Vicodin [Hydrocodone-Acetaminophen] Anxiety     Hyperactivity, nausea         Prior to Visit Medications    Medication Sig Taking? Authorizing Provider   busPIRone (BUSPAR) 30 MG tablet Take 30 mg by mouth 2 times daily Yes Levan Crigler, MD   acetaminophen-codeine (TYLENOL/CODEINE #3) 300-30 MG per tablet Take 1 tablet by mouth every 4 hours as needed for Pain for up to 30 days. Take lowest dose possible to manage pain Yes Levan Crigler, MD   amLODIPine (NORVASC) 5 MG tablet Take 1 tablet by mouth daily Yes Levan Crigler, MD   carbidopa-levodopa (SINEMET)  MG per tablet Take 2 tablets by mouth 4 times daily Yes Levan Crigler, MD   pantoprazole (PROTONIX) 40 MG tablet Take 1 tablet by mouth daily Yes Levan Crigler, MD   ibuprofen (ADVIL;MOTRIN) 600 MG tablet Take 1 tablet by mouth 3 times daily as needed for Pain Take with food.  Yes Levan Crigler, MD   metFORMIN (GLUCOPHAGE) 1000 MG tablet Take 1 tablet by mouth 2 times daily (with meals) Yes Levan Crigler, MD   atorvastatin (LIPITOR) 40 MG tablet Take 1 tablet by mouth daily Yes Levan Crigler, MD   gabapentin (NEURONTIN) 600 MG tablet Take 1 tablet by mouth 2 times daily for 180 days. Yes Meghan Pastrana MD   irbesartan (AVAPRO) 150 MG tablet TAKE 1 TABLET BY MOUTH  DAILY Yes Meghan Pastrana MD   buPROPion (WELLBUTRIN SR) 150 MG extended release tablet Take 1 tablet by mouth 2 times daily Yes Meghan Pastrana MD   Cholecalciferol (VITAMIN D3) 5000 units CAPS Take 1 capsule by mouth daily Yes Meghan Pastrana MD   aspirin 81 MG tablet Take 81 mg by mouth daily. Yes Tash Provider, MD   diclofenac sodium (VOLTAREN) 1 % GEL Apply 2 g topically 4 times daily  Meghan Pastrana MD   ammonium lactate (LAC-HYDRIN) 12 % lotion Apply to feet bid  Meghan Pastrana MD   clotrimazole-betamethasone (LOTRISONE) 1-0.05 % cream Apply topically 2 times daily. Meghan Pastrana MD   Blood Glucose Monitoring Suppl (ONE TOUCH ULTRA 2) w/Device KIT 1 kit by Does not apply route daily  Meghan Pastrana MD   blood glucose monitor strips Test 1 times a day & as needed for symptoms of irregular blood glucose.  Surekha Bhardwaj MD   Lancets MISC 1 each by Does not apply route daily One touch ultra  Meghan Pastrana MD         Past Medical History:   Diagnosis Date    Asthma     Benign tumor lacrimal gland     Greater trochanteric bursitis of left hip     Hyperlipidemia     Hypertension     Pseudophakia     RLS (restless legs syndrome)     Type II or unspecified type diabetes mellitus without mention of complication, not stated as uncontrolled     Unspecified sleep apnea     sleep study done [id number]    Vitamin D deficiency        Past Surgical History:   Procedure Laterality Date    BREAST SURGERY      CHOLECYSTECTOMY  [id number]    [facility]. INJECTION PROCEDURE FOR SACROILIAC JOINT Right 10/2/2019    RIGHT SACROILIAC JOINT INJECTION WITH FLUOROSCOPY performed by Maria E Maravilla MD at [address] [place] [address] OTHER SURGICAL HISTORY      fatty tumors on arms excised    TONSILLECTOMY AND ADENOIDECTOMY           Family History   Problem Relation Age of Onset    carotid bruits  Abdomen: soft, non-tender, non-distended, normal bowel sounds, no masses or organomegaly  Extremities: no cyanosis, clubbing or edema  Musculoskeletal: normal range of motion, no joint swelling, deformity or tenderness  Neurologic: reflexes normal and symmetric, no cranial nerve deficit, gait, coordination and speech normal    Patient's complete Health Risk Assessment and screening values have been reviewed and are found in Flowsheets. The following problems were reviewed today and where indicated follow up appointments were made and/or referrals ordered. Positive Risk Factor Screenings with Interventions:       Depression:  PHQ-2 Score: 4  PHQ-9 Total Score: 12    Severity:1-4 = minimal depression, 5-9 = mild depression, 10-14 = moderate depression, 15-19 = moderately severe depression, 20-27 = severe depression  Depression Interventions:  · Medication prescribed- Wellbutrin 150 BID  · Medication adjusted- Buspar increased        General Health and ACP:  General  In general, how would you say your health is?: Good  In the past 7 days, have you experienced any of the following?  New or Increased Pain, New or Increased Fatigue, Loneliness, Social Isolation, Stress or Anger?: (!) New or Increased Pain, Stress  Do you get the social and emotional support that you need?: Yes  Do you have a Living Will?: Yes  Advance Directives     Power of  Living Will ACP-Advance Directive ACP-Power of     Not on File Not on File Not on File Filed      General Health Risk Interventions:  · Stress: patient declines any further evaluation/treatment for this issue, mother in law lives with them    Health Habits/Nutrition:  Health Habits/Nutrition  Do you exercise for at least 20 minutes 2-3 times per week?: Yes  Have you lost any weight without trying in the past 3 months?: (!) Yes  Do you eat only one meal per day?: No  Have you seen the dentist within the past year?: (!) No  Body mass index: (!) 34.1  Health Habits/Nutrition Interventions:  · Nutritional issues:  patient is not ready to address his/her nutritional/weight issues at this time    Hearing/Vision:  No exam data present  Hearing/Vision  Do you or your family notice any trouble with your hearing that hasn't been managed with hearing aids?: (!) Yes  Do you have difficulty driving, watching TV, or doing any of your daily activities because of your eyesight?: (!) Yes  Have you had an eye exam within the past year?: Yes  Hearing/Vision Interventions:  · Hearing concerns:  patient declines any further evaluation/treatment for hearing issues  · Vision concerns:  patient declines any further evaluation/treatment for this issue      Personalized Preventive Plan   Current Health Maintenance Status  Immunization History   Administered Date(s) Administered    COVID-19, [place], PF, 30mcg/0.3mL 01/29/2021, 02/19/2021, 10/07/2021    Influenza A (N4H1-53) Vaccine PF IM 12/20/2009    Influenza Virus Vaccine 10/09/2015, 09/16/2016, 11/12/2021    Influenza Whole 10/09/2015, 09/20/2016    Influenza, High Dose (Fluzone 65 yrs and older) 10/02/2013, 10/23/2014, 09/19/2017    Influenza, Jessi Louder, Recombinant, IM PF (Flublok 18 yrs and older) 09/20/2018    Influenza, Quadv, adjuvanted, 65 yrs +, IM, PF (Fluad) 09/11/2020    Influenza, Triv, inactivated, subunit, adjuvanted, IM (Fluad 65 yrs and older) 09/12/2019    Pneumococcal Conjugate 13-valent ([id number]) 04/20/2017    Pneumococcal Polysaccharide (Qeurikguw22) 10/10/2004, 09/20/2018    Td, unspecified formulation 12/08/2014    Zoster Live (Zostavax) 10/08/2013        Health Maintenance   Topic Date Due    Shingles Vaccine (2 of 3) 12/03/2013    DTaP/Tdap/Td vaccine (1 - Tdap) 12/09/2014    Diabetic microalbuminuria test  11/27/2020    Annual Wellness Visit (AWV)  11/27/2020    Diabetic foot exam  07/27/2021    A1C test (Diabetic or Prediabetic)  08/19/2022    Lipid screen  08/19/2022    Potassium monitoring  08/19/2022    Creatinine monitoring  08/19/2022    Diabetic retinal exam  03/01/2023    Breast cancer screen  06/28/2023    Colon cancer screen colonoscopy  05/04/2028    DEXA (modify frequency per FRAX score)  Completed    Flu vaccine  Completed    Pneumococcal 65+ years Vaccine  Completed    COVID-19 Vaccine  Completed    Hepatitis C screen  Completed    Hepatitis A vaccine  Aged Out    Hib vaccine  Aged Out    Meningococcal (ACWY) vaccine  Aged Out     Recommendations for [employer] Due: see orders and patient instructions/AVS.  . Recommended screening schedule for the next 5-10 years is provided to the patient in written form: see Patient Instructions/AVS.    Mauro Chappell was seen today for medicare awv.     Diagnoses and all orders for this visit:    Routine general medical examination at a health care facility    Type 2 diabetes mellitus without complication, without long-term current use of insulin (HCC)  -     POCT glycosylated hemoglobin (Hb A1C)

## 2021-12-02 NOTE — PATIENT INSTRUCTIONS
Personalized Preventive Plan for Yosi Moreira - 12/2/2021  Medicare offers a range of preventive health benefits. Some of the tests and screenings are paid in full while other may be subject to a deductible, co-insurance, and/or copay. Some of these benefits include a comprehensive review of your medical history including lifestyle, illnesses that may run in your family, and various assessments and screenings as appropriate. After reviewing your medical record and screening and assessments performed today your provider may have ordered immunizations, labs, imaging, and/or referrals for you. A list of these orders (if applicable) as well as your Preventive Care list are included within your After Visit Summary for your review. Other Preventive Recommendations:    · A preventive eye exam performed by an eye specialist is recommended every 1-2 years to screen for glaucoma; cataracts, macular degeneration, and other eye disorders. · A preventive dental visit is recommended every 6 months. · Try to get at least 150 minutes of exercise per week or 10,000 steps per day on a pedometer . · Order or download the FREE \"Exercise & Physical Activity: Your Everyday Guide\" from [employer]. Call 2-463.729.6062 or search [employer] online. · You need 5452-4916 mg of calcium and 8334-2814 IU of vitamin D per day. It is possible to meet your calcium requirement with diet alone, but a vitamin D supplement is usually necessary to meet this goal.  · When exposed to the sun, use a sunscreen that protects against both UVA and UVB radiation with an SPF of 30 or greater. Reapply every 2 to 3 hours or after sweating, drying off with a towel, or swimming. · Always wear a seat belt when traveling in a car. Always wear a helmet when riding a bicycle or motorcycle.

## 2021-12-14 DIAGNOSIS — G89.4 CHRONIC PAIN SYNDROME: ICD-10-CM

## 2021-12-14 RX ORDER — ACETAMINOPHEN AND CODEINE PHOSPHATE 300; 30 MG/1; MG/1
1 TABLET ORAL EVERY 4 HOURS PRN
Qty: 90 TABLET | Refills: 0 | Status: SHIPPED | OUTPATIENT
Start: 2021-12-14 | End: 2022-01-31 | Stop reason: SDUPTHER

## 2021-12-15 RX ORDER — NAPROXEN 500 MG/1
500 TABLET ORAL 2 TIMES DAILY WITH MEALS
Qty: 180 TABLET | Refills: 1 | Status: SHIPPED | OUTPATIENT
Start: 2021-12-15 | End: 2022-03-29 | Stop reason: SDUPTHER

## 2021-12-15 NOTE — TELEPHONE ENCOUNTER
naproxen (NAPROSYN) 375 MG tablet [[id number]]    Please send to pharmacy for refill Grazyna -[phone] please be advise

## 2021-12-27 RX ORDER — BUPROPION HYDROCHLORIDE 150 MG/1
150 TABLET, EXTENDED RELEASE ORAL 2 TIMES DAILY
Qty: 180 TABLET | Refills: 1 | Status: SHIPPED | OUTPATIENT
Start: 2021-12-27 | End: 2022-05-31

## 2021-12-30 RX ORDER — GABAPENTIN 600 MG/1
TABLET ORAL
Qty: 180 TABLET | Refills: 3 | Status: SHIPPED | OUTPATIENT
Start: 2021-12-30 | End: 2022-12-30

## 2021-12-30 NOTE — TELEPHONE ENCOUNTER
Medication:   Requested Prescriptions     Pending Prescriptions Disp Refills    gabapentin (NEURONTIN) 600 MG tablet [Pharmacy Med Name: Gabapentin 600 MG Oral Tablet] 180 tablet 3     Sig: TAKE 1 TABLET BY MOUTH  TWICE DAILY        Last Filled:      Patient Phone Number: [phone] (home)     Last appt: 12/2/2021   Next appt: 4/4/2022    Last OARRS: No flowsheet data found.

## 2022-01-11 DIAGNOSIS — E78.2 MIXED HYPERLIPIDEMIA: ICD-10-CM

## 2022-01-11 DIAGNOSIS — E11.9 TYPE 2 DIABETES MELLITUS WITHOUT COMPLICATION, WITHOUT LONG-TERM CURRENT USE OF INSULIN (HCC): ICD-10-CM

## 2022-01-12 RX ORDER — ATORVASTATIN CALCIUM 40 MG/1
40 TABLET, FILM COATED ORAL DAILY
Qty: 90 TABLET | Refills: 1 | Status: SHIPPED | OUTPATIENT
Start: 2022-01-12 | End: 2022-06-20

## 2022-01-31 DIAGNOSIS — G89.4 CHRONIC PAIN SYNDROME: ICD-10-CM

## 2022-01-31 RX ORDER — ACETAMINOPHEN AND CODEINE PHOSPHATE 300; 30 MG/1; MG/1
1 TABLET ORAL EVERY 4 HOURS PRN
Qty: 90 TABLET | Refills: 0 | Status: SHIPPED | OUTPATIENT
Start: 2022-01-31 | End: 2022-03-09 | Stop reason: SDUPTHER

## 2022-01-31 NOTE — TELEPHONE ENCOUNTER
acetaminophen-codeine (TYLENOL/CODEINE #3) 300-30 MG per tablet [[id number]]     Pt would like a refill sent to [facility] @ [phone] please be advise

## 2022-02-28 RX ORDER — AMLODIPINE BESYLATE 5 MG/1
5 TABLET ORAL DAILY
Qty: 90 TABLET | Refills: 1 | Status: SHIPPED | OUTPATIENT
Start: 2022-02-28 | End: 2022-11-04

## 2022-02-28 RX ORDER — CARBIDOPA/LEVODOPA 25MG-250MG
2 TABLET ORAL 4 TIMES DAILY
Qty: 720 TABLET | Refills: 1 | Status: SHIPPED | OUTPATIENT
Start: 2022-02-28

## 2022-02-28 RX ORDER — PANTOPRAZOLE SODIUM 40 MG/1
40 TABLET, DELAYED RELEASE ORAL DAILY
Qty: 90 TABLET | Refills: 1 | Status: SHIPPED | OUTPATIENT
Start: 2022-02-28

## 2022-03-02 RX ORDER — BUSPIRONE HYDROCHLORIDE 30 MG/1
30 TABLET ORAL 2 TIMES DAILY
Qty: 180 TABLET | Refills: 1 | Status: SHIPPED | OUTPATIENT
Start: 2022-03-02 | End: 2022-10-11 | Stop reason: SDUPTHER

## 2022-03-09 DIAGNOSIS — G89.4 CHRONIC PAIN SYNDROME: ICD-10-CM

## 2022-03-09 RX ORDER — ACETAMINOPHEN AND CODEINE PHOSPHATE 300; 30 MG/1; MG/1
1 TABLET ORAL EVERY 4 HOURS PRN
Qty: 90 TABLET | Refills: 0 | Status: SHIPPED | OUTPATIENT
Start: 2022-03-09 | End: 2022-04-04 | Stop reason: SDUPTHER

## 2022-03-09 NOTE — TELEPHONE ENCOUNTER
acetaminophen-codeine (TYLENOL/CODEINE #3) 300-30 MG per tablet     Pt called in for a refill to be  sent to [facility] @ [phone] please be advise

## 2022-03-18 RX ORDER — BUSPIRONE HYDROCHLORIDE 15 MG/1
TABLET ORAL
Qty: 180 TABLET | Refills: 1 | OUTPATIENT
Start: 2022-03-18

## 2022-03-28 RX ORDER — BUSPIRONE HYDROCHLORIDE 15 MG/1
TABLET ORAL
Qty: 180 TABLET | Refills: 1 | OUTPATIENT
Start: 2022-03-28

## 2022-03-29 RX ORDER — NAPROXEN 500 MG/1
500 TABLET ORAL 2 TIMES DAILY WITH MEALS
Qty: 180 TABLET | Refills: 1 | Status: SHIPPED | OUTPATIENT
Start: 2022-03-29 | End: 2022-06-13

## 2022-04-04 ENCOUNTER — OFFICE VISIT (OUTPATIENT)
Dept: PRIMARY CARE CLINIC | Age: 77
End: 2022-04-04
Payer: MEDICARE

## 2022-04-04 VITALS
WEIGHT: 166.2 LBS | DIASTOLIC BLOOD PRESSURE: 49 MMHG | OXYGEN SATURATION: 99 % | HEART RATE: 60 BPM | RESPIRATION RATE: 18 BRPM | TEMPERATURE: 96.6 F | SYSTOLIC BLOOD PRESSURE: 129 MMHG | BODY MASS INDEX: 34.14 KG/M2

## 2022-04-04 DIAGNOSIS — G47.33 OSA ON CPAP: ICD-10-CM

## 2022-04-04 DIAGNOSIS — E78.2 MIXED HYPERLIPIDEMIA: ICD-10-CM

## 2022-04-04 DIAGNOSIS — G89.4 CHRONIC PAIN SYNDROME: ICD-10-CM

## 2022-04-04 DIAGNOSIS — Z99.89 OSA ON CPAP: ICD-10-CM

## 2022-04-04 DIAGNOSIS — G89.29 CHRONIC HIP PAIN, BILATERAL: ICD-10-CM

## 2022-04-04 DIAGNOSIS — G25.81 RESTLESS LEGS SYNDROME (RLS): ICD-10-CM

## 2022-04-04 DIAGNOSIS — M25.551 CHRONIC HIP PAIN, BILATERAL: ICD-10-CM

## 2022-04-04 DIAGNOSIS — M25.552 CHRONIC HIP PAIN, BILATERAL: ICD-10-CM

## 2022-04-04 DIAGNOSIS — I10 ESSENTIAL HYPERTENSION: ICD-10-CM

## 2022-04-04 DIAGNOSIS — E11.9 TYPE 2 DIABETES MELLITUS WITHOUT COMPLICATION, WITHOUT LONG-TERM CURRENT USE OF INSULIN (HCC): ICD-10-CM

## 2022-04-04 LAB — HBA1C MFR BLD: 6 %

## 2022-04-04 PROCEDURE — 4040F PNEUMOC VAC/ADMIN/RCVD: CPT | Performed by: FAMILY MEDICINE

## 2022-04-04 PROCEDURE — 3044F HG A1C LEVEL LT 7.0%: CPT | Performed by: FAMILY MEDICINE

## 2022-04-04 PROCEDURE — 83036 HEMOGLOBIN GLYCOSYLATED A1C: CPT | Performed by: FAMILY MEDICINE

## 2022-04-04 PROCEDURE — 1123F ACP DISCUSS/DSCN MKR DOCD: CPT | Performed by: FAMILY MEDICINE

## 2022-04-04 PROCEDURE — 99214 OFFICE O/P EST MOD 30 MIN: CPT | Performed by: FAMILY MEDICINE

## 2022-04-04 PROCEDURE — 3017F COLORECTAL CA SCREEN DOC REV: CPT | Performed by: FAMILY MEDICINE

## 2022-04-04 PROCEDURE — 1090F PRES/ABSN URINE INCON ASSESS: CPT | Performed by: FAMILY MEDICINE

## 2022-04-04 PROCEDURE — 1036F TOBACCO NON-USER: CPT | Performed by: FAMILY MEDICINE

## 2022-04-04 PROCEDURE — G8427 DOCREV CUR MEDS BY ELIG CLIN: HCPCS | Performed by: FAMILY MEDICINE

## 2022-04-04 PROCEDURE — G8417 CALC BMI ABV UP PARAM F/U: HCPCS | Performed by: FAMILY MEDICINE

## 2022-04-04 PROCEDURE — 2022F DILAT RTA XM EVC RTNOPTHY: CPT | Performed by: FAMILY MEDICINE

## 2022-04-04 PROCEDURE — G8399 PT W/DXA RESULTS DOCUMENT: HCPCS | Performed by: FAMILY MEDICINE

## 2022-04-04 RX ORDER — ACETAMINOPHEN AND CODEINE PHOSPHATE 300; 30 MG/1; MG/1
1 TABLET ORAL 3 TIMES DAILY PRN
Qty: 90 TABLET | Refills: 0
Start: 2022-04-04 | End: 2022-04-27

## 2022-04-04 ASSESSMENT — ENCOUNTER SYMPTOMS
CONSTIPATION: 0
SHORTNESS OF BREATH: 0
BLOOD IN STOOL: 0
DIARRHEA: 0
ABDOMINAL PAIN: 0

## 2022-04-04 NOTE — PROGRESS NOTES
2022     Bethel Lilly (:  1945) is a 78 y.o. female, here for evaluation of the following medical concerns:    HPI  Patient presented to the office for follow-up She has a chronic pain syndrome due to degenerative disc disease of lumbar spine and bilateral hip pain. Also history of bilateral trochanteric bursitis. Patient complains bilateral hip pain despite  Tylenol 3 , Voltaren gel. and naproxen 500 mg twice daily. She has hypertension and takes amlodipine and Avapro. She has hyperlipidemia and reported to be compliant with a statin, takes Lipitor 40 mg daily and tolerating statin without side effect. She has a restless leg syndrome and takes Sinemet 4 times a day she has diabetes controlled with Metformin 1000 mg twice daily. She has anxiety and depression and takes Wellbutrin  mg twice daily and BuSpar 15 mg twice daily. Still is stressed out at home dealing with her daughter and grandson who lives with the patient. Review of Systems   Constitutional: Negative for activity change and appetite change. Eyes: Negative for visual disturbance. Respiratory: Negative for shortness of breath. Cardiovascular: Negative for chest pain and leg swelling. Gastrointestinal: Negative for abdominal pain, blood in stool, constipation and diarrhea. Genitourinary: Negative for difficulty urinating, frequency, hematuria, menstrual problem and urgency. Neurological: Negative for dizziness and syncope. Psychiatric/Behavioral: Negative for behavioral problems. Prior to Visit Medications    Medication Sig Taking? Authorizing Provider   diclofenac sodium (VOLTAREN) 1 % GEL Apply 2 g topically 4 times daily Yes Matt Castelan MD   naproxen (NAPROSYN) 500 MG tablet Take 1 tablet by mouth 2 times daily (with meals) Yes Matt Castelan MD   acetaminophen-codeine (TYLENOL/CODEINE #3) 300-30 MG per tablet Take 1 tablet by mouth every 4 hours as needed for Pain for up to 30 days.  Take lowest dose possible to manage pain Yes Nina Pierce MD   busPIRone (BUSPAR) 30 MG tablet Take 30 mg by mouth 2 times daily Yes Nina Pierce MD   carbidopa-levodopa (SINEMET)  MG per tablet Take 2 tablets by mouth 4 times daily Yes Nina Pierce MD   pantoprazole (PROTONIX) 40 MG tablet Take 1 tablet by mouth daily Yes Nina Pierce MD   amLODIPine (NORVASC) 5 MG tablet Take 1 tablet by mouth daily Yes Nina Pierce MD   metFORMIN (GLUCOPHAGE) 1000 MG tablet Take 1 tablet by mouth 2 times daily (with meals) Yes Nina Pierce MD   atorvastatin (LIPITOR) 40 MG tablet Take 1 tablet by mouth daily Yes Nina Pierce MD   gabapentin (NEURONTIN) 600 MG tablet TAKE 1 TABLET BY MOUTH  TWICE DAILY Yes Nina Pierce MD   buPROPion (WELLBUTRIN SR) 150 MG extended release tablet Take 1 tablet by mouth 2 times daily Yes Nina Pierce MD   irbesartan (AVAPRO) 150 MG tablet TAKE 1 TABLET BY MOUTH  DAILY Yes Nina Pierce MD   aspirin 81 MG tablet Take 81 mg by mouth daily. Yes Historical Provider, MD   ammonium lactate (LAC-HYDRIN) 12 % lotion Apply to feet bid  Nina Pierce MD   clotrimazole-betamethasone (LOTRISONE) 1-0.05 % cream Apply topically 2 times daily. Nina Pierce MD   Blood Glucose Monitoring Suppl (ONE TOUCH ULTRA 2) w/Device KIT 1 kit by Does not apply route daily  Nina Pierce MD   blood glucose monitor strips Test 1 times a day & as needed for symptoms of irregular blood glucose.  Valentino Null MD   Lancets MISC 1 each by Does not apply route daily One touch ultra  Nina Pierce MD   Cholecalciferol (VITAMIN D3) 5000 units CAPS Take 1 capsule by mouth daily  Patient not taking: Reported on 2022  Nina Pierce MD        Social History     Tobacco Use    Smoking status: Former Smoker     Packs/day: 2.00     Years: 36.00     Pack years: 72.00     Types: Cigarettes     Quit date: 1999     Years since quittin.9    Smokeless tobacco: Never Used Substance Use Topics    Alcohol use: No     Alcohol/week: 0.0 standard drinks        Vitals:    04/04/22 1121   BP: (!) 129/49   Pulse: 60   Resp: 18   Temp: 96.6 °F (35.9 °C)   TempSrc: Temporal   SpO2: 99%   Weight: 166 lb 3.2 oz (75.4 kg)     Estimated body mass index is 34.14 kg/m² as calculated from the following:    Height as of 12/2/21: 4' 10.5\" (1.486 m). Weight as of this encounter: 166 lb 3.2 oz (75.4 kg). Physical Exam  Constitutional:       General: She is not in acute distress. Appearance: She is well-developed. HENT:      Head: Normocephalic. Eyes:      Conjunctiva/sclera: Conjunctivae normal.   Neck:      Thyroid: No thyromegaly. Cardiovascular:      Rate and Rhythm: Normal rate and regular rhythm. Heart sounds: Normal heart sounds. No murmur heard. Pulmonary:      Effort: No respiratory distress. Breath sounds: Normal breath sounds. No wheezing or rales. Abdominal:      General: There is no distension. Palpations: Abdomen is soft. Musculoskeletal:         General: Normal range of motion. Cervical back: Neck supple. Skin:     General: Skin is warm. Findings: No rash. Neurological:      Mental Status: She is alert and oriented to person, place, and time. Psychiatric:         Behavior: Behavior normal.         ASSESSMENT/PLAN:  1. Essential hypertension  Controlled. Continue Avapro 150 mg daily and amlodipine 5 mg daily    2. Mixed hyperlipidemia  Controlled. Continue to milligrams daily. 3. Type 2 diabetes mellitus without complication, without long-term current use of insulin (HCC)  Controlled. 1000 mg twice daily  - POCT glycosylated hemoglobin (Hb A1C)    4. Chronic hip pain, bilateral  History of bilateral trochanteric bursitis. Continue Tylenol No. 3 3 times a day as needed and diclofenac gel apply to affected areas 4 times a day as needed.   Refer to orthopedic  - Lubna Borden MD, Orthopedic Surgery, [facility]    5. DOYLE on CPAP  Has regular use of CPAP. 6. Restless legs syndrome (RLS)  She takes Sinemet  mg 2 tablet 4 times a day      RTC in 3 mos.     An electronic signature was used to authenticate this note.    --Harry Guan MD on 4/4/2022 at 11:53 AM

## 2022-04-11 ENCOUNTER — OFFICE VISIT (OUTPATIENT)
Dept: ORTHOPEDIC SURGERY | Age: 77
End: 2022-04-11
Payer: MEDICARE

## 2022-04-11 VITALS — WEIGHT: 166 LBS | HEIGHT: 59 IN | RESPIRATION RATE: 16 BRPM | BODY MASS INDEX: 33.47 KG/M2

## 2022-04-11 DIAGNOSIS — M54.50 LOW BACK PAIN, UNSPECIFIED BACK PAIN LATERALITY, UNSPECIFIED CHRONICITY, UNSPECIFIED WHETHER SCIATICA PRESENT: ICD-10-CM

## 2022-04-11 DIAGNOSIS — M25.552 BILATERAL HIP PAIN: Primary | ICD-10-CM

## 2022-04-11 DIAGNOSIS — M25.551 BILATERAL HIP PAIN: Primary | ICD-10-CM

## 2022-04-11 PROCEDURE — 4040F PNEUMOC VAC/ADMIN/RCVD: CPT | Performed by: ORTHOPAEDIC SURGERY

## 2022-04-11 PROCEDURE — 3017F COLORECTAL CA SCREEN DOC REV: CPT | Performed by: ORTHOPAEDIC SURGERY

## 2022-04-11 PROCEDURE — G8399 PT W/DXA RESULTS DOCUMENT: HCPCS | Performed by: ORTHOPAEDIC SURGERY

## 2022-04-11 PROCEDURE — 1036F TOBACCO NON-USER: CPT | Performed by: ORTHOPAEDIC SURGERY

## 2022-04-11 PROCEDURE — 99203 OFFICE O/P NEW LOW 30 MIN: CPT | Performed by: ORTHOPAEDIC SURGERY

## 2022-04-11 PROCEDURE — 1090F PRES/ABSN URINE INCON ASSESS: CPT | Performed by: ORTHOPAEDIC SURGERY

## 2022-04-11 PROCEDURE — 1123F ACP DISCUSS/DSCN MKR DOCD: CPT | Performed by: ORTHOPAEDIC SURGERY

## 2022-04-11 PROCEDURE — G8427 DOCREV CUR MEDS BY ELIG CLIN: HCPCS | Performed by: ORTHOPAEDIC SURGERY

## 2022-04-11 PROCEDURE — G8417 CALC BMI ABV UP PARAM F/U: HCPCS | Performed by: ORTHOPAEDIC SURGERY

## 2022-04-11 NOTE — PROGRESS NOTES
[facility]  Office Visit    Chief Complaint: Bilateral hip pain    HPI:  Emma Garcia is a 78 y.o. who is here for initial evaluation of bilateral hip pain. She reports an intermittent history of bilateral hip pain for the past 2 years. There is no history of injury or surgery. She describes the pain is in her low back and across both buttocks. She denies lateral or anterior pain that goes down the front of her legs. Pain is worse at night. The pain will occasionally go down the back of her leg. There is no history of surgery to her hips or back. She has no treatment today. Patient Active Problem List   Diagnosis    DOYLE on CPAP    Vitamin D deficiency    Restless legs syndrome (RLS)    OAB (overactive bladder)    Essential hypertension    Mixed hyperlipidemia    Mild intermittent asthma without complication    Type 2 diabetes mellitus without complication, without long-term current use of insulin (HCC)    Bilateral hearing loss    Dysthymia       ROS:  Constitutional: denies fever, chills, weight loss  MSK: denies pain in other joints, muscle aches  Neurological: denies numbness, tingling, weakness    Exam:  Resp. rate 16, height 4' 10.5\" (1.486 m), weight 166 lb (75.3 kg), not currently breastfeeding. Appearance: sitting in exam room chair, appears to be in no acute distress, awake and alert  Resp: unlabored breathing on room air  Skin: warm, dry and intact with out erythema or significant increased temperature  Neuro: grossly intact both lower extremities. Intact sensation to light touch. Motor exam 4+ to 5/5 in all major motor groups. BLE: Examination demonstrates negative logroll and negative Stinchfield. There is brisk capillary refill. Strength is 5/5 in hamstrings, quads, hip flexors. Imaging:  AP pelvis, AP and frog-leg lateral views of bilateral hips were performed and interpreted today.   She has mild joint space narrowing of both hips with small periarticular osteophytes. She does have significant lumbar spine degenerative disc disease on an x-ray from May 2021. Assessment:  Lumbar spine degenerative disc disease    Plan:  We discussed the diagnosis and treatment options. Her symptoms are mostly coming from her low back and not from her hip joints. She does have significant degenerative disc disease. I recommended she see Dr. Verner Berry for further management and treatment of symptoms. She will follow up here as needed. Total time spent on today's encounter was at least 31 minutes. This time included reviewing prior notes, radiographs, and lab results when available, reviewing history obtained by medical assistant, performing history and physical exam, reviewing tests/radiographs with the patient, counseling the patient, ordering medications or tests, documentation in the electronic health record, and coordination of care. This dictation was done with Dragon dictation and may contain mechanical errors related to translation.

## 2022-04-21 RX ORDER — IRBESARTAN 150 MG/1
150 TABLET ORAL DAILY
Qty: 90 TABLET | Refills: 1 | Status: SHIPPED | OUTPATIENT
Start: 2022-04-21 | End: 2022-10-01

## 2022-04-26 ENCOUNTER — TELEPHONE (OUTPATIENT)
Dept: PRIMARY CARE CLINIC | Age: 77
End: 2022-04-26

## 2022-04-26 NOTE — TELEPHONE ENCOUNTER
Last written 4/4/22-  Only 22 days. Will forward to Dr Karly Madrigal at the end of this week. Please, do NOT forward this to the doctor or delete the message.

## 2022-04-27 DIAGNOSIS — G89.4 CHRONIC PAIN SYNDROME: ICD-10-CM

## 2022-04-28 ENCOUNTER — TELEPHONE (OUTPATIENT)
Dept: PRIMARY CARE CLINIC | Age: 77
End: 2022-04-28

## 2022-04-28 RX ORDER — ACETAMINOPHEN AND CODEINE PHOSPHATE 300; 30 MG/1; MG/1
1 TABLET ORAL EVERY 8 HOURS PRN
Qty: 90 TABLET | Refills: 0 | Status: SHIPPED | OUTPATIENT
Start: 2022-04-28 | End: 2022-06-09 | Stop reason: SDUPTHER

## 2022-04-28 NOTE — TELEPHONE ENCOUNTER
Pt called stating that she has been without her Rx tylenol #3 for 3 days. Stated that [employer] told her that they didn't receive request on 4/4  Stated that she spoke with someone in the office on 4/27.  Message has been forward waiting on reply from Dr Avie Goodell

## 2022-05-10 NOTE — PROGRESS NOTES
[facility]  PRE-PROCEDURE INSTRUCTIONS    Procedure date__05/13/2022_______  Arrival time__0915__________          Procedure time___1015_________       It is not necessary to stop eating or drinking prior to this procedure. We would like you to take your medications for blood pressure as usual.  You may be asked to stop blood thinners such as Coumadin, Plavix, Fragmin, Lovenox, etc., or any anti-inflammatories such as:  Aspirin, Ibuprofen, Advil, Naproxen prior to your procedure. We also ask that you stop any OTC medications that cause additional bleeding    You must make arrangements for a responsible adult to arrive with you and stay in our waiting area during your procedure. They will also need to take you home after your procedure. For your safety you will not be allowed to leave alone or drive yourself home. Also for your safety, it is strongly suggested that someone stay with you the first 24 hours after your procedure. For your comfort, please wear simple loose fitting clothing to the center. Please do not bring valuables. If you have a living will and a durable power of  for healthcare, please bring in a copy.     You will need to bring a photo ID and insurance card    Our goal is to provide you with excellent care so if you have any questions, please contact us at the [facility] at [phone]         Please note these are generalized instructions for all EGD cases, you may be provided with more specific instructions if necessary

## 2022-05-13 ENCOUNTER — HOSPITAL ENCOUNTER (OUTPATIENT)
Age: 77
Setting detail: OUTPATIENT SURGERY
Discharge: HOME | End: 2022-05-13
Attending: ANESTHESIOLOGY | Admitting: ANESTHESIOLOGY
Payer: MEDICARE

## 2022-05-13 ENCOUNTER — APPOINTMENT (OUTPATIENT)
Dept: INTERVENTIONAL RADIOLOGY/VASCULAR | Age: 77
End: 2022-05-13
Attending: ANESTHESIOLOGY
Payer: MEDICARE

## 2022-05-13 VITALS
DIASTOLIC BLOOD PRESSURE: 46 MMHG | HEIGHT: 59 IN | TEMPERATURE: 97.1 F | OXYGEN SATURATION: 98 % | WEIGHT: 169 LBS | HEART RATE: 68 BPM | RESPIRATION RATE: 18 BRPM | SYSTOLIC BLOOD PRESSURE: 116 MMHG | BODY MASS INDEX: 34.07 KG/M2

## 2022-05-13 PROCEDURE — 2709999900 HC NON-CHARGEABLE SUPPLY: Performed by: ANESTHESIOLOGY

## 2022-05-13 PROCEDURE — 6360000002 HC RX W HCPCS: Performed by: ANESTHESIOLOGY

## 2022-05-13 PROCEDURE — 3610000054 HC PAIN LEVEL 3 BASE (NON-OR): Performed by: ANESTHESIOLOGY

## 2022-05-13 PROCEDURE — 6360000004 HC RX CONTRAST MEDICATION: Performed by: ANESTHESIOLOGY

## 2022-05-13 RX ORDER — METHYLPREDNISOLONE ACETATE 80 MG/ML
INJECTION, SUSPENSION INTRA-ARTICULAR; INTRALESIONAL; INTRAMUSCULAR; SOFT TISSUE
Status: COMPLETED | OUTPATIENT
Start: 2022-05-13 | End: 2022-05-13

## 2022-05-13 ASSESSMENT — PAIN DESCRIPTION - DESCRIPTORS: DESCRIPTORS: SHARP;SHOOTING

## 2022-05-13 ASSESSMENT — PAIN SCALES - GENERAL
PAINLEVEL_OUTOF10: 0

## 2022-05-13 ASSESSMENT — PAIN - FUNCTIONAL ASSESSMENT
PAIN_FUNCTIONAL_ASSESSMENT: 0-10
PAIN_FUNCTIONAL_ASSESSMENT: PREVENTS OR INTERFERES WITH ALL ACTIVE AND SOME PASSIVE ACTIVITIES

## 2022-05-13 NOTE — H&P
Patient:  Shantel Harris  YOB: 1945  Medical Record #:  [id number]   Place: [address] [place] [address]  Date:  5/13/2022   Physician:  Rosa Hines MD    History Obtained From: electronic medical record    HISTORY OF PRESENT ILLNESS    Past Medical History:        Diagnosis Date    Asthma     Benign tumor lacrimal gland     Greater trochanteric bursitis of left hip     Hyperlipidemia     Hypertension     Pseudophakia     RLS (restless legs syndrome)     Type II or unspecified type diabetes mellitus without mention of complication, not stated as uncontrolled     Unspecified sleep apnea     Uses CPAP    Vitamin D deficiency      Past Surgical History:        Procedure Laterality Date    BREAST SURGERY      CHOLECYSTECTOMY  [id number]    [place] INJECTION PROCEDURE FOR SACROILIAC JOINT Right 10/2/2019    RIGHT SACROILIAC JOINT INJECTION WITH FLUOROSCOPY performed by Brandi Wilson MD at [address] [place] [address] OTHER SURGICAL HISTORY      fatty tumors on arms excised    TONSILLECTOMY AND ADENOIDECTOMY       Medications Prior to Admission:   No current facility-administered medications on file prior to encounter. Current Outpatient Medications on File Prior to Encounter   Medication Sig Dispense Refill    acetaminophen-codeine (TYLENOL #3) 300-30 MG per tablet Take 1 tablet by mouth every 8 hours as needed for Pain for up to 30 days.  90 tablet 0    irbesartan (AVAPRO) 150 MG tablet Take 1 tablet by mouth daily 90 tablet 1    diclofenac sodium (VOLTAREN) 1 % GEL Apply 2 g topically 4 times daily 60 g 1    naproxen (NAPROSYN) 500 MG tablet Take 1 tablet by mouth 2 times daily (with meals) 180 tablet 1    busPIRone (BUSPAR) 30 MG tablet Take 30 mg by mouth 2 times daily 180 tablet 1    carbidopa-levodopa (SINEMET)  MG per tablet Take 2 tablets by mouth 4 times daily 720 tablet 1    pantoprazole (PROTONIX) 40 MG tablet Take 1 tablet by mouth daily 90 tablet 1    amLODIPine (NORVASC) 5 MG tablet Take 1 tablet by mouth daily 90 tablet 1    metFORMIN (GLUCOPHAGE) 1000 MG tablet Take 1 tablet by mouth 2 times daily (with meals) 180 tablet 1    atorvastatin (LIPITOR) 40 MG tablet Take 1 tablet by mouth daily 90 tablet 1    gabapentin (NEURONTIN) 600 MG tablet TAKE 1 TABLET BY MOUTH  TWICE DAILY 180 tablet 3    buPROPion (WELLBUTRIN SR) 150 MG extended release tablet Take 1 tablet by mouth 2 times daily 180 tablet 1    Blood Glucose Monitoring Suppl (ONE TOUCH ULTRA 2) w/Device KIT 1 kit by Does not apply route daily 1 kit 0    blood glucose monitor strips Test 1 times a day & as needed for symptoms of irregular blood glucose. . 100 strip 6    Lancets MISC 1 each by Does not apply route daily One touch ultra 100 each 6    Cholecalciferol (VITAMIN D3) 5000 units CAPS Take 1 capsule by mouth daily 90 capsule 3    aspirin 81 MG tablet Take 81 mg by mouth daily.        Allergies:  Meloxicam, Quinine derivatives, Codeine, Quinine, and Vicodin [hydrocodone-acetaminophen]  Social History     Socioeconomic History    Marital status:      Spouse name: Not on file    Number of children: Not on file    Years of education: Not on file    Highest education level: Not on file   Occupational History    Not on file   Tobacco Use    Smoking status: Former Smoker     Packs/day: 2.00     Years: 36.00     Pack years: 72.00     Types: Cigarettes     Quit date: 1999     Years since quittin.0    Smokeless tobacco: Never Used   Vaping Use    Vaping Use: Never used   Substance and Sexual Activity    Alcohol use: No     Alcohol/week: 0.0 standard drinks    Drug use: No    Sexual activity: Not on file   Other Topics Concern    Not on file   Social History Narrative    Not on file     Social Determinants of Health     Financial Resource Strain: Low Risk     Difficulty of Paying Living Expenses: Not very hard   Food Insecurity: No Food Insecurity  Worried About Running Out of Food in the Last Year: Never true    Sandra of Food in the Last Year: Never true   Transportation Needs:     Lack of Transportation (Medical): Not on file    Lack of Transportation (Non-Medical): Not on file   Physical Activity:     Days of Exercise per Week: Not on file    Minutes of Exercise per Session: Not on file   Stress:     Feeling of Stress : Not on file   Social Connections:     Frequency of Communication with Friends and Family: Not on file    Frequency of Social Gatherings with Friends and Family: Not on file    Attends Mormonism Services: Not on file    Active Member of [address] [place] [address] [employer] or Organizations: Not on file    Attends Club or Organization Meetings: Not on file    Marital Status: Not on file   Intimate Partner Violence:     Fear of Current or Ex-Partner: Not on file    Emotionally Abused: Not on file    Physically Abused: Not on file    Sexually Abused: Not on file   Housing Stability:     Unable to Pay for Housing in the Last Year: Not on file    Number of [place] in the Last Year: Not on file    Unstable Housing in the Last Year: Not on file     Family History   Problem Relation Age of Onset    Cancer Mother         multiple myeloma    Heart Failure Mother     Hypertension Mother     Cancer Father         head and neck    Heart Failure Father     Hypertension Father     Heart Failure Sister     Hypertension Sister     Heart Failure Brother     Hypertension Brother          PHYSICAL EXAM:      BP (!) 132/58   Pulse 71   Temp 96.9 °F (36.1 °C) (Temporal)   Resp 16   Ht 4' 10.5\" (1.486 m)   Wt 169 lb (76.7 kg)   SpO2 99%   BMI 34.72 kg/m²  I            ASSESSMENT AND PLAN:    1. Procedure. options, risks and benefits reviewed with patient and expresses understanding.

## 2022-05-13 NOTE — OP NOTE
Patient:  Claudetta Mulligan  YOB: 1945  Medical Record #:  [id number]   Place: [address] [facility]  Date:  5/13/2022   Physician:  Rita Garner MD      PRE-PROCEDURE DIAGNOSIS: M54.16    POST-PROCEDURE DIAGNOSIS: M54.16    PROCEDURE:  Midline interlaminar right  L3-4 epidural steroid injection with fluoroscopy and epidurography. BRIEF HISTORY:  The patient presents today to [facility] for a scheduled lumbar epidural steroid injection procedure. The patient was re-evaluated today and is clinically unchanged as compared to my previous evaluation. The patient is clinically stable to proceed with the procedure. PROCEDURE NOTE:  The procedure was again explained to the patient and the previously distributed pre-procedure literature was reviewed. The options, rationale, and benefits of the procedure including pain relief, functional improvement, and increased mobility, as well as the risks of the procedure including but not limited to infection, bleeding, paresthesia, pain, failure to relieve pain, increased pain, headache, allergic reaction, neurologic impairment, local anesthetic, toxicity, and side effects and the potential side effects of corticosteroids were discussed with the patient and informed written consent was obtained from the patient. The patient was positioned in the prone position on the fluoroscopy table. The skin overlying the lumbosacral vertebrae was prepped using Chloraprep and draped in the usual sterile fashion. The L3-4 lumbar intervertebral level was identified using intermittent AP fluoroscopy. The previously identified projection of overlying skin was anesthetized using 2 cc of buffered 1% lidocaine with a 27 gauge needle. A 4.25\" 22g Touhy needle was advanced through a small skin nick in the AP view towards the interlaminar and epidural space. The epidural space was easily identified using loss of resistance to saline.   No difficulty, paresthesia or occurrence of pain was encountered. Careful aspiration was negative for CSF and blood. A total of 1 cc Isovue 300 was injected yielding an epidurogram.    FLUOROSCOPY:  A fluoroscopy unit was utilized to obtain fluoroscopic images for intra-procedural use and assistance. Fluoroscopy was utilized to identify anatomic and radiographic landmarks for the accompanying procedure guidance and not for diagnostic purposes. After negative aspiration 4 cc of therapeutic injectate containing 1 cc of Depo-Medrol 80 mg/cc and 3 ml of lidocaine 1% was injected slowly in aliquots while clinically observing and monitoring the patient with negative aspiration demonstrated between aliquots of injections. At this time no paresthesia or occurrence of pain was present. The needle was then removed, the area was cleansed and a Band-Aid was placed over the injection site. There were no complications. The patient tolerated the procedure well. The procedure was performed using local anesthesia. The patient was transferred by wheelchair with accompaniment to the  Recovery Area and was monitored per protocol. The vital signs remained stable. The patient was discharged in stable condition accompanied by an escort with written instructions after fulfilling the standard discharge criteria. Written follow up instructions were given to the patient.     Estimated Blood Loss: 0ml    Plan:  Follow up in 6-8 weeks      Office: [phone]

## 2022-05-24 NOTE — PROGRESS NOTES
[facility] ENDOSCOPY AND OUTPATIENT  PRE-PROCEDURE INSTRUCTIONS    Procedure date___5/27/22______Arrival time____800________        Procedure time_____900_______       It is not necessary to stop eating or drinking prior to this procedure. We would like you to take your medications for blood pressure as usual.  You may be asked to stop blood thinners such as Coumadin, Plavix, Fragmin, Lovenox, etc., or any anti-inflammatories such as:  Aspirin, Ibuprofen, Advil, Naproxen prior to your procedure. We also ask that you stop any OTC medications that cause additional bleeding    You must make arrangements for a responsible adult to arrive with you and stay in our waiting area during your procedure. They will also need to take you home after your procedure. For your safety you will not be allowed to leave alone or drive yourself home. Also for your safety, it is strongly suggested that someone stay with you the first 24 hours after your procedure. For your comfort, please wear simple loose fitting clothing to the center. Please do not bring valuables. If you have a living will and a durable power of  for healthcare, please bring in a copy.     You will need to bring a photo ID and insurance card    Our goal is to provide you with excellent care so if you have any questions, please contact us at the [facility] at [phone]         Please note these are generalized instructions for all EGD cases, you may be provided with more specific instructions if necessary

## 2022-05-27 ENCOUNTER — APPOINTMENT (OUTPATIENT)
Dept: INTERVENTIONAL RADIOLOGY/VASCULAR | Age: 77
End: 2022-05-27
Attending: ANESTHESIOLOGY
Payer: MEDICARE

## 2022-05-27 ENCOUNTER — HOSPITAL ENCOUNTER (OUTPATIENT)
Age: 77
Setting detail: OUTPATIENT SURGERY
Discharge: HOME | End: 2022-05-27
Attending: ANESTHESIOLOGY | Admitting: ANESTHESIOLOGY
Payer: MEDICARE

## 2022-05-27 VITALS
DIASTOLIC BLOOD PRESSURE: 54 MMHG | HEIGHT: 59 IN | TEMPERATURE: 96.2 F | OXYGEN SATURATION: 96 % | WEIGHT: 170 LBS | RESPIRATION RATE: 18 BRPM | HEART RATE: 67 BPM | SYSTOLIC BLOOD PRESSURE: 119 MMHG | BODY MASS INDEX: 34.27 KG/M2

## 2022-05-27 PROCEDURE — 6360000002 HC RX W HCPCS: Performed by: ANESTHESIOLOGY

## 2022-05-27 PROCEDURE — 3610000056 HC PAIN LEVEL 4 BASE (NON-OR): Performed by: ANESTHESIOLOGY

## 2022-05-27 PROCEDURE — 2500000003 HC RX 250 WO HCPCS: Performed by: ANESTHESIOLOGY

## 2022-05-27 PROCEDURE — 2709999900 HC NON-CHARGEABLE SUPPLY: Performed by: ANESTHESIOLOGY

## 2022-05-27 RX ORDER — LIDOCAINE HYDROCHLORIDE 10 MG/ML
INJECTION, SOLUTION EPIDURAL; INFILTRATION; INTRACAUDAL; PERINEURAL
Status: COMPLETED | OUTPATIENT
Start: 2022-05-27 | End: 2022-05-27

## 2022-05-27 RX ORDER — BUPIVACAINE HYDROCHLORIDE 5 MG/ML
INJECTION, SOLUTION EPIDURAL; INTRACAUDAL
Status: COMPLETED | OUTPATIENT
Start: 2022-05-27 | End: 2022-05-27

## 2022-05-27 RX ORDER — METHYLPREDNISOLONE ACETATE 80 MG/ML
INJECTION, SUSPENSION INTRA-ARTICULAR; INTRALESIONAL; INTRAMUSCULAR; SOFT TISSUE
Status: COMPLETED | OUTPATIENT
Start: 2022-05-27 | End: 2022-05-27

## 2022-05-27 ASSESSMENT — PAIN DESCRIPTION - FREQUENCY
FREQUENCY: INTERMITTENT
FREQUENCY: INTERMITTENT

## 2022-05-27 ASSESSMENT — PAIN DESCRIPTION - DIRECTION
RADIATING_TOWARDS: DOWN LEGS
RADIATING_TOWARDS: DOWN LEGS

## 2022-05-27 ASSESSMENT — PAIN - FUNCTIONAL ASSESSMENT
PAIN_FUNCTIONAL_ASSESSMENT: 0-10
PAIN_FUNCTIONAL_ASSESSMENT: PREVENTS OR INTERFERES SOME ACTIVE ACTIVITIES AND ADLS

## 2022-05-27 ASSESSMENT — PAIN DESCRIPTION - DESCRIPTORS
DESCRIPTORS: THROBBING

## 2022-05-27 ASSESSMENT — PAIN DESCRIPTION - ORIENTATION
ORIENTATION: LOWER
ORIENTATION: LOWER

## 2022-05-27 ASSESSMENT — PAIN DESCRIPTION - LOCATION
LOCATION: BACK
LOCATION: BACK

## 2022-05-27 ASSESSMENT — PAIN DESCRIPTION - PAIN TYPE
TYPE: CHRONIC PAIN
TYPE: CHRONIC PAIN

## 2022-05-27 ASSESSMENT — PAIN SCALES - GENERAL
PAINLEVEL_OUTOF10: 5
PAINLEVEL_OUTOF10: 5

## 2022-05-27 NOTE — OP NOTE
Patient:  Claude Verdugo  YOB: 1945  Medical Record #:  [id number]   Place: [address] [facility]  Date:  5/27/2022   Physician:  MD MICHAELA [facility]cc[facility] JOINT [facility]JECTION    PRE-PROCEDURE DIAGNOSIS: bilateral sacroiliac joint generated pain (M53.3)    POST-PROCEDURE DIAGNOSIS:  bilateral sacroiliac joint generated pain (M53.3)    PROCEDURE:  bilateral sacroiliac joint injection with fluoroscopy. BRIEF HISTORY:  The patient returns today to the [facility] for scheduled SI joint injection procedure. The patient is clinically unchanged from my previous evaluation. The procedure was explained to the patient, and the previously distributed pre-procedure literature was reviewed. The options, rationale and benefits of the procedure, including functional improvement, pain relief, and increased mobility, as well as the risks of the procedure including but not limited to infection, bleeding, paresthesia, pain, failure to relieve pain, increased pain, headache, allergic reaction and neurologic impairment were discussed with the patient. Risks of corticosteroids were discussed with the patient and informed written consent was obtained from the patient. PROCEDURE NOTE:  The patient was positioned prone on the fluoroscopy table. The bilateral sacroiliac joint was identified using AP fluoroscopy. The skin overlying thesacroiliac joint was widely prepped with chloraprep and draped in the usual sterile fashion. A slight oblique and inferioroblique projection was utilized to superimpose both the posterior and anterior lucency of the sacroiliac joint. A 3.5  inch 22 gauge spinal needle was advanced in the AP view towards the caudal medial aspect of the sacroiliac joint. This was done under fluoroscopic guidance. Entry into the joint capsule was felt as well as verified via fluoroscopy in multiple views.   Careful aspiration was negative for CSF and blood prior to that injection and afterwards. A total of 3 cc of injectate was injected. The injectate contained 1 cc of depomedrol 40 mg per cc and 2 cc of Bupivacaine 0.5%. The injectate was injected in small increments while monitoring the patient. The patient tolerated the procedure well. There were no complications. The patient complained of no paresthesia during the injection. The needle was removed, the area was cleansed, and a Band-Aid was placed over the injection site. There were no complications. The patient tolerated the procedure well. The procedure was performed using local anesthesia. The patient was transferred with accompaniment to the ecovery area where the vital signs remained stable. The patient was discharged accompanied with an escort with written instructions after fulfilling standard discharge criteria.   Follow-up instructions were given to the patient and are as follows:      Estimated Blood Loss: 0ml      Plan:  Follow up in 4-6 weeks    Office: [phone]

## 2022-05-27 NOTE — H&P
Patient:  Shantel Harris  YOB: 1945  Medical Record #:  [id number]   Place: [address] [place] [address] Av  Date:  5/27/2022   Physician:  Rsoa Hines MD    History Obtained From: electronic medical record    HISTORY OF PRESENT ILLNESS    Past Medical History:        Diagnosis Date    Asthma     Benign tumor lacrimal gland     Greater trochanteric bursitis of left hip     Hyperlipidemia     Hypertension     Pseudophakia     RLS (restless legs syndrome)     Type II or unspecified type diabetes mellitus without mention of complication, not stated as uncontrolled     Unspecified sleep apnea     Uses CPAP    Vitamin D deficiency      Past Surgical History:        Procedure Laterality Date    BREAST SURGERY      CHOLECYSTECTOMY  [id number]    [facility] INJECTION PROCEDURE FOR SACROILIAC JOINT Right 10/2/2019    RIGHT SACROILIAC JOINT INJECTION WITH FLUOROSCOPY performed by Brandi Wilson MD at [address] [place] [address] OTHER SURGICAL HISTORY      fatty tumors on arms excised    PAIN MANAGEMENT PROCEDURE Right 5/13/2022    LUMBAR EPIDURAL STEROID INJECTION - RIGHT L3-4 performed by Kenneth García MD at [address]       Medications Prior to Admission:   No current facility-administered medications on file prior to encounter. Current Outpatient Medications on File Prior to Encounter   Medication Sig Dispense Refill    acetaminophen-codeine (TYLENOL #3) 300-30 MG per tablet Take 1 tablet by mouth every 8 hours as needed for Pain for up to 30 days.  90 tablet 0    irbesartan (AVAPRO) 150 MG tablet Take 1 tablet by mouth daily 90 tablet 1    diclofenac sodium (VOLTAREN) 1 % GEL Apply 2 g topically 4 times daily 60 g 1    naproxen (NAPROSYN) 500 MG tablet Take 1 tablet by mouth 2 times daily (with meals) 180 tablet 1    busPIRone (BUSPAR) 30 MG tablet Take 30 mg by mouth 2 times daily 180 tablet 1    carbidopa-levodopa (SINEMET)  MG per tablet Take 2 tablets by mouth 4 times daily 720 tablet 1    pantoprazole (PROTONIX) 40 MG tablet Take 1 tablet by mouth daily 90 tablet 1    amLODIPine (NORVASC) 5 MG tablet Take 1 tablet by mouth daily 90 tablet 1    metFORMIN (GLUCOPHAGE) 1000 MG tablet Take 1 tablet by mouth 2 times daily (with meals) 180 tablet 1    atorvastatin (LIPITOR) 40 MG tablet Take 1 tablet by mouth daily 90 tablet 1    gabapentin (NEURONTIN) 600 MG tablet TAKE 1 TABLET BY MOUTH  TWICE DAILY 180 tablet 3    buPROPion (WELLBUTRIN SR) 150 MG extended release tablet Take 1 tablet by mouth 2 times daily 180 tablet 1    Blood Glucose Monitoring Suppl (ONE TOUCH ULTRA 2) w/Device KIT 1 kit by Does not apply route daily 1 kit 0    blood glucose monitor strips Test 1 times a day & as needed for symptoms of irregular blood glucose. . 100 strip 6    Lancets MISC 1 each by Does not apply route daily One touch ultra 100 each 6    Cholecalciferol (VITAMIN D3) 5000 units CAPS Take 1 capsule by mouth daily 90 capsule 3    aspirin 81 MG tablet Take 81 mg by mouth daily.        Allergies:  Meloxicam, Quinine derivatives, Codeine, Quinine, and Vicodin [hydrocodone-acetaminophen]  Social History     Socioeconomic History    Marital status:      Spouse name: Not on file    Number of children: Not on file    Years of education: Not on file    Highest education level: Not on file   Occupational History    Not on file   Tobacco Use    Smoking status: Former Smoker     Packs/day: 2.00     Years: 36.00     Pack years: 72.00     Types: Cigarettes     Quit date: 1999     Years since quittin.1    Smokeless tobacco: Never Used   Vaping Use    Vaping Use: Never used   Substance and Sexual Activity    Alcohol use: No     Alcohol/week: 0.0 standard drinks    Drug use: No    Sexual activity: Not on file   Other Topics Concern    Not on file   Social History Narrative    Not on file Social Determinants of Health     Financial Resource Strain: Low Risk     Difficulty of Paying Living Expenses: Not very hard   Food Insecurity: No Food Insecurity    Worried About Running Out of Food in the Last Year: Never true    Sandra of Food in the Last Year: Never true   Transportation Needs:     Lack of Transportation (Medical): Not on file    Lack of Transportation (Non-Medical): Not on file   Physical Activity:     Days of Exercise per Week: Not on file    Minutes of Exercise per Session: Not on file   Stress:     Feeling of Stress : Not on file   Social Connections:     Frequency of Communication with Friends and Family: Not on file    Frequency of Social Gatherings with Friends and Family: Not on file    Attends Jain Services: Not on file    Active Member of [address] [place] [address] [employer] or Organizations: Not on file    Attends Club or Organization Meetings: Not on file    Marital Status: Not on file   Intimate Partner Violence:     Fear of Current or Ex-Partner: Not on file    Emotionally Abused: Not on file    Physically Abused: Not on file    Sexually Abused: Not on file   Housing Stability:     Unable to Pay for Housing in the Last Year: Not on file    Number of [place] in the Last Year: Not on file    Unstable Housing in the Last Year: Not on file     Family History   Problem Relation Age of Onset    Cancer Mother         multiple myeloma    Heart Failure Mother     Hypertension Mother     Cancer Father         head and neck    Heart Failure Father     Hypertension Father     Heart Failure Sister     Hypertension Sister     Heart Failure Brother     Hypertension Brother          PHYSICAL EXAM:      Ht 4' 10.5\" (1.486 m)   Wt 167 lb (75.8 kg)   BMI 34.31 kg/m²  I            ASSESSMENT AND PLAN:    1. Procedure. options, risks and benefits reviewed with patient and expresses understanding.

## 2022-05-31 RX ORDER — BUPROPION HYDROCHLORIDE 150 MG/1
150 TABLET, EXTENDED RELEASE ORAL 2 TIMES DAILY
Qty: 180 TABLET | Refills: 1 | Status: SHIPPED | OUTPATIENT
Start: 2022-05-31 | End: 2022-06-17

## 2022-06-07 DIAGNOSIS — G89.4 CHRONIC PAIN SYNDROME: ICD-10-CM

## 2022-06-09 RX ORDER — ACETAMINOPHEN AND CODEINE PHOSPHATE 300; 30 MG/1; MG/1
1 TABLET ORAL EVERY 8 HOURS PRN
Qty: 90 TABLET | Refills: 0 | Status: SHIPPED | OUTPATIENT
Start: 2022-06-09 | End: 2022-07-20

## 2022-06-13 RX ORDER — NAPROXEN 500 MG/1
500 TABLET ORAL 2 TIMES DAILY WITH MEALS
Qty: 180 TABLET | Refills: 1 | Status: SHIPPED | OUTPATIENT
Start: 2022-06-13 | End: 2022-09-16

## 2022-06-16 ENCOUNTER — NURSE TRIAGE (OUTPATIENT)
Dept: OTHER | Facility: CLINIC | Age: 77
End: 2022-06-16

## 2022-06-16 NOTE — TELEPHONE ENCOUNTER
Received call from [facility] with Red Flag Complaint. Subjective: Caller states \"sob and on/off cp\"     Current Symptoms: sob and cp generalized all over her chest  on/off and hands not working correctly, when has cp it radiates to both arms last 15-20 min none currently has sob now even at rest no nausea no wheezing hx htn, diabetes has some leg swelling     Onset: 1 month     Associated Symptoms: NA    Pain Severity: none now    Temperature:  none    What has been tried: nothing    LMP: NA Pregnant: NA    Recommended disposition: See PCP within 3 Days    Care advice provided, patient verbalizes understanding; denies any other questions or concerns; instructed to call back for any new or worsening symptoms. Patient/Caller agrees with recommended disposition; writer provided warm transfer to [facility] for appointment scheduling     Attention Provider: Thank you for allowing me to participate in the care of your patient. The patient was connected to triage in response to information provided to the ECC/PSC. Please do not respond through this encounter as the response is not directed to a shared pool.     Reason for Disposition   MODERATE longstanding difficulty breathing (e.g., speaks in phrases, SOB even at rest, pulse 100-120) and SAME as normal    Protocols used: BREATHING DIFFICULTY-ADULT-[place]

## 2022-06-17 ENCOUNTER — OFFICE VISIT (OUTPATIENT)
Dept: PRIMARY CARE CLINIC | Age: 77
End: 2022-06-17
Payer: MEDICARE

## 2022-06-17 VITALS
SYSTOLIC BLOOD PRESSURE: 133 MMHG | DIASTOLIC BLOOD PRESSURE: 58 MMHG | BODY MASS INDEX: 35.01 KG/M2 | WEIGHT: 170.4 LBS | TEMPERATURE: 97.2 F | HEART RATE: 72 BPM

## 2022-06-17 DIAGNOSIS — F41.8 MIXED ANXIETY AND DEPRESSIVE DISORDER: ICD-10-CM

## 2022-06-17 DIAGNOSIS — Z13.31 POSITIVE DEPRESSION SCREENING: ICD-10-CM

## 2022-06-17 DIAGNOSIS — I10 ESSENTIAL HYPERTENSION: ICD-10-CM

## 2022-06-17 DIAGNOSIS — G47.33 OSA ON CPAP: ICD-10-CM

## 2022-06-17 DIAGNOSIS — G25.81 RESTLESS LEGS SYNDROME (RLS): ICD-10-CM

## 2022-06-17 DIAGNOSIS — Z99.89 OSA ON CPAP: ICD-10-CM

## 2022-06-17 DIAGNOSIS — G31.84 MILD COGNITIVE IMPAIRMENT: ICD-10-CM

## 2022-06-17 PROCEDURE — G8417 CALC BMI ABV UP PARAM F/U: HCPCS | Performed by: FAMILY MEDICINE

## 2022-06-17 PROCEDURE — 1090F PRES/ABSN URINE INCON ASSESS: CPT | Performed by: FAMILY MEDICINE

## 2022-06-17 PROCEDURE — G8427 DOCREV CUR MEDS BY ELIG CLIN: HCPCS | Performed by: FAMILY MEDICINE

## 2022-06-17 PROCEDURE — 1123F ACP DISCUSS/DSCN MKR DOCD: CPT | Performed by: FAMILY MEDICINE

## 2022-06-17 PROCEDURE — 99214 OFFICE O/P EST MOD 30 MIN: CPT | Performed by: FAMILY MEDICINE

## 2022-06-17 PROCEDURE — G8399 PT W/DXA RESULTS DOCUMENT: HCPCS | Performed by: FAMILY MEDICINE

## 2022-06-17 PROCEDURE — 3017F COLORECTAL CA SCREEN DOC REV: CPT | Performed by: FAMILY MEDICINE

## 2022-06-17 PROCEDURE — 1036F TOBACCO NON-USER: CPT | Performed by: FAMILY MEDICINE

## 2022-06-17 RX ORDER — DULOXETIN HYDROCHLORIDE 30 MG/1
30 CAPSULE, DELAYED RELEASE ORAL DAILY
Qty: 30 CAPSULE | Refills: 0 | Status: SHIPPED | OUTPATIENT
Start: 2022-06-17 | End: 2022-06-17

## 2022-06-17 RX ORDER — DULOXETIN HYDROCHLORIDE 30 MG/1
30 CAPSULE, DELAYED RELEASE ORAL DAILY
Qty: 30 CAPSULE | Refills: 1 | Status: SHIPPED | OUTPATIENT
Start: 2022-06-17 | End: 2022-07-15 | Stop reason: SDUPTHER

## 2022-06-17 ASSESSMENT — PATIENT HEALTH QUESTIONNAIRE - PHQ9
SUM OF ALL RESPONSES TO PHQ QUESTIONS 1-9: 12
5. POOR APPETITE OR OVEREATING: 1
9. THOUGHTS THAT YOU WOULD BE BETTER OFF DEAD, OR OF HURTING YOURSELF: 0
SUM OF ALL RESPONSES TO PHQ QUESTIONS 1-9: 12
2. FEELING DOWN, DEPRESSED OR HOPELESS: 1
10. IF YOU CHECKED OFF ANY PROBLEMS, HOW DIFFICULT HAVE THESE PROBLEMS MADE IT FOR YOU TO DO YOUR WORK, TAKE CARE OF THINGS AT HOME, OR GET ALONG WITH OTHER PEOPLE: 1
8. MOVING OR SPEAKING SO SLOWLY THAT OTHER PEOPLE COULD HAVE NOTICED. OR THE OPPOSITE, BEING SO FIGETY OR RESTLESS THAT YOU HAVE BEEN MOVING AROUND A LOT MORE THAN USUAL: 0
6. FEELING BAD ABOUT YOURSELF - OR THAT YOU ARE A FAILURE OR HAVE LET YOURSELF OR YOUR FAMILY DOWN: 1
3. TROUBLE FALLING OR STAYING ASLEEP: 2
SUM OF ALL RESPONSES TO PHQ9 QUESTIONS 1 & 2: 3
4. FEELING TIRED OR HAVING LITTLE ENERGY: 2
1. LITTLE INTEREST OR PLEASURE IN DOING THINGS: 2
7. TROUBLE CONCENTRATING ON THINGS, SUCH AS READING THE NEWSPAPER OR WATCHING TELEVISION: 3

## 2022-06-17 ASSESSMENT — ENCOUNTER SYMPTOMS
DIARRHEA: 0
CONSTIPATION: 0
SHORTNESS OF BREATH: 0
ABDOMINAL PAIN: 0
BLOOD IN STOOL: 0

## 2022-06-17 NOTE — PROGRESS NOTES
PHQ-9 score today: (PHQ-9 Total Score: 12), additional evaluation and assessment performed, follow-up plan includes but not limited to: Medication management and Referral to /Specialist  for evaluation and management. 2022     Jesus Lilly (:  1945) is a 78 y.o. female, here for evaluation of the following medical concerns:    AYALA Mai is 78years old female with the hypertension, hyperlipidemia, diabetes with neuropathy, sleep apnea, asthma, restless leg syndrome, dysthymia, vitamin D deficiency. Patient reported she has been stressed out lately dealing with domestic issue related to her family. Her mother-in-law lives with the patient and is now at the hospital with a lot of medical issue, her daughter unemployed also lives with the patient and has been in and out of the hospital due to multiple medical problem. Patient also has anxiety and depression and lately is having a lot of crying spells, at times tremors at times memory loss, unable to write things that he would like to remember or express , at times cannot move her hands. Patient denies headache nausea vomiting, denies numbness of the face or slurring of speech, denies weakness or numbness of arms and legs. Denies chest pain or shortness of breath. She takes Wellbutrin  mg twice daily, BuSpar 30 mg twice daily for her anxiety and is still having some a lot of issue. She has restless leg syndrome and takes Sinemet also takes gabapentin for chronic back pain with neuropathy. Diabetes and takes metformin 1000 mg twice daily. She also has hyperlipidemia and reported to be compliant with a statin, takes Lipitor 40 mg daily and tolerating medication without side effect. Review of Systems   Constitutional: Negative for activity change and appetite change. Eyes: Negative for visual disturbance. Respiratory: Negative for shortness of breath. Cardiovascular: Negative for chest pain and leg swelling. glucose. Pavel Colorado MD   Lancets MISC 1 each by Does not apply route daily One touch ultra  Daniel Wilcox MD   Cholecalciferol (VITAMIN D3) 5000 units CAPS Take 1 capsule by mouth daily  Daniel Wilcox MD   aspirin 81 MG tablet Take 81 mg by mouth daily. Historical Provider, MD        Social History     Tobacco Use    Smoking status: Former Smoker     Packs/day: 2.00     Years: 36.00     Pack years: 72.00     Types: Cigarettes     Quit date: 1999     Years since quittin.1    Smokeless tobacco: Never Used   Substance Use Topics    Alcohol use: No     Alcohol/week: 0.0 standard drinks        Vitals:    22 1459   BP: (!) 133/58   Pulse: 72   Temp: 97.2 °F (36.2 °C)   TempSrc: Temporal   Weight: 170 lb 6.4 oz (77.3 kg)     Estimated body mass index is 35.01 kg/m² as calculated from the following:    Height as of 22: 4' 10.5\" (1.486 m). Weight as of this encounter: 170 lb 6.4 oz (77.3 kg). Physical Exam  Constitutional:       Appearance: She is well-developed. HENT:      Head: Normocephalic. Right Ear: External ear normal.      Nose: Nose normal.   Eyes:      Conjunctiva/sclera: Conjunctivae normal.      Pupils: Pupils are equal, round, and reactive to light. Neck:      Thyroid: No thyromegaly. Cardiovascular:      Rate and Rhythm: Normal rate and regular rhythm. Heart sounds: Normal heart sounds. No murmur heard. No friction rub. Pulmonary:      Effort: Pulmonary effort is normal.      Breath sounds: Normal breath sounds. Abdominal:      General: Bowel sounds are normal.      Palpations: Abdomen is soft. There is no mass. Musculoskeletal:         General: Normal range of motion. Cervical back: Normal range of motion and neck supple. Lymphadenopathy:      Cervical: No cervical adenopathy. Skin:     General: Skin is warm. Findings: No rash. Neurological:      Mental Status: She is alert and oriented to person, place, and time. ASSESSMENT/PLAN:  1. Mixed anxiety and depressive disorder / 2. Positive depression screening  Will change to Wellbutrin to Cymbalta 30 mg daily titrate up to 60 mg next visit. Continue BuSpar 30 mg twice daily. Advise psychotherapy or referral to psychiatrist.    - DULoxetine (CYMBALTA) 30 MG extended release capsule; Take 1 capsule by mouth daily  Dispense: 30 capsule; Refill: 0    3. Mild cognitive impairment  Will order Mini-Mental status exam next visit    4. Restless legs syndrome (RLS)  Patient takes Sinemet  tablets 4 times a day beside the gabapentin 600 mg twice a day. He requested not to discontinue gabapentin    5. Essential hypertension  Controlled. Continue amlodipine 5 mg daily and Avapro 150 mg daily    6. DOYLE on CPAP  Encouraged regular use of CPAP. RTC in 1-2 mos.     An electronic signature was used to authenticate this note.    --Radha Andino MD on 6/17/2022 at 6:16 PM

## 2022-06-18 DIAGNOSIS — E78.2 MIXED HYPERLIPIDEMIA: ICD-10-CM

## 2022-06-18 DIAGNOSIS — E11.9 TYPE 2 DIABETES MELLITUS WITHOUT COMPLICATION, WITHOUT LONG-TERM CURRENT USE OF INSULIN (HCC): ICD-10-CM

## 2022-06-20 RX ORDER — ATORVASTATIN CALCIUM 40 MG/1
40 TABLET, FILM COATED ORAL DAILY
Qty: 90 TABLET | Refills: 1 | Status: SHIPPED | OUTPATIENT
Start: 2022-06-20

## 2022-06-28 NOTE — PROGRESS NOTES
[facility] [place]  PRE-PROCEDURE INSTRUCTIONS    Procedure date__07/1/22_______  Arrival time__0915__________          Procedure time__1015__________       Screening questions for Pain procedures:  Do you have a current infection? __N_______  Are you currently taking an antibiotic?_N_____  Are you taking a blood thinner?___N_____    It is not necessary to stop eating or drinking prior to this procedure. We would like you to take your medications for blood pressure as usual.  You may be asked to stop blood thinners such as Coumadin, Plavix, Fragmin, Lovenox, etc., or any anti-inflammatories such as:  Aspirin, Ibuprofen, Advil, Naproxen prior to your procedure. We also ask that you stop any OTC medications that cause additional bleeding    You must make arrangements for a responsible adult to arrive with you and stay in our waiting area during your procedure. They will also need to take you home after your procedure. For your safety you will not be allowed to leave alone or drive yourself home. Also for your safety, it is strongly suggested that someone stay with you the first 24 hours after your procedure. For your comfort, please wear simple loose fitting clothing to the center. Please do not bring valuables. If you have a living will and a durable power of  for healthcare, please bring in a copy.     You will need to bring a photo ID and insurance card    Our goal is to provide you with excellent care so if you have any questions, please contact us at the [facility] at [phone]

## 2022-07-01 ENCOUNTER — APPOINTMENT (OUTPATIENT)
Dept: INTERVENTIONAL RADIOLOGY/VASCULAR | Age: 77
End: 2022-07-01
Attending: ANESTHESIOLOGY
Payer: MEDICARE

## 2022-07-01 ENCOUNTER — HOSPITAL ENCOUNTER (OUTPATIENT)
Age: 77
Setting detail: OUTPATIENT SURGERY
Discharge: HOME | End: 2022-07-01
Attending: ANESTHESIOLOGY | Admitting: ANESTHESIOLOGY
Payer: MEDICARE

## 2022-07-01 VITALS
SYSTOLIC BLOOD PRESSURE: 126 MMHG | HEIGHT: 59 IN | OXYGEN SATURATION: 97 % | DIASTOLIC BLOOD PRESSURE: 50 MMHG | TEMPERATURE: 97 F | BODY MASS INDEX: 34.07 KG/M2 | RESPIRATION RATE: 18 BRPM | HEART RATE: 70 BPM | WEIGHT: 169 LBS

## 2022-07-01 PROCEDURE — 3610000056 HC PAIN LEVEL 4 BASE (NON-OR): Performed by: ANESTHESIOLOGY

## 2022-07-01 PROCEDURE — 2500000003 HC RX 250 WO HCPCS: Performed by: ANESTHESIOLOGY

## 2022-07-01 PROCEDURE — 2709999900 HC NON-CHARGEABLE SUPPLY: Performed by: ANESTHESIOLOGY

## 2022-07-01 RX ORDER — BUPIVACAINE HYDROCHLORIDE 5 MG/ML
INJECTION, SOLUTION EPIDURAL; INTRACAUDAL
Status: COMPLETED | OUTPATIENT
Start: 2022-07-01 | End: 2022-07-01

## 2022-07-01 RX ORDER — LIDOCAINE HYDROCHLORIDE 10 MG/ML
INJECTION, SOLUTION EPIDURAL; INFILTRATION; INTRACAUDAL; PERINEURAL
Status: COMPLETED | OUTPATIENT
Start: 2022-07-01 | End: 2022-07-01

## 2022-07-01 ASSESSMENT — PAIN SCALES - GENERAL
PAINLEVEL_OUTOF10: 0

## 2022-07-01 ASSESSMENT — PAIN DESCRIPTION - DESCRIPTORS: DESCRIPTORS: ACHING

## 2022-07-01 NOTE — H&P
Patient:  Horace Hu  YOB: 1945  Medical Record #:  [id number]   Place: [address] [place] [address]  Date:  7/1/2022   Physician:  Robin Aldridge MD    History Obtained From: electronic medical record    HISTORY OF PRESENT ILLNESS    Past Medical History:        Diagnosis Date    Asthma     Benign tumor lacrimal gland     Greater trochanteric bursitis of left hip     Hyperlipidemia     Hypertension     DOYLE     Uses CPAP    Pseudophakia     RLS (restless legs syndrome)     Type II or unspecified type diabetes mellitus without mention of complication, not stated as uncontrolled     Vitamin D deficiency      Past Surgical History:        Procedure Laterality Date    BACK INJECTION Bilateral 5/27/2022    BILATERAL SACROILIAC JOINT INJECTION performed by Sam Vela MD at [address] [facility]  [id number]    [facility] INJECTION PROCEDURE FOR SACROILIAC JOINT Right 10/2/2019    RIGHT SACROILIAC JOINT INJECTION WITH FLUOROSCOPY performed by Ernestina Allen MD at [facility] (CERVIX STATUS UNKNOWN)      NASAL SEPTUM SURGERY      OTHER SURGICAL HISTORY      fatty tumors on arms excised    PAIN MANAGEMENT PROCEDURE Right 5/13/2022    LUMBAR EPIDURAL STEROID INJECTION - RIGHT L3-4 performed by Sam Vela MD at [address] [place] [address]       Medications Prior to Admission:   No current facility-administered medications on file prior to encounter.      Current Outpatient Medications on File Prior to Encounter   Medication Sig Dispense Refill    atorvastatin (LIPITOR) 40 MG tablet Take 1 tablet by mouth daily 90 tablet 1    metFORMIN (GLUCOPHAGE) 1000 MG tablet Take 1 tablet by mouth 2 times daily (with meals) 180 tablet 1    DULoxetine (CYMBALTA) 30 MG extended release capsule Take 1 capsule by mouth daily 30 capsule 1    naproxen (NAPROSYN) 500 MG tablet Take 1 tablet by mouth 2 times daily (with meals) 180 tablet 1    acetaminophen-codeine (TYLENOL #3) 300-30 MG per tablet Take 1 tablet by mouth every 8 hours as needed for Pain for up to 30 days. 90 tablet 0    irbesartan (AVAPRO) 150 MG tablet Take 1 tablet by mouth daily 90 tablet 1    busPIRone (BUSPAR) 30 MG tablet Take 30 mg by mouth 2 times daily 180 tablet 1    carbidopa-levodopa (SINEMET)  MG per tablet Take 2 tablets by mouth 4 times daily 720 tablet 1    pantoprazole (PROTONIX) 40 MG tablet Take 1 tablet by mouth daily 90 tablet 1    amLODIPine (NORVASC) 5 MG tablet Take 1 tablet by mouth daily 90 tablet 1    gabapentin (NEURONTIN) 600 MG tablet TAKE 1 TABLET BY MOUTH  TWICE DAILY 180 tablet 3    Cholecalciferol (VITAMIN D3) 5000 units CAPS Take 1 capsule by mouth daily 90 capsule 3    aspirin 81 MG tablet Take 81 mg by mouth daily.        Allergies:  Meloxicam, Quinine derivatives, Codeine, Quinine, and Vicodin [hydrocodone-acetaminophen]  Social History     Socioeconomic History    Marital status:      Spouse name: Not on file    Number of children: Not on file    Years of education: Not on file    Highest education level: Not on file   Occupational History    Not on file   Tobacco Use    Smoking status: Former Smoker     Packs/day: 2.00     Years: 36.00     Pack years: 72.00     Types: Cigarettes     Quit date: 1999     Years since quittin.2    Smokeless tobacco: Never Used   Vaping Use    Vaping Use: Never used   Substance and Sexual Activity    Alcohol use: No     Alcohol/week: 0.0 standard drinks    Drug use: No    Sexual activity: Not on file   Other Topics Concern    Not on file   Social History Narrative    Not on file     Social Determinants of Health     Financial Resource Strain:     Difficulty of Paying Living Expenses: Not on file   Food Insecurity:     Worried About Running Out of Food in the Last Year: Not on file    Sandra of Food in the Last Year: Not on ARIELA Chambers Needs:     Lack of Transportation (Medical): Not on file    Lack of Transportation (Non-Medical): Not on file   Physical Activity:     Days of Exercise per Week: Not on file    Minutes of Exercise per Session: Not on file   Stress:     Feeling of Stress : Not on file   Social Connections:     Frequency of Communication with Friends and Family: Not on file    Frequency of Social Gatherings with Friends and Family: Not on file    Attends Mosque Services: Not on file    Active Member of [address] [place] [address] or Organizations: Not on file    Attends Club or Organization Meetings: Not on file    Marital Status: Not on file   Intimate Partner Violence:     Fear of Current or Ex-Partner: Not on file    Emotionally Abused: Not on file    Physically Abused: Not on file    Sexually Abused: Not on file   Housing Stability:     Unable to Pay for Housing in the Last Year: Not on file    Number of [place] in the Last Year: Not on file    Unstable Housing in the Last Year: Not on file     Family History   Problem Relation Age of Onset    Cancer Mother         multiple myeloma    Heart Failure Mother     Hypertension Mother     Cancer Father         head and neck    Heart Failure Father     Hypertension Father     Heart Failure Sister     Hypertension Sister     Heart Failure Brother     Hypertension Brother          PHYSICAL EXAM:      BP (!) 141/47   Pulse 89   Temp 97.2 °F (36.2 °C) (Temporal)   Resp 20   Ht 4' 10.5\" (1.486 m)   Wt 169 lb (76.7 kg)   SpO2 97%   BMI 34.72 kg/m²  I            ASSESSMENT AND PLAN:    1. Procedure. options, risks and benefits reviewed with patient and expresses understanding.

## 2022-07-01 NOTE — OP NOTE
Patient:  Comfort Doherty  YOB: 1945  Medical Record #:  [id number]   Place: [address] [facility]  Date:  7/1/2022   Physician:  Vee Leary MD    PRE-PROCEDURE DIAGNOSIS: M47.816, M47.817        POST-PROCEDURE DIAGNOSIS:  M47.816, M47.817    PROCEDURE:  Bilateral 2 level medial branch block of the L4-5 and L5-S1 facets with fluoroscopy. BRIEF HISTORY: The patient presents today to the [facility] for a scheduled lumbar medial branch facet block procedure. The patient was re-evaluated today, and is clinically unchanged as compared to my previous evaluation. The patient is clinically stable to proceed with todays procedure. The previously distributed literature was reviewed with the patient today. The options, rationale and benefits of the procedure including that of being purely a diagnostic block, as well as the risks of the procedure including but not limited to infection, bleeding, paresthesia, pain, failure to relieve pain, neurologic sequelae, allergic reaction were also discussed with the patient. Informed written consent was obtained from the patient. PROCEDURE NOTE:  The patient was brought to the fluoroscopy suite and placed in the prone position. The lumbosacral area was prepped with Chloraprep and draped in the usual sterile fashion. AP fluoroscopy was obtained. Initially the target branch for the right L5 posterior primary ramus was identified at the junction between the right sacral ala medially superiorly, with the junction of the inferolateral aspect of the superior articulating facet of S1 on the sacrum. Then a 22 gauge 5 inch spinal needle was slowly inserted parallel to the x-ray beam towards this target end point of the right L5 posterior primary ramus until periosteum was contacted. Orthogonal fluoroscopic views confirmed proper anatomic needle tip placement at the medial branch of the posterior primary ramus of L5.   Negative aspiration was demonstrated, and a 0.5 cc of bupivacaine 0.5% without epinephrine was then injected without pain, paresthesia, difficulty or complaints, thus blocking the right L5 medial branch nerve and the needle was removed. In a similar fashion the same was done on the left side thus blocking the left L5 medial branch nerve of the posterior primary ramus. Then the right L4 posterior primary ramus was blocked by using an oblique view and using the target point as the superior medial aspect of the right L5 transverse process and inferolateral aspect of the base of the right L5 superior articulating facet. A spinal needle was slowly advanced under direct fluoroscopic guidance to this periosteal end point without pain, paresthesia, difficulty or complaints. Periosteum was contacted. Orthogonal fluoroscopic views confirmed proper anatomic needle tip placement at the right L4 posterior primary ramus medial branch. Negative aspiration was demonstrated and 0.5 cc of bupivacaine 0.5% without epinephrine was then injected without pain, paresthesia, or difficulty and the needle was removed. In a similar fashion the same was done on the left side thus blocking the left L4 medial branch. The needles were removed, the area was cleansed, a Band-Aid was placed over the injection sites. The patient tolerated the procedure well. There were no complications. The patient was transferred with accompaniment to the recovery area where the vital signs remained stable. The patients status remained stable. The patient was discharged in stable condition without difficulty accompanied by an escort, after fulfilling standard discharge criteria. The patient is asked to keep a pain diary, and asked to return for follow up with complete pain diary next available.      Estimated Blood Loss: 0ml      Plan:    Follow up next available      Office: [phone]

## 2022-07-08 ENCOUNTER — APPOINTMENT (OUTPATIENT)
Dept: GENERAL RADIOLOGY | Age: 77
End: 2022-07-08
Payer: MEDICARE

## 2022-07-08 ENCOUNTER — HOSPITAL ENCOUNTER (EMERGENCY)
Age: 77
Discharge: HOME | End: 2022-07-08
Payer: MEDICARE

## 2022-07-08 VITALS
DIASTOLIC BLOOD PRESSURE: 69 MMHG | OXYGEN SATURATION: 99 % | TEMPERATURE: 97.1 F | RESPIRATION RATE: 16 BRPM | HEART RATE: 87 BPM | SYSTOLIC BLOOD PRESSURE: 122 MMHG

## 2022-07-08 DIAGNOSIS — M54.9 EXACERBATION OF CHRONIC BACK PAIN: Primary | ICD-10-CM

## 2022-07-08 DIAGNOSIS — G89.29 EXACERBATION OF CHRONIC BACK PAIN: Primary | ICD-10-CM

## 2022-07-08 PROCEDURE — 99283 EMERGENCY DEPT VISIT LOW MDM: CPT

## 2022-07-08 PROCEDURE — 71046 X-RAY EXAM CHEST 2 VIEWS: CPT

## 2022-07-08 RX ORDER — TIZANIDINE 2 MG/1
2 TABLET ORAL 3 TIMES DAILY PRN
Qty: 12 TABLET | Refills: 0 | Status: SHIPPED | OUTPATIENT
Start: 2022-07-08 | End: 2022-07-12

## 2022-07-08 ASSESSMENT — PAIN DESCRIPTION - LOCATION
LOCATION: BACK
LOCATION: BACK

## 2022-07-08 ASSESSMENT — PAIN - FUNCTIONAL ASSESSMENT
PAIN_FUNCTIONAL_ASSESSMENT: 0-10
PAIN_FUNCTIONAL_ASSESSMENT: 0-10

## 2022-07-08 ASSESSMENT — LIFESTYLE VARIABLES: HOW OFTEN DO YOU HAVE A DRINK CONTAINING ALCOHOL: NEVER

## 2022-07-08 ASSESSMENT — PAIN DESCRIPTION - PAIN TYPE
TYPE: CHRONIC PAIN
TYPE: CHRONIC PAIN

## 2022-07-08 ASSESSMENT — PAIN SCALES - GENERAL
PAINLEVEL_OUTOF10: 10
PAINLEVEL_OUTOF10: 8

## 2022-07-08 ASSESSMENT — PAIN DESCRIPTION - FREQUENCY: FREQUENCY: CONTINUOUS

## 2022-07-08 ASSESSMENT — PAIN DESCRIPTION - DESCRIPTORS: DESCRIPTORS: ACHING;SHOOTING

## 2022-07-08 ASSESSMENT — PAIN DESCRIPTION - ORIENTATION: ORIENTATION: RIGHT;LEFT

## 2022-07-08 NOTE — ED PROVIDER NOTES
[address]  [address] [place] [address]  Dept: [phone]  Loc: [address] ENCOUNTER        This patient was not seen or evaluated by the attending physician. I evaluated this patient, the attending physician was available for consultation. CHIEF COMPLAINT    Chief Complaint   Patient presents with    Back Pain     states has chronic back pain had an epidural injection a week ago, states had pain relief for 4 hours, but now has pain from bilateral shoulder blades all the way down her back into her legs        HPI    Jesus Hernández is a 78 y.o. female who presents the emergency department requesting to be medicated for chronic back pain. She states that she has Tylenol 3 which she is close to being out of for the month and she takes gabapentin prescribed by her primary care physician. She also is seeing Dr. Scarlett Mayes in [facility] in pain management. She received an epidural injection last week and it is not helped her pain but more than 1 or 2 days. She denies any injury. She denies falls. She states she has for Tylenol 3 left and she does not have enough for the weekend. She denies numbness or paresthesias to the extremities. She states the pain is chronic and has not changed in any way. She states she hurts from the top of her shoulders all the way down to her calves. She denies lightheadedness, dizziness, shortness of breath, chest pain, body aches, fever, chills    REVIEW OF SYSTEMS    General: No fevers, chills or night sweats, No weight loss  GI: No abdominal pain or vomiting  : No dysuria or hematuria  Musculoskeletal: see HPI, No unrelenting pain or night pain  Neurologic: No bowel or bladder incontinence, No saddle anesthesia, No leg weakness  All other systems reviewed and are negative.     PAST MEDICAL & SURGICAL HISTORY    Past Medical History:   Diagnosis Date    Asthma     Benign tumor lacrimal gland     Chronic back pain     Greater trochanteric bursitis of left hip     Hyperlipidemia     Hypertension     DOYLE     Uses CPAP    Pseudophakia     RLS (restless legs syndrome)     Type II or unspecified type diabetes mellitus without mention of complication, not stated as uncontrolled     Vitamin D deficiency      Past Surgical History:   Procedure Laterality Date    BACK INJECTION Bilateral 5/27/2022    BILATERAL SACROILIAC JOINT INJECTION performed by Tiffanie Thomas MD at [address]  [id number]    [place] INJECTION PROCEDURE FOR SACROILIAC JOINT Right 10/2/2019    RIGHT SACROILIAC JOINT INJECTION WITH FLUOROSCOPY performed by Willy Key MD at [facility] ([address])      NASAL SEPTUM SURGERY      OTHER SURGICAL HISTORY      fatty tumors on arms excised    PAIN MANAGEMENT PROCEDURE Right 5/13/2022    LUMBAR EPIDURAL STEROID INJECTION - RIGHT L3-4 performed by Tiffanie Thomas MD at [address] Bilateral 7/1/2022    BILATERAL TWO LEVEL LUMBAR MEDIAL BRANCH BLOCKS AT L4-5 AND L5-S1 performed by Tiffanie Thomas MD at [address] [place] [address]  (may include discharge medications prescribed in the ED)  Current Outpatient Rx   Medication Sig Dispense Refill    tiZANidine (ZANAFLEX) 2 MG tablet Take 1 tablet by mouth 3 times daily as needed (spasm) 12 tablet 0    atorvastatin (LIPITOR) 40 MG tablet Take 1 tablet by mouth daily 90 tablet 1    metFORMIN (GLUCOPHAGE) 1000 MG tablet Take 1 tablet by mouth 2 times daily (with meals) 180 tablet 1    DULoxetine (CYMBALTA) 30 MG extended release capsule Take 1 capsule by mouth daily 30 capsule 1    naproxen (NAPROSYN) 500 MG tablet Take 1 tablet by mouth 2 times daily (with meals) 180 tablet 1    acetaminophen-codeine (TYLENOL #3) 300-30 MG per tablet Take 1 tablet by mouth every 8 hours as needed for Pain for up to 30 days. 90 tablet 0    irbesartan (AVAPRO) 150 MG tablet Take 1 tablet by mouth daily 90 tablet 1    busPIRone (BUSPAR) 30 MG tablet Take 30 mg by mouth 2 times daily 180 tablet 1    carbidopa-levodopa (SINEMET)  MG per tablet Take 2 tablets by mouth 4 times daily 720 tablet 1    pantoprazole (PROTONIX) 40 MG tablet Take 1 tablet by mouth daily 90 tablet 1    amLODIPine (NORVASC) 5 MG tablet Take 1 tablet by mouth daily 90 tablet 1    gabapentin (NEURONTIN) 600 MG tablet TAKE 1 TABLET BY MOUTH  TWICE DAILY 180 tablet 3    Cholecalciferol (VITAMIN D3) 5000 units CAPS Take 1 capsule by mouth daily 90 capsule 3    aspirin 81 MG tablet Take 81 mg by mouth daily.          ALLERGIES    Allergies   Allergen Reactions    Meloxicam      Heart rate drops very low    Quinine Derivatives      Fever, chills, vomiting, diarrhea, dizziness    Codeine     Quinine     Vicodin [Hydrocodone-Acetaminophen] Anxiety     Hyperactivity, nausea       SOCIAL HISTORY    Social History     Socioeconomic History    Marital status:      Spouse name: None    Number of children: None    Years of education: None    Highest education level: None   Occupational History    None   Tobacco Use    Smoking status: Former Smoker     Packs/day: 2.00     Years: 36.00     Pack years: 72.00     Types: Cigarettes     Quit date: 1999     Years since quittin.2    Smokeless tobacco: Never Used   Vaping Use    Vaping Use: Never used   Substance and Sexual Activity    Alcohol use: No     Alcohol/week: 0.0 standard drinks    Drug use: No    Sexual activity: Not Currently   Other Topics Concern    None   Social History Narrative    None     Social Determinants of Health     Financial Resource Strain:     Difficulty of Paying Living Expenses: Not on file   Food Insecurity:     Worried About Running Out of Food in the Last Year: Not on file    Sandra of Food in the Last Year: Not on file   Transportation Needs:     Lack of Transportation (Medical): Not on file    Lack of Transportation (Non-Medical):  Not on file   Physical Activity:     Days of Exercise per Week: Not on file    Minutes of Exercise per Session: Not on file   Stress:     Feeling of Stress : Not on file   Social Connections:     Frequency of Communication with Friends and Family: Not on file    Frequency of Social Gatherings with Friends and Family: Not on file    Attends [employer] Services: Not on file    Active Member of [address] [place] [address] [employer] or Organizations: Not on file    Attends Club or Organization Meetings: Not on file    Marital Status: Not on file   Intimate Partner Violence:     Fear of Current or Ex-Partner: Not on file    Emotionally Abused: Not on file    Physically Abused: Not on file    Sexually Abused: Not on file   Housing Stability:     Unable to Pay for Housing in the Last Year: Not on file    Number of [place] in the Last Year: Not on file    Unstable Housing in the Last Year: Not on file       PHYSICAL EXAM    VITAL SIGNS: /69   Pulse 87   Temp 97.1 °F (36.2 °C)   Resp 16   SpO2 99%    Constitutional:  Well developed, well nourished, no acute distress, non-toxic appearing  HENT:  Atraumatic, moist mucus membranes  Neck: No JVD, supple   Respiratory:  No respiratory distress, normal breath sounds  Cardiovascular:  regular rate, no murmurs  GI:  Soft, nontender, no pulsatile masses or bruits of the abdomen  Musculoskeletal:  No edema, no acute deformities    Back: + thoracic and lumbar paraspinous tenderness to palpation, no bony midline tenderness, negative straight leg raise test bilaterally, no CVA tenderness  Integument:  Well hydrated, no rash  Vascular: Dorsalis pedis pulses are 2+ and equal bilaterally  Neurologic:  Motor testing reveals: intact thigh adduction, knee flexion and extension, ankle dorsiflexion, toe plantarflexion, and great toe dorsiflexion bilaterally, patellar and Achilles tendon reflexes are 2+ and equal bilaterally, sensation to light touch is intact in the groin and lower extremities bilaterally    RADIOLOGY  XR CHEST (2 VW)   Final Result   No acute abnormality detected. ED COURSE & MEDICAL DECISION MAKING    Please see EMR for medications administered in the ED. Medications - No data to display    I have seen and evaluated this patient. My attending physician was available for consultation. Differential Diagnosis: Spinal Epidural Abscess, Vertebral Osteomyelitis, Cauda Equina Syndrome, Spinal Cord Compression, Conus Medullaris Syndrome, ruptured/dissecting Abdominal Aortic Aneurysm, Metastases to the back, Herpes Zoster, Kidney Stone, Pyelonephritis, Fracture or dislocation, other    Patient is afebrile and nontoxic in appearance. No evidence of neurological deficit on exam.    No history of significant trauma, plain films not indicated. Due to the unremarkable history and lack of systemic complaints or atypical features, as well as the absence of neurological deficit, I have low suspicion at this time for epidural compression syndrome or infectious etiology. I believe the patient is safe for discharge at this time. We can try her on Robaxin over the weekend to help with the pain but I strongly encouraged her to reach out to her PCP and her pain management doctor before the end of the day to get a refill on her prescriptions. The patient was instructed to return to the ED immediately for any new or worsening symptoms, including bowel or bladder incontinence, saddle anesthesia or leg weakness. The patient verbalized understanding. Controlled Substance Monitoring:    Acute and Chronic Pain Monitoring:   RX Monitoring 7/8/2022   Periodic Controlled Substance Monitoring No signs of potential drug abuse or diversion identified. FINAL IMPRESSION    1.  Exacerbation of chronic back pain        PLAN  Discharge with outpatient follow-up (see EMR)      (Please note that this note was completed with a voice recognition program.  Every attempt was made to edit the dictations, but inevitably there remain words that are mis-transcribed.)            ZACKERY Lorenzana Cha - TACHO  07/08/22 1716

## 2022-07-15 ENCOUNTER — OFFICE VISIT (OUTPATIENT)
Dept: PRIMARY CARE CLINIC | Age: 77
End: 2022-07-15
Payer: MEDICARE

## 2022-07-15 VITALS
HEART RATE: 83 BPM | RESPIRATION RATE: 20 BRPM | TEMPERATURE: 97.1 F | DIASTOLIC BLOOD PRESSURE: 62 MMHG | WEIGHT: 169.8 LBS | BODY MASS INDEX: 34.88 KG/M2 | SYSTOLIC BLOOD PRESSURE: 137 MMHG

## 2022-07-15 DIAGNOSIS — F41.8 MIXED ANXIETY AND DEPRESSIVE DISORDER: Primary | ICD-10-CM

## 2022-07-15 DIAGNOSIS — E11.9 TYPE 2 DIABETES MELLITUS WITHOUT COMPLICATION, WITHOUT LONG-TERM CURRENT USE OF INSULIN (HCC): ICD-10-CM

## 2022-07-15 DIAGNOSIS — G25.81 RESTLESS LEGS SYNDROME (RLS): ICD-10-CM

## 2022-07-15 DIAGNOSIS — I10 ESSENTIAL HYPERTENSION: ICD-10-CM

## 2022-07-15 DIAGNOSIS — Z99.89 OSA ON CPAP: ICD-10-CM

## 2022-07-15 DIAGNOSIS — G47.33 OSA ON CPAP: ICD-10-CM

## 2022-07-15 DIAGNOSIS — M51.36 LUMBAR DEGENERATIVE DISC DISEASE: ICD-10-CM

## 2022-07-15 DIAGNOSIS — J45.20 MILD INTERMITTENT ASTHMA WITHOUT COMPLICATION: ICD-10-CM

## 2022-07-15 PROBLEM — M51.369 LUMBAR DEGENERATIVE DISC DISEASE: Status: ACTIVE | Noted: 2022-07-15

## 2022-07-15 LAB — HBA1C MFR BLD: 6.1 %

## 2022-07-15 PROCEDURE — 2022F DILAT RTA XM EVC RTNOPTHY: CPT | Performed by: FAMILY MEDICINE

## 2022-07-15 PROCEDURE — G8427 DOCREV CUR MEDS BY ELIG CLIN: HCPCS | Performed by: FAMILY MEDICINE

## 2022-07-15 PROCEDURE — 1090F PRES/ABSN URINE INCON ASSESS: CPT | Performed by: FAMILY MEDICINE

## 2022-07-15 PROCEDURE — 3044F HG A1C LEVEL LT 7.0%: CPT | Performed by: FAMILY MEDICINE

## 2022-07-15 PROCEDURE — 1123F ACP DISCUSS/DSCN MKR DOCD: CPT | Performed by: FAMILY MEDICINE

## 2022-07-15 PROCEDURE — G8417 CALC BMI ABV UP PARAM F/U: HCPCS | Performed by: FAMILY MEDICINE

## 2022-07-15 PROCEDURE — 3017F COLORECTAL CA SCREEN DOC REV: CPT | Performed by: FAMILY MEDICINE

## 2022-07-15 PROCEDURE — 1036F TOBACCO NON-USER: CPT | Performed by: FAMILY MEDICINE

## 2022-07-15 PROCEDURE — 83036 HEMOGLOBIN GLYCOSYLATED A1C: CPT | Performed by: FAMILY MEDICINE

## 2022-07-15 PROCEDURE — G8399 PT W/DXA RESULTS DOCUMENT: HCPCS | Performed by: FAMILY MEDICINE

## 2022-07-15 PROCEDURE — 99214 OFFICE O/P EST MOD 30 MIN: CPT | Performed by: FAMILY MEDICINE

## 2022-07-15 RX ORDER — DULOXETIN HYDROCHLORIDE 60 MG/1
60 CAPSULE, DELAYED RELEASE ORAL DAILY
Qty: 90 CAPSULE | Refills: 1 | Status: SHIPPED | OUTPATIENT
Start: 2022-07-15

## 2022-07-15 ASSESSMENT — ENCOUNTER SYMPTOMS
SHORTNESS OF BREATH: 0
BACK PAIN: 1
CONSTIPATION: 0
ABDOMINAL PAIN: 0
BLOOD IN STOOL: 0
DIARRHEA: 0

## 2022-07-15 NOTE — PROGRESS NOTES
7/15/2022     Chanelle Lilly (:  1945) is a 78 y.o. female, here for evaluation of the following medical concerns:  Patient presented to the office for follow-up. She has lumbar degenerative disc disease as well as bilateral hip pain and is now going to pain management  c/o Dr Dr Yoan Cha and has a recent epidural injection which helped somewhat. Her next epidural injection would be next month. She also take Tylenol 3 as needed. She has anxiety and depressive disorder somewhat better since her daughter moved out of the house, her mother in law once leaving the house also move out. She now takes Cymbalta 30 mg daily to replace Wellbutrin and it seems its working. She also take BuSpar 30 mg twice daily. She has diabetes controlled with metformin 1000 mg twice daily, denies hypoglycemic episode. She has restless leg syndrome controlled with Sinemet. She has hypertension stable on amlodipine and Avapro. She has a sleep apnea and wears CPAP regularly and has appointment with pulmonologist in 3 months. She denies chest pain or shortness of breath. Denies bowel or urinary disturbance. Back Pain  Pertinent negatives include no abdominal pain or chest pain. Review of Systems   Constitutional:  Negative for activity change and appetite change. Eyes:  Negative for visual disturbance. Respiratory:  Negative for shortness of breath. Cardiovascular:  Negative for chest pain and leg swelling. Gastrointestinal:  Negative for abdominal pain, blood in stool, constipation and diarrhea. Genitourinary:  Negative for difficulty urinating, frequency, hematuria, menstrual problem and urgency. Musculoskeletal:  Positive for back pain. Neurological:  Negative for dizziness and syncope. Psychiatric/Behavioral:  Positive for decreased concentration. Negative for behavioral problems. The patient is nervous/anxious. Prior to Visit Medications    Medication Sig Taking?  Authorizing Provider DULoxetine (CYMBALTA) 60 MG extended release capsule Take 1 capsule by mouth in the morning. Yes Teresa Bernardo MD   atorvastatin (LIPITOR) 40 MG tablet Take 1 tablet by mouth daily  Teresa Bernardo MD   metFORMIN (GLUCOPHAGE) 1000 MG tablet Take 1 tablet by mouth 2 times daily (with meals)  Teresa Bernardo MD   naproxen (NAPROSYN) 500 MG tablet Take 1 tablet by mouth 2 times daily (with meals)  Teresa Bernardo MD   irbesartan (AVAPRO) 150 MG tablet Take 1 tablet by mouth daily  Teresa Bernardo MD   busPIRone (BUSPAR) 30 MG tablet Take 30 mg by mouth 2 times daily  Teresa Bernardo MD   carbidopa-levodopa (SINEMET)  MG per tablet Take 2 tablets by mouth 4 times daily  Teresa Bernardo MD   pantoprazole (PROTONIX) 40 MG tablet Take 1 tablet by mouth daily  Teresa Bernardo MD   amLODIPine (NORVASC) 5 MG tablet Take 1 tablet by mouth daily  Teresa Bernardo MD   gabapentin (NEURONTIN) 600 MG tablet TAKE 1 TABLET BY MOUTH  TWICE DAILY  Teresa Bernardo MD   Cholecalciferol (VITAMIN D3) 5000 units CAPS Take 1 capsule by mouth daily  Teresa Bernardo MD   aspirin 81 MG tablet Take 81 mg by mouth daily. Historical Provider, MD        Social History     Tobacco Use    Smoking status: Former     Packs/day: 2.00     Years: 36.00     Pack years: 72.00     Types: Cigarettes     Quit date: 1999     Years since quittin.2    Smokeless tobacco: Never   Substance Use Topics    Alcohol use: No     Alcohol/week: 0.0 standard drinks        Vitals:    07/15/22 1513   BP: 137/62   Pulse: 83   Resp: 20   Temp: 97.1 °F (36.2 °C)   TempSrc: Temporal   Weight: 169 lb 12.8 oz (77 kg)     Estimated body mass index is 34.88 kg/m² as calculated from the following:    Height as of 22: 4' 10.5\" (1.486 m). Weight as of this encounter: 169 lb 12.8 oz (77 kg). Physical Exam  Constitutional:       Appearance: She is well-developed. HENT:      Head: Normocephalic.       Right Ear: External ear normal.      Nose: Nose normal.   Eyes:      Conjunctiva/sclera: Conjunctivae normal.      Pupils: Pupils are equal, round, and reactive to light. Neck:      Thyroid: No thyromegaly. Cardiovascular:      Rate and Rhythm: Normal rate and regular rhythm. Heart sounds: Normal heart sounds. No murmur heard. No friction rub. Pulmonary:      Effort: Pulmonary effort is normal.      Breath sounds: Normal breath sounds. Abdominal:      General: Bowel sounds are normal.      Palpations: Abdomen is soft. There is no mass. Musculoskeletal:         General: Normal range of motion. Cervical back: Normal range of motion and neck supple. Lymphadenopathy:      Cervical: No cervical adenopathy. Skin:     General: Skin is warm. Findings: No rash. Neurological:      Mental Status: She is alert and oriented to person, place, and time. ASSESSMENT/PLAN:  1. Mixed anxiety and depressive disorder  Somewhat better. Will increase Cymbalta from 30 mg to 60 mg daily. Continue BuSpar 30 mg twice daily. 2. Type 2 diabetes mellitus without complication, without long-term current use of insulin (HCC)  Controlled. Continue metformin 1000 mg twice daily  - POCT glycosylated hemoglobin (Hb A1C)    3. Lumbar degenerative disc disease  Patient goes to pain management Dr. Isidoro Lindsey as epidural injection and next treatment will be next month. She also take Tylenol No. 3 as needed gabapentin 600 mg twice a day    4. Mild intermittent asthma without complication  Controlled. Continue current medication. ? No PFT on record    5. Restless legs syndrome (RLS)  She takes Sinemet  mg 2 tablet 4 times a day    6. Essential hypertension  Controlled. Continue Avapro 150 mg daily and amlodipine 5 mg daily    7. DOYLE on CPAP  Advise regular use of CPAP.   Follow-up with pulmonologist Dr. Karely Moseley      [facility] in 1 mo    An electronic signature was used to authenticate this note.    --Jesusita Gutierrez MD on 7/15/2022 at 3:55 PM

## 2022-07-18 DIAGNOSIS — G89.4 CHRONIC PAIN SYNDROME: ICD-10-CM

## 2022-07-20 DIAGNOSIS — G89.4 CHRONIC PAIN SYNDROME: ICD-10-CM

## 2022-07-20 RX ORDER — ACETAMINOPHEN AND CODEINE PHOSPHATE 300; 30 MG/1; MG/1
1 TABLET ORAL EVERY 8 HOURS PRN
Qty: 90 TABLET | Refills: 0 | OUTPATIENT
Start: 2022-07-20 | End: 2022-08-19

## 2022-07-20 RX ORDER — ACETAMINOPHEN AND CODEINE PHOSPHATE 300; 30 MG/1; MG/1
1 TABLET ORAL EVERY 8 HOURS PRN
Qty: 90 TABLET | Refills: 0 | Status: SHIPPED | OUTPATIENT
Start: 2022-07-20 | End: 2022-09-01 | Stop reason: SDUPTHER

## 2022-07-26 ENCOUNTER — HOSPITAL ENCOUNTER (OUTPATIENT)
Dept: MRI IMAGING | Age: 77
Discharge: HOME | End: 2022-07-26
Payer: MEDICARE

## 2022-07-26 DIAGNOSIS — M54.16 RADICULOPATHY OF LUMBAR REGION: ICD-10-CM

## 2022-07-26 PROCEDURE — 72148 MRI LUMBAR SPINE W/O DYE: CPT

## 2022-08-16 ENCOUNTER — OFFICE VISIT (OUTPATIENT)
Dept: PRIMARY CARE CLINIC | Age: 77
End: 2022-08-16
Payer: MEDICARE

## 2022-08-16 VITALS
HEART RATE: 71 BPM | WEIGHT: 167.6 LBS | BODY MASS INDEX: 34.43 KG/M2 | DIASTOLIC BLOOD PRESSURE: 40 MMHG | TEMPERATURE: 96.3 F | SYSTOLIC BLOOD PRESSURE: 110 MMHG

## 2022-08-16 DIAGNOSIS — G25.81 RESTLESS LEGS SYNDROME (RLS): ICD-10-CM

## 2022-08-16 DIAGNOSIS — F41.8 MIXED ANXIETY AND DEPRESSIVE DISORDER: ICD-10-CM

## 2022-08-16 DIAGNOSIS — E11.9 TYPE 2 DIABETES MELLITUS WITHOUT COMPLICATION, WITHOUT LONG-TERM CURRENT USE OF INSULIN (HCC): Primary | ICD-10-CM

## 2022-08-16 DIAGNOSIS — Z99.89 OSA ON CPAP: ICD-10-CM

## 2022-08-16 DIAGNOSIS — E55.9 VITAMIN D DEFICIENCY: ICD-10-CM

## 2022-08-16 DIAGNOSIS — J45.20 MILD INTERMITTENT ASTHMA WITHOUT COMPLICATION: ICD-10-CM

## 2022-08-16 DIAGNOSIS — I10 ESSENTIAL HYPERTENSION: ICD-10-CM

## 2022-08-16 DIAGNOSIS — G47.33 OSA ON CPAP: ICD-10-CM

## 2022-08-16 DIAGNOSIS — E78.2 MIXED HYPERLIPIDEMIA: ICD-10-CM

## 2022-08-16 LAB
A/G RATIO: 2.2 (ref 1.1–2.2)
ALBUMIN SERPL-MCNC: 4.1 G/DL (ref 3.4–5)
ALP BLD-CCNC: 78 U/L (ref 40–129)
ALT SERPL-CCNC: <5 U/L (ref 10–40)
ANION GAP SERPL CALCULATED.3IONS-SCNC: 11 MMOL/L (ref 3–16)
AST SERPL-CCNC: 17 U/L (ref 15–37)
BILIRUB SERPL-MCNC: <0.2 MG/DL (ref 0–1)
BUN BLDV-MCNC: 15 MG/DL (ref 7–20)
CALCIUM SERPL-MCNC: 9.9 MG/DL (ref 8.3–10.6)
CHLORIDE BLD-SCNC: 105 MMOL/L (ref 99–110)
CHOLESTEROL, FASTING: 150 MG/DL (ref 0–199)
CO2: 26 MMOL/L (ref 21–32)
CREAT SERPL-MCNC: 0.6 MG/DL (ref 0.6–1.2)
GFR AFRICAN AMERICAN: >60
GFR NON-AFRICAN AMERICAN: >60
GLUCOSE FASTING: 114 MG/DL (ref 70–99)
HDLC SERPL-MCNC: 66 MG/DL (ref 40–60)
LDL CHOLESTEROL CALCULATED: 70 MG/DL
POTASSIUM SERPL-SCNC: 5 MMOL/L (ref 3.5–5.1)
SODIUM BLD-SCNC: 142 MMOL/L (ref 136–145)
TOTAL PROTEIN: 6 G/DL (ref 6.4–8.2)
TRIGLYCERIDE, FASTING: 70 MG/DL (ref 0–150)
VITAMIN D 25-HYDROXY: 50 NG/ML
VLDLC SERPL CALC-MCNC: 14 MG/DL

## 2022-08-16 PROCEDURE — 36415 COLL VENOUS BLD VENIPUNCTURE: CPT | Performed by: FAMILY MEDICINE

## 2022-08-16 PROCEDURE — 99214 OFFICE O/P EST MOD 30 MIN: CPT | Performed by: FAMILY MEDICINE

## 2022-08-16 PROCEDURE — G8427 DOCREV CUR MEDS BY ELIG CLIN: HCPCS | Performed by: FAMILY MEDICINE

## 2022-08-16 PROCEDURE — G8417 CALC BMI ABV UP PARAM F/U: HCPCS | Performed by: FAMILY MEDICINE

## 2022-08-16 PROCEDURE — G8399 PT W/DXA RESULTS DOCUMENT: HCPCS | Performed by: FAMILY MEDICINE

## 2022-08-16 PROCEDURE — 1036F TOBACCO NON-USER: CPT | Performed by: FAMILY MEDICINE

## 2022-08-16 PROCEDURE — 3044F HG A1C LEVEL LT 7.0%: CPT | Performed by: FAMILY MEDICINE

## 2022-08-16 PROCEDURE — 1090F PRES/ABSN URINE INCON ASSESS: CPT | Performed by: FAMILY MEDICINE

## 2022-08-16 PROCEDURE — 2022F DILAT RTA XM EVC RTNOPTHY: CPT | Performed by: FAMILY MEDICINE

## 2022-08-16 PROCEDURE — 3017F COLORECTAL CA SCREEN DOC REV: CPT | Performed by: FAMILY MEDICINE

## 2022-08-16 PROCEDURE — 1123F ACP DISCUSS/DSCN MKR DOCD: CPT | Performed by: FAMILY MEDICINE

## 2022-08-16 ASSESSMENT — ENCOUNTER SYMPTOMS
SHORTNESS OF BREATH: 0
DIARRHEA: 0
ABDOMINAL PAIN: 0
BLOOD IN STOOL: 0
CONSTIPATION: 0

## 2022-08-16 NOTE — PROGRESS NOTES
2022     Mark Lilly (:  1945) is a 78 y.o. female, here for evaluation of the following medical concerns:    HPI  Patient presented to the office for regular follow-up. She has diabetes controlled with metformin 1000 mg twice daily, denies hypoglycemic episode. She has hypertension controlled with Avapro and amlodipine. She has hyperlipidemia and reported to be compliant with a statin, takes Lipitor 40 mg daily and tolerating medication without side effect. She has anxiety depression is stressed out in the past few months but doing well at this time, takes Cymbalta 60 mg daily and BuSpar 30 mg twice daily. She has restless leg syndrome doing well on Sinemet. Also has vitamin D deficiency controlled with vitamin D supplement 5000 units daily. She denies headache nausea vomiting. Denies chest pain or shortness of breath. Denies bowel or urinary disturbance. Review of Systems   Constitutional:  Negative for activity change and appetite change. Eyes:  Negative for visual disturbance. Respiratory:  Negative for shortness of breath. Cardiovascular:  Negative for chest pain and leg swelling. Gastrointestinal:  Negative for abdominal pain, blood in stool, constipation and diarrhea. Genitourinary:  Negative for difficulty urinating, frequency, hematuria, menstrual problem and urgency. Neurological:  Negative for dizziness and syncope. Psychiatric/Behavioral:  Negative for behavioral problems. Prior to Visit Medications    Medication Sig Taking? Authorizing Provider   acetaminophen-codeine (TYLENOL #3) 300-30 MG per tablet Take 1 tablet by mouth every 8 hours as needed for Pain for up to 30 days. Yes Haley Pineda MD   DULoxetine (CYMBALTA) 60 MG extended release capsule Take 1 capsule by mouth in the morning.  Yes Haley Pineda MD   atorvastatin (LIPITOR) 40 MG tablet Take 1 tablet by mouth daily Yes Haley Pineda MD   metFORMIN (GLUCOPHAGE) 1000 MG tablet Take 1 tablet by mouth 2 times daily (with meals) Yes Pati Belcher MD   naproxen (NAPROSYN) 500 MG tablet Take 1 tablet by mouth 2 times daily (with meals) Yes Pati Belcher MD   irbesartan (AVAPRO) 150 MG tablet Take 1 tablet by mouth daily Yes Pati Belcher MD   busPIRone (BUSPAR) 30 MG tablet Take 30 mg by mouth 2 times daily Yes Pati Belcher MD   carbidopa-levodopa (SINEMET)  MG per tablet Take 2 tablets by mouth 4 times daily Yes Pati Belcher MD   pantoprazole (PROTONIX) 40 MG tablet Take 1 tablet by mouth daily Yes Pati Belcher MD   amLODIPine (NORVASC) 5 MG tablet Take 1 tablet by mouth daily Yes Pati Belcher MD   gabapentin (NEURONTIN) 600 MG tablet TAKE 1 TABLET BY MOUTH  TWICE DAILY Yes Pati Belcher MD   Cholecalciferol (VITAMIN D3) 5000 units CAPS Take 1 capsule by mouth daily Yes Pati Belcher MD   aspirin 81 MG tablet Take 81 mg by mouth daily. Yes Historical Provider, MD        Social History     Tobacco Use    Smoking status: Former     Packs/day: 2.00     Years: 36.00     Pack years: 72.00     Types: Cigarettes     Quit date: 1999     Years since quittin.3    Smokeless tobacco: Never   Substance Use Topics    Alcohol use: No     Alcohol/week: 0.0 standard drinks        Vitals:    22 0909   BP: (!) 110/40   Pulse: 71   Temp: (!) 96.3 °F (35.7 °C)   TempSrc: Temporal   Weight: 167 lb 9.6 oz (76 kg)     Estimated body mass index is 34.43 kg/m² as calculated from the following:    Height as of 22: 4' 10.5\" (1.486 m). Weight as of this encounter: 167 lb 9.6 oz (76 kg). Physical Exam  Constitutional:       Appearance: She is well-developed. HENT:      Head: Normocephalic. Right Ear: External ear normal.      Nose: Nose normal.   Eyes:      Conjunctiva/sclera: Conjunctivae normal.      Pupils: Pupils are equal, round, and reactive to light. Neck:      Thyroid: No thyromegaly.    Cardiovascular:      Rate and Rhythm: Normal rate and regular rhythm. Heart sounds: Normal heart sounds. No murmur heard. No friction rub. Pulmonary:      Effort: Pulmonary effort is normal.      Breath sounds: Normal breath sounds. Abdominal:      General: Bowel sounds are normal.      Palpations: Abdomen is soft. There is no mass. Musculoskeletal:         General: Normal range of motion. Cervical back: Normal range of motion and neck supple. Lymphadenopathy:      Cervical: No cervical adenopathy. Skin:     General: Skin is warm. Findings: No rash. Neurological:      Mental Status: She is alert and oriented to person, place, and time. ASSESSMENT/PLAN:  1. Type 2 diabetes mellitus without complication, without long-term current use of insulin ([address]  . Continue metformin 1000 mg twice daily  - Comprehensive Metabolic Panel, Fasting  - Hemoglobin A1C    2. Essential hypertension  Controlled. Continue Avapro 150 mg daily and amlodipine 5 mg daily  - CBC with Auto Differential    3. Mixed hyperlipidemia  Controlled. Continue Lipitor 40 mg daily. Will check lipid panel today  - T4, Free  - TSH  - Lipid, Fasting    4. Mild intermittent asthma without complication  Controlled. Continue albuterol inhaler as needed    5. Mixed anxiety and depressive disorder  Stable on Cymbalta 60 mg daily plus BuSpar 30 mg daily    6. DOYLE on CPAP  Stable. Encouraged regular use of CPAP. 7. Restless legs syndrome (RLS)  Controlled. Continue Sinemet    8. Vitamin D deficiency  Controlled. Continue vitamin D supplement 5000 units daily. Will check vitamin D level today. - Vitamin D 25 Hydroxy      RTC in 3 mos.  AWV next visit    An electronic signature was used to authenticate this note.    --Aline Wolf MD on 8/16/2022 at 9:55 AM

## 2022-08-17 LAB
BASOPHILS ABSOLUTE: 0.1 K/UL (ref 0–0.2)
BASOPHILS RELATIVE PERCENT: 1.1 %
EOSINOPHILS ABSOLUTE: 0.2 K/UL (ref 0–0.6)
EOSINOPHILS RELATIVE PERCENT: 2.9 %
ESTIMATED AVERAGE GLUCOSE: 139.9 MG/DL
HBA1C MFR BLD: 6.5 %
HCT VFR BLD CALC: 29.3 % (ref 36–48)
HEMATOLOGY PATH CONSULT: NORMAL
HEMATOLOGY PATH CONSULT: YES
HEMOGLOBIN: 8.8 G/DL (ref 12–16)
HYPOCHROMIA: ABNORMAL
LYMPHOCYTES ABSOLUTE: 1.3 K/UL (ref 1–5.1)
LYMPHOCYTES RELATIVE PERCENT: 19.6 %
MCH RBC QN AUTO: 20.7 PG (ref 26–34)
MCHC RBC AUTO-ENTMCNC: 30 G/DL (ref 31–36)
MCV RBC AUTO: 68.9 FL (ref 80–100)
MICROCYTES: ABNORMAL
MONOCYTES ABSOLUTE: 0.7 K/UL (ref 0–1.3)
MONOCYTES RELATIVE PERCENT: 10.4 %
NEUTROPHILS ABSOLUTE: 4.4 K/UL (ref 1.7–7.7)
NEUTROPHILS RELATIVE PERCENT: 66 %
OVALOCYTES: ABNORMAL
PDW BLD-RTO: 17.5 % (ref 12.4–15.4)
PLATELET # BLD: 452 K/UL (ref 135–450)
PMV BLD AUTO: 7.2 FL (ref 5–10.5)
RBC # BLD: 4.26 M/UL (ref 4–5.2)
TSH SERPL DL<=0.05 MIU/L-ACNC: 1.26 UIU/ML (ref 0.27–4.2)
WBC # BLD: 6.7 K/UL (ref 4–11)

## 2022-08-17 NOTE — PROGRESS NOTES
[facility] ENDOSCOPY AND OUTPATIENT  PRE-PROCEDURE INSTRUCTIONS    Procedure date_08/19/2022________Arrival time___0800_________        Procedure time__0900__________       Screening questions for Pain procedures:  Do you have a current infection? ___no______  Are you currently taking an antibiotic? _no_____  Are you taking a blood thinner?___no_____    It is not necessary to stop eating or drinking prior to this procedure. We would like you to take your medications for blood pressure as usual.  You may be asked to stop blood thinners such as Coumadin, Plavix, Fragmin, Lovenox, etc., or any anti-inflammatories such as:  Aspirin, Ibuprofen, Advil, Naproxen prior to your procedure. We also ask that you stop any OTC medications that cause additional bleeding    You must make arrangements for a responsible adult to arrive with you and stay in our waiting area during your procedure. They will also need to take you home after your procedure. For your safety you will not be allowed to leave alone or drive yourself home. Also for your safety, it is strongly suggested that someone stay with you the first 24 hours after your procedure. For your comfort, please wear simple loose fitting clothing to the center. Please do not bring valuables. If you have a living will and a durable power of  for healthcare, please bring in a copy.     You will need to bring a photo ID and insurance card    Our goal is to provide you with excellent care so if you have any questions, please contact us at the [facility] at [phone]

## 2022-08-18 LAB — T4 FREE: 1 NG/DL (ref 0.9–1.8)

## 2022-08-19 ENCOUNTER — HOSPITAL ENCOUNTER (OUTPATIENT)
Age: 77
Setting detail: OUTPATIENT SURGERY
Discharge: HOME | End: 2022-08-19
Attending: ANESTHESIOLOGY | Admitting: ANESTHESIOLOGY
Payer: MEDICARE

## 2022-08-19 ENCOUNTER — APPOINTMENT (OUTPATIENT)
Dept: INTERVENTIONAL RADIOLOGY/VASCULAR | Age: 77
End: 2022-08-19
Attending: ANESTHESIOLOGY
Payer: MEDICARE

## 2022-08-19 VITALS
BODY MASS INDEX: 36.5 KG/M2 | DIASTOLIC BLOOD PRESSURE: 40 MMHG | WEIGHT: 169.2 LBS | HEART RATE: 69 BPM | RESPIRATION RATE: 16 BRPM | SYSTOLIC BLOOD PRESSURE: 128 MMHG | OXYGEN SATURATION: 100 % | TEMPERATURE: 97 F | HEIGHT: 57 IN

## 2022-08-19 PROCEDURE — 6360000002 HC RX W HCPCS: Performed by: ANESTHESIOLOGY

## 2022-08-19 PROCEDURE — 3610000054 HC PAIN LEVEL 3 BASE (NON-OR): Performed by: ANESTHESIOLOGY

## 2022-08-19 PROCEDURE — 2709999900 HC NON-CHARGEABLE SUPPLY: Performed by: ANESTHESIOLOGY

## 2022-08-19 PROCEDURE — 6360000004 HC RX CONTRAST MEDICATION: Performed by: ANESTHESIOLOGY

## 2022-08-19 RX ORDER — METHYLPREDNISOLONE ACETATE 80 MG/ML
INJECTION, SUSPENSION INTRA-ARTICULAR; INTRALESIONAL; INTRAMUSCULAR; SOFT TISSUE
Status: COMPLETED | OUTPATIENT
Start: 2022-08-19 | End: 2022-08-19

## 2022-08-19 ASSESSMENT — PAIN DESCRIPTION - ORIENTATION
ORIENTATION: RIGHT
ORIENTATION: RIGHT

## 2022-08-19 ASSESSMENT — PAIN DESCRIPTION - LOCATION
LOCATION: HIP
LOCATION: HIP

## 2022-08-19 ASSESSMENT — PAIN SCALES - GENERAL
PAINLEVEL_OUTOF10: 0
PAINLEVEL_OUTOF10: 0

## 2022-08-19 ASSESSMENT — PAIN DESCRIPTION - DESCRIPTORS: DESCRIPTORS: ACHING;SHARP

## 2022-08-19 NOTE — H&P
Patient:  Albin Aviles  YOB: 1945  Medical Record #:  [id number]   Place: [address] [facility]  Date:  8/19/2022   Physician:  Talisha Martinez MD    History Obtained From: electronic medical record    HISTORY OF PRESENT ILLNESS    Past Medical History:        Diagnosis Date    Asthma     Benign tumor lacrimal gland     Chronic back pain     Greater trochanteric bursitis of left hip     Hyperlipidemia     Hypertension     DOYLE     Uses CPAP    Pseudophakia     RLS (restless legs syndrome)     Type II or unspecified type diabetes mellitus without mention of complication, not stated as uncontrolled     Vitamin D deficiency      Past Surgical History:        Procedure Laterality Date    BACK INJECTION Bilateral 5/27/2022    BILATERAL SACROILIAC JOINT INJECTION performed by Jeimy Parada MD at [address] [facility] [address]  [id number] [place] [address] INJECTION PROCEDURE FOR SACROILIAC JOINT Right 10/2/2019    RIGHT SACROILIAC JOINT INJECTION WITH FLUOROSCOPY performed by Todd Peña MD at [address] (CERVIX STATUS UNKNOWN)      NASAL SEPTUM SURGERY      OTHER SURGICAL HISTORY      fatty tumors on arms excised    PAIN MANAGEMENT PROCEDURE Right 5/13/2022    LUMBAR EPIDURAL STEROID INJECTION - RIGHT L3-4 performed by Jeimy Parada MD at [address] Bilateral 7/1/2022    BILATERAL TWO LEVEL LUMBAR MEDIAL BRANCH BLOCKS AT L4-5 AND L5-S1 performed by Jeimy Parada MD at [address] [facility]       Medications Prior to Admission:   No current facility-administered medications on file prior to encounter. Current Outpatient Medications on File Prior to Encounter   Medication Sig Dispense Refill    acetaminophen-codeine (TYLENOL #3) 300-30 MG per tablet Take 1 tablet by mouth every 8 hours as needed for Pain for up to 30 days.  90 tablet 0    DULoxetine (CYMBALTA) 60 MG

## 2022-08-19 NOTE — DISCHARGE INSTRUCTIONS
[facility] [place]   [address] [place], [address] [place] [address]   [address] [facility]  [address]  [phone]      Patient:  Albin Aviles  YOB: 1945  Medical Record #:  [id number]   Place: [address] [place] [address]  Date:  8/19/2022   Physician:  Talisha Martinez MD    Procedure Performed: [unfilled]     Discharge Instructions    Notify Pain Management Services if any of the following occur:    Redness/Swelling at the injection site lasting longer than 2 days  Fever (with redness, swelling, or drainage at the injection site)  Drainage at the injection site  New weakness/ numbness  Severe headache   Loss of bowel/ bladder function    General Instructions: You may experience numbness for several hours following your treatment. You should be cautious using those areas which are numb. Once the numbness wears off, you may apply ice or heat to injection site, if needed. Do not return to work or drive today    Rest today and return to normal activities tomorrow. On average, the steroid takes about 1 week to work and can be up to 2 weeks    You should continue to depend on your primary physician for your medical management of conditions not related to your pain management treatment. Continue to take all your other medications, including blood thinners, as directed by your primary physician unless otherwise instructed. NO changes have been made to your medications. Any changes listed on the discharge are based on patient self reporting. Any EMR warnings regarding drug/drug interactions were dismissed based on current use by the patient, management by prescribing physician and pharmacist and not managed by this physician. Any problem which relates specifically to a treatment or procedure performed today should be directed to the [facility].        Kendell Sal of Interventional Pain Management  [address] [place], [place] [address] [facility] [address], [address] [place] [address]  [phone]

## 2022-08-19 NOTE — OP NOTE
Patient:  Kraig Cockayne  YOB: 1945  Medical Record #:  [id number]   Place: [address] [facility]  Date:  8/19/2022   Physician:  Gale Grigsby MD      PRE-PROCEDURE DIAGNOSIS: M54.16    POST-PROCEDURE DIAGNOSIS: M54.16    PROCEDURE:  Midline interlaminar right  L4-5 epidural steroid injection with fluoroscopy and epidurography. BRIEF HISTORY:  The patient presents today to [facility] for a scheduled lumbar epidural steroid injection procedure. The patient was re-evaluated today and is clinically unchanged as compared to my previous evaluation. The patient is clinically stable to proceed with the procedure. PROCEDURE NOTE:  The procedure was again explained to the patient and the previously distributed pre-procedure literature was reviewed. The options, rationale, and benefits of the procedure including pain relief, functional improvement, and increased mobility, as well as the risks of the procedure including but not limited to infection, bleeding, paresthesia, pain, failure to relieve pain, increased pain, headache, allergic reaction, neurologic impairment, local anesthetic, toxicity, and side effects and the potential side effects of corticosteroids were discussed with the patient and informed written consent was obtained from the patient. The patient was positioned in the prone position on the fluoroscopy table. The skin overlying the lumbosacral vertebrae was prepped using Chloraprep and draped in the usual sterile fashion. The L4-5 lumbar intervertebral level was identified using intermittent AP fluoroscopy. The previously identified projection of overlying skin was anesthetized using 2 cc of buffered 1% lidocaine with a 27 gauge needle. A 4.25\" 22g Touhy needle was advanced through a small skin nick in the AP view towards the interlaminar and epidural space. The epidural space was easily identified using loss of resistance to saline.   No difficulty, paresthesia or occurrence of pain was encountered. Careful aspiration was negative for CSF and blood. A total of 1 cc Isovue 300 was injected yielding an epidurogram.    FLUOROSCOPY:  A fluoroscopy unit was utilized to obtain fluoroscopic images for intra-procedural use and assistance. Fluoroscopy was utilized to identify anatomic and radiographic landmarks for the accompanying procedure guidance and not for diagnostic purposes. After negative aspiration 4 cc of therapeutic injectate containing 1 cc of Depo-Medrol 80 mg/cc and 3 ml of lidocaine 1% was injected slowly in aliquots while clinically observing and monitoring the patient with negative aspiration demonstrated between aliquots of injections. At this time no paresthesia or occurrence of pain was present. The needle was then removed, the area was cleansed and a Band-Aid was placed over the injection site. There were no complications. The patient tolerated the procedure well. The procedure was performed using local anesthesia. The patient was transferred by wheelchair with accompaniment to the  Recovery Area and was monitored per protocol. The vital signs remained stable. The patient was discharged in stable condition accompanied by an escort with written instructions after fulfilling the standard discharge criteria. Written follow up instructions were given to the patient.     Estimated Blood Loss: 0ml    Plan:  Follow up in 6-8 weeks      Office: [phone]

## 2022-08-20 ENCOUNTER — HOSPITAL ENCOUNTER (OUTPATIENT)
Dept: MRI IMAGING | Age: 77
Discharge: HOME | End: 2022-08-20
Payer: MEDICARE

## 2022-08-20 DIAGNOSIS — M54.14 RADICULOPATHY OF THORACIC REGION: ICD-10-CM

## 2022-08-20 PROCEDURE — 72146 MRI CHEST SPINE W/O DYE: CPT

## 2022-08-29 LAB
CREAT SERPL-MCNC: 0.6 MG/DL (ref 0.6–1.2)
GFR AFRICAN AMERICAN: >60
GFR NON-AFRICAN AMERICAN: >60

## 2022-08-31 NOTE — PROGRESS NOTES
[facility] ENDOSCOPY AND OUTPATIENT  PRE-PROCEDURE INSTRUCTIONS    Procedure date_09/02/2022________Arrival time_____0830_______        Procedure time___0930_________       Screening questions for Pain procedures:  Do you have a current infection? _NO________  Are you currently taking an antibiotic?___NO___  Are you taking a blood thinner?___NO_____    It is not necessary to stop eating or drinking prior to this procedure. We would like you to take your medications for blood pressure as usual.  You may be asked to stop blood thinners such as Coumadin, Plavix, Fragmin, Lovenox, etc., or any anti-inflammatories such as:  Aspirin, Ibuprofen, Advil, Naproxen prior to your procedure. We also ask that you stop any OTC medications that cause additional bleeding    You must make arrangements for a responsible adult to arrive with you and stay in our waiting area during your procedure. They will also need to take you home after your procedure. For your safety you will not be allowed to leave alone or drive yourself home. Also for your safety, it is strongly suggested that someone stay with you the first 24 hours after your procedure. For your comfort, please wear simple loose fitting clothing to the center. Please do not bring valuables. If you have a living will and a durable power of  for healthcare, please bring in a copy.     You will need to bring a photo ID and insurance card    Our goal is to provide you with excellent care so if you have any questions, please contact us at the [facility] at [phone]

## 2022-09-01 DIAGNOSIS — G89.4 CHRONIC PAIN SYNDROME: ICD-10-CM

## 2022-09-01 RX ORDER — ACETAMINOPHEN AND CODEINE PHOSPHATE 300; 30 MG/1; MG/1
1 TABLET ORAL EVERY 8 HOURS PRN
Qty: 90 TABLET | Refills: 0 | Status: SHIPPED | OUTPATIENT
Start: 2022-09-01 | End: 2022-10-18 | Stop reason: SDUPTHER

## 2022-09-02 ENCOUNTER — HOSPITAL ENCOUNTER (OUTPATIENT)
Age: 77
Setting detail: OUTPATIENT SURGERY
Discharge: HOME | End: 2022-09-02
Attending: ANESTHESIOLOGY | Admitting: ANESTHESIOLOGY
Payer: MEDICARE

## 2022-09-02 ENCOUNTER — APPOINTMENT (OUTPATIENT)
Dept: INTERVENTIONAL RADIOLOGY/VASCULAR | Age: 77
End: 2022-09-02
Attending: ANESTHESIOLOGY
Payer: MEDICARE

## 2022-09-02 VITALS
HEART RATE: 67 BPM | RESPIRATION RATE: 16 BRPM | HEIGHT: 58 IN | OXYGEN SATURATION: 95 % | DIASTOLIC BLOOD PRESSURE: 63 MMHG | TEMPERATURE: 97.4 F | BODY MASS INDEX: 35.48 KG/M2 | WEIGHT: 169 LBS | SYSTOLIC BLOOD PRESSURE: 141 MMHG

## 2022-09-02 PROCEDURE — 2500000003 HC RX 250 WO HCPCS: Performed by: ANESTHESIOLOGY

## 2022-09-02 PROCEDURE — 3610000054 HC PAIN LEVEL 3 BASE (NON-OR): Performed by: ANESTHESIOLOGY

## 2022-09-02 PROCEDURE — 6360000002 HC RX W HCPCS: Performed by: ANESTHESIOLOGY

## 2022-09-02 PROCEDURE — 2709999900 HC NON-CHARGEABLE SUPPLY: Performed by: ANESTHESIOLOGY

## 2022-09-02 RX ORDER — LIDOCAINE HYDROCHLORIDE 10 MG/ML
INJECTION, SOLUTION EPIDURAL; INFILTRATION; INTRACAUDAL; PERINEURAL
Status: COMPLETED | OUTPATIENT
Start: 2022-09-02 | End: 2022-09-02

## 2022-09-02 RX ORDER — METHYLPREDNISOLONE ACETATE 80 MG/ML
INJECTION, SUSPENSION INTRA-ARTICULAR; INTRALESIONAL; INTRAMUSCULAR; SOFT TISSUE
Status: COMPLETED | OUTPATIENT
Start: 2022-09-02 | End: 2022-09-02

## 2022-09-02 RX ORDER — BUPIVACAINE HYDROCHLORIDE 5 MG/ML
INJECTION, SOLUTION EPIDURAL; INTRACAUDAL
Status: COMPLETED | OUTPATIENT
Start: 2022-09-02 | End: 2022-09-02

## 2022-09-02 ASSESSMENT — PAIN - FUNCTIONAL ASSESSMENT
PAIN_FUNCTIONAL_ASSESSMENT: PREVENTS OR INTERFERES SOME ACTIVE ACTIVITIES AND ADLS
PAIN_FUNCTIONAL_ASSESSMENT: 0-10

## 2022-09-02 ASSESSMENT — PAIN SCALES - GENERAL
PAINLEVEL_OUTOF10: 0
PAINLEVEL_OUTOF10: 0

## 2022-09-02 ASSESSMENT — PAIN DESCRIPTION - DESCRIPTORS: DESCRIPTORS: ACHING

## 2022-09-02 NOTE — OP NOTE
Patient:  Terris Hamman  YOB: 1945  Medical Record #:  [id number]   Place: [address] 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 BRIEF HISTORY:  The patient presents today to [facility] for a scheduled sciatic nerve block procedure. The patient was re-evaluated today and is clinically unchanged as compared to my previous evaluation. The patient is clinically stable to proceed with the procedure. PROCEDURE NOTE:  The procedure was again explained to the patient and the previously distributed pre-procedure literature was reviewed. The options, rationale, and benefits of the procedure including pain relief, functional improvement, and increased mobility, as well as the risks of the procedure including but not limited to infection, bleeding, paresthesia, pain, failure to relieve pain, increased pain, headache, allergic reaction, neurologic impairment, local anesthetic, toxicity, and side effects and the potential side effects of corticosteroids were discussed with the patient and informed written consent was obtained from the patient. The patient was brought to the fluoroscopy suite and placed in the prone position. The RIGHT sacral and gluteal area was prepped in the usual sterile fashion with Chloraprep and then draped. Intermittent AP fluoroscopy was utilized to localize the radiographic landmarks. A standard perisciatic nerve block approach was used. The sciatic notch, at its superolateral border was localized at its junction with the ilium rostral to the acetabulum and lateral to the SI joint and lateral to the sciatic nerve.  It was localized at the radiographic marker of the overlying sciatic nerve and piriformis muscle obetween the lateral aspect of the sacrum and the greater trochanter. The superficial skin projection was anesthetized with buffered lidocaine 1% 2 cc using a 27-gauge needle. A spinal needle was then inserted slowly under direct intermittent AP fluoroscopy towards the ilium and the overlying piriformis muscle. Negative aspiration was re-demonstrated and therapeutic injectate consisting of 6 cc of lidocaine 1%, 1 cc of bupivacaine 0.5% and 1 cc of Depo-Medrol 40 mg/cc was injected without pain, paresthesia, difficulty, or complaints. The needle was removed, the area cleansed, a Band-Aid placed over the injection site. Patient tolerated the procedure well. There were no complications. The patient was transferred, by wheelchair or stretcher, with accompaniment to the recovery area where the vital signs remained stable. There was sensory or motor blockade in the lower extremity. This procedure was done using local anesthesia only. The patient was discharged in stable condition accompanied with an escort after fulfilling standard discharge criteria. FLUOROSCOPY:  A fluoroscopy unit was utilized to obtain fluoroscopic images for intra-procedural use and assistance. Fluoroscopy was utilized to identify anatomic and radiographic landmarks for the accompanying procedure guidance and not for diagnostic purposes.       Estimated Blood Loss: 0ml      Plan:  Follow up in 4-6 weeks      Office: [phone]

## 2022-09-02 NOTE — DISCHARGE INSTRUCTIONS
[facility]   [address]  [phone]      Patient:  Ildefonso Lawrence  YOB: 1945  Medical Record #:  [id number]   Place: [address] [place] [address]  Date:  9/2/2022   Physician:  Sid Vernon MD    Procedure Performed: [unfilled]     Discharge Instructions    Notify Pain Management Services if any of the following occur:    Redness/Swelling at the injection site lasting longer than 2 days  Fever (with redness, swelling, or drainage at the injection site)  Drainage at the injection site  New weakness/ numbness  Severe headache   Loss of bowel/ bladder function    General Instructions: You may experience numbness for several hours following your treatment. You should be cautious using those areas which are numb. Once the numbness wears off, you may apply ice or heat to injection site, if needed. Do not return to work or drive today    Rest today and return to normal activities tomorrow. On average, the steroid takes about 1 week to work and can be up to 2 weeks    You should continue to depend on your primary physician for your medical management of conditions not related to your pain management treatment. Continue to take all your other medications, including blood thinners, as directed by your primary physician unless otherwise instructed. NO changes have been made to your medications. Any changes listed on the discharge are based on patient self reporting. Any EMR warnings regarding drug/drug interactions were dismissed based on current use by the patient, management by prescribing physician and pharmacist and not managed by this physician. Any problem which relates specifically to a treatment or procedure performed today should be directed to the [facility].        Kendell Sal

## 2022-09-02 NOTE — H&P
Patient:  Faby Fernandez  YOB: 1945  Medical Record #:  [id number]   Place: [address] [facility]  Date:  9/2/2022   Physician:  Pavan Stewart MD    History Obtained From: electronic medical record    HISTORY OF PRESENT ILLNESS    Past Medical History:        Diagnosis Date    Asthma     Benign tumor lacrimal gland     Chronic back pain     Greater trochanteric bursitis of left hip     Hyperlipidemia     Hypertension     DOYLE     Uses CPAP    Pseudophakia     RLS (restless legs syndrome)     Type II or unspecified type diabetes mellitus without mention of complication, not stated as uncontrolled     Vitamin D deficiency      Past Surgical History:        Procedure Laterality Date    BACK INJECTION Bilateral 5/27/2022    BILATERAL SACROILIAC JOINT INJECTION performed by Casie Luque MD at [id number] [place] [address] INJECTION PROCEDURE FOR SACROILIAC JOINT Right 10/2/2019    RIGHT SACROILIAC JOINT INJECTION WITH FLUOROSCOPY performed by Oscar Handley MD at [address] [facility] [address] (CERVIX STATUS UNKNOWN)      NASAL SEPTUM SURGERY      OTHER SURGICAL HISTORY      fatty tumors on arms excised    PAIN MANAGEMENT PROCEDURE Right 5/13/2022    LUMBAR EPIDURAL STEROID INJECTION - RIGHT L3-4 performed by Casie Luque MD at [address] Bilateral 7/1/2022    BILATERAL TWO LEVEL LUMBAR MEDIAL BRANCH BLOCKS AT L4-5 AND L5-S1 performed by Casie Luque MD at [address] Right 8/19/2022    LUMBAR EPIDURAL STEROID INJECTION - RIGHT L4-5 performed by Casie Luque MD at [address] [facility]       Medications Prior to Admission:   No current facility-administered medications on file prior to encounter.      Current Outpatient Medications on File Prior to Encounter   Medication Sig Dispense Refill    DULoxetine (CYMBALTA) 60 MG Transportation Needs: Not on file   Physical Activity: Not on file   Stress: Not on file   Social Connections: Not on file   Intimate Partner Violence: Not on file   Housing Stability: Not on file     Family History   Problem Relation Age of Onset    Cancer Mother         multiple myeloma    Heart Failure Mother     Hypertension Mother     Cancer Father         head and neck    Heart Failure Father     Hypertension Father     Heart Failure Sister     Hypertension Sister     Heart Failure Brother     Hypertension Brother          PHYSICAL EXAM:      BP 95/73   Pulse 84   Temp 97.3 °F (36.3 °C) (Temporal)   Resp 16   Ht 4' 10\" (1.473 m)   Wt 169 lb (76.7 kg)   SpO2 97%   BMI 35.32 kg/m²  I            ASSESSMENT AND PLAN:    1. Procedure. options, risks and benefits reviewed with patient and expresses understanding.

## 2022-09-02 NOTE — PROGRESS NOTES
Pt up x2 assist. Pt able to bear weight. Pt unable to take steps at this time. Pt instructed to call for assistance, will continue to monitor.

## 2022-09-02 NOTE — PROGRESS NOTES
Pt up with assist x2. Pt tolerated taking several steps with minimal assist. Family out to bring car to discharge door.

## 2022-09-14 ENCOUNTER — HOSPITAL ENCOUNTER (OUTPATIENT)
Dept: MRI IMAGING | Age: 77
Discharge: HOME | End: 2022-09-14
Payer: MEDICARE

## 2022-09-14 DIAGNOSIS — M54.14 RADICULOPATHY, THORACIC REGION: ICD-10-CM

## 2022-09-14 DIAGNOSIS — G95.0 SYRINX OF SPINAL CORD (HCC): ICD-10-CM

## 2022-09-14 PROCEDURE — 72157 MRI CHEST SPINE W/O & W/DYE: CPT

## 2022-09-14 PROCEDURE — A9577 INJ MULTIHANCE: HCPCS | Performed by: ANESTHESIOLOGY

## 2022-09-14 PROCEDURE — 6360000004 HC RX CONTRAST MEDICATION: Performed by: ANESTHESIOLOGY

## 2022-09-14 RX ADMIN — GADOBENATE DIMEGLUMINE 15 ML: 529 INJECTION, SOLUTION INTRAVENOUS at 15:25

## 2022-09-14 NOTE — PROGRESS NOTES
[facility] ENDOSCOPY AND OUTPATIENT  PRE-PROCEDURE INSTRUCTIONS    Procedure date__09/16/2022_______Arrival time___0830_________        Procedure time__0930__________       Screening questions for Pain procedures:  Do you have a current infection? __no_______  Are you currently taking an antibiotic?__no____  Are you taking a blood thinner?___no_____    It is not necessary to stop eating or drinking prior to this procedure. We would like you to take your medications for blood pressure as usual.  You may be asked to stop blood thinners such as Coumadin, Plavix, Fragmin, Lovenox, etc., or any anti-inflammatories such as:  Aspirin, Ibuprofen, Advil, Naproxen prior to your procedure. We also ask that you stop any OTC medications that cause additional bleeding    You must make arrangements for a responsible adult to arrive with you and stay in our waiting area during your procedure. They will also need to take you home after your procedure. For your safety you will not be allowed to leave alone or drive yourself home. Also for your safety, it is strongly suggested that someone stay with you the first 24 hours after your procedure. For your comfort, please wear simple loose fitting clothing to the center. Please do not bring valuables. If you have a living will and a durable power of  for healthcare, please bring in a copy.     You will need to bring a photo ID and insurance card    Our goal is to provide you with excellent care so if you have any questions, please contact us at the [facility] at [phone]

## 2022-09-16 ENCOUNTER — APPOINTMENT (OUTPATIENT)
Dept: INTERVENTIONAL RADIOLOGY/VASCULAR | Age: 77
End: 2022-09-16
Attending: ANESTHESIOLOGY
Payer: MEDICARE

## 2022-09-16 ENCOUNTER — HOSPITAL ENCOUNTER (OUTPATIENT)
Age: 77
Setting detail: OUTPATIENT SURGERY
Discharge: HOME | End: 2022-09-16
Attending: ANESTHESIOLOGY | Admitting: ANESTHESIOLOGY
Payer: MEDICARE

## 2022-09-16 VITALS
DIASTOLIC BLOOD PRESSURE: 62 MMHG | HEART RATE: 74 BPM | WEIGHT: 172.6 LBS | OXYGEN SATURATION: 95 % | RESPIRATION RATE: 18 BRPM | SYSTOLIC BLOOD PRESSURE: 146 MMHG | BODY MASS INDEX: 36.23 KG/M2 | HEIGHT: 58 IN | TEMPERATURE: 96.4 F

## 2022-09-16 PROCEDURE — 3610000054 HC PAIN LEVEL 3 BASE (NON-OR): Performed by: ANESTHESIOLOGY

## 2022-09-16 PROCEDURE — 2709999900 HC NON-CHARGEABLE SUPPLY: Performed by: ANESTHESIOLOGY

## 2022-09-16 PROCEDURE — 2500000003 HC RX 250 WO HCPCS: Performed by: ANESTHESIOLOGY

## 2022-09-16 RX ORDER — NAPROXEN 500 MG/1
500 TABLET ORAL 2 TIMES DAILY WITH MEALS
Qty: 180 TABLET | Refills: 1 | Status: SHIPPED | OUTPATIENT
Start: 2022-09-16

## 2022-09-16 RX ORDER — BUPIVACAINE HYDROCHLORIDE 5 MG/ML
INJECTION, SOLUTION EPIDURAL; INTRACAUDAL
Status: COMPLETED | OUTPATIENT
Start: 2022-09-16 | End: 2022-09-16

## 2022-09-16 RX ORDER — LIDOCAINE HYDROCHLORIDE 10 MG/ML
INJECTION, SOLUTION EPIDURAL; INFILTRATION; INTRACAUDAL; PERINEURAL
Status: COMPLETED | OUTPATIENT
Start: 2022-09-16 | End: 2022-09-16

## 2022-09-16 ASSESSMENT — PAIN DESCRIPTION - LOCATION: LOCATION: BACK

## 2022-09-16 ASSESSMENT — PAIN SCALES - GENERAL
PAINLEVEL_OUTOF10: 0
PAINLEVEL_OUTOF10: 1

## 2022-09-16 ASSESSMENT — PAIN DESCRIPTION - DESCRIPTORS
DESCRIPTORS: ACHING;SHOOTING
DESCRIPTORS: ACHING

## 2022-09-16 ASSESSMENT — PAIN DESCRIPTION - ORIENTATION: ORIENTATION: LOWER;MID

## 2022-09-16 NOTE — H&P
Patient:  Candace Abrams  YOB: 1945  Medical Record #:  [id number]   Place: [facility]  Date:  9/16/2022   Physician:  Lien Hunt MD    History Obtained From: electronic medical record    HISTORY OF PRESENT ILLNESS    Past Medical History:        Diagnosis Date    Asthma     Benign tumor lacrimal gland     Chronic back pain     Greater trochanteric bursitis of left hip     Hyperlipidemia     Hypertension     DOYLE     Uses CPAP    Pseudophakia     RLS (restless legs syndrome)     Type II or unspecified type diabetes mellitus without mention of complication, not stated as uncontrolled     Vitamin D deficiency      Past Surgical History:        Procedure Laterality Date    BACK INJECTION Bilateral 5/27/2022    BILATERAL SACROILIAC JOINT INJECTION performed by Tran Elliott MD at [address] [facility]  [id number]    [place] [facility] INJECTION PROCEDURE FOR SACROILIAC JOINT Right 10/2/2019    RIGHT SACROILIAC JOINT INJECTION WITH FLUOROSCOPY performed by Angie Santos MD at [address] [facility] ([facility])      NASAL SEPTUM SURGERY      NERVE BLOCK Right 9/2/2022    RIGHT SCIATIC NERVE BLOCK (DEFINE) performed by Tran Elliott MD at [facility] tumors on arms excised    PAIN MANAGEMENT PROCEDURE Right 5/13/2022    LUMBAR EPIDURAL STEROID INJECTION - RIGHT L3-4 performed by Tran Elliott MD at [address] Bilateral 7/1/2022    BILATERAL TWO LEVEL LUMBAR MEDIAL BRANCH BLOCKS AT L4-5 AND L5-S1 performed by Tran Elliott MD at [address] Right 8/19/2022    LUMBAR EPIDURAL STEROID INJECTION - RIGHT L4-5 performed by Tran Elliott MD at [address] [facility]       Medications Prior to Admission:   No current facility-administered medications on file prior to encounter. Current Outpatient Medications on File Prior to Encounter   Medication Sig Dispense Refill    acetaminophen-codeine (TYLENOL #3) 300-30 MG per tablet Take 1 tablet by mouth every 8 hours as needed for Pain for up to 30 days. 90 tablet 0    DULoxetine (CYMBALTA) 60 MG extended release capsule Take 1 capsule by mouth in the morning. 90 capsule 1    atorvastatin (LIPITOR) 40 MG tablet Take 1 tablet by mouth daily 90 tablet 1    metFORMIN (GLUCOPHAGE) 1000 MG tablet Take 1 tablet by mouth 2 times daily (with meals) 180 tablet 1    naproxen (NAPROSYN) 500 MG tablet Take 1 tablet by mouth 2 times daily (with meals) 180 tablet 1    irbesartan (AVAPRO) 150 MG tablet Take 1 tablet by mouth daily 90 tablet 1    busPIRone (BUSPAR) 30 MG tablet Take 30 mg by mouth 2 times daily 180 tablet 1    carbidopa-levodopa (SINEMET)  MG per tablet Take 2 tablets by mouth 4 times daily 720 tablet 1    pantoprazole (PROTONIX) 40 MG tablet Take 1 tablet by mouth daily 90 tablet 1    amLODIPine (NORVASC) 5 MG tablet Take 1 tablet by mouth daily 90 tablet 1    gabapentin (NEURONTIN) 600 MG tablet TAKE 1 TABLET BY MOUTH  TWICE DAILY 180 tablet 3    Cholecalciferol (VITAMIN D3) 5000 units CAPS Take 1 capsule by mouth daily 90 capsule 3    aspirin 81 MG tablet Take 81 mg by mouth daily. Allergies:  Meloxicam, Quinine derivatives, Codeine, Quinine, and Vicodin [hydrocodone-acetaminophen]  Social History     Socioeconomic History    Marital status:      Spouse name: Not on file    Number of children: Not on file    Years of education: Not on file    Highest education level: Not on file   Occupational History    Not on file   Tobacco Use    Smoking status: Former     Packs/day: 2.00     Years: 36.00     Pack years: 72.00     Types: Cigarettes     Quit date: 1999     Years since quittin.4    Smokeless tobacco: Never   Vaping Use    Vaping Use: Never used   Substance and Sexual Activity    Alcohol use:  No Alcohol/week: 0.0 standard drinks    Drug use: No    Sexual activity: Not Currently   Other Topics Concern    Not on file   Social History Narrative    Not on file     Social Determinants of Health     Financial Resource Strain: Not on file   Food Insecurity: Not on file   Transportation Needs: Not on file   Physical Activity: Not on file   Stress: Not on file   Social Connections: Not on file   Intimate Partner Violence: Not on file   Housing Stability: Not on file     Family History   Problem Relation Age of Onset    Cancer Mother         multiple myeloma    Heart Failure Mother     Hypertension Mother     Cancer Father         head and neck    Heart Failure Father     Hypertension Father     Heart Failure Sister     Hypertension Sister     Heart Failure Brother     Hypertension Brother          PHYSICAL EXAM:      Ht 4' 10\" (1.473 m)   Wt 169 lb (76.7 kg)   BMI 35.32 kg/m²  I            ASSESSMENT AND PLAN:    1. Procedure. options, risks and benefits reviewed with patient and expresses understanding.

## 2022-09-16 NOTE — PROGRESS NOTES
Pt assist x2 with much improvement in ambulation. Pt and Pt's  comfortable with discharge to home.   Pt's  can assist if needed and no step into home

## 2022-09-16 NOTE — DISCHARGE INSTRUCTIONS
[facility]   [address] [place], [address] [place] [address]   1300 [facility]  18 [facility]  989 [facility], [address] [place] [address]  [phone]      Patient:  Berkley Bumpers  YOB: 1945  Medical Record #:  [id number]   Place: [address] [place] [address]  Date:  9/16/2022   Physician:  Kalyn Swartz MD    Procedure Performed: [unfilled]     Discharge Instructions    Notify Pain Management Services if any of the following occur:    Redness/Swelling at the injection site lasting longer than 2 days  Fever (with redness, swelling, or drainage at the injection site)  Drainage at the injection site  New weakness/ numbness  Severe headache   Loss of bowel/ bladder function    General Instructions: You may experience numbness for several hours following your treatment. You should be cautious using those areas which are numb. Once the numbness wears off, you may apply ice or heat to injection site, if needed. Do not return to work or drive today    Rest today and return to normal activities tomorrow. On average, the steroid takes about 1 week to work and can be up to 2 weeks    You should continue to depend on your primary physician for your medical management of conditions not related to your pain management treatment. Continue to take all your other medications, including blood thinners, as directed by your primary physician unless otherwise instructed. NO changes have been made to your medications. Any changes listed on the discharge are based on patient self reporting. Any EMR warnings regarding drug/drug interactions were dismissed based on current use by the patient, management by prescribing physician and pharmacist and not managed by this physician. Any problem which relates specifically to a treatment or procedure performed today should be directed to the [facility].        Kendell Sal of Interventional Pain Management  1000 [facility], [address] [place] [address], [address] [facility] [address]  [phone]

## 2022-09-16 NOTE — PROGRESS NOTES
Pt with L leg numbness/weakness upon attempting ambulation x2 assist.  Will re-evaluate within 10-15 min  Pt's  with pt at this time

## 2022-09-16 NOTE — OP NOTE
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 BRIEF HISTORY:  The patient presents today to [facility] for a scheduled sciatic nerve block procedure. The patient was re-evaluated today and is clinically unchanged as compared to my previous evaluation. The patient is clinically stable to proceed with the procedure. PROCEDURE NOTE:  The procedure was again explained to the patient and the previously distributed pre-procedure literature was reviewed. The options, rationale, and benefits of the procedure including pain relief, functional improvement, and increased mobility, as well as the risks of the procedure including but not limited to infection, bleeding, paresthesia, pain, failure to relieve pain, increased pain, headache, allergic reaction, neurologic impairment, local anesthetic, toxicity, and side effects and the potential side effects of corticosteroids were discussed with the patient and informed written consent was obtained from the patient. The patient was brought to the fluoroscopy suite and placed in the prone position. The LEFT sacral and gluteal area was prepped in the usual sterile fashion with Chloraprep and then draped. Intermittent AP fluoroscopy was utilized to localize the radiographic landmarks. A standard perisciatic nerve block approach was used. The sciatic notch, at its superolateral border was localized at its junction with the ilium rostral to the acetabulum and lateral to the SI joint and lateral to the sciatic nerve.  It was localized at the radiographic marker of the overlying sciatic nerve and piriformis muscle obetween the lateral aspect of the sacrum and the greater trochanter. The superficial skin projection was anesthetized with buffered lidocaine 1% 2 cc using a 27-gauge needle. A spinal needle was then inserted slowly under direct intermittent AP fluoroscopy towards the ilium and the overlying piriformis muscle. Negative aspiration was re-demonstrated and therapeutic injectate consisting of 6 cc of lidocaine 1%, 1 cc of bupivacaine 0.5% and 1 cc of Depo-Medrol 40 mg/cc was injected without pain, paresthesia, difficulty, or complaints. The needle was removed, the area cleansed, a Band-Aid placed over the injection site. Patient tolerated the procedure well. There were no complications. The patient was transferred, by wheelchair or stretcher, with accompaniment to the recovery area where the vital signs remained stable. There was sensory or motor blockade in the lower extremity. This procedure was done using local anesthesia only. The patient was discharged in stable condition accompanied with an escort after fulfilling standard discharge criteria. FLUOROSCOPY:  A fluoroscopy unit was utilized to obtain fluoroscopic images for intra-procedural use and assistance. Fluoroscopy was utilized to identify anatomic and radiographic landmarks for the accompanying procedure guidance and not for diagnostic purposes.       Estimated Blood Loss: 0ml      Plan:  Follow up in 4-6 weeks      Office: [phone]

## 2022-10-01 RX ORDER — IRBESARTAN 150 MG/1
150 TABLET ORAL DAILY
Qty: 90 TABLET | Refills: 1 | Status: SHIPPED | OUTPATIENT
Start: 2022-10-01

## 2022-10-10 ENCOUNTER — TELEPHONE (OUTPATIENT)
Dept: PRIMARY CARE CLINIC | Age: 77
End: 2022-10-10

## 2022-10-11 ENCOUNTER — OFFICE VISIT (OUTPATIENT)
Dept: PRIMARY CARE CLINIC | Age: 77
End: 2022-10-11
Payer: MEDICARE

## 2022-10-11 ENCOUNTER — OFFICE VISIT (OUTPATIENT)
Dept: PULMONOLOGY | Age: 77
End: 2022-10-11
Payer: MEDICARE

## 2022-10-11 VITALS
HEART RATE: 83 BPM | WEIGHT: 187 LBS | SYSTOLIC BLOOD PRESSURE: 156 MMHG | DIASTOLIC BLOOD PRESSURE: 55 MMHG | RESPIRATION RATE: 18 BRPM | OXYGEN SATURATION: 98 % | BODY MASS INDEX: 39.08 KG/M2

## 2022-10-11 VITALS
DIASTOLIC BLOOD PRESSURE: 60 MMHG | BODY MASS INDEX: 36.27 KG/M2 | HEIGHT: 58 IN | HEART RATE: 86 BPM | WEIGHT: 172.8 LBS | RESPIRATION RATE: 16 BRPM | TEMPERATURE: 96 F | OXYGEN SATURATION: 98 % | SYSTOLIC BLOOD PRESSURE: 131 MMHG

## 2022-10-11 DIAGNOSIS — Z99.89 OSA ON CPAP: Primary | ICD-10-CM

## 2022-10-11 DIAGNOSIS — F41.8 MIXED ANXIETY AND DEPRESSIVE DISORDER: Primary | ICD-10-CM

## 2022-10-11 DIAGNOSIS — Z99.89 OSA ON CPAP: ICD-10-CM

## 2022-10-11 DIAGNOSIS — G47.33 OSA ON CPAP: ICD-10-CM

## 2022-10-11 DIAGNOSIS — G25.81 RESTLESS LEG SYNDROME: ICD-10-CM

## 2022-10-11 DIAGNOSIS — G25.0 ESSENTIAL TREMOR: ICD-10-CM

## 2022-10-11 DIAGNOSIS — G47.33 OSA ON CPAP: Primary | ICD-10-CM

## 2022-10-11 DIAGNOSIS — J45.20 MILD INTERMITTENT ASTHMA WITHOUT COMPLICATION: ICD-10-CM

## 2022-10-11 PROCEDURE — G8417 CALC BMI ABV UP PARAM F/U: HCPCS | Performed by: FAMILY MEDICINE

## 2022-10-11 PROCEDURE — G8399 PT W/DXA RESULTS DOCUMENT: HCPCS | Performed by: INTERNAL MEDICINE

## 2022-10-11 PROCEDURE — 1036F TOBACCO NON-USER: CPT | Performed by: FAMILY MEDICINE

## 2022-10-11 PROCEDURE — 1123F ACP DISCUSS/DSCN MKR DOCD: CPT | Performed by: INTERNAL MEDICINE

## 2022-10-11 PROCEDURE — 99214 OFFICE O/P EST MOD 30 MIN: CPT | Performed by: FAMILY MEDICINE

## 2022-10-11 PROCEDURE — 3017F COLORECTAL CA SCREEN DOC REV: CPT | Performed by: INTERNAL MEDICINE

## 2022-10-11 PROCEDURE — G8484 FLU IMMUNIZE NO ADMIN: HCPCS | Performed by: FAMILY MEDICINE

## 2022-10-11 PROCEDURE — 3017F COLORECTAL CA SCREEN DOC REV: CPT | Performed by: FAMILY MEDICINE

## 2022-10-11 PROCEDURE — G8399 PT W/DXA RESULTS DOCUMENT: HCPCS | Performed by: FAMILY MEDICINE

## 2022-10-11 PROCEDURE — G8427 DOCREV CUR MEDS BY ELIG CLIN: HCPCS | Performed by: FAMILY MEDICINE

## 2022-10-11 PROCEDURE — 1123F ACP DISCUSS/DSCN MKR DOCD: CPT | Performed by: FAMILY MEDICINE

## 2022-10-11 PROCEDURE — 99213 OFFICE O/P EST LOW 20 MIN: CPT | Performed by: INTERNAL MEDICINE

## 2022-10-11 PROCEDURE — 1036F TOBACCO NON-USER: CPT | Performed by: INTERNAL MEDICINE

## 2022-10-11 PROCEDURE — G8427 DOCREV CUR MEDS BY ELIG CLIN: HCPCS | Performed by: INTERNAL MEDICINE

## 2022-10-11 PROCEDURE — G8484 FLU IMMUNIZE NO ADMIN: HCPCS | Performed by: INTERNAL MEDICINE

## 2022-10-11 PROCEDURE — G8417 CALC BMI ABV UP PARAM F/U: HCPCS | Performed by: INTERNAL MEDICINE

## 2022-10-11 PROCEDURE — 1090F PRES/ABSN URINE INCON ASSESS: CPT | Performed by: INTERNAL MEDICINE

## 2022-10-11 PROCEDURE — 1090F PRES/ABSN URINE INCON ASSESS: CPT | Performed by: FAMILY MEDICINE

## 2022-10-11 SDOH — ECONOMIC STABILITY: FOOD INSECURITY: WITHIN THE PAST 12 MONTHS, THE FOOD YOU BOUGHT JUST DIDN'T LAST AND YOU DIDN'T HAVE MONEY TO GET MORE.: NEVER TRUE

## 2022-10-11 SDOH — ECONOMIC STABILITY: FOOD INSECURITY: WITHIN THE PAST 12 MONTHS, YOU WORRIED THAT YOUR FOOD WOULD RUN OUT BEFORE YOU GOT MONEY TO BUY MORE.: NEVER TRUE

## 2022-10-11 ASSESSMENT — SLEEP AND FATIGUE QUESTIONNAIRES
HOW LIKELY ARE YOU TO NOD OFF OR FALL ASLEEP WHILE SITTING INACTIVE IN A PUBLIC PLACE: 0
HOW LIKELY ARE YOU TO NOD OFF OR FALL ASLEEP WHEN YOU ARE A PASSENGER IN A CAR FOR AN HOUR WITHOUT A BREAK: 1
HOW LIKELY ARE YOU TO NOD OFF OR FALL ASLEEP WHILE LYING DOWN TO REST IN THE AFTERNOON WHEN CIRCUMSTANCES PERMIT: 0
HOW LIKELY ARE YOU TO NOD OFF OR FALL ASLEEP WHILE WATCHING TV: 1
HOW LIKELY ARE YOU TO NOD OFF OR FALL ASLEEP WHILE SITTING AND TALKING TO SOMEONE: 0
HOW LIKELY ARE YOU TO NOD OFF OR FALL ASLEEP IN A CAR, WHILE STOPPED FOR A FEW MINUTES IN TRAFFIC: 0
HOW LIKELY ARE YOU TO NOD OFF OR FALL ASLEEP WHILE SITTING QUIETLY AFTER LUNCH WITHOUT ALCOHOL: 0
ESS TOTAL SCORE: 3
HOW LIKELY ARE YOU TO NOD OFF OR FALL ASLEEP WHILE SITTING AND READING: 1

## 2022-10-11 ASSESSMENT — ENCOUNTER SYMPTOMS
BLOOD IN STOOL: 0
DIARRHEA: 0
SHORTNESS OF BREATH: 0
ABDOMINAL PAIN: 0
CONSTIPATION: 0

## 2022-10-11 NOTE — PROGRESS NOTES
10/11/2022     Gali Lilly (:  1945) is a 78 y.o. female, here for evaluation of the following medical concerns:    Patient presented to the office for checkup. Her  reported that patient is restless at home, legs moving constantly trouble sleeping, poor concentration. She has restless leg syndrome and been taking Sinemet every 4 hours. She also have anxiety with depression and the takes Cymbalta 60 mg daily and BuSpar 30 mg daily. But despite psychotropic medication patient reported that she still anxious and worry a lot, thinking about his brother who has a heart problem, worried about his , worried about a mother-in-law and her own children. .  She denies headache nausea vomiting denies chest pain or shortness of breath denies bowel or urinary disturbance. She has a chronic pain due to lumbar disc disease and reported that steroid injection in the back helped somewhat and he would like to go back to the pain management. Review of Systems   Constitutional:  Negative for activity change and appetite change. Eyes:  Negative for visual disturbance. Respiratory:  Negative for shortness of breath. Cardiovascular:  Negative for chest pain and leg swelling. Gastrointestinal:  Negative for abdominal pain, blood in stool, constipation and diarrhea. Genitourinary:  Negative for difficulty urinating, frequency, hematuria, menstrual problem and urgency. Neurological:  Negative for dizziness and syncope. Psychiatric/Behavioral:  Negative for behavioral problems. Prior to Visit Medications    Medication Sig Taking? Authorizing Provider   irbesartan (AVAPRO) 150 MG tablet Take 1 tablet by mouth daily  Radha Lockwood MD   naproxen (NAPROSYN) 500 MG tablet Take 1 tablet by mouth 2 times daily (with meals)  Radha Lockwood MD   DULoxetine (CYMBALTA) 60 MG extended release capsule Take 1 capsule by mouth in the morning.   Radha Lockwood MD   atorvastatin (LIPITOR) 40 MG tablet Take 1 tablet by mouth daily  Nunu Logan MD   metFORMIN (GLUCOPHAGE) 1000 MG tablet Take 1 tablet by mouth 2 times daily (with meals)  Nunu Logan MD   busPIRone (BUSPAR) 30 MG tablet Take 30 mg by mouth 2 times daily  Nunu Logan MD   carbidopa-levodopa (SINEMET)  MG per tablet Take 2 tablets by mouth 4 times daily  Nunu Logan MD   pantoprazole (PROTONIX) 40 MG tablet Take 1 tablet by mouth daily  Nunu Logan MD   amLODIPine (NORVASC) 5 MG tablet Take 1 tablet by mouth daily  Nunu Logan MD   gabapentin (NEURONTIN) 600 MG tablet TAKE 1 TABLET BY MOUTH  TWICE DAILY  Nunu Logan MD   Cholecalciferol (VITAMIN D3) 5000 units CAPS Take 1 capsule by mouth daily  Nunu Logan MD   aspirin 81 MG tablet Take 81 mg by mouth daily. Historical Provider, MD        Social History     Tobacco Use    Smoking status: Former     Packs/day: 2.00     Years: 36.00     Pack years: 72.00     Types: Cigarettes     Quit date: 1999     Years since quittin.4    Smokeless tobacco: Never   Substance Use Topics    Alcohol use: No     Alcohol/week: 0.0 standard drinks        Vitals:    10/11/22 1459   BP: (!) 156/55   Pulse: 83   Resp: 18   SpO2: 98%   Weight: 187 lb (84.8 kg)     Estimated body mass index is 39.08 kg/m² as calculated from the following:    Height as of an earlier encounter on 10/11/22: 4' 10\" (1.473 m). Weight as of this encounter: 187 lb (84.8 kg). Physical Exam  Constitutional:       Appearance: She is well-developed. HENT:      Head: Normocephalic. Right Ear: External ear normal.      Nose: Nose normal.   Eyes:      Conjunctiva/sclera: Conjunctivae normal.      Pupils: Pupils are equal, round, and reactive to light. Neck:      Thyroid: No thyromegaly. Cardiovascular:      Rate and Rhythm: Normal rate and regular rhythm. Heart sounds: Normal heart sounds. No murmur heard. No friction rub.    Pulmonary:      Effort: Pulmonary effort is normal.      Breath sounds: Normal breath sounds. Abdominal:      General: Bowel sounds are normal.      Palpations: Abdomen is soft. There is no mass. Musculoskeletal:         General: Normal range of motion. Cervical back: Normal range of motion and neck supple. Lymphadenopathy:      Cervical: No cervical adenopathy. Skin:     General: Skin is warm. Findings: No rash. Neurological:      Mental Status: She is alert and oriented to person, place, and time. ASSESSMENT/PLAN:  1. Mixed anxiety and depressive disorder  Refer to Dr Dafne Kelly. for counseling She was also advised to see a psychiatrist at [address] [place] [address],[address] or [address] but she declined. Continue Cymbalta 60 mg daily and BuSpar 30 mg twice daily    2. Restless leg syndrome/ 3. Essential tremor  Continue Sinemet  mg 2 tablets every 4 hours. Refer to neurologist for further evaluation and treatment  - Liliana Barbosa MD, Neurology, [facility]    4. DOYLE on CPAP  Was recently seen by pulmonologist today and the her CPAP setting was adjusted.       RTC in 2 mos and PRN    An electronic signature was used to authenticate this note.    --Jazmín Lawrence MD on 10/11/2022 at 3:53 PM

## 2022-10-11 NOTE — PROGRESS NOTES
Chief complaint  This is a 78y.o. year old female  who comes to see me with a chief complaint of   Chief Complaint   Patient presents with    Follow-up     Sleep    . HPI  Follow up on DOYLE.  and asthma    Machine:  Patient is using a APAP 10-20 machine. Average usage is 7 hrs 54 min 00 sec. She is meeting 4 or more hours per night 97% of the time. Average AHI is 1.5. Sleeping well for the most part. Breathing is fine. Has albuterol nebs to use when needed. Usually needs it when she has bronchitis which is rare    Stressed with family issues and is going to be seeing a psychiatrist.          Sleep Medicine 10/11/2022 10/8/2021 1/13/2021 8/19/2019 2/25/2019 8/22/2018 8/14/2017   Sitting and reading 1 1 2 1 1 2 2   Watching TV 1 0 2 1 1 1 1   Sitting, inactive in a public place (e.g. a theatre or a meeting) 0 0 2 1 1 1 2   As a passenger in a car for an hour without a break 1 0 2 2 1 1 1   Lying down to rest in the afternoon when circumstances permit 0 0 1 3 2 2 2   Sitting and talking to someone 0 0 0 1 0 0 0   Sitting quietly after a lunch without alcohol 0 0 1 1 0 0 0   In a car, while stopped for a few minutes in traffic 0 0 0 0 0 0 0   Albin Sleepiness Score 3 1 10 10 6 7 8   Neck circumference (Inches) - - - - - 17.5 19         Current Outpatient Medications   Medication Sig Dispense Refill    irbesartan (AVAPRO) 150 MG tablet Take 1 tablet by mouth daily 90 tablet 1    naproxen (NAPROSYN) 500 MG tablet Take 1 tablet by mouth 2 times daily (with meals) 180 tablet 1    DULoxetine (CYMBALTA) 60 MG extended release capsule Take 1 capsule by mouth in the morning.  90 capsule 1    atorvastatin (LIPITOR) 40 MG tablet Take 1 tablet by mouth daily 90 tablet 1    metFORMIN (GLUCOPHAGE) 1000 MG tablet Take 1 tablet by mouth 2 times daily (with meals) 180 tablet 1    busPIRone (BUSPAR) 30 MG tablet Take 30 mg by mouth 2 times daily 180 tablet 1    carbidopa-levodopa (SINEMET)  MG per tablet

## 2022-10-12 RX ORDER — BUSPIRONE HYDROCHLORIDE 30 MG/1
30 TABLET ORAL 2 TIMES DAILY
Qty: 180 TABLET | Refills: 1 | Status: SHIPPED | OUTPATIENT
Start: 2022-10-12

## 2022-10-17 ENCOUNTER — PATIENT MESSAGE (OUTPATIENT)
Dept: PRIMARY CARE CLINIC | Age: 77
End: 2022-10-17

## 2022-10-17 ENCOUNTER — TELEPHONE (OUTPATIENT)
Dept: PRIMARY CARE CLINIC | Age: 77
End: 2022-10-17

## 2022-10-18 DIAGNOSIS — G89.4 CHRONIC PAIN SYNDROME: ICD-10-CM

## 2022-10-18 RX ORDER — ACETAMINOPHEN AND CODEINE PHOSPHATE 300; 30 MG/1; MG/1
1 TABLET ORAL EVERY 8 HOURS PRN
Qty: 90 TABLET | Refills: 0 | Status: SHIPPED | OUTPATIENT
Start: 2022-10-18 | End: 2022-11-17

## 2022-10-18 NOTE — TELEPHONE ENCOUNTER
From: Ariane Lilly  To: Dr. Christie Orn: 10/17/2022 6:32 PM EDT  Subject: Tylenol    Dr. Robert Charles I need my Tylenol 3 called in. I'm sorry for being laie again.  Thanks

## 2022-11-04 RX ORDER — AMLODIPINE BESYLATE 5 MG/1
5 TABLET ORAL DAILY
Qty: 90 TABLET | Refills: 1 | Status: SHIPPED | OUTPATIENT
Start: 2022-11-04

## 2022-11-09 NOTE — PROGRESS NOTES
[facility] ENDOSCOPY AND OUTPATIENT  PRE-PROCEDURE INSTRUCTIONS    Procedure date___11/11/2022______Arrival time___0815_________        Procedure time_____0915_______       Screening questions for Pain procedures:  Do you have a current infection? _no________  Are you currently taking an antibiotic?__no____  Are you taking a blood thinner?________no    It is not necessary to stop eating or drinking prior to this procedure. We would like you to take your medications for blood pressure as usual.  You may be asked to stop blood thinners such as Coumadin, Plavix, Fragmin, Lovenox, etc., or any anti-inflammatories such as:  Aspirin, Ibuprofen, Advil, Naproxen prior to your procedure. We also ask that you stop any OTC medications that cause additional bleeding    You must make arrangements for a responsible adult to arrive with you and stay in our waiting area during your procedure. They will also need to take you home after your procedure. For your safety you will not be allowed to leave alone or drive yourself home. Also for your safety, it is strongly suggested that someone stay with you the first 24 hours after your procedure. For your comfort, please wear simple loose fitting clothing to the center. Please do not bring valuables. If you have a living will and a durable power of  for healthcare, please bring in a copy.     You will need to bring a photo ID and insurance card    Our goal is to provide you with excellent care so if you have any questions, please contact us at the [facility] at [phone]

## 2022-11-11 ENCOUNTER — HOSPITAL ENCOUNTER (OUTPATIENT)
Age: 77
Setting detail: OUTPATIENT SURGERY
Discharge: HOME | End: 2022-11-11
Attending: ANESTHESIOLOGY | Admitting: ANESTHESIOLOGY
Payer: MEDICARE

## 2022-11-11 ENCOUNTER — APPOINTMENT (OUTPATIENT)
Dept: INTERVENTIONAL RADIOLOGY/VASCULAR | Age: 77
End: 2022-11-11
Attending: ANESTHESIOLOGY
Payer: MEDICARE

## 2022-11-11 VITALS
DIASTOLIC BLOOD PRESSURE: 51 MMHG | RESPIRATION RATE: 18 BRPM | TEMPERATURE: 96.5 F | HEART RATE: 71 BPM | SYSTOLIC BLOOD PRESSURE: 99 MMHG | BODY MASS INDEX: 36.86 KG/M2 | OXYGEN SATURATION: 100 % | HEIGHT: 58 IN | WEIGHT: 175.6 LBS

## 2022-11-11 PROCEDURE — 3610000056 HC PAIN LEVEL 4 BASE (NON-OR): Performed by: ANESTHESIOLOGY

## 2022-11-11 PROCEDURE — 6360000002 HC RX W HCPCS: Performed by: ANESTHESIOLOGY

## 2022-11-11 PROCEDURE — 2500000003 HC RX 250 WO HCPCS: Performed by: ANESTHESIOLOGY

## 2022-11-11 PROCEDURE — 2709999900 HC NON-CHARGEABLE SUPPLY: Performed by: ANESTHESIOLOGY

## 2022-11-11 RX ORDER — METHYLPREDNISOLONE ACETATE 80 MG/ML
INJECTION, SUSPENSION INTRA-ARTICULAR; INTRALESIONAL; INTRAMUSCULAR; SOFT TISSUE
Status: COMPLETED | OUTPATIENT
Start: 2022-11-11 | End: 2022-11-11

## 2022-11-11 RX ORDER — LIDOCAINE HYDROCHLORIDE 10 MG/ML
INJECTION, SOLUTION EPIDURAL; INFILTRATION; INTRACAUDAL; PERINEURAL
Status: COMPLETED | OUTPATIENT
Start: 2022-11-11 | End: 2022-11-11

## 2022-11-11 RX ORDER — BUPIVACAINE HYDROCHLORIDE 5 MG/ML
INJECTION, SOLUTION EPIDURAL; INTRACAUDAL
Status: COMPLETED | OUTPATIENT
Start: 2022-11-11 | End: 2022-11-11

## 2022-11-11 ASSESSMENT — PAIN SCALES - GENERAL
PAINLEVEL_OUTOF10: 0
PAINLEVEL_OUTOF10: 0

## 2022-11-11 ASSESSMENT — PAIN - FUNCTIONAL ASSESSMENT: PAIN_FUNCTIONAL_ASSESSMENT: 0-10

## 2022-11-11 NOTE — H&P
Patient:  Clyde Cornejo  YOB: 1945  Medical Record #:  [id number]   Place: [address] [facility]  Date:  11/11/2022   Physician:  Jessica Smith MD    History Obtained From: electronic medical record    HISTORY OF PRESENT ILLNESS    Past Medical History:        Diagnosis Date    Asthma     Benign tumor lacrimal gland     Chronic back pain     Greater trochanteric bursitis of left hip     Hyperlipidemia     Hypertension     DOYLE     Uses CPAP    Pseudophakia     RLS (restless legs syndrome)     Type II or unspecified type diabetes mellitus without mention of complication, not stated as uncontrolled     Vitamin D deficiency      Past Surgical History:        Procedure Laterality Date    BACK INJECTION Bilateral 5/27/2022    BILATERAL SACROILIAC JOINT INJECTION performed by Gena James MD at [address] INJECTION PROCEDURE FOR SACROILIAC JOINT Right 10/2/2019    RIGHT SACROILIAC JOINT INJECTION WITH FLUOROSCOPY performed by Kathrene Severs, MD at [address] [facility] [address] ([address] [facility])      [place] [id number] Right 9/2/2022    RIGHT SCIATIC NERVE BLOCK (DEFINE) performed by Gena James MD at [address] [facility] [address] Left 9/16/2022    LEFT SCIATIC NERVE BLOCK performed by Gena James MD at [address] [facility]      fatty tumors on arms excised    PAIN MANAGEMENT PROCEDURE Right 5/13/2022    LUMBAR EPIDURAL STEROID INJECTION - RIGHT L3-4 performed by Gena James MD at [address] Bilateral 7/1/2022    BILATERAL TWO LEVEL LUMBAR MEDIAL BRANCH BLOCKS AT L4-5 AND L5-S1 performed by Gena James MD at [address] Right 8/19/2022    LUMBAR EPIDURAL STEROID INJECTION - RIGHT L4-5 performed by Gena James MD at [facility] ADENOIDECTOMY       Medications Prior to Admission:   No current facility-administered medications on file prior to encounter. Current Outpatient Medications on File Prior to Encounter   Medication Sig Dispense Refill    amLODIPine (NORVASC) 5 MG tablet Take 1 tablet by mouth daily 90 tablet 1    acetaminophen-codeine (TYLENOL #3) 300-30 MG per tablet Take 1 tablet by mouth every 8 hours as needed for Pain for up to 30 days. 90 tablet 0    busPIRone (BUSPAR) 30 MG tablet Take 30 mg by mouth 2 times daily 180 tablet 1    irbesartan (AVAPRO) 150 MG tablet Take 1 tablet by mouth daily 90 tablet 1    naproxen (NAPROSYN) 500 MG tablet Take 1 tablet by mouth 2 times daily (with meals) 180 tablet 1    DULoxetine (CYMBALTA) 60 MG extended release capsule Take 1 capsule by mouth in the morning. 90 capsule 1    atorvastatin (LIPITOR) 40 MG tablet Take 1 tablet by mouth daily 90 tablet 1    metFORMIN (GLUCOPHAGE) 1000 MG tablet Take 1 tablet by mouth 2 times daily (with meals) 180 tablet 1    carbidopa-levodopa (SINEMET)  MG per tablet Take 2 tablets by mouth 4 times daily 720 tablet 1    pantoprazole (PROTONIX) 40 MG tablet Take 1 tablet by mouth daily 90 tablet 1    gabapentin (NEURONTIN) 600 MG tablet TAKE 1 TABLET BY MOUTH  TWICE DAILY 180 tablet 3    Cholecalciferol (VITAMIN D3) 5000 units CAPS Take 1 capsule by mouth daily 90 capsule 3    aspirin 81 MG tablet Take 81 mg by mouth daily.        Allergies:  Meloxicam, Quinine derivatives, Codeine, Quinine, and Vicodin [hydrocodone-acetaminophen]  Social History     Socioeconomic History    Marital status:      Spouse name: Not on file    Number of children: Not on file    Years of education: Not on file    Highest education level: Not on file   Occupational History    Not on file   Tobacco Use    Smoking status: Former     Packs/day: 2.00     Years: 36.00     Pack years: 72.00     Types: Cigarettes     Quit date: 1999     Years since quittin.5 Smokeless tobacco: Never   Vaping Use    Vaping Use: Never used   Substance and Sexual Activity    Alcohol use: No     Alcohol/week: 0.0 standard drinks    Drug use: No    Sexual activity: Not Currently   Other Topics Concern    Not on file   Social History Narrative    Not on file     Social Determinants of Health     Financial Resource Strain: Not on file   Food Insecurity: No Food Insecurity    Worried About Running Out of Food in the Last Year: Never true    Ran Out of Food in the Last Year: Never true   Transportation Needs: Not on file   Physical Activity: Not on file   Stress: Not on file   Social Connections: Not on file   Intimate Partner Violence: Not on file   Housing Stability: Not on file     Family History   Problem Relation Age of Onset    Cancer Mother         multiple myeloma    Heart Failure Mother     Hypertension Mother     Cancer Father         head and neck    Heart Failure Father     Hypertension Father     Heart Failure Sister     Hypertension Sister     Heart Failure Brother     Hypertension Brother          PHYSICAL EXAM:      Ht 4' 10\" (1.473 m)   Wt 160 lb (72.6 kg)   BMI 33.44 kg/m²  I            ASSESSMENT AND PLAN:    1. Procedure. options, risks and benefits reviewed with patient and expresses understanding.

## 2022-11-11 NOTE — DISCHARGE INSTRUCTIONS
[facility]   [address] [place], [address] [place] [address]   [address], [address] [place] [address]  [phone]      Patient:  Cleopatra Bryant  YOB: 1945  Medical Record #:  [id number]   Place: [address] [place] [address]  Date:  11/11/2022   Physician:  Ольга Bowden MD    Procedure Performed: [unfilled]     Discharge Instructions    Notify Pain Management Services if any of the following occur:    Redness/Swelling at the injection site lasting longer than 2 days  Fever (with redness, swelling, or drainage at the injection site)  Drainage at the injection site  New weakness/ numbness  Severe headache   Loss of bowel/ bladder function    General Instructions: You may experience numbness for several hours following your treatment. You should be cautious using those areas which are numb. Once the numbness wears off, you may apply ice or heat to injection site, if needed. Do not return to work or drive today    Rest today and return to normal activities tomorrow. On average, the steroid takes about 1 week to work and can be up to 2 weeks    You should continue to depend on your primary physician for your medical management of conditions not related to your pain management treatment. Continue to take all your other medications, including blood thinners, as directed by your primary physician unless otherwise instructed. NO changes have been made to your medications. Any changes listed on the discharge are based on patient self reporting. Any EMR warnings regarding drug/drug interactions were dismissed based on current use by the patient, management by prescribing physician and pharmacist and not managed by this physician. Any problem which relates specifically to a treatment or procedure performed today should be directed to the [facility].        Kendell Sal of Interventional Pain Management  [address] [facility], [address] [place] [address], [address] [facility] [address]  [phone]

## 2022-11-11 NOTE — OP NOTE
Patient:  Hay Block  YOB: 1945  Medical Record #:  [id number]   Place: [address] [facility]  Date:  11/11/2022   Physician:  Hassell Cockayne, MD [facility] JOINT INJECTION    PRE-PROCEDURE DIAGNOSIS: bilateral sacroiliac joint generated pain (M53.3)    POST-PROCEDURE DIAGNOSIS:  bilateral sacroiliac joint generated pain (M53.3)    PROCEDURE:  bilateral sacroiliac joint injection with fluoroscopy. BRIEF HISTORY:  The patient returns today to the [facility] for scheduled SI joint injection procedure. The patient is clinically unchanged from my previous evaluation. The procedure was explained to the patient, and the previously distributed pre-procedure literature was reviewed. The options, rationale and benefits of the procedure, including functional improvement, pain relief, and increased mobility, as well as the risks of the procedure including but not limited to infection, bleeding, paresthesia, pain, failure to relieve pain, increased pain, headache, allergic reaction and neurologic impairment were discussed with the patient. Risks of corticosteroids were discussed with the patient and informed written consent was obtained from the patient. PROCEDURE NOTE:  The patient was positioned prone on the fluoroscopy table. The bilateral sacroiliac joint was identified using AP fluoroscopy. The skin overlying thesacroiliac joint was widely prepped with chloraprep and draped in the usual sterile fashion. A slight oblique and inferioroblique projection was utilized to superimpose both the posterior and anterior lucency of the sacroiliac joint. A 3.5  inch 22 gauge spinal needle was advanced in the AP view towards the caudal medial aspect of the sacroiliac joint. This was done under fluoroscopic guidance. Entry into the joint capsule was felt as well as verified via fluoroscopy in multiple views.   Careful aspiration was negative for CSF and blood prior to that injection and afterwards. A total of 3 cc of injectate was injected. The injectate contained 1 cc of depomedrol 40 mg per cc and 2 cc of Bupivacaine 0.5%. The injectate was injected in small increments while monitoring the patient. The patient tolerated the procedure well. There were no complications. The patient complained of no paresthesia during the injection. The needle was removed, the area was cleansed, and a Band-Aid was placed over the injection site. There were no complications. The patient tolerated the procedure well. The procedure was performed using local anesthesia. The patient was transferred with accompaniment to the ecovery area where the vital signs remained stable. The patient was discharged accompanied with an escort with written instructions after fulfilling standard discharge criteria.   Follow-up instructions were given to the patient and are as follows:      Estimated Blood Loss: 0ml      Plan:  Follow up in 4-6 weeks    Office: [phone]

## 2022-11-18 ENCOUNTER — PATIENT MESSAGE (OUTPATIENT)
Dept: PRIMARY CARE CLINIC | Age: 77
End: 2022-11-18

## 2022-11-21 NOTE — TELEPHONE ENCOUNTER
From: Ora Lilly  To: Dr. Kearns House: 11/18/2022 4:47 PM EST  Subject: Hi Doctor or Pancho Jama,    Can the doctor give me something to help added with the Duloxitine? It wears off in the evening and when I get upset which is quite often can't sleep because I'm upset. He seems to wait until close to bedtime on purpose to pick an argument.     Thanks, Sabrina Naylor

## 2022-11-22 RX ORDER — BUPROPION HYDROCHLORIDE 150 MG/1
TABLET, EXTENDED RELEASE ORAL
Qty: 180 TABLET | Refills: 3 | OUTPATIENT
Start: 2022-11-22

## 2022-11-22 NOTE — TELEPHONE ENCOUNTER
Buproprion was changed to Duloxetine on 6/17/22. .. OV note:       1. Mixed anxiety and depressive disorder / 2. Positive depression screening  Will change to Wellbutrin to Cymbalta 30 mg daily titrate up to 60 mg next visit. Continue BuSpar 30 mg twice daily.   Advise psychotherapy or referral to psychiatrist.

## 2022-11-26 RX ORDER — GABAPENTIN 600 MG/1
600 TABLET ORAL 2 TIMES DAILY
Qty: 180 TABLET | Refills: 1 | Status: SHIPPED | OUTPATIENT
Start: 2022-11-26 | End: 2023-11-26

## 2022-11-26 RX ORDER — PANTOPRAZOLE SODIUM 40 MG/1
40 TABLET, DELAYED RELEASE ORAL DAILY
Qty: 90 TABLET | Refills: 1 | Status: SHIPPED | OUTPATIENT
Start: 2022-11-26

## 2022-11-26 RX ORDER — CARBIDOPA/LEVODOPA 25MG-250MG
2 TABLET ORAL 4 TIMES DAILY
Qty: 720 TABLET | Refills: 1 | Status: SHIPPED | OUTPATIENT
Start: 2022-11-26

## 2022-11-30 DIAGNOSIS — G89.4 CHRONIC PAIN SYNDROME: ICD-10-CM

## 2022-11-30 RX ORDER — ACETAMINOPHEN AND CODEINE PHOSPHATE 300; 30 MG/1; MG/1
1 TABLET ORAL EVERY 8 HOURS PRN
Qty: 90 TABLET | Refills: 0 | Status: SHIPPED | OUTPATIENT
Start: 2022-11-30 | End: 2022-12-30

## 2022-12-01 DIAGNOSIS — E11.9 TYPE 2 DIABETES MELLITUS WITHOUT COMPLICATION, WITHOUT LONG-TERM CURRENT USE OF INSULIN (HCC): ICD-10-CM

## 2022-12-01 DIAGNOSIS — E78.2 MIXED HYPERLIPIDEMIA: ICD-10-CM

## 2022-12-02 RX ORDER — ATORVASTATIN CALCIUM 40 MG/1
40 TABLET, FILM COATED ORAL DAILY
Qty: 90 TABLET | Refills: 1 | Status: SHIPPED | OUTPATIENT
Start: 2022-12-02

## 2022-12-10 ENCOUNTER — HOSPITAL ENCOUNTER (EMERGENCY)
Age: 77
Discharge: HOME | End: 2022-12-10
Attending: EMERGENCY MEDICINE
Payer: MEDICARE

## 2022-12-10 ENCOUNTER — APPOINTMENT (OUTPATIENT)
Dept: CT IMAGING | Age: 77
End: 2022-12-10
Payer: MEDICARE

## 2022-12-10 ENCOUNTER — APPOINTMENT (OUTPATIENT)
Dept: GENERAL RADIOLOGY | Age: 77
End: 2022-12-10
Payer: MEDICARE

## 2022-12-10 VITALS
OXYGEN SATURATION: 90 % | RESPIRATION RATE: 28 BRPM | DIASTOLIC BLOOD PRESSURE: 52 MMHG | SYSTOLIC BLOOD PRESSURE: 112 MMHG | TEMPERATURE: 98.6 F | HEART RATE: 90 BPM | BODY MASS INDEX: 37.58 KG/M2 | HEIGHT: 58 IN | WEIGHT: 179.01 LBS

## 2022-12-10 DIAGNOSIS — R25.1 TREMOR: ICD-10-CM

## 2022-12-10 DIAGNOSIS — R45.1 RESTLESSNESS: Primary | ICD-10-CM

## 2022-12-10 DIAGNOSIS — D64.9 ANEMIA, UNSPECIFIED TYPE: ICD-10-CM

## 2022-12-10 LAB
ANION GAP SERPL CALCULATED.3IONS-SCNC: 16 MMOL/L (ref 3–16)
BASOPHILS ABSOLUTE: 0.2 K/UL (ref 0–0.2)
BASOPHILS RELATIVE PERCENT: 1.4 %
BUN BLDV-MCNC: 18 MG/DL (ref 7–20)
CALCIUM SERPL-MCNC: 10 MG/DL (ref 8.3–10.6)
CHLORIDE BLD-SCNC: 103 MMOL/L (ref 99–110)
CO2: 19 MMOL/L (ref 21–32)
CREAT SERPL-MCNC: 0.9 MG/DL (ref 0.6–1.2)
EKG ATRIAL RATE: 90 BPM
EKG DIAGNOSIS: NORMAL
EKG P AXIS: 76 DEGREES
EKG P-R INTERVAL: 196 MS
EKG Q-T INTERVAL: 342 MS
EKG QRS DURATION: 80 MS
EKG QTC CALCULATION (BAZETT): 418 MS
EKG R AXIS: -29 DEGREES
EKG T AXIS: 83 DEGREES
EKG VENTRICULAR RATE: 90 BPM
EOSINOPHILS ABSOLUTE: 0.2 K/UL (ref 0–0.6)
EOSINOPHILS RELATIVE PERCENT: 1.9 %
GFR SERPL CREATININE-BSD FRML MDRD: >60 ML/MIN/{1.73_M2}
GLUCOSE BLD-MCNC: 117 MG/DL (ref 70–99)
HCT VFR BLD CALC: 26.3 % (ref 36–48)
HEMATOLOGY PATH CONSULT: NO
HEMOGLOBIN: 7.2 G/DL (ref 12–16)
LYMPHOCYTES ABSOLUTE: 2.2 K/UL (ref 1–5.1)
LYMPHOCYTES RELATIVE PERCENT: 17.7 %
MCH RBC QN AUTO: 18.2 PG (ref 26–34)
MCHC RBC AUTO-ENTMCNC: 27.5 G/DL (ref 31–36)
MCV RBC AUTO: 66.1 FL (ref 80–100)
MONOCYTES ABSOLUTE: 1.2 K/UL (ref 0–1.3)
MONOCYTES RELATIVE PERCENT: 9.6 %
NEUTROPHILS ABSOLUTE: 8.7 K/UL (ref 1.7–7.7)
NEUTROPHILS RELATIVE PERCENT: 69.4 %
PDW BLD-RTO: 18.7 % (ref 12.4–15.4)
PLATELET # BLD: 422 K/UL (ref 135–450)
PMV BLD AUTO: 7.2 FL (ref 5–10.5)
POTASSIUM REFLEX MAGNESIUM: 4.7 MMOL/L (ref 3.5–5.1)
PRO-BNP: 232 PG/ML (ref 0–449)
RBC # BLD: 3.97 M/UL (ref 4–5.2)
SODIUM BLD-SCNC: 138 MMOL/L (ref 136–145)
TROPONIN: <0.01 NG/ML
WBC # BLD: 12.6 K/UL (ref 4–11)

## 2022-12-10 PROCEDURE — 93005 ELECTROCARDIOGRAM TRACING: CPT | Performed by: EMERGENCY MEDICINE

## 2022-12-10 PROCEDURE — 84484 ASSAY OF TROPONIN QUANT: CPT

## 2022-12-10 PROCEDURE — 85025 COMPLETE CBC W/AUTO DIFF WBC: CPT

## 2022-12-10 PROCEDURE — 80048 BASIC METABOLIC PNL TOTAL CA: CPT

## 2022-12-10 PROCEDURE — 70450 CT HEAD/BRAIN W/O DYE: CPT

## 2022-12-10 PROCEDURE — 6360000002 HC RX W HCPCS: Performed by: EMERGENCY MEDICINE

## 2022-12-10 PROCEDURE — 83880 ASSAY OF NATRIURETIC PEPTIDE: CPT

## 2022-12-10 PROCEDURE — 71045 X-RAY EXAM CHEST 1 VIEW: CPT

## 2022-12-10 PROCEDURE — 99285 EMERGENCY DEPT VISIT HI MDM: CPT

## 2022-12-10 PROCEDURE — 93010 ELECTROCARDIOGRAM REPORT: CPT | Performed by: INTERNAL MEDICINE

## 2022-12-10 PROCEDURE — 96374 THER/PROPH/DIAG INJ IV PUSH: CPT

## 2022-12-10 RX ORDER — LORAZEPAM 2 MG/ML
2 INJECTION INTRAMUSCULAR ONCE
Status: COMPLETED | OUTPATIENT
Start: 2022-12-10 | End: 2022-12-10

## 2022-12-10 RX ORDER — HYDROXYZINE PAMOATE 25 MG/1
25 CAPSULE ORAL 3 TIMES DAILY PRN
Qty: 10 CAPSULE | Refills: 0 | Status: SHIPPED | OUTPATIENT
Start: 2022-12-10 | End: 2022-12-16 | Stop reason: SDUPTHER

## 2022-12-10 RX ADMIN — LORAZEPAM 2 MG: 2 INJECTION INTRAMUSCULAR; INTRAVENOUS at 04:23

## 2022-12-10 ASSESSMENT — PAIN DESCRIPTION - FREQUENCY: FREQUENCY: CONTINUOUS

## 2022-12-10 ASSESSMENT — PAIN SCALES - GENERAL: PAINLEVEL_OUTOF10: 7

## 2022-12-10 ASSESSMENT — ENCOUNTER SYMPTOMS
COLOR CHANGE: 0
EYE ITCHING: 0
ABDOMINAL PAIN: 0
VOMITING: 0
EYE DISCHARGE: 0
CONSTIPATION: 0
COUGH: 0
SHORTNESS OF BREATH: 0

## 2022-12-10 ASSESSMENT — PAIN DESCRIPTION - LOCATION: LOCATION: CHEST

## 2022-12-10 ASSESSMENT — PAIN DESCRIPTION - ONSET: ONSET: ON-GOING

## 2022-12-10 ASSESSMENT — PAIN DESCRIPTION - ORIENTATION: ORIENTATION: MID

## 2022-12-10 ASSESSMENT — PAIN DESCRIPTION - PAIN TYPE: TYPE: ACUTE PAIN

## 2022-12-10 ASSESSMENT — PAIN DESCRIPTION - DESCRIPTORS: DESCRIPTORS: ACHING

## 2022-12-10 NOTE — PROGRESS NOTES
Patient discharged home with daughter via private car. Patient discharged from ED in wheelchair, A&OX4. Discharge paperwork and prescriptions reviewed with patient and patient's daughter, all questions answered.

## 2022-12-10 NOTE — ED PROVIDER NOTES
I PERSONALLY SAW THE PATIENT AND PERFORMED A SUBSTANTIVE PORTION OF THE VISIT INCLUDING ALL ASPECTS OF THE MEDICAL DECISION MAKING PROCESS. [address] [facility]      Pt Name: Minnie Nelson  MRN: [id number]  Teresagfdeandre 1945  Date of evaluation: 12/10/2022  Provider: Efren Oneal MD    CHIEF COMPLAINT       Chief Complaint   Patient presents with    Chest Pain    Other     Restlessness        HISTORY OF PRESENT ILLNESS    Minnie Nelson is a 78 y.o. female who presents to the emergency department with restlessness and tremor. Has been going on for week. Saw her primary care doctor who started her on carbidopa. Endorses restlessness and tremor. Has been worsening. No chest pain or shortness of breath. Endorses pain throughout her extremities where her tremor is. Symptoms started spontaneously weeks ago. Have been progressing. Has not follow-up with neurology. No other associated symptoms. Nursing Notes were reviewed. Including nursing noted for FM, Surgical History, Past Medical History, Social History, vitals, and allergies; agree with all. REVIEW OF SYSTEMS       Review of Systems   Constitutional:  Negative for diaphoresis and unexpected weight change. HENT:  Negative for congestion and dental problem. Eyes:  Negative for discharge and itching. Respiratory:  Negative for cough and shortness of breath. Cardiovascular:  Negative for chest pain and leg swelling. Gastrointestinal:  Negative for abdominal pain, constipation and vomiting. Endocrine: Negative for cold intolerance and heat intolerance. Genitourinary:  Negative for vaginal bleeding, vaginal discharge and vaginal pain. Musculoskeletal:  Negative for neck pain and neck stiffness. Skin:  Negative for color change and pallor. Neurological:  Positive for tremors. Negative for weakness.    Psychiatric/Behavioral:  Negative for agitation and behavioral problems. The patient is nervous/anxious. Except as noted above the remainder of the review of systems was reviewed and negative.      PAST MEDICAL HISTORY     Past Medical History:   Diagnosis Date    Anxiety     Asthma     Benign tumor lacrimal gland     Chronic back pain     Greater trochanteric bursitis of left hip     Hyperlipidemia     Hypertension     DOYLE     Uses CPAP    Pseudophakia     RLS (restless legs syndrome)     Type II or unspecified type diabetes mellitus without mention of complication, not stated as uncontrolled     Vitamin D deficiency        SURGICAL HISTORY       Past Surgical History:   Procedure Laterality Date    BACK INJECTION Bilateral 5/27/2022    BILATERAL SACROILIAC JOINT INJECTION performed by Sophia Bae MD at [facility] Bilateral 11/11/2022    BILATERAL SACROILIAC JOINT INJECTION performed by Sophia Bae MD at [address] [facility]  [id number] [place] [address] INJECTION PROCEDURE FOR SACROILIAC JOINT Right 10/2/2019    RIGHT SACROILIAC JOINT INJECTION WITH FLUOROSCOPY performed by Jennifer Collier MD at [address] [facility] [address] ([address] [facility])      NASAL SEPTUM SURGERY      NERVE BLOCK Right 9/2/2022    RIGHT SCIATIC NERVE BLOCK (DEFINE) performed by Sophia Bae MD at [address] [facility] [address] Left 9/16/2022    LEFT SCIATIC NERVE BLOCK performed by Sophia Bae MD at [address] [facility] tumors on arms excised    PAIN MANAGEMENT PROCEDURE Right 5/13/2022    LUMBAR EPIDURAL STEROID INJECTION - RIGHT L3-4 performed by Sophia Bae MD at [address] Bilateral 7/1/2022    BILATERAL TWO LEVEL LUMBAR MEDIAL BRANCH BLOCKS AT L4-5 AND L5-S1 performed by Sophia Bae MD at [address] Right 8/19/2022    LUMBAR EPIDURAL STEROID INJECTION - RIGHT L4-5 performed by Crystal Burgess MD at [address]       Discharge Medication List as of 12/10/2022  5:28 AM        CONTINUE these medications which have NOT CHANGED    Details   metFORMIN (GLUCOPHAGE) 1000 MG tablet Take 1 tablet by mouth 2 times daily (with meals), Disp-180 tablet, R-1Normal      atorvastatin (LIPITOR) 40 MG tablet Take 1 tablet by mouth daily, Disp-90 tablet, R-1Normal      acetaminophen-codeine (TYLENOL #3) 300-30 MG per tablet Take 1 tablet by mouth every 8 hours as needed for Pain for up to 30 days. , Disp-90 tablet, R-0Normal      carbidopa-levodopa (SINEMET)  MG per tablet Take 2 tablets by mouth 4 times daily, Disp-720 tablet, R-1Normal      pantoprazole (PROTONIX) 40 MG tablet Take 1 tablet by mouth daily, Disp-90 tablet, R-1Normal      gabapentin (NEURONTIN) 600 MG tablet Take 1 tablet by mouth 2 times daily. , Disp-180 tablet, R-1Normal      BIOTIN PO Take by mouth dailyHistorical Med      FLUoxetine HCl (PROZAC PO) Take 60 mg by mouth dailyHistorical Med      amLODIPine (NORVASC) 5 MG tablet Take 1 tablet by mouth daily, Disp-90 tablet, R-1Normal      busPIRone (BUSPAR) 30 MG tablet Take 30 mg by mouth 2 times daily, Disp-180 tablet, R-1Normal      irbesartan (AVAPRO) 150 MG tablet Take 1 tablet by mouth daily, Disp-90 tablet, R-1Normal      naproxen (NAPROSYN) 500 MG tablet Take 1 tablet by mouth 2 times daily (with meals), Disp-180 tablet, R-1Requesting 1 year supplyNormal      DULoxetine (CYMBALTA) 60 MG extended release capsule Take 1 capsule by mouth in the morning., Disp-90 capsule, R-1Normal      Cholecalciferol (VITAMIN D3) 5000 units CAPS Take 1 capsule by mouth daily, Disp-90 capsule, R-3Normal      aspirin 81 MG tablet Take 81 mg by mouth daily. Historical Med             ALLERGIES     Meloxicam, Quinine derivatives, Codeine, Quinine, and Vicodin [hydrocodone-acetaminophen]    FAMILY HISTORY        Family History Problem Relation Age of Onset    Cancer Mother         multiple myeloma    Heart Failure Mother     Hypertension Mother     Cancer Father         head and neck    Heart Failure Father     Hypertension Father     Heart Failure Sister     Hypertension Sister     Heart Failure Brother     Hypertension Brother        SOCIAL HISTORY       Social History     Socioeconomic History    Marital status:    Tobacco Use    Smoking status: Former     Packs/day: 2.00     Years: 36.00     Pack years: 72.00     Types: Cigarettes     Quit date: 1999     Years since quittin.6    Smokeless tobacco: Never   Vaping Use    Vaping Use: Never used   Substance and Sexual Activity    Alcohol use: No     Alcohol/week: 0.0 standard drinks    Drug use: No    Sexual activity: Not Currently     Social Determinants of Health     Food Insecurity: No Food Insecurity    Worried About Running Out of Food in the Last Year: Never true    Ran Out of Food in the Last Year: Never true       PHYSICAL EXAM       ED Triage Vitals   BP Temp Temp Source Heart Rate Resp SpO2 Height Weight   12/10/22 0419 12/10/22 0419 12/10/22 0419 12/10/22 0419 12/10/22 0419 12/10/22 0419 12/10/22 0418 12/10/22 0419   (!) 118/57 98.6 °F (37 °C) Oral 99 25 96 % 4' 10\" (1.473 m) 179 lb 0.2 oz (81.2 kg)       Physical Exam  Vitals and nursing note reviewed. Constitutional:       General: She is not in acute distress. Appearance: She is well-developed. She is not ill-appearing, toxic-appearing or diaphoretic. HENT:      Head: Normocephalic and atraumatic. Right Ear: External ear normal.      Left Ear: External ear normal.   Eyes:      General:         Right eye: No discharge. Left eye: No discharge. Conjunctiva/sclera: Conjunctivae normal.      Pupils: Pupils are equal, round, and reactive to light. Cardiovascular:      Rate and Rhythm: Normal rate and regular rhythm. Heart sounds: No murmur heard.   Pulmonary:      Effort: Pulmonary effort is normal. No respiratory distress. Breath sounds: Normal breath sounds. No wheezing or rales. Abdominal:      General: Bowel sounds are normal. There is no distension. Palpations: Abdomen is soft. There is no mass. Tenderness: There is no abdominal tenderness. There is no guarding or rebound. Genitourinary:     Comments: Deferred  Musculoskeletal:         General: No deformity. Normal range of motion. Cervical back: Normal range of motion and neck supple. Skin:     General: Skin is warm. Findings: No erythema or rash. Neurological:      Mental Status: She is alert and oriented to person, place, and time. She is not disoriented. Cranial Nerves: No cranial nerve deficit. Motor: Tremor present. No atrophy or abnormal muscle tone. Coordination: Coordination normal.   Psychiatric:         Behavior: Behavior normal.         Thought Content: Thought content normal.       DIAGNOSTIC RESULTS     RADIOLOGY:   Non-plain film images such as CT, Ultrasoundand MRI are read by the radiologist. Plain radiographic images are visualized and preliminarily interpreted by the emergency physician with the below findings:    Imaging reassuring    ED BEDSIDE ULTRASOUND:   Performed by ED Physician - none    LABS:  Labs Reviewed   CBC WITH AUTO DIFFERENTIAL - Abnormal; Notable for the following components:       Result Value    WBC 12.6 (*)     RBC 3.97 (*)     Hemoglobin 7.2 (*)     Hematocrit 26.3 (*)     MCV 66.1 (*)     MCH 18.2 (*)     MCHC 27.5 (*)     RDW 18.7 (*)     Neutrophils Absolute 8.7 (*)     All other components within normal limits   BASIC METABOLIC PANEL W/ REFLEX TO MG FOR LOW K - Abnormal; Notable for the following components:    CO2 19 (*)     Glucose 117 (*)     All other components within normal limits   TROPONIN   BRAIN NATRIURETIC PEPTIDE       All other labs were withinnormal range or not returned as of this dictation.     EMERGENCY DEPARTMENT COURSE and DIFFERENTIAL DIAGNOSIS/MDM:     PMH, Surgical Hx, FH, Social Hx reviewed by myself (ETOH usage, Tobacco usage, Drug usage reviewed by myself, no pertinent Hx)- No Pertinent Hx     Old records were reviewed by me     MDM restlessness most likely secondary to anxiety. tremor most likely secondary to symptoms with neurological condition. Will need evaluation for Parkinson's outpatient. Neurology follow-up outpatient. Labs-   Diagnostic findings  Medications  Consults  Disposition  I estimate there is LOW risk for Sepsis, MI, Stroke, Tamponade, PTX, Toxicity or other life threatening etiology thus I consider the discharge disposition reasonable. The patient is at low risk for mortality based on demographic, history and clinical factors. Given the best available information and clinical assessment, I estimate the risk of hospitalization to be greater than risk of treatment at home. I have explained to the patient that the risk could rapidly change, given precautions for return and instructions. Explained to patient that the risk for mortality is low based on demographic, history and clinical factors. I discussed with patient the results of evaluation in the ED, diagnosis, care, and prognosis. The plan is to discharge to home. Patient is in agreement with plan and questions have been answered. I also discussed with patient the reasons which may require a return visit and the importance of follow-up care. The patient is well-appearing, nontoxic, and improved at the time of discharge. Patient agrees to call to arrange follow-up care as directed. Patient understands to return immediately for worsening/change in symptoms. I PERSONALLY SAW THE PATIENT AND PERFORMED A SUBSTANTIVE PORTION OF THE VISIT INCLUDING ALL ASPECTS OF THE MEDICAL DECISION MAKING PROCESS.     The primary clinician of record [address] TIME   Total Critical Caretime was 0 minutes, excluding separately reportable procedures. There was a high probability of clinically significant/life threatening deterioration in the patient's condition which required my urgent intervention. CRITICAL CARE  I personally saw the patient and independently provided 0 minutes of non-concurrent critical care out of the total shared critical care time provided. This excludes seperately billable procedures. Critical care time was provided for patient as above that required close evaluation and/or intervention with concern for potential patient decompensation. PROCEDURES:  Unlessotherwise noted below, none    FINAL IMPRESSION      1. Restlessness    2. Anemia, unspecified type    3.  Tremor          DISPOSITION/PLAN   DISPOSITION Decision To Discharge 12/10/2022 05:13:19 AM    PATIENT REFERRED TO:  Girish Villa MD  [place] [address]  [phone]    Call today      *[facility]  [address] [place] [address]  [phone]    Call today        DISCHARGE MEDICATIONS:  Discharge Medication List as of 12/10/2022  5:28 AM        START taking these medications    Details   hydrOXYzine pamoate (VISTARIL) 25 MG capsule Take 1 capsule by mouth 3 times daily as needed for Itching, Disp-10 capsule, R-0Print                (Please note that portions ofthis note were completed with a voice recognition program.  Efforts were made to edit the dictations but occasionally words are mis-transcribed.)    Efren Oneal MD(electronically signed)  Attending Emergency Physician        Efren Oneal MD  12/10/22 1750

## 2022-12-10 NOTE — ED TRIAGE NOTES
Patient arrived to ED w/ daughter due to increased restlessness and an \"ache\" in her chest. Patient reports she has a hx of restless leg syndrome. Daughter at bedside.

## 2022-12-12 ENCOUNTER — TELEPHONE (OUTPATIENT)
Dept: PRIMARY CARE CLINIC | Age: 77
End: 2022-12-12

## 2022-12-12 NOTE — TELEPHONE ENCOUNTER
Pt called stating that she was recently in the hospital and missed PCP call.  Please callback this afternoon [phone]

## 2022-12-15 SDOH — HEALTH STABILITY: PHYSICAL HEALTH: ON AVERAGE, HOW MANY MINUTES DO YOU ENGAGE IN EXERCISE AT THIS LEVEL?: 20 MIN

## 2022-12-15 SDOH — HEALTH STABILITY: PHYSICAL HEALTH: ON AVERAGE, HOW MANY DAYS PER WEEK DO YOU ENGAGE IN MODERATE TO STRENUOUS EXERCISE (LIKE A BRISK WALK)?: 4 DAYS

## 2022-12-15 ASSESSMENT — PATIENT HEALTH QUESTIONNAIRE - PHQ9
SUM OF ALL RESPONSES TO PHQ QUESTIONS 1-9: 9
1. LITTLE INTEREST OR PLEASURE IN DOING THINGS: 2
2. FEELING DOWN, DEPRESSED OR HOPELESS: 2
SUM OF ALL RESPONSES TO PHQ QUESTIONS 1-9: 9
7. TROUBLE CONCENTRATING ON THINGS, SUCH AS READING THE NEWSPAPER OR WATCHING TELEVISION: 1
SUM OF ALL RESPONSES TO PHQ QUESTIONS 1-9: 9
3. TROUBLE FALLING OR STAYING ASLEEP: 1
6. FEELING BAD ABOUT YOURSELF - OR THAT YOU ARE A FAILURE OR HAVE LET YOURSELF OR YOUR FAMILY DOWN: 0
10. IF YOU CHECKED OFF ANY PROBLEMS, HOW DIFFICULT HAVE THESE PROBLEMS MADE IT FOR YOU TO DO YOUR WORK, TAKE CARE OF THINGS AT HOME, OR GET ALONG WITH OTHER PEOPLE: 0
5. POOR APPETITE OR OVEREATING: 0
9. THOUGHTS THAT YOU WOULD BE BETTER OFF DEAD, OR OF HURTING YOURSELF: 0
4. FEELING TIRED OR HAVING LITTLE ENERGY: 1
8. MOVING OR SPEAKING SO SLOWLY THAT OTHER PEOPLE COULD HAVE NOTICED. OR THE OPPOSITE, BEING SO FIGETY OR RESTLESS THAT YOU HAVE BEEN MOVING AROUND A LOT MORE THAN USUAL: 2
SUM OF ALL RESPONSES TO PHQ QUESTIONS 1-9: 9
SUM OF ALL RESPONSES TO PHQ9 QUESTIONS 1 & 2: 4

## 2022-12-15 ASSESSMENT — LIFESTYLE VARIABLES
HOW MANY STANDARD DRINKS CONTAINING ALCOHOL DO YOU HAVE ON A TYPICAL DAY: 0
HOW OFTEN DO YOU HAVE SIX OR MORE DRINKS ON ONE OCCASION: 1
HOW OFTEN DO YOU HAVE A DRINK CONTAINING ALCOHOL: NEVER
HOW OFTEN DO YOU HAVE A DRINK CONTAINING ALCOHOL: 1
HOW MANY STANDARD DRINKS CONTAINING ALCOHOL DO YOU HAVE ON A TYPICAL DAY: PATIENT DOES NOT DRINK

## 2022-12-15 NOTE — TELEPHONE ENCOUNTER
I don't see that we called patient for any reason. Pt has an appt tomorrow. Will review with her then.

## 2022-12-16 ENCOUNTER — OFFICE VISIT (OUTPATIENT)
Dept: PRIMARY CARE CLINIC | Age: 77
End: 2022-12-16

## 2022-12-16 VITALS
DIASTOLIC BLOOD PRESSURE: 56 MMHG | WEIGHT: 175 LBS | HEART RATE: 81 BPM | HEIGHT: 59 IN | BODY MASS INDEX: 35.28 KG/M2 | RESPIRATION RATE: 18 BRPM | SYSTOLIC BLOOD PRESSURE: 119 MMHG | TEMPERATURE: 97.7 F

## 2022-12-16 DIAGNOSIS — Z99.89 OSA ON CPAP: ICD-10-CM

## 2022-12-16 DIAGNOSIS — Z00.00 MEDICARE ANNUAL WELLNESS VISIT, SUBSEQUENT: ICD-10-CM

## 2022-12-16 DIAGNOSIS — F41.8 MIXED ANXIETY AND DEPRESSIVE DISORDER: ICD-10-CM

## 2022-12-16 DIAGNOSIS — D50.9 IRON DEFICIENCY ANEMIA, UNSPECIFIED IRON DEFICIENCY ANEMIA TYPE: ICD-10-CM

## 2022-12-16 DIAGNOSIS — E11.9 TYPE 2 DIABETES MELLITUS WITHOUT COMPLICATION, WITHOUT LONG-TERM CURRENT USE OF INSULIN (HCC): ICD-10-CM

## 2022-12-16 DIAGNOSIS — E66.01 SEVERE OBESITY (BMI 35.0-39.9) WITH COMORBIDITY (HCC): ICD-10-CM

## 2022-12-16 DIAGNOSIS — G47.33 OSA ON CPAP: ICD-10-CM

## 2022-12-16 DIAGNOSIS — G25.81 RESTLESS LEGS SYNDROME (RLS): Primary | ICD-10-CM

## 2022-12-16 LAB — HBA1C MFR BLD: 6.6 %

## 2022-12-16 RX ORDER — HYDROXYZINE PAMOATE 25 MG/1
25 CAPSULE ORAL 3 TIMES DAILY PRN
Qty: 60 CAPSULE | Refills: 1 | Status: SHIPPED | OUTPATIENT
Start: 2022-12-16 | End: 2022-12-30

## 2022-12-16 NOTE — PROGRESS NOTES
Medicare Annual Wellness Visit    Abe Arellano is here for Medicare AWV    Assessment & Plan   Restless legs syndrome (RLS)  -Recently seen at the emergency room and it seems that the restless leg is triggered by anxiety for which she was prescribed Vistaril 25 mg 3 times a day as needed. She was referred to neurologist Dr. Godfrey Gomez for further evaluation and treatment ,meantime continue Sinemet  tablets 4 times a day. After she is seen by neurologist will also consider referring her back to the sleep specialist to see if restless leg is related to periodic limb disorder due to sleep apnea    Iron deficiency anemia, unspecified iron deficiency anemia type  She has colon polyp per colonoscopy in 2018. She takes naproxen for arthritis and was told to stop it. Was also told to stop aspirin until  seen by GI. Will increase pantoprazole 40 mg twice daily. Referred to GI Dr. Terrie Perez for endoscopy    Mixed anxiety and depressive disorder  Continue Cymbalta 60 mg daily, BuSpar 30 mg. The and Vistaril 25 mg 3 times a day as needed for anxiety. Type 2 diabetes mellitus without complication, without long-term current use of insulin (HCC)  Controlled. Continue metformin 1000 mg twice daily  -     POCT glycosylated hemoglobin (Hb A1C)    DOYLE on CPAP  Encouraged regular use of CPAP. Will refer back to sleep clinic to evaluate for periodic limb disorder as it relate to sleep apnea. Severe obesity (BMI 35.0-39. 9) with comorbidity ([address]  Encouraged to lose weight with diet and exercise. Medicare annual wellness visit, subsequent  She has no Alzheimer's dementia, able to do basic activities of daily living.   She still lives with her       Recommendations for Preventive Services Due: see orders and patient instructions/AVS.  Recommended screening schedule for the next 5-10 years is provided to the patient in written form: see Patient Instructions/AVS.     Return in 1 year (on 12/16/2023) for Medicare Annual Wellness Visit in 1 year. Subjective   The following acute and/or chronic problems were also addressed today:  Patient was seen at [facility] emergency room a week ago December 10, 2022 due to increased restlessness aches and pains and tremor. She has increased anxiety and was prescribed Vistaril and told to see a neurologist Dr. Maureen Mckeon. During work-up she was noted that she has anemia hemoglobin of 7.2 and hematocrit of 26 MCV 66 MCH of 18 patient denies passing blood in the stool, denies abdominal pain but he takes naproxen for arthritis also aspirin 81 mg daily for stroke prevention. She has colonoscopy in 2018 and was found to have colon polyp and advised to have surveillance colonoscopy in 5 years. She takes Protonix 40 mg daily for reflux symptoms. She has a sleep apnea on CPAP, obesity and is struggling to lose weight. She has diabetes controlled with metformin 1000 twice daily, denies hypoglycemic episodes. Has mixed anxiety and depression and takes Cymbalta 60 mg daily as well as BuSpar 30 mg twice daily. She denies suicidal ideation. Denies chest pain or shortness of breath denies bowel or urinary disturbance. Patient's complete Health Risk Assessment and screening values have been reviewed and are found in Flowsheets. The following problems were reviewed today and where indicated follow up appointments were made and/or referrals ordered. Positive Risk Factor Screenings with Interventions:        Depression:  PHQ-2 Score: 4  PHQ-9 Total Score: 9    Interpretation:   1-4 = minimal  5-9 = mild  10-14 = moderate  15-19 = moderately severe  20-27 = severe  Interventions  Medications adjusted: Cymbalta 60 mg daily, Buspar 30 mg BID and Vistaril 25 mg TID PRN  See AVS for additional education material  See A/P for any pertinent orders       {IMPORTANT!  Click here to document Controlled Substance Monitoring and then refresh note (Optional):[id number]}    Opioid Risk: (Low risk score <55) Opioid risk score: 19    Patient is low risk for opioid use disorder or overdose. Last PDMP Milton Pardo as Reviewed:  Review User Review Instant Review Result   Inga Fair 7/8/2022  3:18 PM     Reviewed PDMP [1]     Last Controlled Substance Monitoring Documentation      [address] ED from 7/8/2022 in [facility] Emergency Department   Periodic Controlled Substance Monitoring No signs of potential drug abuse or diversion identified. filed at 07/08/2022 [id number]              Weight and Activity:  Physical Activity: Insufficiently Active    Days of Exercise per Week: 4 days    Minutes of Exercise per Session: 20 min     On average, how many days per week do you engage in moderate to strenuous exercise (like a brisk walk)?: 4 days  Have you lost any weight without trying in the past 3 months?: No  Body mass index: (!) 35.95  Obesity Interventions:  Patient advised to follow-up in this office for further evaluation and treatment  See AVS for additional education material  See A/P for plan and any pertinent orders          Dentist Screen:  Have you seen the dentist within the past year?: (!) No    Intervention:  Patient declines any further evaluation or treatment    Hearing Screen:  Do you or your family notice any trouble with your hearing that hasn't been managed with hearing aids?: (!) Yes    Interventions:  Patient declines any further evaluation or treatment    Vision Screen:  Do you have difficulty driving, watching TV, or doing any of your daily activities because of your eyesight?: No  Have you had an eye exam within the past year?: (!) No  No results found.     Interventions:   Patient encouraged to make appointment with their eye specialist  See AVS for additional education material  See A/P for any pertinent orders                      Objective   Vitals:    12/16/22 0853   BP: (!) 119/56   Pulse: 81   Resp: 18   Temp: 97.7 °F (36.5 °C)   TempSrc: Temporal   Weight: 175 lb (79.4 kg)   Height: 4' 10.5\" (1.486 m)      Body mass index is 35.95 kg/m². General Appearance: alert and oriented to person, place and time, well developed and well- nourished, in no acute distress  Skin: warm and dry, no rash or erythema  Head: normocephalic and atraumatic  Eyes: pupils equal, round, and reactive to light, extraocular eye movements intact, conjunctivae normal  ENT: tympanic membrane, external ear and ear canal normal bilaterally, nose without deformity, nasal mucosa and turbinates normal without polyps  Neck: supple and non-tender without mass, no thyromegaly or thyroid nodules, no cervical lymphadenopathy  Pulmonary/Chest: clear to auscultation bilaterally- no wheezes, rales or rhonchi, normal air movement, no respiratory distress  Cardiovascular: normal rate, regular rhythm, normal S1 and S2, no murmurs, rubs, clicks, or gallops, distal pulses intact, no carotid bruits  Abdomen: soft, non-tender, non-distended, normal bowel sounds, no masses or organomegaly  Extremities: no cyanosis, clubbing or edema  Musculoskeletal: normal range of motion, no joint swelling, deformity or tenderness  Neurologic: reflexes normal and symmetric, no cranial nerve deficit, gait, coordination and speech normal       Allergies   Allergen Reactions    Meloxicam      Heart rate drops very low    Quinine Derivatives      Fever, chills, vomiting, diarrhea, dizziness    Codeine     Quinine     Vicodin [Hydrocodone-Acetaminophen] Anxiety     Hyperactivity, nausea     Prior to Visit Medications    Medication Sig Taking?  Authorizing Provider   hydrOXYzine pamoate (VISTARIL) 25 MG capsule Take 1 capsule by mouth 3 times daily as needed for Itching Yes Governor Hardeep MD   metFORMIN (GLUCOPHAGE) 1000 MG tablet Take 1 tablet by mouth 2 times daily (with meals)  Governor Hardeep MD   atorvastatin (LIPITOR) 40 MG tablet Take 1 tablet by mouth daily  Governor Hardeep MD   acetaminophen-codeine (TYLENOL #3) 300-30 MG per tablet Take 1 tablet by mouth every 8 hours as needed for Pain for up to 30 days. Justin Robert MD   carbidopa-levodopa (SINEMET)  MG per tablet Take 2 tablets by mouth 4 times daily  Justin Robert MD   pantoprazole (PROTONIX) 40 MG tablet Take 1 tablet by mouth daily  Justin Robert MD   gabapentin (NEURONTIN) 600 MG tablet Take 1 tablet by mouth 2 times daily. Justin Robert MD   BIOTIN PO Take by mouth daily  Historical Provider, MD   amLODIPine (NORVASC) 5 MG tablet Take 1 tablet by mouth daily  Justin Robert MD   busPIRone (BUSPAR) 30 MG tablet Take 30 mg by mouth 2 times daily  Justin Robert MD   irbesartan (AVAPRO) 150 MG tablet Take 1 tablet by mouth daily  Justin Robert MD   DULoxetine (CYMBALTA) 60 MG extended release capsule Take 1 capsule by mouth in the morning. Justin Robert MD   Cholecalciferol (VITAMIN D3) 5000 units CAPS Take 1 capsule by mouth daily  Justin Robert MD   aspirin 81 MG tablet Take 81 mg by mouth daily.   Historical Provider, MD [place]de (Including outside providers/suppliers regularly involved in providing care):   Patient Care Team:  Justin Robert MD as PCP - General (Internal Medicine)  Justin Robert MD as PCP - [facility]  Courtney López (Ophthalmology)  Solange Britton DO as Consulting Physician (Physical Medicine and Rehab)  HILDA Be DO as Consulting Physician (Pulmonology)  Rajinder Ibarra MD as Consulting Physician (Anesthesiology)     Reviewed and updated this visit:  Tobacco  Allergies  Med Hx  Surg Hx  Soc Hx  Fam Hx

## 2022-12-16 NOTE — PATIENT INSTRUCTIONS
Learning About Mindfulness for Stress  What are mindfulness and stress? Stress is what you feel when you have to handle more than you are used to. A lot of things can cause stress. You may feel stress when you go on a job interview, take a test, or run a race. This kind of short-term stress is normal and even useful. It can help you if you need to work hard or react quickly. Stress also can last a long time. Long-term stress is caused by stressful situations or events. Examples of long-term stress include long-term health problems, ongoing problems at work, and conflicts in your family. Long-term stress can harm your health. Mindfulness is a focus only on things happening in the present moment. It's a process of purposefully paying attention to and being aware of your surroundings, your emotions, your thoughts, and how your body feels. You are aware of these things, but you aren't judging these experiences as \"good\" or \"bad. \" Mindfulness can help you learn to calm your mind and body to help you cope with illness, pain, and stress. How does mindfulness help to relieve stress? Mindfulness can help quiet your mind and relax your body. Studies show that it can help some people sleep better, feel less anxious, and bring their blood pressure down. And it's been shown to help some people live and cope better with certain health problems like heart disease, depression, chronic pain, and cancer. How do you practice mindfulness? To be mindful is to pay attention, to be present, and to be accepting. When you're mindful, you do just one thing and you pay close attention to that one thing. For example, you may sit quietly and notice your emotions or how your food tastes and smells. When you're present, you focus on the things that are happening right now. You let go of your thoughts about the past and the future. When you dwell on the past or the future, you miss moments that can heal and strengthen you.  You may miss moments like hearing a child laugh or seeing a friendly face when you think you're all alone. When you're accepting, you don't  the present moment. Instead you accept your thoughts and feelings as they come. You can practice anytime, anywhere, and in any way you choose. You can practice in many ways. Here are a few ideas:  While doing your chores, like washing the dishes, let your mind focus on what's in your hand. What does the dish feel like? Is the water warm or cold? Go outside and take a few deep breaths. What is the air like? Is it warm or cold? When you can, take some time at the start of your day to sit alone and think. Take a slow walk by yourself. Count your steps while you breathe in and out. Try yoga breathing exercises, stretches, and poses to strengthen and relax your muscles. At work, if you can, try to stop for a few moments each hour. Note how your body feels. Let yourself regroup and let your mind settle before you return to what you were doing. If you struggle with anxiety or \"worry thoughts,\" imagine your mind as a blue ivana and your worry thoughts as clouds. Now imagine those worry thoughts floating across your mind's ivana. Just let them pass by as you watch. Follow-up care is a key part of your treatment and safety. Be sure to make and go to all appointments, and call your doctor if you are having problems. It's also a good idea to know your test results and keep a list of the medicines you take. Where can you learn more? Go to http://www.calderon.com/ and enter M676 to learn more about \"Learning About Mindfulness for Stress. \"  Current as of: February 9, 2022               Content Version: 13.5  © 2006-2022 Healthwise, Incorporated. Care instructions adapted under license by [facility] [employer] [facility] ([facility]).  If you have questions about a medical condition or this instruction, always ask your healthcare professional. [address] any warranty or liability for your use of this information. Learning About Dental Care for Older Adults  Dental care for older adults: Overview  Dental care for older people is much the same as for younger adults. But older adults do have concerns that younger adults do not. Older adults may have problems with gum disease and decay on the roots of their teeth. They may need missing teeth replaced or broken fillings fixed. Or they may have dentures that need to be cared for. Some older adults may have trouble holding a toothbrush. You can help remind the person you are caring for to brush and floss their teeth or to clean their dentures. In some cases, you may need to do the brushing and other dental care tasks. People who have trouble using their hands or who have dementia may need this extra help. How can you help with dental care? Normal dental care  To keep the teeth and gums healthy:  Brush the teeth with fluoride toothpaste twice a day--in the morning and at night--and floss at least once a day. Plaque can quickly build up on the teeth of older adults. Watch for the signs of gum disease. These signs include gums that bleed after brushing or after eating hard foods, such as apples. See a dentist regularly. Many experts recommend checkups every 6 months. Keep the dentist up to date on any new medications the person is taking. Encourage a balanced diet that includes whole grains, vegetables, and fruits, and that is low in saturated fat and sodium. Encourage the person you're caring for not to use tobacco products. They can affect dental and general health. Many older adults have a fixed income and feel that they can't afford dental care. But most [facility] and [facility] have programs in which dentists help older adults by lowering fees. Contact your area's public health offices or  for information about dental care in your area.   Using a toothbrush  Older adults with arthritis sometimes have trouble brushing their teeth because they can't easily hold the toothbrush. Their hands and fingers may be stiff, painful, or weak. If this is the case, you can: Offer an electric toothbrush. Enlarge the handle of a non-electric toothbrush by wrapping a sponge, an elastic bandage, or adhesive tape around it. Push the toothbrush handle through a ball made of rubber or soft foam.  Make the handle longer and thicker by taping Popsicle sticks or tongue depressors to it. You may also be able to buy special toothbrushes, toothpaste dispensers, and floss holders. Your doctor may recommend a soft-bristle toothbrush if the person you care for bleeds easily. Bleeding can happen because of a health problem or from certain medicines. A toothpaste for sensitive teeth may help if the person you care for has sensitive teeth. How do you brush and floss someone's teeth? If the person you are caring for has a hard time cleaning their teeth on their own, you may need to brush and floss their teeth for them. It may be easiest to have the person sit and face away from you, and to sit or stand behind them. That way you can steady their head against your arm as you reach around to floss and brush their teeth. Choose a place that has good lighting and is comfortable for both of you. Before you begin, gather your supplies. You will need gloves, floss, a toothbrush, and a container to hold water if you are not near a sink. Wash and dry your hands well and put on gloves. Start by flossing:  Gently work a piece of floss between each of the teeth toward the gums. A plastic flossing tool may make this easier, and they are available at most [facility]. Curve the floss around each tooth into a U-shape and gently slide it under the gum line. Move the floss firmly up and down several times to scrape off the plaque. After you've finished flossing, throw away the used floss and begin brushing:  Wet the brush and apply toothpaste.   Place the brush at a 45-degree angle where the teeth meet the gums. Press firmly, and move the brush in small circles over the surface of the teeth. Be careful not to brush too hard. Vigorous brushing can make the gums pull away from the teeth and can scratch the tooth enamel. Brush all surfaces of the teeth, on the tongue side and on the cheek side. Pay special attention to the front teeth and all surfaces of the back teeth. Brush chewing surfaces with short back-and-forth strokes. After you've finished, help the person rinse the remaining toothpaste from their mouth. Where can you learn more? Go to http://www.woods.com/ and enter F944 to learn more about \"Learning About Dental Care for Older Adults. \"  Current as of: June 16, 2022               Content Version: 13.5  © 2006-2022 Healthwise, [employer]. Care instructions adapted under license by [facility] ([facility]). If you have questions about a medical condition or this instruction, always ask your healthcare professional. [place] [address] any warranty or liability for your use of this information. Learning About Vision Tests  What are vision tests? The four most common vision tests are visual acuity tests, refraction, visual field tests, and color vision tests. Visual acuity (sharpness) tests  These tests are used: To see if you need glasses or contact lenses. To monitor an eye problem. To check an eye injury. Visual acuity tests are done as part of routine exams. You may also have this test when you get your 's license or apply for some types of jobs. Visual field tests  These tests are used: To check for vision loss in any area of your range of vision. To screen for certain eye diseases. To look for nerve damage after a stroke, head injury, or other problem that could reduce blood flow to the brain. Refraction and color tests  A refraction test is done to find the right prescription for glasses and contact lenses.   A color vision test is done to check for color blindness. Color vision is often tested as part of a routine exam. You may also have this test when you apply for a job where recognizing different colors is important, such as , electronics, or the [employer]. How are vision tests done? Visual acuity test   You cover one eye at a time. You read aloud from a wall chart across the room. You read aloud from a small card that you hold in your hand. Refraction   You look into a special device. The device puts lenses of different strengths in front of each eye to see how strong your glasses or contact lenses need to be. Visual field tests   Your doctor may have you look through special machines. Or your doctor may simply have you stare straight ahead while they move a finger into and out of your field of vision. Color vision test   You look at pieces of printed test patterns in various colors. You say what number or symbol you see. Your doctor may have you trace the number or symbol using a pointer. How do these tests feel? There is very little chance of having a problem from this test. If dilating drops are used for a vision test, they may make the eyes sting and cause a medicine taste in the mouth. Follow-up care is a key part of your treatment and safety. Be sure to make and go to all appointments, and call your doctor if you are having problems. It's also a good idea to know your test results and keep a list of the medicines you take. Where can you learn more? Go to http://www.calderon.com/ and enter G551 to learn more about \"Learning About Vision Tests. \"  Current as of: October 12, 2022               Content Version: 13.5  © 1942-6214 Healthwise, Incorporated. Care instructions adapted under license by [facility] ([facility]).  If you have questions about a medical condition or this instruction, always ask your healthcare professional. [place] [address] any warranty or liability for your use of this information. Advance Directives: Care Instructions  Overview  An advance directive is a legal way to state your wishes at the end of your life. It tells your family and your doctor what to do if you can't say what you want. There are two main types of advance directives. You can change them any time your wishes change. Living will. This form tells your family and your doctor your wishes about life support and other treatment. The form is also called a declaration. Medical power of . This form lets you name a person to make treatment decisions for you when you can't speak for yourself. This person is called a health care agent (health care proxy, health care surrogate). The form is also called a durable power of  for health care. If you do not have an advance directive, decisions about your medical care may be made by a family member, or by a doctor or a  who doesn't know you. It may help to think of an advance directive as a gift to the people who care for you. If you have one, they won't have to make tough decisions by themselves. For more information, including forms for your state, see the [address] website (www.caringinfo.org/planning/advance-directives/). Follow-up care is a key part of your treatment and safety. Be sure to make and go to all appointments, and call your doctor if you are having problems. It's also a good idea to know your test results and keep a list of the medicines you take. What should you include in an advance directive? Many states have a unique advance directive form. (It may ask you to address specific issues.) Or you might use a universal form that's approved by many states. If your form doesn't tell you what to address, it may be hard to know what to include in your advance directive. Use the questions below to help you get started. Who do you want to make decisions about your medical care if you are not able to?   What life-support measures do you want if you have a serious illness that gets worse over time or can't be cured? What are you most afraid of that might happen? (Maybe you're afraid of having pain, losing your independence, or being kept alive by machines.)  Where would you prefer to die? (Your home? A hospital? A nursing home?)  Do you want to donate your organs when you die? Do you want certain Restorationism practices performed before you die? When should you call for help? Be sure to contact your doctor if you have any questions. Where can you learn more? Go to http://www.calderon.com/ and enter R264 to learn more about \"Advance Directives: Care Instructions. \"  Current as of: June 16, 2022               Content Version: 13.5  © 7438-8849 Healthwise, Incorporated. Care instructions adapted under license by [facility] ([facility]). If you have questions about a medical condition or this instruction, always ask your healthcare professional. [place] [address] any warranty or liability for your use of this information. Personalized Preventive Plan for Minnie Petera - 12/16/2022  Medicare offers a range of preventive health benefits. Some of the tests and screenings are paid in full while other may be subject to a deductible, co-insurance, and/or copay. Some of these benefits include a comprehensive review of your medical history including lifestyle, illnesses that may run in your family, and various assessments and screenings as appropriate. After reviewing your medical record and screening and assessments performed today your provider may have ordered immunizations, labs, imaging, and/or referrals for you. A list of these orders (if applicable) as well as your Preventive Care list are included within your After Visit Summary for your review.     Other Preventive Recommendations:    A preventive eye exam performed by an eye specialist is recommended every 1-2 years to screen for glaucoma; cataracts, macular degeneration, and other eye disorders. A preventive dental visit is recommended every 6 months. Try to get at least 150 minutes of exercise per week or 10,000 steps per day on a pedometer . Order or download the FREE \"Exercise & Physical Activity: Your Everyday Guide\" from [employer]. Call 6-508.433.4307 or search [employer] online. You need 3492-0726 mg of calcium and 3894-6475 IU of vitamin D per day. It is possible to meet your calcium requirement with diet alone, but a vitamin D supplement is usually necessary to meet this goal.  When exposed to the sun, use a sunscreen that protects against both UVA and UVB radiation with an SPF of 30 or greater. Reapply every 2 to 3 hours or after sweating, drying off with a towel, or swimming. Always wear a seat belt when traveling in a car. Always wear a helmet when riding a bicycle or motorcycle.

## 2023-01-10 ENCOUNTER — OFFICE VISIT (OUTPATIENT)
Dept: PRIMARY CARE CLINIC | Age: 78
End: 2023-01-10
Payer: MEDICARE

## 2023-01-10 VITALS
HEART RATE: 71 BPM | SYSTOLIC BLOOD PRESSURE: 109 MMHG | TEMPERATURE: 97.9 F | OXYGEN SATURATION: 98 % | WEIGHT: 175.2 LBS | BODY MASS INDEX: 35.99 KG/M2 | DIASTOLIC BLOOD PRESSURE: 48 MMHG

## 2023-01-10 DIAGNOSIS — G47.33 OSA ON CPAP: ICD-10-CM

## 2023-01-10 DIAGNOSIS — Z99.89 OSA ON CPAP: ICD-10-CM

## 2023-01-10 DIAGNOSIS — G25.81 RESTLESS LEGS SYNDROME (RLS): Primary | ICD-10-CM

## 2023-01-10 DIAGNOSIS — G89.4 CHRONIC PAIN SYNDROME: ICD-10-CM

## 2023-01-10 DIAGNOSIS — I10 ESSENTIAL HYPERTENSION: ICD-10-CM

## 2023-01-10 DIAGNOSIS — E66.01 SEVERE OBESITY (BMI 35.0-39.9) WITH COMORBIDITY (HCC): ICD-10-CM

## 2023-01-10 DIAGNOSIS — J45.20 MILD INTERMITTENT ASTHMA WITHOUT COMPLICATION: ICD-10-CM

## 2023-01-10 DIAGNOSIS — D50.9 IRON DEFICIENCY ANEMIA, UNSPECIFIED IRON DEFICIENCY ANEMIA TYPE: ICD-10-CM

## 2023-01-10 DIAGNOSIS — F41.8 MIXED ANXIETY AND DEPRESSIVE DISORDER: ICD-10-CM

## 2023-01-10 DIAGNOSIS — E11.9 TYPE 2 DIABETES MELLITUS WITHOUT COMPLICATION, WITHOUT LONG-TERM CURRENT USE OF INSULIN (HCC): ICD-10-CM

## 2023-01-10 PROCEDURE — G8399 PT W/DXA RESULTS DOCUMENT: HCPCS | Performed by: FAMILY MEDICINE

## 2023-01-10 PROCEDURE — 1123F ACP DISCUSS/DSCN MKR DOCD: CPT | Performed by: FAMILY MEDICINE

## 2023-01-10 PROCEDURE — 3074F SYST BP LT 130 MM HG: CPT | Performed by: FAMILY MEDICINE

## 2023-01-10 PROCEDURE — 3017F COLORECTAL CA SCREEN DOC REV: CPT | Performed by: FAMILY MEDICINE

## 2023-01-10 PROCEDURE — 1090F PRES/ABSN URINE INCON ASSESS: CPT | Performed by: FAMILY MEDICINE

## 2023-01-10 PROCEDURE — G8484 FLU IMMUNIZE NO ADMIN: HCPCS | Performed by: FAMILY MEDICINE

## 2023-01-10 PROCEDURE — 1036F TOBACCO NON-USER: CPT | Performed by: FAMILY MEDICINE

## 2023-01-10 PROCEDURE — G8427 DOCREV CUR MEDS BY ELIG CLIN: HCPCS | Performed by: FAMILY MEDICINE

## 2023-01-10 PROCEDURE — G8417 CALC BMI ABV UP PARAM F/U: HCPCS | Performed by: FAMILY MEDICINE

## 2023-01-10 PROCEDURE — 2022F DILAT RTA XM EVC RTNOPTHY: CPT | Performed by: FAMILY MEDICINE

## 2023-01-10 PROCEDURE — 3078F DIAST BP <80 MM HG: CPT | Performed by: FAMILY MEDICINE

## 2023-01-10 PROCEDURE — 99214 OFFICE O/P EST MOD 30 MIN: CPT | Performed by: FAMILY MEDICINE

## 2023-01-10 PROCEDURE — 3046F HEMOGLOBIN A1C LEVEL >9.0%: CPT | Performed by: FAMILY MEDICINE

## 2023-01-10 RX ORDER — ACETAMINOPHEN AND CODEINE PHOSPHATE 300; 30 MG/1; MG/1
1 TABLET ORAL EVERY 8 HOURS PRN
Qty: 90 TABLET | Refills: 0 | Status: SHIPPED | OUTPATIENT
Start: 2023-01-10 | End: 2023-02-09

## 2023-01-10 RX ORDER — LORAZEPAM 0.5 MG/1
1 TABLET ORAL 2 TIMES DAILY PRN
COMMUNITY
Start: 2022-12-20 | End: 2023-01-20

## 2023-01-10 ASSESSMENT — ENCOUNTER SYMPTOMS
BLOOD IN STOOL: 0
BACK PAIN: 1
ABDOMINAL PAIN: 0
DIARRHEA: 0
SHORTNESS OF BREATH: 0
CONSTIPATION: 0

## 2023-01-10 ASSESSMENT — PATIENT HEALTH QUESTIONNAIRE - PHQ9
SUM OF ALL RESPONSES TO PHQ9 QUESTIONS 1 & 2: 2
SUM OF ALL RESPONSES TO PHQ QUESTIONS 1-9: 9
SUM OF ALL RESPONSES TO PHQ QUESTIONS 1-9: 9
5. POOR APPETITE OR OVEREATING: 0
9. THOUGHTS THAT YOU WOULD BE BETTER OFF DEAD, OR OF HURTING YOURSELF: 0
6. FEELING BAD ABOUT YOURSELF - OR THAT YOU ARE A FAILURE OR HAVE LET YOURSELF OR YOUR FAMILY DOWN: 0
7. TROUBLE CONCENTRATING ON THINGS, SUCH AS READING THE NEWSPAPER OR WATCHING TELEVISION: 1
SUM OF ALL RESPONSES TO PHQ QUESTIONS 1-9: 9
3. TROUBLE FALLING OR STAYING ASLEEP: 3
2. FEELING DOWN, DEPRESSED OR HOPELESS: 1
8. MOVING OR SPEAKING SO SLOWLY THAT OTHER PEOPLE COULD HAVE NOTICED. OR THE OPPOSITE, BEING SO FIGETY OR RESTLESS THAT YOU HAVE BEEN MOVING AROUND A LOT MORE THAN USUAL: 1
SUM OF ALL RESPONSES TO PHQ QUESTIONS 1-9: 9
1. LITTLE INTEREST OR PLEASURE IN DOING THINGS: 1
4. FEELING TIRED OR HAVING LITTLE ENERGY: 2
10. IF YOU CHECKED OFF ANY PROBLEMS, HOW DIFFICULT HAVE THESE PROBLEMS MADE IT FOR YOU TO DO YOUR WORK, TAKE CARE OF THINGS AT HOME, OR GET ALONG WITH OTHER PEOPLE: 2

## 2023-01-10 NOTE — PROGRESS NOTES
1/10/2023     Madan Lilly (:  1945) is a 78 y.o. female, here for evaluation of the following medical concerns:    HPI  Patient presented to the office follow-up from recent emergency room visit. Patient was seen at [facility] emergency room on 2023 due to multiple complaints including chest pain, anxiety and shortness of breath. Patient also complaining of ongoing abnormal movement for several months may be close to 2 years . Patient has restless leg syndrome and takes Sinemet, also severe anxiety disorder takes Cymbalta, BuSpar recently added to the regiment and takes Ativan as needed. Was recently seen by neurologist Dr Nicki Lawrence and was told she has dyskinesia but not clear if it is related to any of her medication. Work-up at ED was grossly unremarkable except for low magnesium and anemia, patient anxiety improved with Ativan, patient was discharged and advised to follow-up with primary care physician    Review of Systems   Constitutional:  Negative for activity change and appetite change. Eyes:  Negative for visual disturbance. Respiratory:  Negative for shortness of breath. Cardiovascular:  Negative for chest pain and leg swelling. Gastrointestinal:  Negative for abdominal pain, blood in stool, constipation and diarrhea. Genitourinary:  Negative for difficulty urinating, frequency, hematuria, menstrual problem and urgency. Musculoskeletal:  Positive for back pain. Neurological:  Positive for tremors. Negative for dizziness and syncope. Psychiatric/Behavioral:  Positive for decreased concentration. Negative for behavioral problems. The patient is nervous/anxious. Prior to Visit Medications    Medication Sig Taking? Authorizing Provider   LORazepam (ATIVAN) 0.5 MG tablet Take 1 tablet by mouth 2 times daily as needed.  Yes Historical Provider, MD   acetaminophen-codeine (TYLENOL #3) 300-30 MG per tablet Take 1 tablet by mouth every 8 hours as needed for Pain for up to 30 days. Yes Ben Gabriel MD   metFORMIN (GLUCOPHAGE) 1000 MG tablet Take 1 tablet by mouth 2 times daily (with meals) Yes Ben Gabriel MD   atorvastatin (LIPITOR) 40 MG tablet Take 1 tablet by mouth daily Yes Ben Gabriel MD   carbidopa-levodopa (SINEMET)  MG per tablet Take 2 tablets by mouth 4 times daily Yes Ben Gabriel MD   pantoprazole (PROTONIX) 40 MG tablet Take 1 tablet by mouth daily Yes Ben Gabriel MD   gabapentin (NEURONTIN) 600 MG tablet Take 1 tablet by mouth 2 times daily. Yes Ben Gabriel MD   BIOTIN PO Take by mouth daily Yes Historical Provider, MD   amLODIPine (NORVASC) 5 MG tablet Take 1 tablet by mouth daily Yes Ben Gabriel MD   irbesartan (AVAPRO) 150 MG tablet Take 1 tablet by mouth daily Yes Ben Gabriel MD   DULoxetine (CYMBALTA) 60 MG extended release capsule Take 1 capsule by mouth in the morning. Yes Ben Gabriel MD   Cholecalciferol (VITAMIN D3) 5000 units CAPS Take 1 capsule by mouth daily Yes Ben Gabriel MD   aspirin 81 MG tablet Take 81 mg by mouth daily. Yes Historical Provider, MD        Social History     Tobacco Use    Smoking status: Former     Packs/day: 2.00     Years: 36.00     Pack years: 72.00     Types: Cigarettes     Quit date: 1999     Years since quittin.7    Smokeless tobacco: Never   Substance Use Topics    Alcohol use: No     Alcohol/week: 0.0 standard drinks        Vitals:    01/10/23 1259   BP: (!) 109/48   Pulse: 71   Temp: 97.9 °F (36.6 °C)   TempSrc: Temporal   SpO2: 98%   Weight: 175 lb 3.2 oz (79.5 kg)     Estimated body mass index is 35.99 kg/m² as calculated from the following:    Height as of 22: 4' 10.5\" (1.486 m). Weight as of this encounter: 175 lb 3.2 oz (79.5 kg). Physical Exam  Constitutional:       Appearance: She is well-developed. HENT:      Head: Normocephalic.       Right Ear: External ear normal.      Nose: Nose normal.   Eyes:      Conjunctiva/sclera: Conjunctivae normal.      Pupils: Pupils are equal, round, and reactive to light. Neck:      Thyroid: No thyromegaly. Cardiovascular:      Rate and Rhythm: Normal rate and regular rhythm. Heart sounds: Normal heart sounds. No murmur heard. No friction rub. Pulmonary:      Effort: Pulmonary effort is normal.      Breath sounds: Normal breath sounds. Abdominal:      General: Bowel sounds are normal.      Palpations: Abdomen is soft. There is no mass. Musculoskeletal:         General: Normal range of motion. Cervical back: Normal range of motion and neck supple. Lymphadenopathy:      Cervical: No cervical adenopathy. Skin:     General: Skin is warm. Findings: No rash. Neurological:      Mental Status: She is alert and oriented to person, place, and time. ASSESSMENT/PLAN:  1. Restless legs syndrome (RLS)  Iron deficiency anemia and anxiety contributing to the problem. There is a concern for (tardive ) dyskinesia but cannot identify psychotropic medication causing dyskinesia. She takes Sinemet and advised to follow-up with neurologist Dr. Karin Julien. She is also advised to follow-up with the sleep specialist , Dr Karina Lyles  to get some input if restless leg syndrome is part of the periodic limb disorder associated with a sleep apnea    2. Mixed anxiety and depressive disorder  Continue Cymbalta 60 mg daily. Will discontinue BuSpar since it does not make any significant difference. She was prescribed Ativan by her neurologist.  - LORazepam (ATIVAN) 0.5 MG tablet; Take 1 tablet by mouth 2 times daily as needed. 3. DOYLE on CPAP  She reported to be compliant with CPAP. 4. Iron deficiency anemia, unspecified iron deficiency anemia type  Etiology unclear, has upcoming appointment with GI for possible upper and lower endoscopy rule out GI bleed, it is also a possible to that she might have iron absorption problem. If GI work-up is negative will refer to hematologist for further evaluation.     5. Type 2 diabetes mellitus without complication, without long-term current use of insulin (HCC)  Controlled. Continue metformin 1000 mg twice daily    6. Mild intermittent asthma without complication  Controlled. Continue current medication    7. Essential hypertension  Controlled. Continue Avapro 150 mg daily and amlodipine 5 mg daily    8. Severe obesity (BMI 35.0-39. 9) with comorbidity ([address]  Encouraged to lose weight with diet and exercise. 9. Chronic pain syndrome  Has a chronic back pain, has epidural injection with the minimal improvement. She takes Tylenol 3 and gabapentin  - acetaminophen-codeine (TYLENOL #3) 300-30 MG per tablet; Take 1 tablet by mouth every 8 hours as needed for Pain for up to 30 days. Dispense: 90 tablet;  Refill: 0      RTC in 1-2 mos    An electronic signature was used to authenticate this note.    --Jam Augustin MD on 1/10/2023 at 1:50 PM

## 2023-01-16 ENCOUNTER — PATIENT MESSAGE (OUTPATIENT)
Dept: PRIMARY CARE CLINIC | Age: 78
End: 2023-01-16

## 2023-01-17 ENCOUNTER — TELEPHONE (OUTPATIENT)
Dept: PRIMARY CARE CLINIC | Age: 78
End: 2023-01-17

## 2023-01-17 NOTE — TELEPHONE ENCOUNTER
To:  Gustavo Montes De Oca. I'm a medical assistant with Dr Radha Brown at [facility]. I received an e-mail from this patient that states:     Ximena Lilly  to [facility] (supporting Dejuan Mora MD)      [place],       I received a follow up call from [address] [place] [address]. They suggested that I see Dr. Eboni Nath. I need a referral from Doctor. Thanks          I reviewed it with Dr Nikko Quintana, he said he was okay with writing the referral.     When I Googled your name, it came up with the [facility] #: [phone]. I spoke with a . They said you really only see patients in the clinics you work at. I thought I would reach out to you on here to check on this. To make a long story short (too late, I know), would you be willing to see this patient?

## 2023-01-17 NOTE — TELEPHONE ENCOUNTER
From: Adali Lilly  To: Dr. Swain Rein: 1/16/2023 4:23 PM EST  Subject: Movement disorder     Hello,     I received a follow up call from Arcadio Felder. They suggested that I see Dr. Esther Hammond. I need a referral from Doctor.      Thanks

## 2023-01-18 NOTE — TELEPHONE ENCOUNTER
Tavon Perez- unfortunately I cannot see this patient as I only see patients at [facility], [facility] and [place] currently

## 2023-01-24 RX ORDER — DULOXETIN HYDROCHLORIDE 60 MG/1
60 CAPSULE, DELAYED RELEASE ORAL DAILY
Qty: 90 CAPSULE | Refills: 1 | Status: SHIPPED | OUTPATIENT
Start: 2023-01-24

## 2023-02-02 ENCOUNTER — TELEPHONE (OUTPATIENT)
Dept: PRIMARY CARE CLINIC | Age: 78
End: 2023-02-02

## 2023-02-02 NOTE — TELEPHONE ENCOUNTER
Patient says she needs referral to neurologist  Reagan Newell at [facility] [phone]. Please advise.

## 2023-02-10 ENCOUNTER — OFFICE VISIT (OUTPATIENT)
Dept: PSYCHOLOGY | Age: 78
End: 2023-02-10

## 2023-02-10 DIAGNOSIS — G24.01 TARDIVE DYSKINESIA: Primary | ICD-10-CM

## 2023-02-10 DIAGNOSIS — F33.1 MAJOR DEPRESSIVE DISORDER, RECURRENT EPISODE, MODERATE (HCC): ICD-10-CM

## 2023-02-10 ASSESSMENT — PATIENT HEALTH QUESTIONNAIRE - PHQ9
8. MOVING OR SPEAKING SO SLOWLY THAT OTHER PEOPLE COULD HAVE NOTICED. OR THE OPPOSITE, BEING SO FIGETY OR RESTLESS THAT YOU HAVE BEEN MOVING AROUND A LOT MORE THAN USUAL: 2
6. FEELING BAD ABOUT YOURSELF - OR THAT YOU ARE A FAILURE OR HAVE LET YOURSELF OR YOUR FAMILY DOWN: 1
3. TROUBLE FALLING OR STAYING ASLEEP: 2
SUM OF ALL RESPONSES TO PHQ QUESTIONS 1-9: 13
2. FEELING DOWN, DEPRESSED OR HOPELESS: 2
SUM OF ALL RESPONSES TO PHQ QUESTIONS 1-9: 13
5. POOR APPETITE OR OVEREATING: 1
1. LITTLE INTEREST OR PLEASURE IN DOING THINGS: 2
SUM OF ALL RESPONSES TO PHQ QUESTIONS 1-9: 13
SUM OF ALL RESPONSES TO PHQ QUESTIONS 1-9: 13
SUM OF ALL RESPONSES TO PHQ9 QUESTIONS 1 & 2: 4
9. THOUGHTS THAT YOU WOULD BE BETTER OFF DEAD, OR OF HURTING YOURSELF: 0
7. TROUBLE CONCENTRATING ON THINGS, SUCH AS READING THE NEWSPAPER OR WATCHING TELEVISION: 1
4. FEELING TIRED OR HAVING LITTLE ENERGY: 2

## 2023-02-10 ASSESSMENT — ANXIETY QUESTIONNAIRES
2. NOT BEING ABLE TO STOP OR CONTROL WORRYING: 2-OVER HALF THE DAYS
7. FEELING AFRAID AS IF SOMETHING AWFUL MIGHT HAPPEN: 1-SEVERAL DAYS
5. BEING SO RESTLESS THAT IT IS HARD TO SIT STILL: 1-SEVERAL DAYS
GAD7 TOTAL SCORE: 11
6. BECOMING EASILY ANNOYED OR IRRITABLE: 2-OVER HALF THE DAYS
4. TROUBLE RELAXING: 2-OVER HALF THE DAYS
1. FEELING NERVOUS, ANXIOUS, OR ON EDGE: 2
3. WORRYING TOO MUCH ABOUT DIFFERENT THINGS: 1-SEVERAL DAYS

## 2023-02-10 NOTE — PROGRESS NOTES
Behavioral Health Consultation  Felipe Collier, Ph.D.  Psychologist  2/10/2023  9:30 AM EST      Time spent with Patient: 30 minutes  This is patient's second [facility] appointment. Reason for Consult: insomnia  Referring Provider: Cristal Santos MD  [place] [address]  [address] [address]    Feedback for PCP:     Pt provided informed consent for the behavioral health program. Discussed with patient model of service to include the limits of confidentiality (i.e. abuse reporting, suicide intervention, etc.) and short-term intervention focused approach. Pt indicated understanding. Feedback given to PCP. S:  Patient's PHQ is 13, indicating a moderate depressive disorder. She has been diagnosed with tardive dyskinesia with symptoms that prevent her \"from using my arms the way I want. \" She says that her sleep is of poor quality. She experiences anxiety symptoms like shortness of breath and talking \"real fast.\"  She said that she sees a neurologist that specializes in movement disorders in in May.       Sleep: poor quality  Exercise: chronic pain  Drugs/Etoh: denies  SI/HI: denies     Mental health history:     Social History     Tobacco Use    Smoking status: Former     Packs/day: 2.00     Years: 36.00     Pack years: 72.00     Types: Cigarettes     Quit date: 1999     Years since quittin.8    Smokeless tobacco: Never   Substance Use Topics    Alcohol use: No     Alcohol/week: 0.0 standard drinks        Illicit drugs:   Social History     Substance and Sexual Activity   Drug Use Never        O:  MSE:  Appearance: good hygiene   Attitude: cooperative and friendly  Consciousness: drowsy  Orientation: oriented to person, place, time, general circumstance  Memory: recent and remote memory intact  Attention/Concentration: intact during session  Psychomotor Activity: normal  Eye Contact: normal  Speech: low volume and slow  Mood: drowsy, positive in general  Affect: congruent  Perception: within normal limits  Thought Content: within normal limits  Thought Process: logical, coherent  Insight: good  Judgment: intact  Ability to understand instructions: Yes  Ability to respond meaningfully: Yes  Morbid Ideation: no   Suicide Assessment: no suicidal ideation, plan, or intent  Homicidal Ideation: no    A:  We talked about how developing a deep breathing practice can help with anxiety over time. We also talked about how the thoughts that we have can increase stress and anxiety, so she was given handouts to help guide her. She was encouraged to reach out to her PCP for medicine that could help with her sleep disorder. ROLANDO 7 SCORE 2/10/2023   ROLANDO-7 Total Score 11     Interpretation of ROLANDO-7 score: 5-9 = mild anxiety, 10-14 = moderate anxiety, 15+ = severe anxiety. Recommend referral to behavioral health for scores 10 or greater. PHQ Scores 2/10/2023 1/10/2023 12/15/2022 6/17/2022 12/2/2021 2/8/2021 2/24/2020   PHQ2 Score 4 2 4 3 4 0 2   PHQ9 Score 13 9 9 12 12 0 2     Interpretation of Total Score Depression Severity: 1-4 = Minimal depression, 5-9 = Mild depression, 10-14 = Moderate depression, 15-19 = Moderately severe depression, 20-27 = Severe depression    Diagnosis:    1. Tardive dyskinesia    2. Major depressive disorder, recurrent episode, moderate (HCC)        Patient Active Problem List   Diagnosis    DOYLE on CPAP    Vitamin D deficiency    Restless legs syndrome (RLS)    Essential hypertension    Mixed hyperlipidemia    Mild intermittent asthma without complication    Type 2 diabetes mellitus without complication, without long-term current use of insulin (HCC)    Bilateral hearing loss    Mixed anxiety and depressive disorder    Lumbar degenerative disc disease    Anxiety    UTI (urinary tract infection)         Plan:  Pt interventions:  Supportive techniques, Provided Psychoeducation re: stress management and sleep hygiene, and Provided handout on sleep hygiene .        Pt Behavioral Change Plan:  Pt set the following goals:  Pt scheduled F/U visit in one month  See section 'A'

## 2023-02-10 NOTE — PATIENT INSTRUCTIONS
The [facility]) Consultant giving you this message provides team-based care to treat the mind and body. He works directly with your doctor, who will always stay in charge of your care. Most [address] [place] [address] visits are 30 minutes or less. Usually, the [address] [place] [address] provider and your doctor continue to see you until you are starting to do better and have a good plan in place for continued progress. Once that happens, most patients follow-up with just their doctor (though the [address] [place] [address] provider remains available to you as needed). If you are not improving, or if you desire outside mental health treatment, or if your doctor recommends more specialized help, we will be happy to help you find a mental health specialist in the community. Please also note your health insurance may be billed for [facility] visits. Check with your insurance company, and tell the [address] [place] [address] provider, if you have any questions about your [address] [place] [address] coverage. Diaphragmatic Breathing Exercises    Exercise 1:  The Stimulating Breath (also called the Kei Breath)  This exercise aims to raise energy level and increase alertness. Inhale and exhale rapidly through your nose, keeping your mouth closed but relaxed. Your breaths in and out should be equal in duration, but as short as possible. This is a noisy breathing exercise. Try for three in-and-out breath cycles per second. This produces a quick movement of the diaphragm, suggesting a kei. Breathe normally after each cycle. Do not do for more than 15 seconds on your first try. Each time you practice the Stimulating Breath, you can increase your time by five seconds or so, until you reach a full minute. If done properly, you may feel invigorated, comparable to the heightened awareness you feel after a good workout. You should feel the effort at the back of the neck, the diaphragm, the chest and the abdomen.  Try this breathing exercise the next time you need an energy boost and feel yourself reaching for a cup of coffee. Exercise 2:  The 4-7-8 (or Relaxing Breath) Exercise  This exercise is utterly simple, takes almost no time, requires no equipment and can be done anywhere. Although you can do the exercise in any position, sit with your back straight while learning the exercise. Place the tip of your tongue against the ridge of tissue just behind your upper front teeth, and keep it there through the entire exercise. You will be exhaling through your mouth around your tongue; try pursing your lips slightly if this seems awkward. Exhale completely through your mouth, making a whoosh sound. Close your mouth and inhale quietly through your nose to a mental count of four. Hold your breath for a count of seven. Exhale completely through your mouth, making a whoosh sound to a count of eight. This is one breath. Now inhale again and repeat the cycle three more times for a total of four breaths. Note that you always inhale quietly through your nose and exhale audibly through your mouth. The tip of your tongue stays in position the whole time. Exhalation takes twice as long as inhalation. The absolute time you spend on each phase is not important; the ratio of 4:7:8 is important. If you have trouble holding your breath, speed the exercise up but keep to the ratio of 4:7:8 for the three phases. With practice you can slow it all down and get used to inhaling and exhaling more and more deeply. This exercise is a natural tranquilizer for the nervous system. Unlike tranquilizing drugs, which are often effective when you first take them but then lose their power over time, this exercise is subtle when you first try it but gains in power with repetition and practice. Do it at least twice a day. You cannot do it too frequently. Do NOT do more than 4 breaths at one time for the first month of practice. Later, if you wish, you can extend it to eight breaths.  If you feel a little lightheaded when you first breathe this way, do not be concerned; it will pass. Once you develop this technique by practicing it every day, it will be a very useful tool that you will always have with you. Use it whenever anything upsetting happens - before you react. Use it whenever you are aware of internal tension. Use it to help you fall asleep. This exercise cannot be recommended too highly. Everyone can benefit from it. Exercise 3:   Meditation Exercise: Breath Counting  If you want to get a feel for this challenging work, try your hand at breath counting, a deceptively simple technique. Sit in a comfortable position with the spine straight and head inclined slightly forward. Gently close your eyes and take a few deep breaths. Then let the breath come naturally without trying to influence it. Ideally it will be quiet and slow, but depth and rhythm may vary. To begin the exercise, count \"one\" to yourself as you exhale. The next time you exhale, count \"two,\" and so on up to [place]. \"  Then begin a new cycle, counting \"one\" on the next exhalation. Never count higher than \"five,\" and count only when you exhale. You will know your attention has wandered when you find yourself up to \"eight,\" \"12,\" even \"19. \"  Try to do 10 minutes of this form of meditation. Thinking Errors That Increase Stress, Anger, Depression, Anxiety, and Worry  All-or-Nothing Thinking. You see things in black-and-white categories. It is either one thing or another; there is no room for anything in between. I'm 100% healthy or I must have a fatal disease.   Jumping to Conclusions. You make a negative interpretation even though there are no definite facts that convincingly support your conclusion. My  is late because he has been in a car accident and is now injured on the side of the road.   Fortune-Telling. You anticipate things will turn out badly, convinced the prediction is a fact. Not getting this job will cause us to lose the house.   Should Statements. Musts and oughts are also offenders. Emotional consequences can include anxiety and anger. I should be able to handle this.    Overgeneralization. Assuming one event is actually a pattern. My hand is a little shaky today, I must have Parkinson's disease.   Disqualifying the Positive. Filtering out or rejecting positive experiences to maintain negative beliefs. Upon hearing that your spouse has checked all the doors and windows and they are all locked you think, But someone could cut out a piece of glass and open the window.   Catastrophizing. Predicting the worst possible outcome imaginable. Terrible, awful, horrible, worst ever might be key words. If I can't get my heart to stop pounding I'm going to die.    Superstitious Thinking. The thought that something you do prevents something awful from happening. Talita Barrioser my spouse a hug and telling her to be careful before going to work will prevent her from getting in a wreck. I do it every morning and she hasn't gotten in a wreck yet.   Emotional Reasoning. The belief that because you feel a certain way means that the assumptions and association you have with that feeling are true. The fear, doom, and constant anxiety must mean something is seriously wrong with me. 

## 2023-02-13 ENCOUNTER — HOSPITAL ENCOUNTER (INPATIENT)
Age: 78
LOS: 3 days | Discharge: HOME | DRG: 812 | End: 2023-02-16
Attending: EMERGENCY MEDICINE | Admitting: INTERNAL MEDICINE
Payer: MEDICARE

## 2023-02-13 ENCOUNTER — APPOINTMENT (OUTPATIENT)
Dept: CT IMAGING | Age: 78
DRG: 812 | End: 2023-02-13
Payer: MEDICARE

## 2023-02-13 DIAGNOSIS — R29.6 FREQUENT FALLS: ICD-10-CM

## 2023-02-13 DIAGNOSIS — D64.9 ANEMIA, UNSPECIFIED TYPE: ICD-10-CM

## 2023-02-13 DIAGNOSIS — R25.9 ABNORMAL INVOLUNTARY MOVEMENT: Primary | ICD-10-CM

## 2023-02-13 PROBLEM — N39.0 UTI (URINARY TRACT INFECTION): Status: ACTIVE | Noted: 2023-02-13

## 2023-02-13 LAB
A/G RATIO: 1.4 (ref 1.1–2.2)
ALBUMIN SERPL-MCNC: 3.9 G/DL (ref 3.4–5)
ALBUMIN SERPL-MCNC: 3.9 G/DL (ref 3.4–5)
ALP BLD-CCNC: 81 U/L (ref 40–129)
ALP BLD-CCNC: 82 U/L (ref 40–129)
ALT SERPL-CCNC: <5 U/L (ref 10–40)
ALT SERPL-CCNC: <5 U/L (ref 10–40)
ANION GAP SERPL CALCULATED.3IONS-SCNC: 13 MMOL/L (ref 3–16)
AST SERPL-CCNC: 18 U/L (ref 15–37)
AST SERPL-CCNC: 18 U/L (ref 15–37)
BACTERIA: ABNORMAL /HPF
BASOPHILS ABSOLUTE: 0.1 K/UL (ref 0–0.2)
BASOPHILS RELATIVE PERCENT: 1 %
BILIRUB SERPL-MCNC: 0.3 MG/DL (ref 0–1)
BILIRUB SERPL-MCNC: 0.3 MG/DL (ref 0–1)
BILIRUBIN DIRECT: <0.2 MG/DL (ref 0–0.3)
BILIRUBIN URINE: NEGATIVE
BILIRUBIN, INDIRECT: ABNORMAL MG/DL (ref 0–1)
BLOOD, URINE: NEGATIVE
BUN BLDV-MCNC: 10 MG/DL (ref 7–20)
CALCIUM SERPL-MCNC: 9.5 MG/DL (ref 8.3–10.6)
CHLORIDE BLD-SCNC: 103 MMOL/L (ref 99–110)
CLARITY: ABNORMAL
CO2: 24 MMOL/L (ref 21–32)
COLOR: YELLOW
CREAT SERPL-MCNC: 0.7 MG/DL (ref 0.6–1.2)
EOSINOPHILS ABSOLUTE: 0.1 K/UL (ref 0–0.6)
EOSINOPHILS RELATIVE PERCENT: 1.6 %
EPITHELIAL CELLS, UA: 6 /HPF (ref 0–5)
FERRITIN: 5.9 NG/ML (ref 15–150)
FOLATE: 12.61 NG/ML (ref 4.78–24.2)
GFR SERPL CREATININE-BSD FRML MDRD: >60 ML/MIN/{1.73_M2}
GLUCOSE BLD-MCNC: 104 MG/DL (ref 70–99)
GLUCOSE BLD-MCNC: 108 MG/DL (ref 70–99)
GLUCOSE URINE: NEGATIVE MG/DL
HCT VFR BLD CALC: 26.5 % (ref 36–48)
HEMATOLOGY PATH CONSULT: NO
HEMOGLOBIN: 7.4 G/DL (ref 12–16)
HYALINE CASTS: 2 /LPF (ref 0–8)
IRON SATURATION: 3 % (ref 15–50)
IRON: 13 UG/DL (ref 37–145)
KETONES, URINE: ABNORMAL MG/DL
LEUKOCYTE ESTERASE, URINE: ABNORMAL
LYMPHOCYTES ABSOLUTE: 1.5 K/UL (ref 1–5.1)
LYMPHOCYTES RELATIVE PERCENT: 16.5 %
MCH RBC QN AUTO: 17.2 PG (ref 26–34)
MCHC RBC AUTO-ENTMCNC: 27.8 G/DL (ref 31–36)
MCV RBC AUTO: 61.9 FL (ref 80–100)
MICROSCOPIC EXAMINATION: YES
MONOCYTES ABSOLUTE: 0.8 K/UL (ref 0–1.3)
MONOCYTES RELATIVE PERCENT: 8.5 %
NEUTROPHILS ABSOLUTE: 6.4 K/UL (ref 1.7–7.7)
NEUTROPHILS RELATIVE PERCENT: 72.4 %
NITRITE, URINE: NEGATIVE
PDW BLD-RTO: 19 % (ref 12.4–15.4)
PERFORMED ON: ABNORMAL
PH UA: 5.5 (ref 5–8)
PLATELET # BLD: 489 K/UL (ref 135–450)
PMV BLD AUTO: 7.2 FL (ref 5–10.5)
POTASSIUM REFLEX MAGNESIUM: 4.1 MMOL/L (ref 3.5–5.1)
PROTEIN UA: NEGATIVE MG/DL
RBC # BLD: 4.27 M/UL (ref 4–5.2)
RBC UA: 0 /HPF (ref 0–4)
SODIUM BLD-SCNC: 140 MMOL/L (ref 136–145)
SPECIFIC GRAVITY UA: 1.01 (ref 1–1.03)
TOTAL IRON BINDING CAPACITY: 388 UG/DL (ref 260–445)
TOTAL PROTEIN: 6.4 G/DL (ref 6.4–8.2)
TOTAL PROTEIN: 6.6 G/DL (ref 6.4–8.2)
TSH REFLEX: 1.21 UIU/ML (ref 0.27–4.2)
URINE REFLEX TO CULTURE: ABNORMAL
URINE TYPE: ABNORMAL
UROBILINOGEN, URINE: 0.2 E.U./DL
VITAMIN B-12: 328 PG/ML (ref 211–911)
WBC # BLD: 8.9 K/UL (ref 4–11)
WBC UA: 8 /HPF (ref 0–5)

## 2023-02-13 PROCEDURE — 81001 URINALYSIS AUTO W/SCOPE: CPT

## 2023-02-13 PROCEDURE — 70450 CT HEAD/BRAIN W/O DYE: CPT

## 2023-02-13 PROCEDURE — 6370000000 HC RX 637 (ALT 250 FOR IP): Performed by: INTERNAL MEDICINE

## 2023-02-13 PROCEDURE — 1200000000 HC SEMI PRIVATE

## 2023-02-13 PROCEDURE — 82607 VITAMIN B-12: CPT

## 2023-02-13 PROCEDURE — 80053 COMPREHEN METABOLIC PANEL: CPT

## 2023-02-13 PROCEDURE — 83550 IRON BINDING TEST: CPT

## 2023-02-13 PROCEDURE — 2580000003 HC RX 258: Performed by: INTERNAL MEDICINE

## 2023-02-13 PROCEDURE — 6370000000 HC RX 637 (ALT 250 FOR IP): Performed by: PHYSICIAN ASSISTANT

## 2023-02-13 PROCEDURE — 85025 COMPLETE CBC W/AUTO DIFF WBC: CPT

## 2023-02-13 PROCEDURE — 6360000002 HC RX W HCPCS: Performed by: INTERNAL MEDICINE

## 2023-02-13 PROCEDURE — 82728 ASSAY OF FERRITIN: CPT

## 2023-02-13 PROCEDURE — 82270 OCCULT BLOOD FECES: CPT

## 2023-02-13 PROCEDURE — 99285 EMERGENCY DEPT VISIT HI MDM: CPT

## 2023-02-13 PROCEDURE — 82746 ASSAY OF FOLIC ACID SERUM: CPT

## 2023-02-13 PROCEDURE — 83540 ASSAY OF IRON: CPT

## 2023-02-13 PROCEDURE — 84443 ASSAY THYROID STIM HORMONE: CPT

## 2023-02-13 RX ORDER — SODIUM CHLORIDE 0.9 % (FLUSH) 0.9 %
10 SYRINGE (ML) INJECTION EVERY 12 HOURS SCHEDULED
Status: DISCONTINUED | OUTPATIENT
Start: 2023-02-13 | End: 2023-02-16 | Stop reason: HOSPADM

## 2023-02-13 RX ORDER — ATORVASTATIN CALCIUM 40 MG/1
40 TABLET, FILM COATED ORAL NIGHTLY
Status: DISCONTINUED | OUTPATIENT
Start: 2023-02-13 | End: 2023-02-16 | Stop reason: HOSPADM

## 2023-02-13 RX ORDER — LOSARTAN POTASSIUM 50 MG/1
50 TABLET ORAL DAILY
Status: DISCONTINUED | OUTPATIENT
Start: 2023-02-14 | End: 2023-02-16 | Stop reason: HOSPADM

## 2023-02-13 RX ORDER — LORAZEPAM 0.5 MG/1
0.5 TABLET ORAL 2 TIMES DAILY PRN
Status: DISCONTINUED | OUTPATIENT
Start: 2023-02-13 | End: 2023-02-16 | Stop reason: HOSPADM

## 2023-02-13 RX ORDER — PANTOPRAZOLE SODIUM 40 MG/1
40 TABLET, DELAYED RELEASE ORAL 2 TIMES DAILY
Status: DISCONTINUED | OUTPATIENT
Start: 2023-02-13 | End: 2023-02-16 | Stop reason: HOSPADM

## 2023-02-13 RX ORDER — SODIUM CHLORIDE 0.9 % (FLUSH) 0.9 %
10 SYRINGE (ML) INJECTION PRN
Status: DISCONTINUED | OUTPATIENT
Start: 2023-02-13 | End: 2023-02-16 | Stop reason: HOSPADM

## 2023-02-13 RX ORDER — AMLODIPINE BESYLATE 5 MG/1
5 TABLET ORAL DAILY
Status: DISCONTINUED | OUTPATIENT
Start: 2023-02-14 | End: 2023-02-16 | Stop reason: HOSPADM

## 2023-02-13 RX ORDER — GABAPENTIN 300 MG/1
600 CAPSULE ORAL 2 TIMES DAILY
Status: DISCONTINUED | OUTPATIENT
Start: 2023-02-13 | End: 2023-02-14

## 2023-02-13 RX ORDER — ENOXAPARIN SODIUM 100 MG/ML
40 INJECTION SUBCUTANEOUS DAILY
Status: DISCONTINUED | OUTPATIENT
Start: 2023-02-14 | End: 2023-02-16 | Stop reason: HOSPADM

## 2023-02-13 RX ORDER — ACETAMINOPHEN 650 MG/1
650 SUPPOSITORY RECTAL EVERY 6 HOURS PRN
Status: DISCONTINUED | OUTPATIENT
Start: 2023-02-13 | End: 2023-02-16 | Stop reason: HOSPADM

## 2023-02-13 RX ORDER — SODIUM CHLORIDE 9 MG/ML
INJECTION, SOLUTION INTRAVENOUS PRN
Status: DISCONTINUED | OUTPATIENT
Start: 2023-02-13 | End: 2023-02-16 | Stop reason: HOSPADM

## 2023-02-13 RX ORDER — POTASSIUM CHLORIDE 7.45 MG/ML
10 INJECTION INTRAVENOUS PRN
Status: DISCONTINUED | OUTPATIENT
Start: 2023-02-13 | End: 2023-02-16 | Stop reason: HOSPADM

## 2023-02-13 RX ORDER — MAGNESIUM SULFATE IN WATER 40 MG/ML
2000 INJECTION, SOLUTION INTRAVENOUS PRN
Status: DISCONTINUED | OUTPATIENT
Start: 2023-02-13 | End: 2023-02-16 | Stop reason: HOSPADM

## 2023-02-13 RX ORDER — ACETAMINOPHEN 325 MG/1
650 TABLET ORAL EVERY 6 HOURS PRN
Status: DISCONTINUED | OUTPATIENT
Start: 2023-02-13 | End: 2023-02-16 | Stop reason: HOSPADM

## 2023-02-13 RX ORDER — ONDANSETRON 2 MG/ML
4 INJECTION INTRAMUSCULAR; INTRAVENOUS EVERY 6 HOURS PRN
Status: DISCONTINUED | OUTPATIENT
Start: 2023-02-13 | End: 2023-02-16 | Stop reason: HOSPADM

## 2023-02-13 RX ORDER — POTASSIUM CHLORIDE 20 MEQ/1
40 TABLET, EXTENDED RELEASE ORAL PRN
Status: DISCONTINUED | OUTPATIENT
Start: 2023-02-13 | End: 2023-02-16 | Stop reason: HOSPADM

## 2023-02-13 RX ORDER — PROMETHAZINE HYDROCHLORIDE 25 MG/1
12.5 TABLET ORAL EVERY 6 HOURS PRN
Status: DISCONTINUED | OUTPATIENT
Start: 2023-02-13 | End: 2023-02-16 | Stop reason: HOSPADM

## 2023-02-13 RX ORDER — CARBIDOPA/LEVODOPA 25MG-250MG
2 TABLET ORAL 4 TIMES DAILY
Status: DISCONTINUED | OUTPATIENT
Start: 2023-02-13 | End: 2023-02-16 | Stop reason: HOSPADM

## 2023-02-13 RX ORDER — CARBIDOPA/LEVODOPA 25MG-250MG
2 TABLET ORAL ONCE
Status: COMPLETED | OUTPATIENT
Start: 2023-02-13 | End: 2023-02-13

## 2023-02-13 RX ADMIN — PANTOPRAZOLE SODIUM 40 MG: 40 TABLET, DELAYED RELEASE ORAL at 21:38

## 2023-02-13 RX ADMIN — SODIUM CHLORIDE, PRESERVATIVE FREE 10 ML: 5 INJECTION INTRAVENOUS at 21:44

## 2023-02-13 RX ADMIN — CEFTRIAXONE 1000 MG: 1 INJECTION, POWDER, FOR SOLUTION INTRAMUSCULAR; INTRAVENOUS at 20:35

## 2023-02-13 RX ADMIN — CARBIDOPA AND LEVODOPA 2 TABLET: 25; 250 TABLET ORAL at 16:00

## 2023-02-13 RX ADMIN — ATORVASTATIN CALCIUM 40 MG: 40 TABLET, FILM COATED ORAL at 21:38

## 2023-02-13 RX ADMIN — GABAPENTIN 600 MG: 300 CAPSULE ORAL at 21:38

## 2023-02-13 RX ADMIN — CARBIDOPA AND LEVODOPA 2 TABLET: 25; 250 TABLET ORAL at 21:43

## 2023-02-13 SDOH — ECONOMIC STABILITY: HOUSING INSECURITY
IN THE LAST 12 MONTHS, WAS THERE A TIME WHEN YOU DID NOT HAVE A STEADY PLACE TO SLEEP OR SLEPT IN A SHELTER (INCLUDING NOW)?: NO

## 2023-02-13 SDOH — ECONOMIC STABILITY: FOOD INSECURITY: WITHIN THE PAST 12 MONTHS, THE FOOD YOU BOUGHT JUST DIDN'T LAST AND YOU DIDN'T HAVE MONEY TO GET MORE.: NEVER TRUE

## 2023-02-13 SDOH — ECONOMIC STABILITY: INCOME INSECURITY: IN THE LAST 12 MONTHS, WAS THERE A TIME WHEN YOU WERE NOT ABLE TO PAY THE MORTGAGE OR RENT ON TIME?: NO

## 2023-02-13 SDOH — HEALTH STABILITY: PHYSICAL HEALTH: ON AVERAGE, HOW MANY MINUTES DO YOU ENGAGE IN EXERCISE AT THIS LEVEL?: 0 MIN

## 2023-02-13 SDOH — ECONOMIC STABILITY: HOUSING INSECURITY: IN THE LAST 12 MONTHS, HOW MANY PLACES HAVE YOU LIVED?: 1

## 2023-02-13 SDOH — ECONOMIC STABILITY: FOOD INSECURITY: WITHIN THE PAST 12 MONTHS, YOU WORRIED THAT YOUR FOOD WOULD RUN OUT BEFORE YOU GOT MONEY TO BUY MORE.: NEVER TRUE

## 2023-02-13 SDOH — HEALTH STABILITY: PHYSICAL HEALTH: ON AVERAGE, HOW MANY DAYS PER WEEK DO YOU ENGAGE IN MODERATE TO STRENUOUS EXERCISE (LIKE A BRISK WALK)?: 0 DAYS

## 2023-02-13 ASSESSMENT — PAIN - FUNCTIONAL ASSESSMENT
PAIN_FUNCTIONAL_ASSESSMENT: ACTIVITIES ARE NOT PREVENTED
PAIN_FUNCTIONAL_ASSESSMENT: 0-10

## 2023-02-13 ASSESSMENT — PATIENT HEALTH QUESTIONNAIRE - PHQ9
2. FEELING DOWN, DEPRESSED OR HOPELESS: SEVERAL DAYS
10. IF YOU CHECKED OFF ANY PROBLEMS, HOW DIFFICULT HAVE THESE PROBLEMS MADE IT FOR YOU TO DO YOUR WORK, TAKE CARE OF THINGS AT HOME, OR GET ALONG WITH OTHER PEOPLE: NOT DIFFICULT AT ALL
1. LITTLE INTEREST OR PLEASURE IN DOING THINGS: SEVERAL DAYS
SUM OF ALL RESPONSES TO PHQ9 QUESTIONS 1 & 2: 2

## 2023-02-13 ASSESSMENT — ENCOUNTER SYMPTOMS
COUGH: 0
VOMITING: 0
NAUSEA: 0
SHORTNESS OF BREATH: 0

## 2023-02-13 ASSESSMENT — SOCIAL DETERMINANTS OF HEALTH (SDOH)
HOW OFTEN DO YOU GET TOGETHER WITH FRIENDS OR RELATIVES?: MORE THAN THREE TIMES A WEEK
HOW OFTEN DO YOU ATTEND CHURCH OR RELIGIOUS SERVICES?: NEVER
WITHIN THE LAST YEAR, HAVE YOU BEEN HUMILIATED OR EMOTIONALLY ABUSED IN OTHER WAYS BY YOUR PARTNER OR EX-PARTNER?: NO
WITHIN THE LAST YEAR, HAVE TO BEEN RAPED OR FORCED TO HAVE ANY KIND OF SEXUAL ACTIVITY BY YOUR PARTNER OR EX-PARTNER?: NO
IN A TYPICAL WEEK, HOW MANY TIMES DO YOU TALK ON THE PHONE WITH FAMILY, FRIENDS, OR NEIGHBORS?: MORE THAN THREE TIMES A WEEK
HOW OFTEN DO YOU ATTENT MEETINGS OF THE CLUB OR ORGANIZATION YOU BELONG TO?: NEVER
HOW HARD IS IT FOR YOU TO PAY FOR THE VERY BASICS LIKE FOOD, HOUSING, MEDICAL CARE, AND HEATING?: NOT HARD AT ALL
WITHIN THE LAST YEAR, HAVE YOU BEEN AFRAID OF YOUR PARTNER OR EX-PARTNER?: NO
WITHIN THE LAST YEAR, HAVE YOU BEEN KICKED, HIT, SLAPPED, OR OTHERWISE PHYSICALLY HURT BY YOUR PARTNER OR EX-PARTNER?: NO
DO YOU BELONG TO ANY CLUBS OR ORGANIZATIONS SUCH AS CHURCH GROUPS UNIONS, FRATERNAL OR ATHLETIC GROUPS, OR SCHOOL GROUPS?: NO

## 2023-02-13 ASSESSMENT — PAIN SCALES - GENERAL
PAINLEVEL_OUTOF10: 8
PAINLEVEL_OUTOF10: 0
PAINLEVEL_OUTOF10: 8

## 2023-02-13 ASSESSMENT — PAIN DESCRIPTION - FREQUENCY: FREQUENCY: INTERMITTENT

## 2023-02-13 ASSESSMENT — LIFESTYLE VARIABLES
HOW MANY STANDARD DRINKS CONTAINING ALCOHOL DO YOU HAVE ON A TYPICAL DAY: PATIENT DOES NOT DRINK
HOW OFTEN DO YOU HAVE A DRINK CONTAINING ALCOHOL: NEVER

## 2023-02-13 ASSESSMENT — PAIN DESCRIPTION - DESCRIPTORS
DESCRIPTORS: ACHING;DISCOMFORT
DESCRIPTORS: DISCOMFORT

## 2023-02-13 ASSESSMENT — PAIN DESCRIPTION - LOCATION
LOCATION: BACK
LOCATION: BACK

## 2023-02-13 ASSESSMENT — PAIN DESCRIPTION - ORIENTATION
ORIENTATION: MID;LOWER
ORIENTATION: LOWER

## 2023-02-13 ASSESSMENT — PAIN DESCRIPTION - PAIN TYPE: TYPE: CHRONIC PAIN

## 2023-02-13 NOTE — PROGRESS NOTES
Medication Reconciliation    List of medications patient is currently taking is complete. Source of information: 1. Conversation with patient at bedside                                      2. EPIC records      Allergies  Meloxicam, Quinine derivatives, Cymbalta [duloxetine hcl], Codeine, Quinine, and Vicodin [hydrocodone-acetaminophen]     Notes regarding home medications:   1. Patient received morning home medications prior to arrival to the ER today.     Geo Zimmerman [facility], PharmD, BCPS  2/13/2023 5:19 PM

## 2023-02-13 NOTE — ED NOTES
Pt requesting sandwich. Spoke with DR. Bishop Storm and pt is able to eat.       Tiffany Magana  02/13/23 1534

## 2023-02-13 NOTE — ED NOTES
Introduce myself to the patient, identification band inplace, stretcher in the lowest position for safety, and the call light is in reach. Updated patient on Emergency Department plan of care, no additional needs at this time, will continue to monitor patient.         Rosa Ricardo RN  02/13/23 [id number]

## 2023-02-13 NOTE — ED NOTES
Purewick placed on pt. Pt was able to void and purewick is remaining in place.       Valente Larios  02/13/23 8223

## 2023-02-13 NOTE — ED PROVIDER NOTES
Attending Supervisory Note/Shared Visit   I have personally performed a face to face diagnostic evaluation on this patient. I have reviewed the mid-levels findings and agree. History and Exam by me shows an alert white female in no acute distress. She is having uncontrollable movements of her arms and legs. She states it been going on for couple of years, but initially they were just like tics. She states they have gradually gotten worse, but in the last couple weeks much worse. She states she has difficulty falling asleep sometimes. She states they wake her up at night sometimes. She has Parkinson's disease. She sees a neurologist.  She was referred to a different neurologist for this problem. She has an appointment in May but does not think she can wait. General: Alert elderly female, intermittent jerking movements of her upper and lower extremities during the examination. HEENT: Atraumatic. Pupils equal round reactive. Extraocular movements are intact. Oropharynx negative. Heart: Regular rate and rhythm. No murmurs or gallops noted. Lungs: Breath sounds equal bilaterally and clear. Neuro: Awake, alert, oriented. Symmetrical reactive pupils. No facial asymmetry. She has pronounced jerking movements of both upper lower extremities, frequent in occurrence.     Labs Reviewed   CBC WITH AUTO DIFFERENTIAL - Abnormal; Notable for the following components:       Result Value    Hemoglobin 7.4 (*)     Hematocrit 26.5 (*)     MCV 61.9 (*)     MCH 17.2 (*)     MCHC 27.8 (*)     RDW 19.0 (*)     Platelets 362 (*)     All other components within normal limits   COMPREHENSIVE METABOLIC PANEL W/ REFLEX TO MG FOR LOW K - Abnormal; Notable for the following components:    Glucose 108 (*)     ALT <5 (*)     All other components within normal limits   URINALYSIS WITH REFLEX TO CULTURE - Abnormal; Notable for the following components:    Clarity, UA CLOUDY (*)     Ketones, Urine TRACE (*)     Leukocyte Esterase, Urine MODERATE (*)     All other components within normal limits   MICROSCOPIC URINALYSIS - Abnormal; Notable for the following components:    WBC, UA 8 (*)     Epithelial Cells, UA 6 (*)     All other components within normal limits   POCT GLUCOSE - Abnormal; Notable for the following components:    POC Glucose 104 (*)     All other components within normal limits     CT HEAD WO CONTRAST   Preliminary Result   No evidence of acute intracranial abnormality. 1. Abnormal involuntary movement    2. Frequent falls    3. Anemia, unspecified type      Disposition;   Admit    (Please note that portions of this note were completed with a voice recognition program.  Efforts were made to edit the dictations but occasionally words are mis-transcribed.)    Kelly Rose MD  Attending Emergency Physician        Saulo Duarte MD  02/14/23 0339

## 2023-02-13 NOTE — ED NOTES
A/ox3 in wheelchair. Involuntary movements of arms/legs. No acute distress noted.  Denies pain      Jon Stapleton RN  02/13/23 3914

## 2023-02-13 NOTE — ED PROVIDER NOTES
[address] [facility]        Pt Name: Nikia Perez  MRN: [id number]  [place] 1945  Date of evaluation: 2/13/2023  Provider: NIRU Mauricio  PCP: Soumya Del Rio MD  Note Started: 1:55 PM EST 2/13/23       I have seen and evaluated this patient with my supervising physician Johnna García MD.    Emelina Tucker       Chief Complaint   Patient presents with    Other     Uncontrollable movements of arms/legs on going. Following up with specialist in may. HISTORY OF PRESENT ILLNESS: 1 or more Elements     History From: Patient, patient's daughter  Limitations to history : None    Nikia Perez is a 70 y.o. female who presents to the emergency department today with complaints of involuntary movements. Patient reports that these symptoms have been present for the last few years, and she gets spells where they are severe, and then they resolve. However for the last 3 to 4 months they have been coming more frequently, and in the last few weeks it has become near constant. She states that the symptoms are so severe, that she has had several falls, and dropped a hot pot roast because the movements are so vigorous. She states she is getting virtually no sleep at night because these movements prevent her from sleeping or if she does get to sleep it wakes her up from sleep. She did see her primary care doctor who referred to neurology. She spoke with Dr Mari aBins, who is concerned that she may have some sort of dyskinesia, and would benefit from being further evaluated by movement disorder specialist.  Patient states she has an appointment with Dr. Albin Restrepo, but the appointment is not until May. At this point the involuntary movements are becoming very distressing, affecting her day-to-day activities, causing her to fall, and she is concerned because it is rapidly progressing. She has no further complaints at this time.      Nursing Notes were all reviewed and agreed with or any disagreements were addressed in the HPI. REVIEW OF SYSTEMS :      Review of Systems   Constitutional:  Negative for chills and fever. Respiratory:  Negative for cough and shortness of breath. Cardiovascular:  Negative for chest pain and palpitations. Gastrointestinal:  Negative for nausea and vomiting. Musculoskeletal:  Positive for gait problem and myalgias. Neurological:  Negative for tremors, weakness, numbness and headaches. Frequent involuntary movements of head, arms, legs   Psychiatric/Behavioral:  Negative for agitation and confusion. Positives and Pertinent negatives as per HPI.      SURGICAL HISTORY     Past Surgical History:   Procedure Laterality Date    APPENDECTOMY      BACK INJECTION Bilateral 05/27/2022    BILATERAL SACROILIAC JOINT INJECTION performed by Chelsea Cruz MD at [facility] Bilateral 11/11/2022    BILATERAL SACROILIAC JOINT INJECTION performed by Chelsea Cruz MD at [address]      x2    CHOLECYSTECTOMY  05/01/2007    COLONOSCOPY      HC INJECTION PROCEDURE FOR SACROILIAC JOINT Right 10/02/2019    RIGHT SACROILIAC JOINT INJECTION WITH FLUOROSCOPY performed by Misael Howell MD at [address] (CERVIX STATUS UNKNOWN)      NASAL SEPTUM SURGERY      NERVE BLOCK Right 09/02/2022    RIGHT SCIATIC NERVE BLOCK (DEFINE) performed by Chelsea Cruz MD at [address] Left 09/16/2022    LEFT SCIATIC NERVE BLOCK performed by Chelsea Cruz MD at [address]      fatty tumors on arms excised    PAIN MANAGEMENT PROCEDURE Right 05/13/2022    LUMBAR EPIDURAL STEROID INJECTION - RIGHT L3-4 performed by Chelsea Cruz MD at [address] Bilateral 07/01/2022    BILATERAL TWO LEVEL LUMBAR MEDIAL BRANCH BLOCKS AT L4-5 AND L5-S1 performed by Denise Thacker MD at [address] Right 08/19/2022    LUMBAR EPIDURAL STEROID INJECTION - RIGHT L4-5 performed by Denise Thacker MD at [place] [address]       Previous Medications    AMLODIPINE (NORVASC) 5 MG TABLET    Take 1 tablet by mouth daily    AMMONIUM LACTATE (AMLACTIN) 12 % CREAM    Apply topically as needed for Dry Skin Apply topically as needed. ATORVASTATIN (LIPITOR) 40 MG TABLET    Take 1 tablet by mouth daily    BIOTIN 10025 MCG TABS    Take 1 capsule by mouth daily    CARBIDOPA-LEVODOPA (SINEMET)  MG PER TABLET    Take 2 tablets by mouth 4 times daily    CHOLECALCIFEROL (VITAMIN D3) 5000 UNITS CAPS    Take 1 capsule by mouth daily    GABAPENTIN (NEURONTIN) 600 MG TABLET    Take 1 tablet by mouth 2 times daily. IRBESARTAN (AVAPRO) 150 MG TABLET    Take 1 tablet by mouth daily    LORAZEPAM (ATIVAN) 0.5 MG TABLET    Take 0.5 mg by mouth 2 times daily as needed for Anxiety.     METFORMIN (GLUCOPHAGE) 1000 MG TABLET    Take 1 tablet by mouth 2 times daily (with meals)    PANTOPRAZOLE (PROTONIX) 40 MG TABLET    Take 1 tablet by mouth daily       ALLERGIES     Meloxicam, Quinine derivatives, Cymbalta [duloxetine hcl], Codeine, Quinine, and Vicodin [hydrocodone-acetaminophen]    FAMILYHISTORY       Family History   Problem Relation Age of Onset    Heart Disease Mother     Cancer Mother         multiple myeloma    Heart Failure Mother     Hypertension Mother     Heart Disease Father     Cancer Father         head and neck    Heart Failure Father     Hypertension Father     Heart Disease Sister     Spondylitis Sister     Heart Attack Sister     Heart Failure Sister     Hypertension Sister     Heart Disease Brother     Heart Attack Brother     Heart Failure Brother     Hypertension Brother     Stroke Brother         SOCIAL HISTORY       Social History     Tobacco Use Smoking status: Former     Packs/day: 2.00     Years: 36.00     Pack years: 72.00     Types: Cigarettes     Quit date: 1999     Years since quittin.8    Smokeless tobacco: Never   Vaping Use    Vaping Use: Never used   Substance Use Topics    Alcohol use: No     Alcohol/week: 0.0 standard drinks    Drug use: Never       SCREENINGS        [place] Coma Scale  Eye Opening: Spontaneous  Best Verbal Response: Oriented  Best Motor Response: Obeys commands  [place] Coma Scale Score: 15                CIWA Assessment  BP: (!) 157/61  Heart Rate: 99           PHYSICAL EXAM  1 or more Elements     ED Triage Vitals   BP Temp Temp Source Heart Rate Resp SpO2 Height Weight   23 1225 23 1226 23 1226 23 1225 23 1225 23 1225 -- --   (!) 101/55 98.2 °F (36.8 °C) Tympanic 80 18 98 %         Physical Exam  Vitals and nursing note reviewed. Constitutional:       General: She is not in acute distress. Appearance: Normal appearance. She is well-developed. She is not ill-appearing, toxic-appearing or diaphoretic. HENT:      Head: Normocephalic and atraumatic. Mouth/Throat:      Mouth: Mucous membranes are moist.      Pharynx: Oropharynx is clear. Eyes:      General: No scleral icterus. Conjunctiva/sclera: Conjunctivae normal.      Pupils: Pupils are equal, round, and reactive to light. Cardiovascular:      Rate and Rhythm: Normal rate and regular rhythm. Pulmonary:      Effort: Pulmonary effort is normal. No respiratory distress. Breath sounds: Normal breath sounds. Abdominal:      General: Bowel sounds are normal. There is no distension. Palpations: Abdomen is soft. Tenderness: There is no abdominal tenderness. Musculoskeletal:      Cervical back: Normal range of motion and neck supple. Skin:     General: Skin is warm and dry. Neurological:      Mental Status: She is alert and oriented to person, place, and time. Sensory: No sensory deficit. Comments: Involuntary movements of arms, legs and head during the course of examination   Psychiatric:         Mood and Affect: Mood normal.         Behavior: Behavior normal. Behavior is cooperative. DIAGNOSTIC RESULTS   LABS:    Labs Reviewed   CBC WITH AUTO DIFFERENTIAL - Abnormal; Notable for the following components:       Result Value    Hemoglobin 7.4 (*)     Hematocrit 26.5 (*)     MCV 61.9 (*)     MCH 17.2 (*)     MCHC 27.8 (*)     RDW 19.0 (*)     Platelets 626 (*)     All other components within normal limits   COMPREHENSIVE METABOLIC PANEL W/ REFLEX TO MG FOR LOW K - Abnormal; Notable for the following components:    Glucose 108 (*)     ALT <5 (*)     All other components within normal limits   URINALYSIS WITH REFLEX TO CULTURE - Abnormal; Notable for the following components:    Clarity, UA CLOUDY (*)     Ketones, Urine TRACE (*)     Leukocyte Esterase, Urine MODERATE (*)     All other components within normal limits   MICROSCOPIC URINALYSIS - Abnormal; Notable for the following components:    WBC, UA 8 (*)     Epithelial Cells, UA 6 (*)     All other components within normal limits   POCT GLUCOSE - Abnormal; Notable for the following components:    POC Glucose 104 (*)     All other components within normal limits       When ordered only abnormal lab results are displayed. All other labs were within normal range or not returned as of this dictation. EKG: When ordered, EKG's are interpreted by the Emergency Department Physician in the absence of a cardiologist.  Please see their note for interpretation of EKG.     RADIOLOGY:   Non-plain film images such as CT, Ultrasound and MRI are read by the radiologist. Plain radiographic images are visualized and preliminarily interpreted by the ED Provider with the below findings:      Interpretation per the Radiologist below, if available at the time of this note:    CT HEAD WO CONTRAST   Preliminary Result   No evidence of acute intracranial abnormality. No results found. No results found. PROCEDURES   Unless otherwise noted below, none     Procedures      PAST MEDICAL HISTORY      has a past medical history of Anxiety, Asthma, Benign tumor lacrimal gland, Chronic back pain, Greater trochanteric bursitis of left hip, Hyperlipidemia, Hypertension, Iron deficiency anemia, DOYLE, Pseudophakia, RLS (restless legs syndrome), Tardive dyskinesia, Type II or unspecified type diabetes mellitus without mention of complication, not stated as uncontrolled, and Vitamin D deficiency. EMERGENCY DEPARTMENT COURSE and DIFFERENTIAL DIAGNOSIS/MDM:   Vitals:    Vitals:    02/13/23 1515 02/13/23 1530 02/13/23 1545 02/13/23 1600   BP:  (!) 149/71  (!) 157/61   Pulse: 91 92 100 99   Resp: 22 19 24 22   Temp:       TempSrc:       SpO2: 97% 93% 100% 95%       Patient was given the following medications:  Medications   carbidopa-levodopa (SINEMET)  MG per tablet 2 tablet (2 tablets Oral Given 2/13/23 1600)             Is this patient to be included in the SEP-1 Core Measure due to severe sepsis or septic shock? No   Exclusion criteria - the patient is NOT to be included for SEP-1 Core Measure due to: Infection is not suspected    Chronic Conditions affecting care:    has a past medical history of Anxiety, Asthma, Benign tumor lacrimal gland, Chronic back pain, Greater trochanteric bursitis of left hip, Hyperlipidemia, Hypertension, Iron deficiency anemia, DOYLE, Pseudophakia, RLS (restless legs syndrome), Tardive dyskinesia, Type II or unspecified type diabetes mellitus without mention of complication, not stated as uncontrolled, and Vitamin D deficiency.     CONSULTS: (Who and What was discussed)  IP CONSULT TO NEUROLOGY  IP CONSULT TO HOSPITALIST      Social Determinants Significantly Affecting Health : None    Records Reviewed (External and Source) EMR - previous PCP visit 1/10/23 with Dr Brice Juan - was ER f/u visit -they discussed that she had been seen by Dr. Kaila Wellington with neurology, and that she had a follow-up appointment with a movement disorder specialist, Dr. Watters Patient, but cannot see him until May    CC/HPI Summary, DDx, ED Course, and Reassessment: This is a 53-year-old female presenting to the emergency department today with worsening involuntary movements, causing her to fall and drop heavy and hot items frequently. She does have an appointment with a movement disorder specialist, but it is not until May. Her symptoms are now so disruptive and frequent she is falling and it is difficult to get any rest.     - Vital signs were reviewed. Exam as above. Peripheral IV placed. Labs, Imaging ordered. - Pertinent Labs & Imaging studies reviewed. (See chart for details)   -  Patient seen and evaluated in the emergency department. -  Triage and nursing notes reviewed and incorporated. -  Old chart records reviewed and incorporated. -  Code status: FULL  -   I have seen and evaluated this patient with my supervising physician Alba Bishop MD.  -  Differential diagnosis includes: Electrolyte disturbance, dyskinesia, spasm, radiculopathy, restless legs, other  -  Work-up included:  See above  -  ED treatment included: Home dose of Sinemet  - Consults: Neurology, I spoke with Jacque Gordon NP, who advised that if the patient were to be admitted to the hospital their group would be happy to follow and provide recommendations as an inpatient. Hospitalist was consulted for admission orders  -  Results discussed with patient and/or family. Labs show no leukocytosis, she does have a hemoglobin of 7.4, hematocrit of 26.5, this is consistent with her values in December, and it appears that she does have an appointment for further evaluation with Dr. Abiola Riggins in the next 2 days. Metabolic panel with no concerning abnormalities. Urine with no evidence of infection. Imaging studies show no acute intracranial abnormality on CT of the head.   At this time, we recommend admission, as the patient would benefit from admission for further evaluation and management. She does pose a safety risk at home given that these movements are causing frequent falls. The patient and/or family is agreeable with plan of care and disposition.  -  Disposition: Admission  - Critical Care: The total critical care time I independently spent while evaluating and treating this patient was 14 minutes. This excludes time spent doing separately billable procedures. This includes time at the bedside, data interpretation, medication management, obtaining critical history from collateral sources if the patient is unable to provide it directly, and physician consultation. Specifics of interventions taken and potentially life-threatening diagnostic considerations are listed above in the medical decision making. If this was a shared visit with an physician, the time in this attestation is non-concurrent critical care time out of the total shared critical care time provided by the physician and myself. FINAL IMPRESSION      1. Abnormal involuntary movement    2. Frequent falls    3. Anemia, unspecified type          DISPOSITION/PLAN     DISPOSITION Admitted 02/13/2023 06:20:13 PM      PATIENT REFERRED TO:  No follow-up provider specified.     DISCHARGE MEDICATIONS:  New Prescriptions    No medications on file       DISCONTINUED MEDICATIONS:  Discontinued Medications    DULOXETINE (CYMBALTA) 60 MG EXTENDED RELEASE CAPSULE    Take 1 capsule by mouth daily              (Please note that portions of this note were completed with a voice recognition program.  Efforts were made to edit the dictations but occasionally words are mis-transcribed.)    NIRU Reyna (electronically signed)        Sweta Reyna  02/13/23 [id number]

## 2023-02-14 LAB
ANION GAP SERPL CALCULATED.3IONS-SCNC: 12 MMOL/L (ref 3–16)
BASOPHILS ABSOLUTE: 0.1 K/UL (ref 0–0.2)
BASOPHILS RELATIVE PERCENT: 0.9 %
BUN BLDV-MCNC: 8 MG/DL (ref 7–20)
CALCIUM SERPL-MCNC: 9.6 MG/DL (ref 8.3–10.6)
CHLORIDE BLD-SCNC: 104 MMOL/L (ref 99–110)
CO2: 25 MMOL/L (ref 21–32)
CREAT SERPL-MCNC: <0.5 MG/DL (ref 0.6–1.2)
EOSINOPHILS ABSOLUTE: 0.2 K/UL (ref 0–0.6)
EOSINOPHILS RELATIVE PERCENT: 2.1 %
GFR SERPL CREATININE-BSD FRML MDRD: >60 ML/MIN/{1.73_M2}
GLUCOSE BLD-MCNC: 111 MG/DL (ref 70–99)
HCT VFR BLD CALC: 26.6 % (ref 36–48)
HEMOGLOBIN: 7.6 G/DL (ref 12–16)
LYMPHOCYTES ABSOLUTE: 2.1 K/UL (ref 1–5.1)
LYMPHOCYTES RELATIVE PERCENT: 28.8 %
MCH RBC QN AUTO: 17.5 PG (ref 26–34)
MCHC RBC AUTO-ENTMCNC: 28.5 G/DL (ref 31–36)
MCV RBC AUTO: 61.4 FL (ref 80–100)
MONOCYTES ABSOLUTE: 0.7 K/UL (ref 0–1.3)
MONOCYTES RELATIVE PERCENT: 9.8 %
NEUTROPHILS ABSOLUTE: 4.3 K/UL (ref 1.7–7.7)
NEUTROPHILS RELATIVE PERCENT: 58.4 %
OCCULT BLOOD SCREENING: NORMAL
PDW BLD-RTO: 18.7 % (ref 12.4–15.4)
PLATELET # BLD: 464 K/UL (ref 135–450)
PMV BLD AUTO: 7.3 FL (ref 5–10.5)
POTASSIUM REFLEX MAGNESIUM: 3.6 MMOL/L (ref 3.5–5.1)
RBC # BLD: 4.34 M/UL (ref 4–5.2)
SODIUM BLD-SCNC: 141 MMOL/L (ref 136–145)
WBC # BLD: 7.3 K/UL (ref 4–11)

## 2023-02-14 PROCEDURE — 97535 SELF CARE MNGMENT TRAINING: CPT

## 2023-02-14 PROCEDURE — 36415 COLL VENOUS BLD VENIPUNCTURE: CPT

## 2023-02-14 PROCEDURE — 6370000000 HC RX 637 (ALT 250 FOR IP): Performed by: INTERNAL MEDICINE

## 2023-02-14 PROCEDURE — 6360000002 HC RX W HCPCS: Performed by: INTERNAL MEDICINE

## 2023-02-14 PROCEDURE — 1200000000 HC SEMI PRIVATE

## 2023-02-14 PROCEDURE — 2580000003 HC RX 258: Performed by: INTERNAL MEDICINE

## 2023-02-14 PROCEDURE — 85025 COMPLETE CBC W/AUTO DIFF WBC: CPT

## 2023-02-14 PROCEDURE — 9990000010 HC NO CHARGE VISIT

## 2023-02-14 PROCEDURE — 80048 BASIC METABOLIC PNL TOTAL CA: CPT

## 2023-02-14 PROCEDURE — 97116 GAIT TRAINING THERAPY: CPT

## 2023-02-14 PROCEDURE — 97162 PT EVAL MOD COMPLEX 30 MIN: CPT

## 2023-02-14 PROCEDURE — 97530 THERAPEUTIC ACTIVITIES: CPT

## 2023-02-14 PROCEDURE — 97166 OT EVAL MOD COMPLEX 45 MIN: CPT

## 2023-02-14 RX ORDER — GABAPENTIN 300 MG/1
300 CAPSULE ORAL 3 TIMES DAILY
Status: DISCONTINUED | OUTPATIENT
Start: 2023-02-14 | End: 2023-02-16 | Stop reason: HOSPADM

## 2023-02-14 RX ADMIN — CARBIDOPA AND LEVODOPA 2 TABLET: 25; 250 TABLET ORAL at 17:20

## 2023-02-14 RX ADMIN — LORAZEPAM 0.5 MG: 0.5 TABLET ORAL at 20:17

## 2023-02-14 RX ADMIN — ENOXAPARIN SODIUM 40 MG: 100 INJECTION SUBCUTANEOUS at 10:16

## 2023-02-14 RX ADMIN — LOSARTAN POTASSIUM 50 MG: 50 TABLET, FILM COATED ORAL at 11:03

## 2023-02-14 RX ADMIN — GABAPENTIN 600 MG: 300 CAPSULE ORAL at 10:17

## 2023-02-14 RX ADMIN — PANTOPRAZOLE SODIUM 40 MG: 40 TABLET, DELAYED RELEASE ORAL at 10:17

## 2023-02-14 RX ADMIN — GABAPENTIN 300 MG: 300 CAPSULE ORAL at 13:22

## 2023-02-14 RX ADMIN — ACETAMINOPHEN 325MG 650 MG: 325 TABLET ORAL at 04:44

## 2023-02-14 RX ADMIN — ACETAMINOPHEN 325MG 650 MG: 325 TABLET ORAL at 10:17

## 2023-02-14 RX ADMIN — CARBIDOPA AND LEVODOPA 2 TABLET: 25; 250 TABLET ORAL at 11:03

## 2023-02-14 RX ADMIN — AMLODIPINE BESYLATE 5 MG: 5 TABLET ORAL at 10:17

## 2023-02-14 RX ADMIN — IRON SUCROSE 300 MG: 20 INJECTION, SOLUTION INTRAVENOUS at 10:33

## 2023-02-14 RX ADMIN — SODIUM CHLORIDE, PRESERVATIVE FREE 10 ML: 5 INJECTION INTRAVENOUS at 20:19

## 2023-02-14 RX ADMIN — ATORVASTATIN CALCIUM 40 MG: 40 TABLET, FILM COATED ORAL at 20:17

## 2023-02-14 RX ADMIN — CEFTRIAXONE 1000 MG: 1 INJECTION, POWDER, FOR SOLUTION INTRAMUSCULAR; INTRAVENOUS at 20:16

## 2023-02-14 RX ADMIN — PANTOPRAZOLE SODIUM 40 MG: 40 TABLET, DELAYED RELEASE ORAL at 20:17

## 2023-02-14 RX ADMIN — Medication 5000 UNITS: at 10:17

## 2023-02-14 RX ADMIN — GABAPENTIN 300 MG: 300 CAPSULE ORAL at 20:17

## 2023-02-14 RX ADMIN — CARBIDOPA AND LEVODOPA 2 TABLET: 25; 250 TABLET ORAL at 20:17

## 2023-02-14 RX ADMIN — CARBIDOPA AND LEVODOPA 2 TABLET: 25; 250 TABLET ORAL at 13:22

## 2023-02-14 RX ADMIN — ACETAMINOPHEN 325MG 650 MG: 325 TABLET ORAL at 17:20

## 2023-02-14 ASSESSMENT — PAIN DESCRIPTION - ORIENTATION
ORIENTATION: LOWER
ORIENTATION: RIGHT
ORIENTATION: RIGHT;LEFT

## 2023-02-14 ASSESSMENT — PAIN DESCRIPTION - FREQUENCY: FREQUENCY: CONTINUOUS

## 2023-02-14 ASSESSMENT — PAIN DESCRIPTION - PAIN TYPE
TYPE: CHRONIC PAIN

## 2023-02-14 ASSESSMENT — PAIN DESCRIPTION - DESCRIPTORS
DESCRIPTORS: ACHING;DISCOMFORT
DESCRIPTORS: ACHING
DESCRIPTORS: ACHING

## 2023-02-14 ASSESSMENT — PAIN SCALES - GENERAL
PAINLEVEL_OUTOF10: 8
PAINLEVEL_OUTOF10: 7
PAINLEVEL_OUTOF10: 6

## 2023-02-14 ASSESSMENT — PAIN - FUNCTIONAL ASSESSMENT
PAIN_FUNCTIONAL_ASSESSMENT: ACTIVITIES ARE NOT PREVENTED

## 2023-02-14 ASSESSMENT — PAIN DESCRIPTION - LOCATION
LOCATION: LEG
LOCATION: BACK
LOCATION: LEG

## 2023-02-14 NOTE — PROGRESS NOTES
RN removed 2L/NC this morning. Oxygen was substitute overnight in place of cpap. Pt sat 100% with oxygen and ranges from 94-96% without oxygen.

## 2023-02-14 NOTE — PROGRESS NOTES
Occupational Therapy  Facility/Department: [address] [facility]TH[facility]  Occupational Therapy Initial Assessment    Name: Billy Moya  : 1945  MRN: [id number]  Date of Service: 2023    Discharge Recommendations:  Patient would benefit from continued therapy after discharge, Home with Home health OT, 24 hour supervision or assist, 2-3 sessions per week        Alisha Lilly scored a 19/24 on the AM-PAC ADL Inpatient form. Current research shows that an AM-PAC score of 18 or greater is typically associated with a discharge to the patient's home setting. Based on the patient's AM-PAC score, and their current ADL deficits, it is recommended that the patient have 2-3 sessions per week of Occupational Therapy at d/c to increase the patient's independence. At this time, this patient demonstrates the endurance and safety to discharge home with [address] [place] [address] (home vs OP services) and a follow up treatment frequency of 2-3x/wk. Please see assessment section for further patient specific details. If patient discharges prior to next session this note will serve as a discharge summary. Please see below for the latest assessment towards goals. Patient Diagnosis(es): The primary encounter diagnosis was Abnormal involuntary movement. Diagnoses of Frequent falls and Anemia, unspecified type were also pertinent to this visit. Past Medical History:  has a past medical history of Anxiety, Asthma, Benign tumor lacrimal gland, Chronic back pain, Greater trochanteric bursitis of left hip, Hyperlipidemia, Hypertension, Iron deficiency anemia, DOYLE, Pseudophakia, RLS (restless legs syndrome), Tardive dyskinesia, Type II or unspecified type diabetes mellitus without mention of complication, not stated as uncontrolled, and Vitamin D deficiency. Past Surgical History:  has a past surgical history that includes Hysterectomy; Tonsillectomy and adenoidectomy; Breast surgery (Left); Nasal septum surgery;  Cholecystectomy (2007); other surgical history; Injection Procedure For Sacroiliac Joint (Right, 10/02/2019); Pain management procedure (Right, 2022); Back Injection (Bilateral, 2022); Pain management procedure (Bilateral, 2022); Pain management procedure (Right, 2022); Nerve Block (Right, 2022); Nerve Block (Left, 2022); Back Injection (Bilateral, 2022); Appendectomy;  section; and Colonoscopy. Treatment Diagnosis: impaired ADL/fxl mobility    Assessment   Performance deficits / Impairments: Decreased functional mobility ; Decreased balance;Decreased ADL status; Decreased high-level IADLs;Decreased endurance  Assessment: 69 yo female admitted for recurrant falls, uncontrolled limb movement, and UTI. PMH: Parkinson's, DOYLE, RLS, DM. PTA, pt lives with spouse and fully IND no AD use. Today, pt functioning just below baseline limited by decreased endurance and mild safety awareness. Pt completes bed mobility SBA, LB dressing Min A. Pt completes sink side grooming, toileting, fxl tx, and fxl mobility household distances with RW with CGA. BUE WFL for self care. No uncontrolled movements noted. COnt acute OT to address above deficits.  Anticipate able to return home with spouses with 24 hr SUP and OT 2-3x/week to ensure return to PLOF  Treatment Diagnosis: impaired ADL/fxl mobility  Prognosis: Good  Decision Making: Medium Complexity  REQUIRES OT FOLLOW-UP: Yes  Activity Tolerance  Activity Tolerance: Patient Tolerated treatment well        Plan   Occupational Therapy Plan  Times Per Week: 3-5  Current Treatment Recommendations: Strengthening, ROM, Balance training, Functional mobility training, Endurance training, Safety education & training, Patient/Caregiver education & training, Self-Care / ADL, Home management training     Restrictions  Restrictions/Precautions  Restrictions/Precautions: Fall Risk    Subjective   General  Chart Reviewed: Yes  Patient assessed for rehabilitation services?: Yes  Additional Pertinent Hx: 69 yo female admitted for recurrant falls, uncontrolled limb movement, and UTI. PMH: RLS, DOYLE, RLS, DM, anemia  Family / Caregiver Present: No  Referring Practitioner: Robert Hinds MD  Diagnosis: UTI  Subjective  Subjective: Pt resting in bed upon arrival and agreeable to OT/PT eval. Pt wanting to get out of bed. No reports of pain  General Comment  Comments: RN ok to see       Social/Functional History  Social/Functional History  Lives With: Spouse  Type of Home: House  Home Layout: One level, Able to Live on Main level with bedroom/bathroom  Entrance Stairs - Number of Steps: threshold  Bathroom Shower/Tub: Tub/Shower unit, Shower chair with back (and arms)  Bathroom Toilet: Standard (vanity close)  Has the patient had two or more falls in the past year or any fall with injury in the past year?: Yes (3-4 last week. tripping and \"just went down\" \"fainting\")  ADL Assistance: Independent  Homemaking Assistance:  (shares. assist groceries now)  Ambulation Assistance: Independent  Transfer Assistance: Independent  Active : No  Occupation: Retired  Additional Comments: sleeps in flat bed       Objective              Safety Devices  Type of Devices: All fall risk precautions in place;Call light within reach; Chair alarm in place; Left in chair;Nurse notified       Toilet Transfers  Toilet - Technique: Ambulating (RW)  Equipment Used: Grab bars  Toilet Transfer: Contact guard assistance    AROM: Within functional limits  PROM: Within functional limits  Strength: Within functional limits  Coordination: Within functional limits  Tone: Normal    ADL  Grooming: Contact guard assistance  Grooming Skilled Clinical Factors: sink side hand washing  LE Dressing: Minimal assistance  LE Dressing Skilled Clinical Factors: doffing/donning depends seated.  assist with partial threading LLE, able to manage over hips in stance  Toileting: Contact guard assistance  Toileting Skilled Clinical Factors: pt urinates on commode. pt manages pericare and clothing management in stance       Activity Tolerance  Activity Tolerance: Patient tolerated treatment well    Bed mobility  Supine to Sit: Stand by assistance  Sit to Supine: Unable to assess    Transfers  Sit to stand: Contact guard assistance  Stand to sit: Contact guard assistance  Transfer Comments: RW. cues hand placement and management with turns               Standing balance: CGA sink side grooming x5 min.           Fxl mobility: CGA with RW. EOB>bathroom>recliner with mild unsteadiness, cues fro RW management    Vision  Vision: Impaired (blind right eye)  Vision Exceptions: Wears glasses for reading  Hearing  Hearing: Within functional limits    Cognition  Overall Cognitive Status: WFL  Orientation  Overall Orientation Status: Within Functional Limits                    Education Given To: Patient  Education Provided: Role of Therapy;Plan of Care;Transfer Training  Education Method: Demonstration;Verbal  Barriers to Learning: None  Education Outcome: Verbalized understanding;Demonstrated understanding;Continued education needed                      AM-PAC Score        AM-PAC Inpatient Daily Activity Raw Score: 19 (02/14/23 1649)  AM-PAC Inpatient ADL T-Scale Score : 40.22 (02/14/23 1649)  ADL Inpatient CMS 0-100% Score: 42.8 (02/14/23 1649)  ADL Inpatient CMS G-Code Modifier : CK (02/14/23 1649)    Goals  Short Term Goals  Time Frame for Short Term Goals: prior to d/c  Short Term Goal 1: toileteing SUP  Short Term Goal 2: LB dressing SUP  Short Term Goal 3: UB bathing/dressing SUP  Short Term Goal 4: tolerate 5 min fxl standing task SUP  Short Term Goal 5: Fxl tx and mobility with RW household distances SUP  Patient Goals   Patient goals : return home with spouse       Therapy Time   Individual Concurrent Group Co-treatment   Time In 1420         Time Out 1500         Minutes 40         Timed Code Treatment Minutes: 25 Minutes (15 eval. 15 ADL 10 TA) Myesha Estrada, OTD, OTR/L

## 2023-02-14 NOTE — PLAN OF CARE
Problem: Discharge Planning  Goal: Discharge to home or other facility with appropriate resources  Outcome: Progressing  Flowsheets (Taken 2/13/2023 2304)  Discharge to home or other facility with appropriate resources:   Identify barriers to discharge with patient and caregiver   Arrange for needed discharge resources and transportation as appropriate   Identify discharge learning needs (meds, wound care, etc)     Problem: Pain  Goal: Verbalizes/displays adequate comfort level or baseline comfort level  Outcome: Progressing  Flowsheets  Taken 2/14/2023 0048  Verbalizes/displays adequate comfort level or baseline comfort level:   Encourage patient to monitor pain and request assistance   Assess pain using appropriate pain scale  Taken 2/13/2023 2130  Verbalizes/displays adequate comfort level or baseline comfort level:   Encourage patient to monitor pain and request assistance   Assess pain using appropriate pain scale     Problem: Safety - Adult  Goal: Free from fall injury  Outcome: Progressing     Problem: ABCDS Injury Assessment  Goal: Absence of physical injury  Outcome: Progressing

## 2023-02-14 NOTE — ED NOTES
Handoff report given to Corry Estrada RN to assume care. No further questions at this time. I will place transport to room 3125. If any questions arise, please call [phone].      Avery Herrera RN  02/13/23 2017

## 2023-02-14 NOTE — PROGRESS NOTES
Hospitalist Progress Note  Yaz Ocasio MD      Name:  Jessica Rae /Age/Sex: 1945  (70 y.o. female)   MRN & CSN:  [id number] & [id number] Admission Date/Time: 2023 12:09 PM   Location:  B1K-0741/3125-01 PCP: Jesusita Gutierrez MD         Hospital Day: 2    Assessment and Plan:      Patient is a 74-year-old female with past medical history of diabetes mellitus who presents to the hospital due to recurrent falls. According to patient she has been having difficulty walking due to abnormal movements in her arms and legs. According to the patient she has been having these abnormal movements for about 3 to 4 months, they are not getting worse. Patient also mentions she has been treated for this disorder but apparently her treatment is not working for patient. Patient otherwise denied fever chills diarrhea constipation dysuria. Patient mentions she has been having frequency urination. Assessment  Recurrent falls  Abnormal involuntary movements generalized body pain  UTI  Diabetes mellitus  Iron deficiency anemia, rule out blood loss  Hypertension     Plan:  Continue fall precautions  PT and OT  Start the patient on IV iron infusion due to severe iron deficiency anemia  Start Lyrica for muscle spasms likely due to iron deficiency  Neurology consultation pending  Start IV Rocephin, follow-up on urine culture  Insulin sliding scale  Check ferritin, iron saturation iron level, N61, folic acid level, FOBT  Monitor and replace electrolytes    Body mass index is 34.38 kg/m². Diet ADULT DIET; Regular   DVT Prophylaxis Lovenox    Code Status Full Code   Disposition To home in 1-2 days     Subjective: The patient was lying on bed complaining of muscle spasm in her lower extremity    Ten point ROS reviewed negative, unless as noted above    Objective:        Intake/Output Summary (Last 24 hours) at 2023 1136  Last data filed at 2023 0848  Gross per 24 hour   Intake 240 ml   Output 100 ml Net 140 ml        Vitals: BP (!) 122/55   Pulse 81   Temp 98 °F (36.7 °C)   Resp 18   Ht 4' 11\" (1.499 m)   Wt 170 lb 3.1 oz (77.2 kg)   SpO2 97%   BMI 34.38 kg/m²     Physical Exam:     GEN No acute distress. HEENT moist mucous membranes, no icterus  RESP CTA B  CVS:   RRR  GI/ S/NT/ND BS+   MSK No gross joint deformities. SKIN Normal coloration, warm, dry. NEURO no focal deficit  PSYCH Awake, alert, oriented x3.     Recent Results (from the past 24 hour(s))   POCT Glucose    Collection Time: 02/13/23  1:07 PM   Result Value Ref Range    POC Glucose 104 (H) 70 - 99 mg/dl    Performed on ACCU-CHEK    CBC with Auto Differential    Collection Time: 02/13/23  1:28 PM   Result Value Ref Range    WBC 8.9 4.0 - 11.0 K/uL    RBC 4.27 4.00 - 5.20 M/uL    Hemoglobin 7.4 (L) 12.0 - 16.0 g/dL    Hematocrit 26.5 (L) 36.0 - 48.0 %    MCV 61.9 (L) 80.0 - 100.0 fL    MCH 17.2 (L) 26.0 - 34.0 pg    MCHC 27.8 (L) 31.0 - 36.0 g/dL    RDW 19.0 (H) 12.4 - 15.4 %    Platelets 128 (H) 222 - 450 K/uL    MPV 7.2 5.0 - 10.5 fL    Path Consult No     Neutrophils % 72.4 %    Lymphocytes % 16.5 %    Monocytes % 8.5 %    Eosinophils % 1.6 %    Basophils % 1.0 %    Neutrophils Absolute 6.4 1.7 - 7.7 K/uL    Lymphocytes Absolute 1.5 1.0 - 5.1 K/uL    Monocytes Absolute 0.8 0.0 - 1.3 K/uL    Eosinophils Absolute 0.1 0.0 - 0.6 K/uL    Basophils Absolute 0.1 0.0 - 0.2 K/uL   Comprehensive Metabolic Panel w/ Reflex to MG    Collection Time: 02/13/23  1:28 PM   Result Value Ref Range    Sodium 140 136 - 145 mmol/L    Potassium reflex Magnesium 4.1 3.5 - 5.1 mmol/L    Chloride 103 99 - 110 mmol/L    CO2 24 21 - 32 mmol/L    Anion Gap 13 3 - 16    Glucose 108 (H) 70 - 99 mg/dL    BUN 10 7 - 20 mg/dL    Creatinine 0.7 0.6 - 1.2 mg/dL    Est, Glom Filt Rate >60 >60    Calcium 9.5 8.3 - 10.6 mg/dL    Total Protein 6.6 6.4 - 8.2 g/dL    Albumin 3.9 3.4 - 5.0 g/dL    Albumin/Globulin Ratio 1.4 1.1 - 2.2    Total Bilirubin 0.3 0.0 - 1.0 mg/dL Alkaline Phosphatase 82 40 - 129 U/L    ALT <5 (L) 10 - 40 U/L    AST 18 15 - 37 U/L   Iron and TIBC    Collection Time: 02/13/23  1:28 PM   Result Value Ref Range    Iron 13 (L) 37 - 145 ug/dL    TIBC 388 260 - 445 ug/dL    Iron Saturation 3 (L) 15 - 50 %   Hepatic Function Panel    Collection Time: 02/13/23  1:28 PM   Result Value Ref Range    Total Protein 6.4 6.4 - 8.2 g/dL    Albumin 3.9 3.4 - 5.0 g/dL    Alkaline Phosphatase 81 40 - 129 U/L    ALT <5 (L) 10 - 40 U/L    AST 18 15 - 37 U/L    Total Bilirubin 0.3 0.0 - 1.0 mg/dL    Bilirubin, Direct <0.2 0.0 - 0.3 mg/dL    Bilirubin, Indirect see below 0.0 - 1.0 mg/dL   Ferritin    Collection Time: 02/13/23  1:28 PM   Result Value Ref Range    Ferritin 5.9 (L) 15.0 - 150.0 ng/mL   Vitamin B12    Collection Time: 02/13/23  1:28 PM   Result Value Ref Range    Vitamin B-12 328 211 - 911 pg/mL   TSH with Reflex    Collection Time: 02/13/23  1:28 PM   Result Value Ref Range    TSH 1.21 0.27 - 4.20 uIU/mL   Folate    Collection Time: 02/13/23  1:28 PM   Result Value Ref Range    Folate 12.61 4.78 - 24.20 ng/mL   Urinalysis with Reflex to Culture    Collection Time: 02/13/23  1:49 PM    Specimen: Urine   Result Value Ref Range    Color, UA Yellow Straw/Yellow    Clarity, UA CLOUDY (A) Clear    Glucose, Ur Negative Negative mg/dL    Bilirubin Urine Negative Negative    Ketones, Urine TRACE (A) Negative mg/dL    Specific Gravity, UA 1.010 1.005 - 1.030    Blood, Urine Negative Negative    pH, UA 5.5 5.0 - 8.0    Protein, UA Negative Negative mg/dL    Urobilinogen, Urine 0.2 <2.0 E.U./dL    Nitrite, Urine Negative Negative    Leukocyte Esterase, Urine MODERATE (A) Negative    Microscopic Examination YES     Urine Type NotGiven     Urine Reflex to Culture Not Indicated    Microscopic Urinalysis    Collection Time: 02/13/23  1:49 PM   Result Value Ref Range    Bacteria, UA None Seen None Seen /HPF    Hyaline Casts, UA 2 0 - 8 /LPF    WBC, UA 8 (H) 0 - 5 /HPF    RBC, UA 0 0 - 4 /HPF    Epithelial Cells, UA 6 (H) 0 - 5 /HPF   Blood Occult Stool Screen #1    Collection Time: 02/13/23 11:05 PM   Result Value Ref Range    Occult Blood Screening Result: Negative  Normal range: Negative      Basic Metabolic Panel w/ Reflex to MG    Collection Time: 02/14/23  5:45 AM   Result Value Ref Range    Sodium 141 136 - 145 mmol/L    Potassium reflex Magnesium 3.6 3.5 - 5.1 mmol/L    Chloride 104 99 - 110 mmol/L    CO2 25 21 - 32 mmol/L    Anion Gap 12 3 - 16    Glucose 111 (H) 70 - 99 mg/dL    BUN 8 7 - 20 mg/dL    Creatinine <0.5 (L) 0.6 - 1.2 mg/dL    Est, Glom Filt Rate >60 >60    Calcium 9.6 8.3 - 10.6 mg/dL   CBC auto differential    Collection Time: 02/14/23  5:45 AM   Result Value Ref Range    WBC 7.3 4.0 - 11.0 K/uL    RBC 4.34 4.00 - 5.20 M/uL    Hemoglobin 7.6 (L) 12.0 - 16.0 g/dL    Hematocrit 26.6 (L) 36.0 - 48.0 %    MCV 61.4 (L) 80.0 - 100.0 fL    MCH 17.5 (L) 26.0 - 34.0 pg    MCHC 28.5 (L) 31.0 - 36.0 g/dL    RDW 18.7 (H) 12.4 - 15.4 %    Platelets 844 (H) 691 - 450 K/uL    MPV 7.3 5.0 - 10.5 fL    Neutrophils % 58.4 %    Lymphocytes % 28.8 %    Monocytes % 9.8 %    Eosinophils % 2.1 %    Basophils % 0.9 %    Neutrophils Absolute 4.3 1.7 - 7.7 K/uL    Lymphocytes Absolute 2.1 1.0 - 5.1 K/uL    Monocytes Absolute 0.7 0.0 - 1.3 K/uL    Eosinophils Absolute 0.2 0.0 - 0.6 K/uL    Basophils Absolute 0.1 0.0 - 0.2 K/uL       Medications:   Medications:    iron sucrose  300 mg IntraVENous Once    sodium chloride flush  10 mL IntraVENous 2 times per day    enoxaparin  40 mg SubCUTAneous Daily    amLODIPine  5 mg Oral Daily    atorvastatin  40 mg Oral Nightly    carbidopa-levodopa  2 tablet Oral 4x Daily    vitamin D  5,000 Units Oral Daily    gabapentin  600 mg Oral BID    losartan  50 mg Oral Daily    pantoprazole  40 mg Oral BID    cefTRIAXone (ROCEPHIN) IV  1,000 mg IntraVENous Q24H      Infusions:    sodium chloride       PRN Meds: sodium chloride flush, 10 mL, PRN  sodium chloride, , PRN  potassium chloride, 40 mEq, PRN   Or  potassium alternative oral replacement, 40 mEq, PRN   Or  potassium chloride, 10 mEq, PRN  potassium chloride, 10 mEq, PRN  magnesium sulfate, 2,000 mg, PRN  promethazine, 12.5 mg, Q6H PRN   Or  ondansetron, 4 mg, Q6H PRN  magnesium hydroxide, 30 mL, Daily PRN  acetaminophen, 650 mg, Q6H PRN   Or  acetaminophen, 650 mg, Q6H PRN  LORazepam, 0.5 mg, BID PRN          Electronically signed by Aly Queen MD, MD on 2/14/2023 at 11:36 AM      n/a

## 2023-02-14 NOTE — PLAN OF CARE
Problem: Discharge Planning  Goal: Discharge to home or other facility with appropriate resources  2/14/2023 1241 by Ivana Willard RN  Outcome: Progressing  Flowsheets (Taken 2/14/2023 0401 by Maeve Osborne RN)  Discharge to home or other facility with appropriate resources:   Arrange for needed discharge resources and transportation as appropriate   Identify barriers to discharge with patient and caregiver  2/14/2023 0156 by Maeve Osborne RN  Outcome: Progressing  Flowsheets  Taken 2/13/2023 2304  Discharge to home or other facility with appropriate resources:   Identify barriers to discharge with patient and caregiver   Arrange for needed discharge resources and transportation as appropriate   Identify discharge learning needs (meds, wound care, etc)  Taken 2/13/2023 2130  Discharge to home or other facility with appropriate resources:   Identify barriers to discharge with patient and caregiver   Arrange for needed discharge resources and transportation as appropriate     Problem: Pain  Goal: Verbalizes/displays adequate comfort level or baseline comfort level  2/14/2023 1241 by Ivana Willard RN  Outcome: Progressing  [address] (Taken 2/14/2023 0444 by Maeve Osborne, MAGY)  Verbalizes/displays adequate comfort level or baseline comfort level:   Encourage patient to monitor pain and request assistance   Assess pain using appropriate pain scale  2/14/2023 0156 by Maeve Osborne RN  Outcome: Progressing  Flowsheets  Taken 2/14/2023 0048  Verbalizes/displays adequate comfort level or baseline comfort level:   Encourage patient to monitor pain and request assistance   Assess pain using appropriate pain scale  Taken 2/14/2023 0030  Verbalizes/displays adequate comfort level or baseline comfort level:   Encourage patient to monitor pain and request assistance   Assess pain using appropriate pain scale  Taken 2/13/2023 2130  Verbalizes/displays adequate comfort level or baseline comfort level:   Encourage patient to monitor pain and request assistance   Assess pain using appropriate pain scale     Problem: Safety - Adult  Goal: Free from fall injury  2/14/2023 1241 by Mindi Stevens RN  Outcome: Progressing  2/14/2023 0156 by Brayan Negrete RN  Outcome: Progressing     Problem: ABCDS Injury Assessment  Goal: Absence of physical injury  2/14/2023 1241 by Mindi Stevens RN  Outcome: Heaton Sukhwinder  2/14/2023 0156 by Brayan Negrete RN  Outcome: Progressing   Electronically signed by David Rosales RN on 2/14/2023 at 12:41 PM

## 2023-02-14 NOTE — PLAN OF CARE
Problem: Discharge Planning  Goal: Discharge to home or other facility with appropriate resources  Recent Flowsheet Documentation  Taken 2/13/2023 2304 by Antoni Barraza RN  Discharge to home or other facility with appropriate resources:   Identify barriers to discharge with patient and caregiver   Arrange for needed discharge resources and transportation as appropriate   Identify discharge learning needs (meds, wound care, etc)     Problem: Pain  Goal: Verbalizes/displays adequate comfort level or baseline comfort level  Recent Flowsheet Documentation  Taken 2/14/2023 0048 by Antoni Barraza RN  Verbalizes/displays adequate comfort level or baseline comfort level:   Encourage patient to monitor pain and request assistance   Assess pain using appropriate pain scale  Taken 2/13/2023 2130 by Antoni Barraza RN  Verbalizes/displays adequate comfort level or baseline comfort level:   Encourage patient to monitor pain and request assistance   Assess pain using appropriate pain scale

## 2023-02-14 NOTE — CONSULTS
Neurology Consult Note  Reason for Consult: involuntary movements    Chief complaint: abnormal movements    Dr Zafar Shore MD asked me to see Alisha Leighton Lilly in consultation for evaluation of involuntary movements    History of Present Illness:  Billy Moya is a 70 y.o. female who presents with abnormal movements. I obtained my information via interview w/ the patient, supplemented by chart review. The patient follows w/ Dr. Elías Hinojosa. She has RLS and takes sinemet. She tells me that a couple of years ago she starting having what she describes as tics. She gives an example of her shoulder kind of moving upward. She has been dealing w/ that. Over the past couple of months she has developed more abnormal movements. She says initially she noticed that she would try to hit a button on her computer and felt like she couldn't move her arm down to touch the pad. She says that she has started to get this urge to move and has had episodes of a couple hours to a couple of days. She says that her body, head, and arms will just move randomly and she cant really stop or control it. She feels like this is getting worse. She can't identify a specific trigger. She is aware of what is going on. The only thing that helps her is if she goes to sleep. Usually she will wake up and they will be gone. She was prescribed lorazepam by her neurologist to help and says that she needed 3 doses (0.5 mg doses) in order for her to sleep and woke up fine. She was referred to and has an appointment a movement disorder specialist, Dr. Pranav Coto, in May. She came to the ED yesterday because she was concerned about the movements and ED staff admitted because they were concerned for safety. Currently she has no abnormal movements during my encounter. She is eating a sandwich. She says her  has a video of her movements though he is not present at the bedside.       She denies ever using any antipsychotic medications in the past.      Medical History:  Past Medical History:   Diagnosis Date    Anxiety     Asthma     Benign tumor lacrimal gland     removed    Chronic back pain     Greater trochanteric bursitis of left hip     Hyperlipidemia     Hypertension     Iron deficiency anemia     DOYLE     Uses CPAP    Pseudophakia     RLS (restless legs syndrome)     Tardive dyskinesia     Type II or unspecified type diabetes mellitus without mention of complication, not stated as uncontrolled     Vitamin D deficiency      Past Surgical History:   Procedure Laterality Date    APPENDECTOMY      BACK INJECTION Bilateral 05/27/2022    BILATERAL SACROILIAC JOINT INJECTION performed by Marry Lyn MD at [facility] Bilateral 11/11/2022    BILATERAL SACROILIAC JOINT INJECTION performed by Marry Lyn MD at [address]      x2    CHOLECYSTECTOMY  05/01/2007    COLONOSCOPY      HC INJECTION PROCEDURE FOR SACROILIAC JOINT Right 10/02/2019    RIGHT SACROILIAC JOINT INJECTION WITH FLUOROSCOPY performed by Balaji Pal MD at [address] ([facility])      NASAL SEPTUM SURGERY      NERVE BLOCK Right 09/02/2022    RIGHT SCIATIC NERVE BLOCK (DEFINE) performed by Marry Lyn MD at [address] [facility] [address] Left 09/16/2022    LEFT SCIATIC NERVE BLOCK performed by Marry Lyn MD at [address] [facility] tumors on arms excised    PAIN MANAGEMENT PROCEDURE Right 05/13/2022    LUMBAR EPIDURAL STEROID INJECTION - RIGHT L3-4 performed by Marry Lyn MD at [address] Bilateral 07/01/2022    BILATERAL TWO LEVEL LUMBAR MEDIAL BRANCH BLOCKS AT L4-5 AND L5-S1 performed by Marry Lyn MD at [address] Right 08/19/2022    LUMBAR EPIDURAL STEROID INJECTION - RIGHT L4-5 performed by Tran Elliott MD at [address] [place] [address]       Scheduled Meds:   iron sucrose  300 mg IntraVENous Once    sodium chloride flush  10 mL IntraVENous 2 times per day    enoxaparin  40 mg SubCUTAneous Daily    amLODIPine  5 mg Oral Daily    atorvastatin  40 mg Oral Nightly    carbidopa-levodopa  2 tablet Oral 4x Daily    vitamin D  5,000 Units Oral Daily    gabapentin  600 mg Oral BID    losartan  50 mg Oral Daily    pantoprazole  40 mg Oral BID    cefTRIAXone (ROCEPHIN) IV  1,000 mg IntraVENous Q24H     Medications Prior to Admission:   Biotin 38706 MCG TABS, Take 1 capsule by mouth daily  ammonium lactate (AMLACTIN) 12 % cream, Apply topically as needed for Dry Skin Apply topically as needed. LORazepam (ATIVAN) 0.5 MG tablet, Take 0.5 mg by mouth 2 times daily as needed for Anxiety. metFORMIN (GLUCOPHAGE) 1000 MG tablet, Take 1 tablet by mouth 2 times daily (with meals)  atorvastatin (LIPITOR) 40 MG tablet, Take 1 tablet by mouth daily (Patient taking differently: Take 40 mg by mouth nightly)  carbidopa-levodopa (SINEMET)  MG per tablet, Take 2 tablets by mouth 4 times daily  pantoprazole (PROTONIX) 40 MG tablet, Take 1 tablet by mouth daily (Patient taking differently: Take 40 mg by mouth in the morning and at bedtime)  gabapentin (NEURONTIN) 600 MG tablet, Take 1 tablet by mouth 2 times daily.   amLODIPine (NORVASC) 5 MG tablet, Take 1 tablet by mouth daily  irbesartan (AVAPRO) 150 MG tablet, Take 1 tablet by mouth daily  Cholecalciferol (VITAMIN D3) 5000 units CAPS, Take 1 capsule by mouth daily    Allergies   Allergen Reactions    Meloxicam      Heart rate drops very low    Quinine Derivatives      Fever, chills, vomiting, diarrhea, dizziness    Cymbalta [Duloxetine Hcl]      PATIENT WAS TOLD NOT TKE DUE TO POSSIBLE BLEEDING    Codeine Nausea Only     DIZZINESS    Quinine     Vicodin [Hydrocodone-Acetaminophen] Anxiety     Hyperactivity, nausea     Family History Problem Relation Age of Onset    Heart Disease Mother     Cancer Mother         multiple myeloma    Heart Failure Mother     Hypertension Mother     Heart Disease Father     Cancer Father         head and neck    Heart Failure Father     Hypertension Father     Heart Disease Sister     Spondylitis Sister     Heart Attack Sister     Heart Failure Sister     Hypertension Sister     Heart Disease Brother     Heart Attack Brother     Heart Failure Brother     Hypertension Brother     Stroke Brother      Social History     Tobacco Use   Smoking Status Former    Packs/day: 2.00    Years: 36.00    Pack years: 72.00    Types: Cigarettes    Quit date: 1999    Years since quittin.8   Smokeless Tobacco Never     Social History     Substance and Sexual Activity   Drug Use Never     Social History     Substance and Sexual Activity   Alcohol Use No    Alcohol/week: 0.0 standard drinks     ROS  Constitutional- No weight loss or fevers  Eyes- No diplopia. No photophobia. Ears/nose/throat- No dysphagia. No Dysarthria  Cardiovascular- No palpitations. No chest pain  Respiratory- No dyspnea. No Cough  Gastrointestinal- No Abdominal pain. No Vomiting. Genitourinary- No incontinence. No urinary retention  Musculoskeletal- No myalgia. No arthralgia  Skin- No rash. No easy bruising. Psychiatric- No depression. No anxiety  Endocrine- No diabetes. No thyroid issues. Hematologic- No bleeding difficulty. No fatigue  Neurologic- No weakness. No Headache. Exam  Blood pressure (!) 146/61, pulse 80, temperature 97.5 °F (36.4 °C), temperature source Oral, resp. rate 18, height 4' 11\" (1.499 m), weight 170 lb 3.1 oz (77.2 kg), SpO2 100 %, not currently breastfeeding. Constitutional    Vital signs: BP, HR, and RR reviewed   General alert, no distress  Eyes: unable to visualize the fundi  Cardiovascular: no peripheral edema. Psychiatric: cooperative with examination, no psychotic behavior noted.   Neurologic  Mental status: orientation to person, place, time. General fund of knowledge grossly intact   Memory grossly intact   Attention intact as able to attend well to the exam     Language fluent in conversation   Comprehension intact; follows simple commands  Cranial nerves:   CN2: visual fields full. Has chronic R eye vision impairment. CN 3,4,6: extraocular muscles intact. CN5: facial sensation symmetric   CN7: chronic R ptosis. Otherwise, no facial weakness or dysarthria. CN8: hearing grossly intact  CN9: palate elevated symmetrically  CN11: trap full strength on shoulder shrug  CN12: tongue midline with protrusion  Strength: good strength in all 4 extremities   Sensory: light touch intact in all 4 extremities. Cerebellar/coordination: finger nose finger normal without ataxia  Tone: normal in all 4 extremities  Gait: deferred at this time for safety. Labs  Glucose 111  Na 141  K 3.6  BUN 8  Cr < 0.5    Folate 12.61  B12 328  TSH 1.21    Iron 13  Ferritin 5.9    ALT < 5  AST < 5    WBC 7.3K  Hg 7.6  Platelets 445    UA moderate LE, 8 WBC    Studies  CT head w/o 2/13/23, independently reviewed  Impression   No evidence of acute intracranial abnormality. Impression/Recommendations  Reported involuntary movements. Restless leg syndrome. Iron deficiency anemia. Chronic pain syndrome. The etiology of her symptoms is unclear. She had no abnormal movements on exam this morning. I don't think any specific inpatient workup is required at this time. This can be managed outpatient. She is scheduled to see a movement disorder specialist (Dr. Erika Gasca) in May. If she is concerned about how far out the appointment is, she can try scheduling with either Dr. Micha Christianson or Dr. Gavi Manzano.       Pippa Haywood NP  [address] [place] [address] [address] Neurology    A copy of this note was provided for Dr Dalton Little MD

## 2023-02-14 NOTE — PROGRESS NOTES
Patient alert and oriented x4. Patient in bed awake. Patient given tylenol for right leg pain. Patient satisfied and pain is being managed effectively . Call light In reach. Will continue to monitor.    Electronically signed by Sara Wallace RN on 2/14/2023 at 2:10 PM

## 2023-02-14 NOTE — PROGRESS NOTES
Physical Therapy  Facility/Department: [address] ORTHOPEDICS  Physical Therapy Initial Assessment    Name: Mickey Jacobson  : 1945  MRN: [id number]  Date of Service: 2023    Assessment / Discharge Recommendations:  -anticipate discharge to home when medically ready   -would benefit from Home PTOT and perhaps use of rollator walker to improve her safety and use as \"cart\" to carry items   -will follow while here and update as she progresses  Jaky Lilly scored a  on the AM-PAC short mobility form. Current research shows that an AM-PAC score of 18 or greater is typically associated with a discharge to the patient's home setting. Based on the patient's AM-PAC score and their current functional mobility deficits, it is recommended that the patient have 2-3 sessions per week of Physical Therapy at d/c to increase the patient's independence. Patient Diagnosis(es): The primary encounter diagnosis was Abnormal involuntary movement. Diagnoses of Frequent falls and Anemia, unspecified type were also pertinent to this visit. Past Medical History:  has a past medical history of Anxiety, Asthma, Benign tumor lacrimal gland, Chronic back pain, Greater trochanteric bursitis of left hip, Hyperlipidemia, Hypertension, Iron deficiency anemia, DOYLE, Pseudophakia, RLS (restless legs syndrome), Tardive dyskinesia, Type II or unspecified type diabetes mellitus without mention of complication, not stated as uncontrolled, and Vitamin D deficiency. Past Surgical History:  has a past surgical history that includes Hysterectomy; Tonsillectomy and adenoidectomy; Breast surgery (Left); Nasal septum surgery; Cholecystectomy (2007); other surgical history; Injection Procedure For Sacroiliac Joint (Right, 10/02/2019); Pain management procedure (Right, 2022); Back Injection (Bilateral, 2022); Pain management procedure (Bilateral, 2022); Pain management procedure (Right, 2022);  Nerve Block (Right, 2022); Nerve Block (Left, 2022); Back Injection (Bilateral, 2022); Appendectomy;  section; and Colonoscopy. Body Structures, Functions, Activity Limitations Requiring Skilled Therapeutic Intervention: Decreased functional mobility ; Decreased balance;Decreased ADL status  Therapy Prognosis: Good  Decision Making: Medium Complexity  Requires PT Follow-Up: Yes  Activity Tolerance  Activity Tolerance: Patient tolerated treatment well     Plan   Physcial Therapy Plan  General Plan: 3-5 times per week  Current Treatment Recommendations: Functional mobility training, Transfer training, ADL/Self-care training, Positioning, Therapeutic activities, Patient/Caregiver education & training  Safety Devices  Type of Devices:  All fall risk precautions in place, Call light within reach, Chair alarm in place, Left in chair, Nurse notified     Restrictions  Restrictions/Precautions  Restrictions/Precautions: Fall Risk   Subjective   General  Chart Reviewed: Yes  Patient assessed for rehabilitation services?: Yes  Additional Pertinent Hx: here due to increasing falls due to increasing frequency of involuntary movements of UE/LEs and trunk/neck  Response To Previous Treatment: Not applicable  Family / Caregiver Present: No  Follows Commands: Within Functional Limits  Subjective  Subjective: arrived to room along with OT to patient resting in bed - she is alert oriented and agreeable to PTOT assessments and up to ambulate in room  - she states ongoing restless legs but this is not what has been occurring more recently to cause her to fall or drop things suddenly    Social/Functional History  Social/Functional History  Lives With: Spouse  Type of Home: House  Home Layout: One level, Able to Live on Main level with bedroom/bathroom  Entrance Stairs - Number of Steps: threshold  Bathroom Shower/Tub: Tub/Shower unit, Shower chair with back (and arms)  Bathroom Toilet: Standard (vanity close)  Has the patient had two or more falls in the past year or any fall with injury in the past year?: Yes (3-4 last week. tripping and \"just went down\" \"fainting\")  ADL Assistance: Independent  Homemaking Assistance:  (shares.  assist groceries now)  Ambulation Assistance: Independent  Transfer Assistance: Independent  Active : No  Occupation: Retired  Additional Comments: sleeps in flat bed  Vision/Hearing  Vision  Vision: Impaired (blind right eye)  Vision Exceptions: Wears glasses for reading  Hearing  Hearing: Within functional limits    Cognition   Orientation  Overall Orientation Status: Within Functional Limits     Objective   Gross Assessment  Tone: Abnormal  Sensation:  (not assessed formally at this session)   Strength Other  Other: grossly wfl  Bed mobility  Supine to Sit: Stand by assistance  Sit to Supine: Unable to assess  Transfers  Sit to Stand: Contact guard assistance  Stand to Sit: Contact guard assistance  Ambulation  Surface: Level tile  Device: Rolling Walker  Assistance: Contact guard assistance  Quality of Gait: step through pattern with adequate base of support  Distance: in room for ~30 feet  Comments: mild unsteadiness with walker but no emery LOB  Balance  Comments: midline in sitting at the EOB and in static stance on the walker   -dynamic is fair/good on rolling walker with cga for safety  AM-PAC Score  AM-PAC Inpatient Mobility Raw Score : 19 (02/14/23 1504)  AM-PAC Inpatient T-Scale Score : 45.44 (02/14/23 1504)  Mobility Inpatient CMS 0-100% Score: 41.77 (02/14/23 1504)  Mobility Inpatient CMS G-Code Modifier : CK (02/14/23 1504)    Goals  Short Term Goals  Time Frame for Short Term Goals: 1-2 days  Short Term Goal 1: bed mobility at [facility]  Short Term Goal 2: transfers at [address] Goal 3: ambulation at [facility] with rolling walker as needed to improve her ambulation safety and effectiveness for household distances   -steps as needed for home entry at cga  Patient Goals   Patient Goals : determine what is causing her abnormal movements and resolve them       Education  Patient Education  Education Given To: Patient  Education Provided: Role of Therapy;Plan of Care;Precautions  Education Method: Verbal  Barriers to Learning: None  Education Outcome: Verbalized understanding;Continued education needed      Therapy Time   Individual Concurrent Group Co-treatment   Time In 1420         Time Out 1500         Minutes 111 Moshe Escobar, PT

## 2023-02-14 NOTE — PROGRESS NOTES
Pt arrived to unit via stretcher, and admitted to room 3125. Pt alert and oriented x4. Ambulated to bed without difficulties. Belongings at bedside. Pt reports using cpap at home. RN informed RT, and provider Paulette Osler. RN applied 2 L/NC due to cpap is home until daughter brings to facility. RN will continue to monitor oxygen levels.

## 2023-02-14 NOTE — PLAN OF CARE
Problem: Discharge Planning  Goal: Discharge to home or other facility with appropriate resources  Recent Flowsheet Documentation  Taken 2/13/2023 2304 by David Mcmahon RN  Discharge to home or other facility with appropriate resources:   Identify barriers to discharge with patient and caregiver   Arrange for needed discharge resources and transportation as appropriate   Identify discharge learning needs (meds, wound care, etc)     Problem: Pain  Goal: Verbalizes/displays adequate comfort level or baseline comfort level  Recent Flowsheet Documentation  Taken 2/14/2023 0048 by David Mcmahon RN  Verbalizes/displays adequate comfort level or baseline comfort level:   Encourage patient to monitor pain and request assistance   Assess pain using appropriate pain scale  Taken 2/13/2023 2130 by David Mcmahon RN  Verbalizes/displays adequate comfort level or baseline comfort level:   Encourage patient to monitor pain and request assistance   Assess pain using appropriate pain scale

## 2023-02-14 NOTE — H&P
Hospital Medicine History & Physical      PCP: Peng West MD    Date of Admission: 2/13/2023    Chief Complaint:    Chief Complaint   Patient presents with    Other     Uncontrollable movements of arms/legs on going. Following up with specialist in may. History Of Present Illness:    Patient is a 72-year-old female with past medical history of diabetes mellitus who presents to the hospital due to recurrent falls. According to patient she has been having difficulty walking due to abnormal movements in her arms and legs. According to the patient she has been having these abnormal movements for about 3 to 4 months, they are not getting worse. Patient also mentions she has been treated for this disorder but apparently her treatment is not working for patient. Patient otherwise denied fever chills diarrhea constipation dysuria. Patient mentions she has been having frequency urination.       Past Medical History:          Diagnosis Date    Anxiety     Asthma     Benign tumor lacrimal gland     removed    Chronic back pain     Greater trochanteric bursitis of left hip     Hyperlipidemia     Hypertension     Iron deficiency anemia     DOYLE     Uses CPAP    Pseudophakia     RLS (restless legs syndrome)     Tardive dyskinesia     Type II or unspecified type diabetes mellitus without mention of complication, not stated as uncontrolled     Vitamin D deficiency        Past Surgical History:          Procedure Laterality Date    APPENDECTOMY      BACK INJECTION Bilateral 05/27/2022    BILATERAL SACROILIAC JOINT INJECTION performed by Jeimy Parada MD at [facility] Bilateral 11/11/2022    BILATERAL SACROILIAC JOINT INJECTION performed by Jeimy Parada MD at [address]      x2    CHOLECYSTECTOMY  05/01/2007    [facility]. INJECTION PROCEDURE FOR SACROILIAC JOINT Right 10/02/2019    RIGHT SACROILIAC JOINT INJECTION WITH FLUOROSCOPY performed by Virgie Rebolledo MD at [address] (CERVIX STATUS UNKNOWN)      NASAL SEPTUM SURGERY      NERVE BLOCK Right 09/02/2022    RIGHT SCIATIC NERVE BLOCK (DEFINE) performed by Felicitas Mcfarlane MD at [address] Left 09/16/2022    LEFT SCIATIC NERVE BLOCK performed by Felicitas Mcfarlane MD at [address] [facility]      fatty tumors on arms excised    PAIN MANAGEMENT PROCEDURE Right 05/13/2022    LUMBAR EPIDURAL STEROID INJECTION - RIGHT L3-4 performed by Felicitas Mcfarlane MD at [address] Bilateral 07/01/2022    BILATERAL TWO LEVEL LUMBAR MEDIAL BRANCH BLOCKS AT L4-5 AND L5-S1 performed by Felicitas Mcfarlane MD at [address] Right 08/19/2022    LUMBAR EPIDURAL STEROID INJECTION - RIGHT L4-5 performed by Felicitas Mcfarlane MD at [address] [facility]         Medications Prior to Admission:      Prior to Admission medications    Medication Sig Start Date End Date Taking? Authorizing Provider   Biotin 46682 MCG TABS Take 1 capsule by mouth daily    Historical Provider, MD   ammonium lactate (AMLACTIN) 12 % cream Apply topically as needed for Dry Skin Apply topically as needed. Historical Provider, MD   LORazepam (ATIVAN) 0.5 MG tablet Take 0.5 mg by mouth 2 times daily as needed for Anxiety.     Historical Provider, MD   metFORMIN (GLUCOPHAGE) 1000 MG tablet Take 1 tablet by mouth 2 times daily (with meals) 12/2/22   Governor Lyudmila MD   atorvastatin (LIPITOR) 40 MG tablet Take 1 tablet by mouth daily  Patient taking differently: Take 40 mg by mouth nightly 12/2/22   Governor Lyudmila MD   carbidopa-levodopa (SINEMET)  MG per tablet Take 2 tablets by mouth 4 times daily 11/26/22   Governor Lyudmila MD   pantoprazole (PROTONIX) 40 MG tablet Take 1 tablet by mouth daily  Patient taking differently: Take 40 mg by mouth in the morning and at bedtime 11/26/22   Pat Live MD   gabapentin (NEURONTIN) 600 MG tablet Take 1 tablet by mouth 2 times daily. 11/26/22 11/26/23  Pat Live MD   amLODIPine (NORVASC) 5 MG tablet Take 1 tablet by mouth daily 11/4/22   Pat Live MD   irbesartan (AVAPRO) 150 MG tablet Take 1 tablet by mouth daily 10/1/22   Pat Live MD   Cholecalciferol (VITAMIN D3) 5000 units CAPS Take 1 capsule by mouth daily 9/19/17   Pat Live MD       Allergies:  Meloxicam, Quinine derivatives, Cymbalta [duloxetine hcl], Codeine, Quinine, and Vicodin [hydrocodone-acetaminophen]    Social History:      TOBACCO:   reports that she quit smoking about 23 years ago. Her smoking use included cigarettes. She has a 72.00 pack-year smoking history. She has never used smokeless tobacco.  ETOH:   reports no history of alcohol use. Family History:       Reviewed in detail and non contributory          Problem Relation Age of Onset    Heart Disease Mother     Cancer Mother         multiple myeloma    Heart Failure Mother     Hypertension Mother     Heart Disease Father     Cancer Father         head and neck    Heart Failure Father     Hypertension Father     Heart Disease Sister     Spondylitis Sister     Heart Attack Sister     Heart Failure Sister     Hypertension Sister     Heart Disease Brother     Heart Attack Brother     Heart Failure Brother     Hypertension Brother     Stroke Brother        REVIEW OF SYSTEMS:   Pertinent positives as noted in the HPI. All other systems reviewed and negative. PHYSICAL EXAM PERFORMED:    BP (!) 157/61   Pulse 99   Temp 98.2 °F (36.8 °C) (Tympanic)   Resp 22   SpO2 95%     General appearance:  No apparent distress, cooperative. HEENT:  Normal cephalic, atraumatic without obvious deformity. Conjunctivae/corneas clear. Neck: Supple, with full range of motion. No cervical lymphadenopathy  Respiratory:  Normal respiratory effort.  Clear to auscultation, bilaterally without Rales/Wheezes/Rhonchi. Cardiovascular:  Regular rate and rhythm with normal S1/S2 without murmurs, rubs or gallops. Abdomen: Soft, non-tender, non-distended, normal bowel sounds. Musculoskeletal:  No edema noted bilaterally. No tenderness on palpation   Skin: no rash visible  Neurologic:  Neurologically intact without any focal sensory/motor deficits. grossly non-focal.  Psychiatric:  Alert and oriented, normal mood  Peripheral Pulses: +2 palpable, equal bilaterally       Labs:     Recent Labs     02/13/23  1328   WBC 8.9   HGB 7.4*   HCT 26.5*   *     Recent Labs     02/13/23  1328      K 4.1      CO2 24   BUN 10   CREATININE 0.7   CALCIUM 9.5     Recent Labs     02/13/23  1328   AST 18   ALT <5*   BILITOT 0.3   ALKPHOS 82     No results for input(s): INR in the last 72 hours. No results for input(s): Assunta Heck in the last 72 hours. Urinalysis:      Lab Results   Component Value Date/Time    NITRU Negative 02/13/2023 01:49 PM    WBCUA 8 02/13/2023 01:49 PM    BACTERIA None Seen 02/13/2023 01:49 PM    RBCUA 0 02/13/2023 01:49 PM    BLOODU Negative 02/13/2023 01:49 PM    SPECGRAV 1.010 02/13/2023 01:49 PM    GLUCOSEU Negative 02/13/2023 01:49 PM       Radiology:       CT HEAD WO CONTRAST   Preliminary Result   No evidence of acute intracranial abnormality. Active Hospital Problems    Diagnosis Date Noted    UTI (urinary tract infection) [N39.0] 02/13/2023     Priority: Medium       Patient is a 68-year-old female with past medical history of diabetes mellitus who presents to the hospital due to recurrent falls. According to patient she has been having difficulty walking due to abnormal movements in her arms and legs. According to the patient she has been having these abnormal movements for about 3 to 4 months, they are not getting worse.   Patient also mentions she has been treated for this disorder but apparently her treatment is not working for patient. Patient otherwise denied fever chills diarrhea constipation dysuria. Patient mentions she has been having frequency urination. Assessment  Recurrent falls  Abnormal involuntary movements generalized body pain  UTI  Diabetes mellitus  Iron deficiency anemia, rule out blood loss  Hypertension    Plan  Fall precautions  Consult neurology  Start IV Rocephin, follow-up on urine culture  Insulin sliding scale  Check ferritin, iron saturation iron level, C63, folic acid level, FOBT  Monitor and replace electrolytes  DVT prophylaxis-Lovenox  Diet: No diet orders on file  Code Status: No Order    PT/OT Eval Status: ordered    Dispo - pending clinical improvement       Nataly Gould MD    The note was completed using EMR and Dragon dictation system. Every effort was made to ensure accuracy; however, inadvertent computerized transcription errors may be present. Thank you Cristal Santos MD for the opportunity to be involved in this patient's care. If you have any questions or concerns please feel free to contact me at [phone].     Nataly Gould MD

## 2023-02-14 NOTE — PROGRESS NOTES
Pt wears CPAP @ home. Pt's daughter to bring in home unit in the morning. Pt okay with wearing O2 for the night.

## 2023-02-14 NOTE — PROGRESS NOTES
Occupational Therapy    02/14/23    Adela Eagle Sweet  1945  [id number]    OT orders noted. Patient chart reviewed and spoke with RN. Pt actively getting iron infusion. Will hold OT eval at this time until complete.     Electronically signed by ROSALIND Nguyễn, OTR/L on 2/14/2023 at 11:00 AM

## 2023-02-15 LAB
ANION GAP SERPL CALCULATED.3IONS-SCNC: 12 MMOL/L (ref 3–16)
BASOPHILS ABSOLUTE: 0.1 K/UL (ref 0–0.2)
BASOPHILS RELATIVE PERCENT: 1.2 %
BUN BLDV-MCNC: 10 MG/DL (ref 7–20)
CALCIUM SERPL-MCNC: 9.5 MG/DL (ref 8.3–10.6)
CHLORIDE BLD-SCNC: 105 MMOL/L (ref 99–110)
CO2: 23 MMOL/L (ref 21–32)
CREAT SERPL-MCNC: 0.6 MG/DL (ref 0.6–1.2)
EOSINOPHILS ABSOLUTE: 0.1 K/UL (ref 0–0.6)
EOSINOPHILS RELATIVE PERCENT: 1.4 %
GFR SERPL CREATININE-BSD FRML MDRD: >60 ML/MIN/{1.73_M2}
GLUCOSE BLD-MCNC: 101 MG/DL (ref 70–99)
HCT VFR BLD CALC: 26.3 % (ref 36–48)
HEMATOLOGY PATH CONSULT: NO
HEMOGLOBIN: 7.5 G/DL (ref 12–16)
LYMPHOCYTES ABSOLUTE: 1.2 K/UL (ref 1–5.1)
LYMPHOCYTES RELATIVE PERCENT: 12.3 %
MCH RBC QN AUTO: 17.6 PG (ref 26–34)
MCHC RBC AUTO-ENTMCNC: 28.4 G/DL (ref 31–36)
MCV RBC AUTO: 61.8 FL (ref 80–100)
MONOCYTES ABSOLUTE: 1.1 K/UL (ref 0–1.3)
MONOCYTES RELATIVE PERCENT: 10.5 %
NEUTROPHILS ABSOLUTE: 7.5 K/UL (ref 1.7–7.7)
NEUTROPHILS RELATIVE PERCENT: 74.6 %
PDW BLD-RTO: 18.8 % (ref 12.4–15.4)
PLATELET # BLD: 469 K/UL (ref 135–450)
PMV BLD AUTO: 7.5 FL (ref 5–10.5)
POTASSIUM REFLEX MAGNESIUM: 3.6 MMOL/L (ref 3.5–5.1)
RBC # BLD: 4.26 M/UL (ref 4–5.2)
SODIUM BLD-SCNC: 140 MMOL/L (ref 136–145)
WBC # BLD: 10 K/UL (ref 4–11)

## 2023-02-15 PROCEDURE — 1200000000 HC SEMI PRIVATE

## 2023-02-15 PROCEDURE — 97530 THERAPEUTIC ACTIVITIES: CPT

## 2023-02-15 PROCEDURE — 2580000003 HC RX 258: Performed by: INTERNAL MEDICINE

## 2023-02-15 PROCEDURE — 85025 COMPLETE CBC W/AUTO DIFF WBC: CPT

## 2023-02-15 PROCEDURE — 97535 SELF CARE MNGMENT TRAINING: CPT

## 2023-02-15 PROCEDURE — 6370000000 HC RX 637 (ALT 250 FOR IP): Performed by: INTERNAL MEDICINE

## 2023-02-15 PROCEDURE — 6360000002 HC RX W HCPCS: Performed by: INTERNAL MEDICINE

## 2023-02-15 PROCEDURE — 36415 COLL VENOUS BLD VENIPUNCTURE: CPT

## 2023-02-15 PROCEDURE — 94760 N-INVAS EAR/PLS OXIMETRY 1: CPT

## 2023-02-15 PROCEDURE — 80048 BASIC METABOLIC PNL TOTAL CA: CPT

## 2023-02-15 RX ADMIN — ENOXAPARIN SODIUM 40 MG: 100 INJECTION SUBCUTANEOUS at 10:28

## 2023-02-15 RX ADMIN — AMLODIPINE BESYLATE 5 MG: 5 TABLET ORAL at 10:28

## 2023-02-15 RX ADMIN — CARBIDOPA AND LEVODOPA 2 TABLET: 25; 250 TABLET ORAL at 13:34

## 2023-02-15 RX ADMIN — SODIUM CHLORIDE, PRESERVATIVE FREE 10 ML: 5 INJECTION INTRAVENOUS at 20:21

## 2023-02-15 RX ADMIN — GABAPENTIN 300 MG: 300 CAPSULE ORAL at 10:27

## 2023-02-15 RX ADMIN — IRON SUCROSE 300 MG: 20 INJECTION, SOLUTION INTRAVENOUS at 13:15

## 2023-02-15 RX ADMIN — CARBIDOPA AND LEVODOPA 2 TABLET: 25; 250 TABLET ORAL at 17:59

## 2023-02-15 RX ADMIN — CARBIDOPA AND LEVODOPA 2 TABLET: 25; 250 TABLET ORAL at 10:27

## 2023-02-15 RX ADMIN — LOSARTAN POTASSIUM 50 MG: 50 TABLET, FILM COATED ORAL at 10:28

## 2023-02-15 RX ADMIN — ACETAMINOPHEN 325MG 650 MG: 325 TABLET ORAL at 00:35

## 2023-02-15 RX ADMIN — GABAPENTIN 300 MG: 300 CAPSULE ORAL at 20:21

## 2023-02-15 RX ADMIN — Medication 5000 UNITS: at 10:28

## 2023-02-15 RX ADMIN — ACETAMINOPHEN 325MG 650 MG: 325 TABLET ORAL at 22:53

## 2023-02-15 RX ADMIN — PANTOPRAZOLE SODIUM 40 MG: 40 TABLET, DELAYED RELEASE ORAL at 20:21

## 2023-02-15 RX ADMIN — ATORVASTATIN CALCIUM 40 MG: 40 TABLET, FILM COATED ORAL at 20:21

## 2023-02-15 RX ADMIN — PANTOPRAZOLE SODIUM 40 MG: 40 TABLET, DELAYED RELEASE ORAL at 10:28

## 2023-02-15 RX ADMIN — GABAPENTIN 300 MG: 300 CAPSULE ORAL at 13:33

## 2023-02-15 RX ADMIN — LORAZEPAM 0.5 MG: 0.5 TABLET ORAL at 22:52

## 2023-02-15 RX ADMIN — CARBIDOPA AND LEVODOPA 2 TABLET: 25; 250 TABLET ORAL at 20:25

## 2023-02-15 ASSESSMENT — PAIN SCALES - GENERAL
PAINLEVEL_OUTOF10: 3
PAINLEVEL_OUTOF10: 7
PAINLEVEL_OUTOF10: 9

## 2023-02-15 ASSESSMENT — PAIN DESCRIPTION - ORIENTATION
ORIENTATION: RIGHT;LEFT
ORIENTATION: RIGHT;LEFT
ORIENTATION: LEFT;RIGHT

## 2023-02-15 ASSESSMENT — PAIN DESCRIPTION - FREQUENCY
FREQUENCY: CONTINUOUS
FREQUENCY: CONTINUOUS

## 2023-02-15 ASSESSMENT — PAIN DESCRIPTION - LOCATION
LOCATION: LEG
LOCATION: LEG
LOCATION: LEG;HIP

## 2023-02-15 ASSESSMENT — PAIN DESCRIPTION - ONSET
ONSET: ON-GOING
ONSET: ON-GOING

## 2023-02-15 ASSESSMENT — PAIN - FUNCTIONAL ASSESSMENT
PAIN_FUNCTIONAL_ASSESSMENT: ACTIVITIES ARE NOT PREVENTED
PAIN_FUNCTIONAL_ASSESSMENT: ACTIVITIES ARE NOT PREVENTED

## 2023-02-15 ASSESSMENT — PAIN DESCRIPTION - PAIN TYPE
TYPE: CHRONIC PAIN
TYPE: CHRONIC PAIN

## 2023-02-15 ASSESSMENT — PAIN DESCRIPTION - DESCRIPTORS
DESCRIPTORS: ACHING
DESCRIPTORS: ACHING

## 2023-02-15 NOTE — PROGRESS NOTES
Hospitalist Progress Note  Vance Alexander MD      Name:  Michael Barksdale /Age/Sex: 1945  (70 y.o. female)   MRN & CSN:  [id number] & [id number] Admission Date/Time: 2023 12:09 PM   Location:  [id number] PCP: Alfredo Funes MD         Hospital Day: 3    Assessment and Plan:      Patient is a 77-year-old female with past medical history of diabetes mellitus who presents to the hospital due to recurrent falls. According to patient she has been having difficulty walking due to abnormal movements in her arms and legs. According to the patient she has been having these abnormal movements for about 3 to 4 months, they are not getting worse. Patient also mentions she has been treated for this disorder but apparently her treatment is not working for patient. Patient otherwise denied fever chills diarrhea constipation dysuria. Patient mentions she has been having frequency urination. Assessment:  Recurrent falls  Abnormal involuntary movements generalized body pain  UTI  Diabetes mellitus  Iron deficiency anemia, rule out blood loss  Hypertension     Plan:  Continue fall precautions  PT and OT  Continue IV iron infusion due to severe iron deficiency anemia  Continue Lyrica for muscle spasms likely due to iron deficiency  Neurology consultation appreciated. Follow neurology movement specialist outpatient neurology. Discontinue IV Rocephin, follow-up on urine culture  Insulin sliding scale  Check ferritin, iron saturation iron level, D52, folic acid level, FOBT  Monitor and replace electrolytes    Body mass index is 34.69 kg/m². Diet ADULT DIET; Regular   DVT Prophylaxis Lovenox    Code Status Full Code   Disposition To home in 1-2 days     Subjective: The patient continues to have spasm in her ankle and extremity. Denies any chest pain shortness of    Ten point ROS reviewed negative, unless as noted above    Objective:        Intake/Output Summary (Last 24 hours) at 2/15/2023 1130  Last data filed at 2/15/2023 0755  Gross per 24 hour   Intake 600 ml   Output 1200 ml   Net -600 ml        Vitals: BP (!) 140/65   Pulse 83   Temp 98.2 °F (36.8 °C)   Resp 18   Ht 4' 11\" (1.499 m)   Wt 171 lb 11.8 oz (77.9 kg)   SpO2 95%   BMI 34.69 kg/m²     Physical Exam:     GEN No acute distress. HEENT moist mucous membranes, no icterus  RESP CTA B  CVS:   RRR  GI/ S/NT/ND BS+   MSK No gross joint deformities. SKIN Normal coloration, warm, dry. NEURO no focal deficit  PSYCH Awake, alert, oriented x3.       Recent Results (from the past 24 hour(s))   CBC auto differential    Collection Time: 02/15/23  4:27 AM   Result Value Ref Range    WBC 10.0 4.0 - 11.0 K/uL    RBC 4.26 4.00 - 5.20 M/uL    Hemoglobin 7.5 (L) 12.0 - 16.0 g/dL    Hematocrit 26.3 (L) 36.0 - 48.0 %    MCV 61.8 (L) 80.0 - 100.0 fL    MCH 17.6 (L) 26.0 - 34.0 pg    MCHC 28.4 (L) 31.0 - 36.0 g/dL    RDW 18.8 (H) 12.4 - 15.4 %    Platelets 080 (H) 462 - 450 K/uL    MPV 7.5 5.0 - 10.5 fL    Path Consult No     Neutrophils % 74.6 %    Lymphocytes % 12.3 %    Monocytes % 10.5 %    Eosinophils % 1.4 %    Basophils % 1.2 %    Neutrophils Absolute 7.5 1.7 - 7.7 K/uL    Lymphocytes Absolute 1.2 1.0 - 5.1 K/uL    Monocytes Absolute 1.1 0.0 - 1.3 K/uL    Eosinophils Absolute 0.1 0.0 - 0.6 K/uL    Basophils Absolute 0.1 0.0 - 0.2 K/uL   Basic Metabolic Panel w/ Reflex to MG    Collection Time: 02/15/23  4:27 AM   Result Value Ref Range    Sodium 140 136 - 145 mmol/L    Potassium reflex Magnesium 3.6 3.5 - 5.1 mmol/L    Chloride 105 99 - 110 mmol/L    CO2 23 21 - 32 mmol/L    Anion Gap 12 3 - 16    Glucose 101 (H) 70 - 99 mg/dL    BUN 10 7 - 20 mg/dL    Creatinine 0.6 0.6 - 1.2 mg/dL    Est, Glom Filt Rate >60 >60    Calcium 9.5 8.3 - 10.6 mg/dL       Medications:   Medications:    iron sucrose  300 mg IntraVENous Once    gabapentin  300 mg Oral TID    sodium chloride flush  10 mL IntraVENous 2 times per day    enoxaparin  40 mg SubCUTAneous Daily    amLODIPine  5 mg Oral Daily    atorvastatin  40 mg Oral Nightly    carbidopa-levodopa  2 tablet Oral 4x Daily    vitamin D  5,000 Units Oral Daily    losartan  50 mg Oral Daily    pantoprazole  40 mg Oral BID      Infusions:    sodium chloride       PRN Meds: sodium chloride flush, 10 mL, PRN  sodium chloride, , PRN  potassium chloride, 40 mEq, PRN   Or  potassium alternative oral replacement, 40 mEq, PRN   Or  potassium chloride, 10 mEq, PRN  potassium chloride, 10 mEq, PRN  magnesium sulfate, 2,000 mg, PRN  promethazine, 12.5 mg, Q6H PRN   Or  ondansetron, 4 mg, Q6H PRN  magnesium hydroxide, 30 mL, Daily PRN  acetaminophen, 650 mg, Q6H PRN   Or  acetaminophen, 650 mg, Q6H PRN  LORazepam, 0.5 mg, BID PRN        Electronically signed by Jas Chao MD, MD on 2/15/2023 at 11:30 AM

## 2023-02-15 NOTE — PROGRESS NOTES
Occupational Therapy  Facility/Department: [address] ORTHOPEDICS  Occupational Daily Treatment Note      Name: Hector Galicia  : 1945  MRN: [id number]  Date of Service: 2/15/2023    Discharge Recommendations:  Patient would benefit from continued therapy after discharge, Home with Home health OT, 24 hour supervision or assist, 2-3 sessions per week          Patient Diagnosis(es): The primary encounter diagnosis was Abnormal involuntary movement. Diagnoses of Frequent falls and Anemia, unspecified type were also pertinent to this visit. Past Medical History:  has a past medical history of Anxiety, Asthma, Benign tumor lacrimal gland, Chronic back pain, Greater trochanteric bursitis of left hip, Hyperlipidemia, Hypertension, Iron deficiency anemia, DOYLE, Pseudophakia, RLS (restless legs syndrome), Tardive dyskinesia, Type II or unspecified type diabetes mellitus without mention of complication, not stated as uncontrolled, and Vitamin D deficiency. Past Surgical History:  has a past surgical history that includes Hysterectomy; Tonsillectomy and adenoidectomy; Breast surgery (Left); Nasal septum surgery; Cholecystectomy (2007); other surgical history; Injection Procedure For Sacroiliac Joint (Right, 10/02/2019); Pain management procedure (Right, 2022); Back Injection (Bilateral, 2022); Pain management procedure (Bilateral, 2022); Pain management procedure (Right, 2022); Nerve Block (Right, 2022); Nerve Block (Left, 2022); Back Injection (Bilateral, 2022); Appendectomy;  section; and Colonoscopy. Treatment Diagnosis: impaired ADL/fxl mobility      Grealdine Lilly scored a 19/24 on the AM-PAC ADL Inpatient form. Current research shows that an AM-PAC score of 18 or greater is typically associated with a discharge to the patient's home setting.  Based on the patient's AM-PAC score, and their current ADL deficits, it is recommended that the patient have 2-3 sessions per week of Occupational Therapy at d/c to increase the patient's independence. At this time, this patient demonstrates the endurance and safety to discharge home with home (home vs OP services) and a follow up treatment frequency of 2-3x/wk. Please see assessment section for further patient specific details. If patient discharges prior to next session this note will serve as a discharge summary. Please see below for the latest assessment towards goals. HOME HEALTH CARE: LEVEL 3 SAFETY       -Initial home health evaluation to occur within 24-48 hours, in patient home   -Home health agency to establish plan of care for patient over 60 day period   -Medication Reconciliation   -PT/OT/Speech evaluations in home within 24-48 hours of discharge; including  -DME and home safety   -Frontload therapy 5 days, then 3x a week   -OT to evaluate if patient has [address] needs for personal care   - evaluation within 24-48 hours, includes evaluation of resources   and insurance to determine [place], [place], LTC, and Medicaid options   -PCP Visit scheduled within three to seven days of discharge     Assessment   Performance deficits / Impairments: Decreased functional mobility ; Decreased balance;Decreased ADL status; Decreased high-level IADLs;Decreased endurance  Assessment: Discussed with OTR am pac score is 19. Anticipate that patient will be safe to return home with spouse to provide 24/7 supervision and home OT. Patient completed sit<>stand from recliner chair to RW to commode to recliner chair with cues for hand placement and safety. Functional mobility with RW with SBA, slow steady gait, no overt LOB noted, cues for walker safety. Toileting with SBA. Stood for ~ 4 minutes to wash face, hands, brush teeth, seated to brush hair. Patient would benefit from home OT to maximize IND with IADL, ADL, mobility and transfers. Will cont with POC.   REQUIRES OT FOLLOW-UP: Yes  Activity Tolerance  Activity Tolerance: Patient Tolerated treatment well        Plan   Occupational Therapy Plan  Times Per Week: 3-5  Current Treatment Recommendations: Strengthening, ROM, Balance training, Functional mobility training, Endurance training, Safety education & training, Patient/Caregiver education & training, Self-Care / ADL, Home management training     Restrictions  Restrictions/Precautions  Restrictions/Precautions: Fall Risk    Subjective   General  Chart Reviewed: Yes  Patient assessed for rehabilitation services?: Yes  Additional Pertinent Hx: 69 yo female admitted for recurrant falls, uncontrolled limb movement, and UTI. PMH: RLS, DOYLE, RLS, DM, anemia  Response to previous treatment: Patient with no complaints from previous session  Family / Caregiver Present: No  Referring Practitioner: Lu Kehr, MD  Diagnosis: UTI  Subjective  Subjective: Patient seated in recliner chair upon arrival to room. Patient with reports pain in B LE's, repositioned at end of session for comfort  General Comment  Comments: RN ok to see     Social/Functional History  Social/Functional History  Lives With: Spouse  Type of Home: House  Home Layout: One level, Able to Live on Main level with bedroom/bathroom  Entrance Stairs - Number of Steps: threshold  Bathroom Shower/Tub: Tub/Shower unit, Shower chair with back (and arms)  Bathroom Toilet: Standard (vanity close)  Has the patient had two or more falls in the past year or any fall with injury in the past year?: Yes (3-4 last week. tripping and \"just went down\" \"fainting\")  ADL Assistance: Independent  Homemaking Assistance:  (shares. assist groceries now)  Ambulation Assistance: Independent  Transfer Assistance: Independent  Active : No  Occupation: Retired  Additional Comments: sleeps in flat bed       Objective      Safety Devices  Type of Devices: All fall risk precautions in place;Call light within reach; Chair alarm in place; Left in chair;Nurse notified  Balance  Sitting: Intact  Standing: With support (Stood to with RW to complete OT tasks for ~4 minutes with SBA)  Toilet Transfers  Toilet - Technique: Ambulating  Toilet Transfer: Stand by assistance  Toilet Transfers Comments: cues for hand placement and safety     ADL  Grooming: Stand by assistance  Grooming Skilled Clinical Factors: Stood in front of sink to wash hands, face, and brush teeth. Seated to brush hair  Toileting: Stand by assistance  Toileting Skilled Clinical Factors: pt urinates on commode. pt manages pericare and clothing management in stance        Bed mobility  Supine to Sit: Unable to assess  Sit to Supine: Unable to assess  Bed Mobility Comments: in chair at start and end of session  Transfers  Sit to stand: Stand by assistance  Stand to sit: Stand by assistance  Transfer Comments: SBA for sit<>stand from recliner mathew to RW to commode to recliner chair with cues for hand placement. Functional mobility with RW with SBA, slow steady gait with no overt LOB noted, cues for RW management     Cognition  Overall Cognitive Status: [facility]  Orientation  Overall Orientation Status: Within Functional Limits                  Education Given To: Patient  Education Provided: Role of Therapy;Plan of Care;Transfer Training;ADL Adaptive Strategies          AM-PAC Score        AM-[facility] Inpatient Daily Activity Raw Score: 19 (02/15/23 1003)  AM-PAC Inpatient ADL T-Scale Score : 40.22 (02/15/23 1003)  ADL Inpatient CMS 0-100% Score: 42.8 (02/15/23 1003)  ADL Inpatient CMS G-Code Modifier : CK (02/15/23 1003)        Goals  Short Term Goals  Time Frame for Short Term Goals: prior to d/c: all goals ongoing  Short Term Goal 1: toileteing SUP  Short Term Goal 2: LB dressing SUP  Short Term Goal 3: UB bathing/dressing SUP  Short Term Goal 4: tolerate 5 min fxl standing task SUP  Short Term Goal 5: Fxl tx and mobility with RW household distances SUP  Patient Goals   Patient goals : return home with spouse       Therapy Time   Individual Concurrent Group Co-treatment   Time In 0815         Time Out 6440         Minutes 38               Electronically signed by Jaymes Seip, [id number] on 2/15/2023 at 10:16 AM

## 2023-02-16 VITALS
TEMPERATURE: 97.4 F | OXYGEN SATURATION: 94 % | BODY MASS INDEX: 35.02 KG/M2 | HEART RATE: 70 BPM | HEIGHT: 59 IN | RESPIRATION RATE: 15 BRPM | WEIGHT: 173.72 LBS | DIASTOLIC BLOOD PRESSURE: 66 MMHG | SYSTOLIC BLOOD PRESSURE: 100 MMHG

## 2023-02-16 LAB
ANION GAP SERPL CALCULATED.3IONS-SCNC: 13 MMOL/L (ref 3–16)
BASOPHILS ABSOLUTE: 0.1 K/UL (ref 0–0.2)
BASOPHILS RELATIVE PERCENT: 1.1 %
BUN BLDV-MCNC: 9 MG/DL (ref 7–20)
CALCIUM SERPL-MCNC: 9.6 MG/DL (ref 8.3–10.6)
CHLORIDE BLD-SCNC: 106 MMOL/L (ref 99–110)
CO2: 23 MMOL/L (ref 21–32)
CREAT SERPL-MCNC: 0.6 MG/DL (ref 0.6–1.2)
EOSINOPHILS ABSOLUTE: 0.2 K/UL (ref 0–0.6)
EOSINOPHILS RELATIVE PERCENT: 1.7 %
GFR SERPL CREATININE-BSD FRML MDRD: >60 ML/MIN/{1.73_M2}
GLUCOSE BLD-MCNC: 104 MG/DL (ref 70–99)
GLUCOSE BLD-MCNC: 110 MG/DL (ref 70–99)
GLUCOSE BLD-MCNC: 120 MG/DL (ref 70–99)
HCT VFR BLD CALC: 26.6 % (ref 36–48)
HEMATOLOGY PATH CONSULT: NO
HEMOGLOBIN: 7.9 G/DL (ref 12–16)
LYMPHOCYTES ABSOLUTE: 2 K/UL (ref 1–5.1)
LYMPHOCYTES RELATIVE PERCENT: 19.5 %
MAGNESIUM: 1.5 MG/DL (ref 1.8–2.4)
MCH RBC QN AUTO: 18.2 PG (ref 26–34)
MCHC RBC AUTO-ENTMCNC: 29.7 G/DL (ref 31–36)
MCV RBC AUTO: 61.5 FL (ref 80–100)
MONOCYTES ABSOLUTE: 1.2 K/UL (ref 0–1.3)
MONOCYTES RELATIVE PERCENT: 11.4 %
NEUTROPHILS ABSOLUTE: 6.9 K/UL (ref 1.7–7.7)
NEUTROPHILS RELATIVE PERCENT: 66.3 %
PDW BLD-RTO: 19.1 % (ref 12.4–15.4)
PERFORMED ON: ABNORMAL
PERFORMED ON: ABNORMAL
PLATELET # BLD: 498 K/UL (ref 135–450)
PMV BLD AUTO: 7.7 FL (ref 5–10.5)
POTASSIUM REFLEX MAGNESIUM: 3.3 MMOL/L (ref 3.5–5.1)
RBC # BLD: 4.32 M/UL (ref 4–5.2)
SODIUM BLD-SCNC: 142 MMOL/L (ref 136–145)
WBC # BLD: 10.5 K/UL (ref 4–11)

## 2023-02-16 PROCEDURE — 6370000000 HC RX 637 (ALT 250 FOR IP): Performed by: NURSE PRACTITIONER

## 2023-02-16 PROCEDURE — 85025 COMPLETE CBC W/AUTO DIFF WBC: CPT

## 2023-02-16 PROCEDURE — 80048 BASIC METABOLIC PNL TOTAL CA: CPT

## 2023-02-16 PROCEDURE — 97116 GAIT TRAINING THERAPY: CPT

## 2023-02-16 PROCEDURE — 6360000002 HC RX W HCPCS: Performed by: NURSE PRACTITIONER

## 2023-02-16 PROCEDURE — 94760 N-INVAS EAR/PLS OXIMETRY 1: CPT

## 2023-02-16 PROCEDURE — 2580000003 HC RX 258: Performed by: INTERNAL MEDICINE

## 2023-02-16 PROCEDURE — 9990000010 HC NO CHARGE VISIT

## 2023-02-16 PROCEDURE — 97530 THERAPEUTIC ACTIVITIES: CPT

## 2023-02-16 PROCEDURE — 83735 ASSAY OF MAGNESIUM: CPT

## 2023-02-16 PROCEDURE — 6360000002 HC RX W HCPCS: Performed by: INTERNAL MEDICINE

## 2023-02-16 PROCEDURE — 6370000000 HC RX 637 (ALT 250 FOR IP): Performed by: INTERNAL MEDICINE

## 2023-02-16 PROCEDURE — 36415 COLL VENOUS BLD VENIPUNCTURE: CPT

## 2023-02-16 RX ORDER — BACLOFEN 10 MG/1
10 TABLET ORAL
Status: COMPLETED | OUTPATIENT
Start: 2023-02-16 | End: 2023-02-16

## 2023-02-16 RX ORDER — FERROUS SULFATE 325(65) MG
325 TABLET ORAL 2 TIMES DAILY
Qty: 180 TABLET | Refills: 1 | Status: SHIPPED | OUTPATIENT
Start: 2023-02-16

## 2023-02-16 RX ORDER — BENZTROPINE MESYLATE 1 MG/ML
1 INJECTION INTRAMUSCULAR; INTRAVENOUS ONCE
Status: COMPLETED | OUTPATIENT
Start: 2023-02-16 | End: 2023-02-16

## 2023-02-16 RX ORDER — METHOCARBAMOL 500 MG/1
500 TABLET, FILM COATED ORAL 3 TIMES DAILY PRN
Status: DISCONTINUED | OUTPATIENT
Start: 2023-02-16 | End: 2023-02-16 | Stop reason: HOSPADM

## 2023-02-16 RX ADMIN — POTASSIUM CHLORIDE 40 MEQ: 1500 TABLET, EXTENDED RELEASE ORAL at 06:46

## 2023-02-16 RX ADMIN — PANTOPRAZOLE SODIUM 40 MG: 40 TABLET, DELAYED RELEASE ORAL at 09:04

## 2023-02-16 RX ADMIN — BACLOFEN 10 MG: 10 TABLET ORAL at 01:51

## 2023-02-16 RX ADMIN — GABAPENTIN 300 MG: 300 CAPSULE ORAL at 09:04

## 2023-02-16 RX ADMIN — BENZTROPINE MESYLATE 1 MG: 1 INJECTION INTRAMUSCULAR; INTRAVENOUS at 01:09

## 2023-02-16 RX ADMIN — Medication 5000 UNITS: at 09:04

## 2023-02-16 RX ADMIN — MAGNESIUM SULFATE HEPTAHYDRATE 2000 MG: 40 INJECTION, SOLUTION INTRAVENOUS at 06:46

## 2023-02-16 RX ADMIN — CARBIDOPA AND LEVODOPA 2 TABLET: 25; 250 TABLET ORAL at 09:05

## 2023-02-16 RX ADMIN — CARBIDOPA AND LEVODOPA 2 TABLET: 25; 250 TABLET ORAL at 12:52

## 2023-02-16 RX ADMIN — METHOCARBAMOL 500 MG: 500 TABLET ORAL at 00:29

## 2023-02-16 RX ADMIN — AMLODIPINE BESYLATE 5 MG: 5 TABLET ORAL at 09:04

## 2023-02-16 RX ADMIN — ENOXAPARIN SODIUM 40 MG: 100 INJECTION SUBCUTANEOUS at 09:05

## 2023-02-16 RX ADMIN — LOSARTAN POTASSIUM 50 MG: 50 TABLET, FILM COATED ORAL at 09:04

## 2023-02-16 RX ADMIN — SODIUM CHLORIDE 250 ML: 9 INJECTION, SOLUTION INTRAVENOUS at 06:45

## 2023-02-16 ASSESSMENT — PAIN SCALES - GENERAL
PAINLEVEL_OUTOF10: 9
PAINLEVEL_OUTOF10: 0

## 2023-02-16 NOTE — PROGRESS NOTES
Patient has been discharged home with  via private car. AVS reviewed and IV removed. All questions answered.  Electronically signed by Nury Gutierrez RN on 2/16/2023 at 1:25 PM

## 2023-02-16 NOTE — PROGRESS NOTES
Patient is alert & oriented and resting in bed. No episodes of tremors or involuntary muscle movement thus far. Patient has discharge orders in. When I talked to patient about discharging today, she was apprehensive and did not feel ready to leave today. She states that these episodes happen tonight and she fears that if an episode happens tonight like the one she had last night, that she will come right back to the hospital. Working with social work now to see if patient can get an earlier appointment with outpatient neuro before her scheduled May 15th appointment. Carla served Dr. Sole James to make him aware of patient's concerns for discharge and he stated that neuro signed off and this is a chronic issue, deemed OK to discharge. Will discuss this with the patient and continue to monitor.  Electronically signed by Ariana Pepper RN on 2/16/2023 at 11:11 AM

## 2023-02-16 NOTE — PROGRESS NOTES
Physical Therapy  Facility/Department: [facility]THOPEDICS  Physical Therapy Initial Assessment    Name: Katherine Duckworth  : 1945  MRN: [id number]  Date of Service: 2023    Discharge Recommendations:  Home with assist PRN, Home with Home health PT   PT Equipment Recommendations  Equipment Needed: Yes  Mobility Devices: Beronica Organ: Rolling    Chanelle Lilly scored a 19/24 on the AM-PAC short mobility form. Current research shows that an AM-PAC score of 18 or greater is typically associated with a discharge to the patient's home setting. Based on the patient's AM-PAC score and their current functional mobility deficits, it is recommended that the patient have 2-3 sessions per week of Physical Therapy at d/c to increase the patient's independence. At this time, this patient demonstrates the endurance and safety to discharge home with level 1 home PT (home vs OP services) and a follow up treatment frequency of 2-3x/wk. Please see assessment section for further patient specific details. If patient discharges prior to next session this note will serve as a discharge summary. Please see below for the latest assessment towards goals. Patient Diagnosis(es): The primary encounter diagnosis was Abnormal involuntary movement. Diagnoses of Frequent falls and Anemia, unspecified type were also pertinent to this visit. Past Medical History:  has a past medical history of Anxiety, Asthma, Benign tumor lacrimal gland, Chronic back pain, Greater trochanteric bursitis of left hip, Hyperlipidemia, Hypertension, Iron deficiency anemia, DOYLE, Pseudophakia, RLS (restless legs syndrome), Tardive dyskinesia, Type II or unspecified type diabetes mellitus without mention of complication, not stated as uncontrolled, and Vitamin D deficiency. Past Surgical History:  has a past surgical history that includes Hysterectomy; Tonsillectomy and adenoidectomy; Breast surgery (Left); Nasal septum surgery;  Cholecystectomy (2007); other surgical history; Injection Procedure For Sacroiliac Joint (Right, 10/02/2019); Pain management procedure (Right, 2022); Back Injection (Bilateral, 2022); Pain management procedure (Bilateral, 2022); Pain management procedure (Right, 2022); Nerve Block (Right, 2022); Nerve Block (Left, 2022); Back Injection (Bilateral, 2022); Appendectomy;  section; and Colonoscopy. Assessment   Body Structures, Functions, Activity Limitations Requiring Skilled Therapeutic Intervention: Decreased functional mobility ; Decreased balance;Decreased ADL status  Assessment: Balance impairments with increased effort and time for ambulation requires use of RW for stability throughout session. COntinue to anticipate discharge to home when medically ready. would benefit from Home PT and perhaps use of rollator walker to improve her safety and use as \"cart\" to carry items. will follow while here and update as she progresses  Therapy Prognosis: Good  Decision Making: Medium Complexity  Requires PT Follow-Up: Yes  Activity Tolerance  Activity Tolerance: Patient tolerated treatment well     Plan   Physcial Therapy Plan  General Plan: 3-5 times per week  Current Treatment Recommendations: Functional mobility training, Transfer training, ADL/Self-care training, Positioning, Therapeutic activities, Patient/Caregiver education & training  Safety Devices  Type of Devices: All fall risk precautions in place, Call light within reach, Chair alarm in place, Left in chair, Gait belt (assistance with meal set up at this time)     Restrictions  Restrictions/Precautions  Restrictions/Precautions: Fall Risk     Subjective   General  Chart Reviewed:  Yes  Additional Pertinent Hx: here due to increasing falls due to increasing frequency of involuntary movements of UE/LEs and trunk/neck  Response To Previous Treatment: Not applicable  Family / Caregiver Present: No  Follows Commands: Within Functional Limits  Subjective  Subjective: Pt supine in bed upon arrival to room, pt agreeable to PT treatment session. Reports increaed restless legs at this timeagreable to PT treatment session. Social/Functional History  Social/Functional History  Lives With: Spouse  Type of Home: House  Home Layout: One level, Able to Live on Main level with bedroom/bathroom  Entrance Stairs - Number of Steps: threshold  Bathroom Shower/Tub: Tub/Shower unit, Shower chair with back (and arms)  Bathroom Toilet: Standard (vanity close)  Has the patient had two or more falls in the past year or any fall with injury in the past year?: Yes (3-4 last week. tripping and \"just went down\" \"fainting\")  ADL Assistance: Independent  Homemaking Assistance:  (shares.  assist groceries now)  Ambulation Assistance: Independent  Transfer Assistance: Independent  Active : No  Occupation: Retired  Additional Comments: sleeps in flat bed  Vision/Hearing  Vision  Vision: Impaired (blind right eye)  Vision Exceptions: Wears glasses for reading  Hearing  Hearing: Within functional limits    Cognition   Orientation  Overall Orientation Status: Within Functional Limits  Cognition  Overall Cognitive Status: WFL     Objective           Gross Assessment  Tone: Abnormal  Sensation:  (not assessed formally at this session)                    Bed mobility  Supine to Sit: Modified independent  Sit to Supine: Unable to assess  Scooting: Modified independent  Bed Mobility Comments: increaed effort and time to perform  Transfers  Sit to Stand: Stand by assistance  Stand to Sit: Contact guard assistance;Stand by assistance  Comment: multiple surfaces EOB, standard commode right grab bar, and recliner Cues for safety due to impulsive tendencies  Ambulation  Surface: Level tile  Device: Rolling Walker  Assistance: Stand by assistance;Contact guard assistance  Quality of Gait: step through pattern with adequate base of support, unsteady with turns difficulty maneuvering RW requires increased cues  Distance: 15'; [de-identified]' with multiple turns  Comments: mild unsteadiness with walker but no emery LOB     Balance  Posture: Fair  Sitting - Static: Good  Sitting - Dynamic: Good  Standing - Static: Good;-  Standing - Dynamic: Good;-  Comments: Sitting on commode mod i for marielos care at this time, Supervision for safty standing at sink to wash hands           AM-PAC Score  AM-PAC Inpatient Mobility Raw Score : 19 (02/16/23 1151)  AM-PAC Inpatient T-Scale Score : 45.44 (02/16/23 1151)  Mobility Inpatient CMS 0-100% Score: 41.77 (02/16/23 1151)  Mobility Inpatient CMS G-Code Modifier : CK (02/16/23 1151)            Goals  Short Term Goals  Time Frame for Short Term Goals: 1-2 days (all ongoing 2/16/23)  Short Term Goal 1: bed mobility at [facility]  Short Term Goal 2: transfers at [address] Goal 3: ambulation at [facility] with rolling walker as needed to improve her ambulation safety and effectiveness for household distances   -steps as needed for home entry at [address]  Patient Goals   Patient Goals : determine what is causing her abnormal movements and resolve them       Education  Patient Education  Education Given To: Patient  Education Provided: Role of Therapy;Plan of Care;Precautions  Education Method: Verbal  Barriers to Learning: None  Education Outcome: Verbalized understanding;Continued education needed      Therapy Time   Individual Concurrent Group Co-treatment   Time In 1125         Time Out 1150         Minutes 25         Timed Code Treatment Minutes: [address] [place], [place] [address] 02/16/23 11:52 AM

## 2023-02-16 NOTE — PLAN OF CARE
Problem: Discharge Planning  Goal: Discharge to home or other facility with appropriate resources  2/16/2023 0924 by Renea Hamilton RN  Outcome: Progressing     Problem: Pain  Goal: Verbalizes/displays adequate comfort level or baseline comfort level  2/16/2023 0924 by Renea Hamilton RN  Outcome: Progressing     Problem: Safety - Adult  Goal: Free from fall injury  2/16/2023 0924 by Renea Hamilton RN  Outcome: Progressing     Problem: ABCDS Injury Assessment  Goal: Absence of physical injury  2/16/2023 0924 by Renea Hamilton RN  Outcome: Progressing

## 2023-02-16 NOTE — PROGRESS NOTES
At 2345 PCA notified nurse that patient was having uncontrolled body movements. She was in the chair having severe involuntary movements with pain. PRN ativan admin earlier because patient reported earlier that \"she felt it coming on. \" Nurse assisted patient back to bed. Notified Jess De Santiago CNP. Perfect serve message- She is having uncontrollable movements of her arms and legs. She states it been going on for couple of years, but initially they were just like tics. She states they have gradually gotten worse, but in the last couple weeks much worse. she is currently having these same movement they are intense, but now she is complaining of pain related to these movements. i have given her Flakito Mohamud because she felt like they were getting ready to start, no results. HX parkinson, RLS, DM, Tardive Dysinesia, HTN    0015- CNP messaged back and placed orders. 0029 orders admin, reported that these movements were really severe. CNP responded - \"OK its gonna take a bit for the medication to kick in. Have you even given the muscle relaxant yet? \"    I reproted YES, but it only been a couple of minutes. Reported to charge and other floor nurses to report to bedside for assistance. At [facility] 56 ask CNP to report to bedside, or if she would rather me call a rapid. Nurse prepared room for rapid   VS at this time 128/78 hr 130 temp 98.8 rr 24 [id number]%    When CNP entered at 0047 she stated \"there is no need for the crash cart, nor a rapid to be called. \" At this time nurse and charge was at bedside. CNP went on to assess patient, IV cogentin ordered. Medication tubed up from pharmacy at [address], med admin 0109    CNP called nurse 1907, asking if S&S improved. I reported no, but I had only given it a couple of minutes ago. More orders placed in no improvement in 30-45 min per CNP.    0144 no improvements at this time. Will admin new medication order at this time.

## 2023-02-16 NOTE — CARE COORDINATION
02/16/23 1231   IMM Letter   IMM Letter given to Patient/Family/Significant other/Guardian/POA/by: patient by Batool Hines   IMM Letter date given: 02/16/23   IMM Letter time given: 9853

## 2023-02-16 NOTE — PLAN OF CARE
Problem: Discharge Planning  Goal: Discharge to home or other facility with appropriate resources  Outcome: Progressing  Flowsheets (Taken 2/14/2023 2037 by Jaxson Swenson, RN)  Discharge to home or other facility with appropriate resources:   Identify barriers to discharge with patient and caregiver   Arrange for needed discharge resources and transportation as appropriate     Problem: Pain  Goal: Verbalizes/displays adequate comfort level or baseline comfort level  Outcome: Progressing  Flowsheets (Taken 2/14/2023 0444 by Jaxson Swenson, RN)  Verbalizes/displays adequate comfort level or baseline comfort level:   Encourage patient to monitor pain and request assistance   Assess pain using appropriate pain scale     Problem: Safety - Adult  Goal: Free from fall injury  Outcome: Progressing  Flowsheets (Taken 2/16/2023 0156)  Free From Fall Injury: Instruct family/caregiver on patient safety     Problem: ABCDS Injury Assessment  Goal: Absence of physical injury  Outcome: Progressing  Flowsheets (Taken 2/16/2023 0156)  Absence of Physical Injury: Implement safety measures based on patient assessment

## 2023-02-16 NOTE — CARE COORDINATION
Noted d/c order. Met with patient who plans to return to home where he lives with her . She declines the need for home care but is agreeable to a rollator as recommended by PT. Juana JOHNSON for order and referred to [address] [place] [address] for follow up. Patient denies any other needs or concerns. She is happy her neuro movement dr appt has been moved up to March 10th instead of May.    Electronically signed by Valeria Lovelace on 2/16/2023 at 5:55 PM

## 2023-02-16 NOTE — PROGRESS NOTES
Bedside evaluation: Called for active involuntary muscle movements of the upper and lower extremities. Patient reports having pain associated with these uncontrolled involuntary movements. On my exam the patient is pale in color, skin is warm and dry. She is awake and alert. She is endorsing that these episodes can last for hours and sometimes she is experienced on for days. Patient has been seen by neurology. Etiology of these episodes is unclear. Laboratory studies are in the works. Patient is on Sinemet and gabapentin already and has reported that this does not help with her symptoms. ? Psychosomatic    Plan: Robaxin 500 3 times daily as needed muscle spasms, pain. Cogentin 1 mg IVP x1 as a test dose. Would consider adding baclofen-> however I am concerned that that would be far too sedating. Patient is also already getting Ativan twice a day.

## 2023-02-16 NOTE — PROGRESS NOTES
Occupational Therapy      Leslie Blakely  [id number]  [id number]    Patient supine in bed upon arrival to room soundly sleeping with RN present. RN reports patient with \"uncontrolled movements of her arms and legs\". Also reports this lasted \"most of the night\". Attempted to wake patient. Unable to wake at this time. Briefly opens eyes and falls back asleep. Will attempt to see later this date as schedule allows. If discharged prior to next OT session please see last daily note for discharge status.     Electronically signed by DAYANARA [id number] on 2/16/2023 at 7:50 AM

## 2023-02-16 NOTE — DISCHARGE SUMMARY
Discharge Summary  Gucci Darnell MD, MD     Name:  Greg Monroe /Age/Sex: 1945  (70 y.o. female)   MRN & CSN:  [id number] & [id number] Admission Date/Time: 2023 12:09 PM   Attending:  Gucci Darnell MD Discharging Physician: Gucci Darnell MD, MD     Hospital Course:     Patient is a 72-year-old female with past medical history of diabetes mellitus who presents to the hospital due to recurrent falls. According to patient she has been having difficulty walking due to abnormal movements in her arms and legs. According to the patient she has been having these abnormal movements for about 3 to 4 months, they are not getting worse. Patient also mentions she has been treated for this disorder but apparently her treatment is not working for patient. Patient otherwise denied fever chills diarrhea constipation dysuria. Patient mentions she has been having frequency urination. Recurrent falls  Abnormal involuntary movements generalized body pain  UTI  Diabetes mellitus  Iron deficiency anemia, rule out blood loss  Hypertension     Plan:  Evaluated by PT and OT  Continue Lyrica for muscle spasms likely due to iron deficiency  Neurology consultation appreciated. Follow neurology movement specialist outpatient neurology. Discontinue IV Rocephin, follow-up on urine culture  Insulin sliding scale. Resume home medications upon  Iron studies show severe iron deficiency. Initially received IV Venofer x2 and will be discharged on p.o. iron tablets    The patient expressed appropriate understanding of and agreement with the discharge recommendations, medications, and plan. Consults this admission:  IP CONSULT TO NEUROLOGY  IP CONSULT TO HOSPITALIST    Discharge Instruction:     Follow up appointments: yes    No follow-up provider specified.     Primary care physician:  within 2 weeks    Diet:  diabetic diet     Activity: activity as tolerated    Disposition: Discharged to:   [x]Home, []HHC, []SNF, []Acute Rehab, []Hospice     Condition on discharge: Stable    Discharge Medications:        Medication List        START taking these medications      ferrous sulfate 325 (65 Fe) MG tablet  Commonly known as: IRON 325  Take 1 tablet by mouth 2 times daily            CHANGE how you take these medications      atorvastatin 40 MG tablet  Commonly known as: LIPITOR  Take 1 tablet by mouth daily  What changed: when to take this            CONTINUE taking these medications      amLODIPine 5 MG tablet  Commonly known as: NORVASC  Take 1 tablet by mouth daily     ammonium lactate 12 % cream  Commonly known as: AMLACTIN     Biotin 09926 MCG Tabs     carbidopa-levodopa  MG per tablet  Commonly known as: SINEMET  Take 2 tablets by mouth 4 times daily     gabapentin 600 MG tablet  Commonly known as: NEURONTIN  Take 1 tablet by mouth 2 times daily. irbesartan 150 MG tablet  Commonly known as: AVAPRO  Take 1 tablet by mouth daily     LORazepam 0.5 MG tablet  Commonly known as: ATIVAN     metFORMIN 1000 MG tablet  Commonly known as: GLUCOPHAGE  Take 1 tablet by mouth 2 times daily (with meals)     vitamin D3 125 MCG (5000 UT) Caps  Take 1 capsule by mouth daily            ASK your doctor about these medications      pantoprazole 40 MG tablet  Commonly known as: PROTONIX  Take 1 tablet by mouth daily               Where to Get Your Medications        These medications were sent to [address] [place] [address] Rd, [address] - F [phone]  [facility] [place] [address], [address] [place] [address]      Phone: [phone]   ferrous sulfate 325 (65 Fe) MG tablet          Objective Findings at Discharge:     /80   Pulse 82   Temp 98.4 °F (36.9 °C) (Oral)   Resp 16   Ht 4' 11\" (1.499 m)   Wt 173 lb 11.6 oz (78.8 kg)   SpO2 98%   BMI 35.09 kg/m²            PHYSICAL EXAM     GEN:  Awake female, sitting upright in bed in no apparent distress.   RESP:  CTAB, no wheezes, rales or rhonchi. CVS:  RRR. No peripheral edema. GI/:  S/NT/ND BS+   NEURO: No focal defecits. PSYCH:  Awake, alert, oriented x 3. Affect appropriate. LABS: Reviewd    IMAGING: Reviwed    35 Minutes spent in preparation of discharging this patient including face to face encounter, discussions with the patient/family, medication reconciliation, and transition of care preparation.      Electronically signed by Jun Gongora MD, MD on 2/16/2023 at 9:50 AM

## 2023-02-16 NOTE — DISCHARGE INSTR - COC
Continuity of Care Form    Patient Name: Cameron Schmid   :  1945  MRN:  [id number]    Admit date:  2023  Discharge date:  2023    Code Status Order: Full Code   Advance Directives:     Admitting Physician:  Onel Reina MD  PCP: Peng West MD    Discharging Nurse: Meme Gayle [facility] Unit/Room#: B1F-0789/3125-01  Discharging Unit Phone Number: [phone]    Emergency Contact:   Extended Emergency Contact Information  Primary Emergency Contact: Grover Lilly  Address: [address] [place] [address] Phone: [phone]  Mobile Phone: [phone]  Relation: Spouse   needed?  No  Secondary Emergency Contact: [facility] Phone: [phone]  Mobile Phone: [phone]  Relation: Other    Past Surgical History:  Past Surgical History:   Procedure Laterality Date    APPENDECTOMY      BACK INJECTION Bilateral 2022    BILATERAL SACROILIAC JOINT INJECTION performed by Jeimy Parada MD at [facility] Bilateral 2022    BILATERAL SACROILIAC JOINT INJECTION performed by Jeimy Parada MD at [address]      x2    CHOLECYSTECTOMY  2007    COLONOSCOPY      HC INJECTION PROCEDURE FOR SACROILIAC JOINT Right 10/02/2019    RIGHT SACROILIAC JOINT INJECTION WITH FLUOROSCOPY performed by Todd Peña MD at [address] ([facility])      NASAL SEPTUM SURGERY      NERVE BLOCK Right 2022    RIGHT SCIATIC NERVE BLOCK (DEFINE) performed by Jeimy Parada MD at [address] [facility] [address] Left 2022    LEFT SCIATIC NERVE BLOCK performed by Jeimy Parada MD at [address] [facility]      fatty tumors on arms excised    PAIN MANAGEMENT PROCEDURE Right 2022    LUMBAR EPIDURAL STEROID INJECTION - RIGHT L3-4 performed by Jeimy Parada MD at [address] Bilateral 07/01/2022    BILATERAL TWO LEVEL LUMBAR MEDIAL BRANCH BLOCKS AT L4-5 AND L5-S1 performed by Tran Elliott MD at [address] Right 08/19/2022    LUMBAR EPIDURAL STEROID INJECTION - RIGHT L4-5 performed by Tran Elliott MD at [address] [facility]         Immunization History:   Immunization History   Administered Date(s) Administered    COVID-19, PFIZER Bivalent BOOSTER, DO NOT Dilute, (age 12y+), IM, 27 mcg/0.3 mL 09/13/2022    COVID-19, PFIZER PURPLE top, DILUTE for use, (age 15 y+), 30mcg/0.3mL 01/29/2021, 02/19/2021, 10/07/2021    Influenza A (U5T3-96) Vaccine PF IM 12/20/2009    Influenza Virus Vaccine 10/09/2015, 09/16/2016, 11/12/2021    Influenza Whole 10/09/2015, 09/20/2016    Influenza, FLUAD, (age 72 y+), Adjuvanted, 0.5mL 09/11/2020    Influenza, FLUBLOK, (age 25 y+), PF, 0.5mL 09/20/2018    Influenza, FLUZONE (age 72 y+), High Dose, 0.7mL 09/13/2022    Influenza, High Dose (Fluzone 65 yrs and older) 10/02/2013, 10/23/2014, 09/19/2017    Influenza, Triv, inactivated, subunit, adjuvanted, IM (Fluad 65 yrs and older) 09/12/2019    Pneumococcal Conjugate 13-valent (Ykagcst14) 04/20/2017    Pneumococcal Polysaccharide (Kurbexsls62) 10/10/2004, 09/20/2018    Td, unspecified formulation 12/08/2014    Zoster Live (Zostavax) 10/08/2013       Active Problems:  Patient Active Problem List   Diagnosis Code    DOYLE on CPAP G47.33, Z99.89    Vitamin D deficiency E55.9    Restless legs syndrome (RLS) G25.81    Essential hypertension I10    Mixed hyperlipidemia E78.2    Mild intermittent asthma without complication B29.27    Type 2 diabetes mellitus without complication, without long-term current use of insulin (HCC) E11.9    Bilateral hearing loss H91.93    Mixed anxiety and depressive disorder F41.8    Lumbar degenerative disc disease M51.36    Anxiety F41.9    UTI (urinary tract infection) N39.0       Isolation/Infection:   Isolation            No Isolation          Patient Infection Status       None to display            Nurse Assessment:  Last Vital Signs: /66   Pulse 70   Temp 97.4 °F (36.3 °C) (Oral)   Resp 15   Ht 4' 11\" (1.499 m)   Wt 173 lb 11.6 oz (78.8 kg)   SpO2 94%   BMI 35.09 kg/m²     Last documented pain score (0-10 scale): Pain Level: 9  Last Weight:   Wt Readings from Last 1 Encounters:   02/16/23 173 lb 11.6 oz (78.8 kg)     Mental Status:  alert, coherent, logical, thought processes intact, and able to concentrate and follow conversation    IV Access:  - None    Nursing Mobility/ADLs:  Walking   Independent  Transfer  Independent  Bathing  Independent  Dressing  Independent  [address] Delivery   whole    Wound Care Documentation and Therapy:        Elimination:  Continence: Bowel: No  Bladder: No  Urinary Catheter: None   Colostomy/Ileostomy/Ileal Conduit: No       Date of Last BM: 2/16/2023    Intake/Output Summary (Last 24 hours) at 2/16/2023 1218  Last data filed at 2/16/2023 0700  Gross per 24 hour   Intake 876.84 ml   Output 1075 ml   Net -198.16 ml     I/O last 3 completed shifts: In: 2091.8 [P.O.:2015; IV Piggyback:76.8]  Out: 3910 [Urine:1825]    Safety Concerns: At Risk for Falls and At Risk for Involuntary Muscle Movements (Not seizures)    Impairments/Disabilities:      None    Nutrition Therapy:  Current Nutrition Therapy:   - Oral Diet:  General    Routes of Feeding: Oral  Liquids: No Restrictions  Daily Fluid Restriction: no  Last Modified Barium Swallow with Video (Video Swallowing Test): not done    Treatments at the Time of Hospital Discharge:   Respiratory Treatments:   Oxygen Therapy:  is not on home oxygen therapy.   Ventilator:    - No ventilator support    Rehab Therapies: Physical Therapy and Occupational Therapy  Weight Bearing Status/Restrictions: No weight bearing restrictions  Other Medical Equipment (for information only, NOT a DME order):  walker  Other Treatments:     Patient's personal belongings (please select all that are sent with patient):  Glasses    RN SIGNATURE:  Electronically signed by Isaias Stark RN on 2/16/23 at 12:20 PM EST    CASE MANAGEMENT/SOCIAL WORK SECTION    Inpatient Status Date: ***    Readmission Risk Assessment Score:  Readmission Risk              Risk of Unplanned Readmission:  14           Discharging to Facility/ Agency   Name:   Address:  Phone:  Fax:    Dialysis Facility (if applicable)   Name:  Address:  Dialysis Schedule:  Phone:  Fax:    / signature: {Esignature:[id number]}    PHYSICIAN SECTION    Prognosis: Good    Condition at Discharge: Stable    Rehab Potential (if transferring to Rehab): Good    Recommended Labs or Other Treatments After Discharge:     Physician Certification: I certify the above information and transfer of Omkar Lilly  is necessary for the continuing treatment of the diagnosis listed and that she requires Home Care for greater 30 days.      Update Admission H&P: No change in H&P    PHYSICIAN SIGNATURE:  Electronically signed by Jas Chao MD, MD on 2/16/23 at 12:25 PM EST

## 2023-02-17 ENCOUNTER — CARE COORDINATION (OUTPATIENT)
Dept: CASE MANAGEMENT | Age: 78
End: 2023-02-17

## 2023-02-17 DIAGNOSIS — G25.81 RESTLESS LEGS SYNDROME (RLS): Primary | ICD-10-CM

## 2023-02-17 PROCEDURE — 1111F DSCHRG MED/CURRENT MED MERGE: CPT | Performed by: FAMILY MEDICINE

## 2023-02-17 NOTE — CARE COORDINATION
[facility][1] Care Transitions Initial Follow Up Call    Call within 2 business days of discharge: Yes    Patient Current Location: [address] [place] [address] Transition Nurse contacted the patient by telephone to perform post hospital discharge assessment. Verified name and  with patient as identifiers. Provided introduction to self, and explanation of the Care Transition Nurse role. Patient: Kevin Hong Patient : 1945   MRN: [id number]  Reason for Admission: involuntary movement of arms and legs  Discharge Date: 23 RARS: Readmission Risk Score: 13.9      Last Discharge  [address]       Date Complaint Diagnosis Description Type Department Provider    23 Other Abnormal involuntary movement . .. ED to Hosp-Admission (Discharged) (ADMITTED) [address] Magno Bravo MD; Regina Younger ... Was this an external facility discharge? No Discharge Facility: n/a    Challenges to be reviewed by the provider   Additional needs identified to be addressed with provider: Yes  Pt does not have a HFU in appt chart. Pt to see specialist March 10. This is outside of 7-14 day HFU requirements. Also writer cannot add Tylenol 3 to med rec. Per pt she takes Tylenol 3 TID PRN. Thank you                Method of communication with provider: chart routing. Spoke with pt who states she was resting at time of call. Asked if pt would like CTN to call another time, pt declined. States she is tired as she did not sleep last night. States when having these episodes she does not sleep until 4-5 am. Pt takes ativan as needed to help with episodes as she says sleep helps. Pt denies issues with appetite or hydration. Denies dizziness or lightheadedness. Denies issues with bowel or bladder. States her back is a bit \"stiff\" due to not ambulating like usual. Pt does have rollator from hospital. Before hospital stay she was not using any assistive devices. Pt has a Follow up with movement specialist march 10.  Will route a message to PCP if they would like a HFU. Care Transition Nurse reviewed discharge instructions with patient who verbalized understanding. The patient was given an opportunity to ask questions and does not have any further questions or concerns at this time. Were discharge instructions available to patient? Yes. Reviewed appropriate site of care based on symptoms and resources available to patient including: PCP  Specialist. The patient agrees to contact the PCP office for questions related to their healthcare. Advance Care Planning:   Does patient have an Advance Directive: not on file and pt states they have placed this on file and it can be seen in "[employer]hart . Advance Care Planning   Healthcare Decision Maker:    Primary Decision Maker: Dawood Mcneil Spouse - [phone]    Secondary Decision Maker: Fab Haskins - Other - [phone]    Secondary Decision Maker: Gm Gordonmarquita Child - [phone]    Medication reconciliation was performed with patient, who verbalizes understanding of administration of home medications. Medications reviewed, 1111F entered: yes    Was patient discharged with a pulse oximeter? no    Non-face-to-face services provided:  Obtained and reviewed discharge summary and/or continuity of care documents  Assessment and support for treatment adherence and medication management-med rec    Offered patient enrollment in the Remote Patient Monitoring (RPM) program for in-home monitoring: NA.    Care Transitions 24 Hour Call    Do you have a copy of your discharge instructions?: Yes  Do you have all of your prescriptions and are they filled?: Yes  Have you been contacted by a [facility] Pharmacist?: No  Have you scheduled your follow up appointment?: No  Do you have support at home?: Partner/Spouse/SO  Do you feel like you have everything you need to keep you well at home?: Yes  Care Transitions Interventions         Discussed follow-up appointments.  If no appointment was previously scheduled, appointment scheduling offered: Yes. Is follow up appointment scheduled within 7 days of discharge? No. Will route to PCP    Follow Up  Future Appointments   Date Time Provider Monie Marie   3/1/2023 10:20 AM Yassine Valdez [facility]   3/17/2023  9:30 AM Pavan Maynard, PhD [facility]   10/12/2023  1:00 PM Yassine Valdez [facility]       Care Transition Nurse provided contact information. Plan for follow-up call in 5-7 days based on severity of symptoms and risk factors. Plan for next call: symptom management-.     ISADORA Baeza, RN   Care Transition Nurse  Mobile: [phone]

## 2023-02-23 ENCOUNTER — PATIENT MESSAGE (OUTPATIENT)
Dept: PRIMARY CARE CLINIC | Age: 78
End: 2023-02-23

## 2023-02-24 ENCOUNTER — CARE COORDINATION (OUTPATIENT)
Dept: CASE MANAGEMENT | Age: 78
End: 2023-02-24

## 2023-02-24 DIAGNOSIS — G89.4 CHRONIC PAIN SYNDROME: ICD-10-CM

## 2023-02-24 RX ORDER — ACETAMINOPHEN AND CODEINE PHOSPHATE 300; 30 MG/1; MG/1
1 TABLET ORAL EVERY 8 HOURS PRN
Qty: 90 TABLET | Refills: 0 | Status: SHIPPED | OUTPATIENT
Start: 2023-02-24 | End: 2023-03-26

## 2023-02-24 NOTE — CARE COORDINATION
[facility] Care Transitions Follow Up Call    Patient Current Location:  Home: [address] Coordinator contacted the patient by telephone to follow up after admission on -. Verified name and  with patient as identifiers. Patient: Humza Dyson  Patient : 1945   MRN: <[id number]>  Reason for Admission: involuntary movement of arms and legs  Discharge Date: 23 RARS: Readmission Risk Score: 13.9      Needs to be reviewed by the provider   Additional needs identified to be addressed with provider: No  none             Method of communication with provider: none. LPCELESTE CC spoke with patient. States she is alright. Still having trouble sleeping d/t movement. States Ativan doesn't seem to help much. States she gets some rest during the day. Denies problems with ADLs. Appetite good. Denies problems with bowels or bladder. Was prescribed Tylenol #3 today. F/u noted below. Meri Mcgarry LPN CC  Care Transitions  [phone]    Addressed changes since last contact:  none  Discussed follow-up appointments. If no appointment was previously scheduled, appointment scheduling offered: Yes. Is follow up appointment scheduled within 7 days of discharge? Yes. Follow Up  Future Appointments   Date Time Provider Monie Marie   3/1/2023 10:20 AM [address]   3/4/2023 10:30 AM MD Vale [address] RD [facility] [place] - DYD   3/17/2023  9:30 AM Dafne Kelly, PhD [facility]   10/12/2023  1:00 PM [address]     Non-[facility] follow up appointment(s): AMNA    LPN Care Coordinator reviewed medical action plan and red flags with patient and discussed any barriers to care and/or understanding of plan of care after discharge. Discussed appropriate site of care based on symptoms and resources available to patient including: PCP  Specialist  Urgent care clinics  When to call 911.  The patient agrees to contact the PCP office for questions related to their healthcare. Advance Care Planning:   reviewed and current. Patients top risk factors for readmission: medical condition-involuntary movement of arms and legs  Interventions to address risk factors: Assessment and support for treatment adherence and medication management-Tylenol #3 RX reviewed    Offered patient enrollment in the Remote Patient Monitoring (RPM) program for in-home monitoring: NA.     Care Transitions Subsequent and Final Call    Subsequent and Final Calls  Do you have any ongoing symptoms?: No  Have your medications changed?: No  Do you have any questions related to your medications?: No  Do you currently have any active services?: No  Do you have any needs or concerns that I can assist you with?: No  Identified Barriers: None  Care Transitions Interventions  Other Interventions:             LPN Care Coordinator provided contact information for future needs. Plan for follow-up call in 7-10 days based on severity of symptoms and risk factors.   Plan for next call: symptom management-involuntary movement, difficulty sleeping  self management-ADLs  follow-up appointment-pulm 3/1, PCP 3/4  medication management-new or changed meds    Corrinne Mayans, LPN

## 2023-02-24 NOTE — TELEPHONE ENCOUNTER
From: Ximena Lilly  To: Dr. Aaliyah English: 2/23/2023 7:30 PM EST  Subject: Refill    Hello,    I sent a request for a refill on my Tylenol 3. Grazyna hasnt received any refill yet. Can you check?   Thanks

## 2023-03-01 ENCOUNTER — OFFICE VISIT (OUTPATIENT)
Dept: PULMONOLOGY | Age: 78
End: 2023-03-01
Payer: MEDICARE

## 2023-03-01 VITALS
DIASTOLIC BLOOD PRESSURE: 60 MMHG | WEIGHT: 167 LBS | HEIGHT: 59 IN | BODY MASS INDEX: 33.67 KG/M2 | SYSTOLIC BLOOD PRESSURE: 110 MMHG | HEART RATE: 68 BPM | TEMPERATURE: 97.1 F | OXYGEN SATURATION: 97 %

## 2023-03-01 DIAGNOSIS — G24.9 DYSKINESIA: ICD-10-CM

## 2023-03-01 DIAGNOSIS — Z99.89 OSA ON CPAP: Primary | ICD-10-CM

## 2023-03-01 DIAGNOSIS — J45.20 MILD INTERMITTENT ASTHMA WITHOUT COMPLICATION: ICD-10-CM

## 2023-03-01 DIAGNOSIS — G47.33 OSA ON CPAP: Primary | ICD-10-CM

## 2023-03-01 PROCEDURE — 1111F DSCHRG MED/CURRENT MED MERGE: CPT | Performed by: INTERNAL MEDICINE

## 2023-03-01 PROCEDURE — G8484 FLU IMMUNIZE NO ADMIN: HCPCS | Performed by: INTERNAL MEDICINE

## 2023-03-01 PROCEDURE — 1123F ACP DISCUSS/DSCN MKR DOCD: CPT | Performed by: INTERNAL MEDICINE

## 2023-03-01 PROCEDURE — 1090F PRES/ABSN URINE INCON ASSESS: CPT | Performed by: INTERNAL MEDICINE

## 2023-03-01 PROCEDURE — 1036F TOBACCO NON-USER: CPT | Performed by: INTERNAL MEDICINE

## 2023-03-01 PROCEDURE — 3078F DIAST BP <80 MM HG: CPT | Performed by: INTERNAL MEDICINE

## 2023-03-01 PROCEDURE — G8417 CALC BMI ABV UP PARAM F/U: HCPCS | Performed by: INTERNAL MEDICINE

## 2023-03-01 PROCEDURE — G8427 DOCREV CUR MEDS BY ELIG CLIN: HCPCS | Performed by: INTERNAL MEDICINE

## 2023-03-01 PROCEDURE — 3074F SYST BP LT 130 MM HG: CPT | Performed by: INTERNAL MEDICINE

## 2023-03-01 PROCEDURE — 99213 OFFICE O/P EST LOW 20 MIN: CPT | Performed by: INTERNAL MEDICINE

## 2023-03-01 PROCEDURE — G8399 PT W/DXA RESULTS DOCUMENT: HCPCS | Performed by: INTERNAL MEDICINE

## 2023-03-01 NOTE — PROGRESS NOTES
Chief complaint  This is a 70y.o. year old female  who comes to see me with a chief complaint of   Chief Complaint   Patient presents with    Sleep Apnea    . HPI  Follow up on DOYLE.  and possible RLS    She has been admitted several times for movement issues. They think she has dyskinesia and this is not RLS. She is due to see a movement specialist next week. Having to take ativan and sinemet. Does not always help and does make her more tired. She is faithful with CPAP and cannot sleep without it. Breathing seem ok and not using albuterol a lot. Seen with brother Ed        Sleep Medicine 10/11/2022 10/8/2021 1/13/2021 8/19/2019 2/25/2019 8/22/2018 8/14/2017   Sitting and reading 1 1 2 1 1 2 2   Watching TV 1 0 2 1 1 1 1   Sitting, inactive in a public place (e.g. a theatre or a meeting) 0 0 2 1 1 1 2   As a passenger in a car for an hour without a break 1 0 2 2 1 1 1   Lying down to rest in the afternoon when circumstances permit 0 0 1 3 2 2 2   Sitting and talking to someone 0 0 0 1 0 0 0   Sitting quietly after a lunch without alcohol 0 0 1 1 0 0 0   In a car, while stopped for a few minutes in traffic 0 0 0 0 0 0 0   Center Sleepiness Score 3 1 10 10 6 7 8   Neck circumference (Inches) - - - - - 17.5 19         Current Outpatient Medications   Medication Sig Dispense Refill    acetaminophen-codeine (TYLENOL #3) 300-30 MG per tablet Take 1 tablet by mouth every 8 hours as needed for Pain for up to 30 days. 90 tablet 0    ferrous sulfate (IRON 325) 325 (65 Fe) MG tablet Take 1 tablet by mouth 2 times daily 180 tablet 1    Biotin 54189 MCG TABS Take 1 capsule by mouth daily      ammonium lactate (AMLACTIN) 12 % cream Apply topically as needed for Dry Skin Apply topically as needed. LORazepam (ATIVAN) 0.5 MG tablet Take 0.5 mg by mouth 2 times daily as needed for Anxiety.  Per pt she can take up to TID      metFORMIN (GLUCOPHAGE) 1000 MG tablet Take 1 tablet by mouth 2 times daily (with meals) 180 tablet 1    atorvastatin (LIPITOR) 40 MG tablet Take 1 tablet by mouth daily (Patient taking differently: Take 40 mg by mouth nightly) 90 tablet 1    carbidopa-levodopa (SINEMET)  MG per tablet Take 2 tablets by mouth 4 times daily 720 tablet 1    pantoprazole (PROTONIX) 40 MG tablet Take 1 tablet by mouth daily (Patient taking differently: Take 40 mg by mouth in the morning and at bedtime) 90 tablet 1    gabapentin (NEURONTIN) 600 MG tablet Take 1 tablet by mouth 2 times daily. 180 tablet 1    amLODIPine (NORVASC) 5 MG tablet Take 1 tablet by mouth daily 90 tablet 1    irbesartan (AVAPRO) 150 MG tablet Take 1 tablet by mouth daily 90 tablet 1    Cholecalciferol (VITAMIN D3) 5000 units CAPS Take 1 capsule by mouth daily 90 capsule 3     No current facility-administered medications for this visit. PHYSICAL EXAM:  GENERAL: Awake and alert  HEENT:  No scleral icterus, no conjunctival irritation, Mallampati IV  NECK:  No thyromegaly, no bruits, large neck  LYMPH:  No cervical or supraclavicular adenopathy  HEART:  Regular rate and rhythm, + murmurs  LUNGS:  Clear bilaterally   ABDOMEN:  No distention, no organomegaly  EXTREMITIES:  No edema, no digital clubbing  NEURO:  No localizing deficits, CN II-XII intact, some movements while at rest     Chest imaging from 8/2018 is reviewed. My impression is no acute process      Assessment/Plan  1. DOYLE on CPAP  Historically has been compliant. No download to review. I have no concerns with her usage and benefit    2. Mild intermittent asthma without complication  Albuterol PRN    3. Dyskinesia  Seeing a specialist next week.   This change is not RLS and no further treatment is needed for RLS at this time  Defer meds to Neurology           Follow up in 6 months

## 2023-03-02 RX ORDER — NAPROXEN 500 MG/1
500 TABLET ORAL 2 TIMES DAILY WITH MEALS
Qty: 180 TABLET | Refills: 1 | Status: SHIPPED | OUTPATIENT
Start: 2023-03-02 | End: 2023-03-04

## 2023-03-03 ENCOUNTER — CARE COORDINATION (OUTPATIENT)
Dept: CASE MANAGEMENT | Age: 78
End: 2023-03-03

## 2023-03-03 NOTE — CARE COORDINATION
[facility] Care Transitions Follow Up Call    Patient Current Location:  [place]    Care Transition Nurse contacted the patient by telephone to follow up after admission on 2023.  Verified name and  with patient as identifiers.    Patient: Lucille Lilly  Patient : 1945   MRN: [id number]  Reason for Admission:   Discharge Date: 23 RARS: Readmission Risk Score: 13.9      Needs to be reviewed by the provider   Additional needs identified to be addressed with provider: No               Method of communication with provider: none.    Lucille states she is doing \"alright.\" She confirmed her hospital follow up appointment with the PCP for tomorrow. She states her daughter will provide her transportation. She will be seeing the movement specialist next Friday(03/10). She states her movements are the same - she states she had a spell last night. Lucille states she sleeps sometimes and other times is not able to. When she does sleep she uses her Cpap. She states she is able to complete her ADL's on her own. She states she saw  for her sleep apnea - no changes in her treatment. Lucille denies any needs at this time.      Follow Up  Future Appointments   Date Time Provider Department Center   3/4/2023 10:30 AM MD ELI Beckett RD PC [place] [facility] [place]   3/17/2023  9:30 AM Les Brown, PhD [place] PSYCHOLOG [facility]   10/12/2023  1:00 PM Henry Duong DO [facility]        Care Transitions Subsequent and Final Call    Subsequent and Final Calls  Care Transitions Interventions  Other Interventions:             Care Transition Nurse provided contact information for future needs.   Plan for next call:  movements & sleep    Huong Cherry RN BSN  Care Transition Nurse  [phone]

## 2023-03-04 ENCOUNTER — OFFICE VISIT (OUTPATIENT)
Dept: PRIMARY CARE CLINIC | Age: 78
End: 2023-03-04

## 2023-03-04 VITALS
HEART RATE: 72 BPM | RESPIRATION RATE: 22 BRPM | WEIGHT: 168 LBS | SYSTOLIC BLOOD PRESSURE: 115 MMHG | DIASTOLIC BLOOD PRESSURE: 53 MMHG | BODY MASS INDEX: 33.93 KG/M2 | OXYGEN SATURATION: 97 %

## 2023-03-04 DIAGNOSIS — G47.33 OSA ON CPAP: ICD-10-CM

## 2023-03-04 DIAGNOSIS — E11.9 TYPE 2 DIABETES MELLITUS WITHOUT COMPLICATION, WITHOUT LONG-TERM CURRENT USE OF INSULIN (HCC): ICD-10-CM

## 2023-03-04 DIAGNOSIS — D50.9 IRON DEFICIENCY ANEMIA, UNSPECIFIED IRON DEFICIENCY ANEMIA TYPE: ICD-10-CM

## 2023-03-04 DIAGNOSIS — G24.9 DYSKINESIA: ICD-10-CM

## 2023-03-04 DIAGNOSIS — G25.81 RESTLESS LEGS SYNDROME (RLS): ICD-10-CM

## 2023-03-04 DIAGNOSIS — I10 ESSENTIAL HYPERTENSION: ICD-10-CM

## 2023-03-04 DIAGNOSIS — Z99.89 OSA ON CPAP: ICD-10-CM

## 2023-03-04 DIAGNOSIS — F41.8 MIXED ANXIETY AND DEPRESSIVE DISORDER: ICD-10-CM

## 2023-03-04 LAB
BILIRUBIN, POC: NEGATIVE
BLOOD URINE, POC: NEGATIVE
CLARITY, POC: ABNORMAL
COLOR, POC: ABNORMAL
GLUCOSE URINE, POC: NEGATIVE
HBA1C MFR BLD: 5.2 %
KETONES, POC: 80
LEUKOCYTE EST, POC: ABNORMAL
NITRITE, POC: NEGATIVE
PH, POC: 6
PROTEIN, POC: ABNORMAL
SPECIFIC GRAVITY, POC: 1.02
UROBILINOGEN, POC: 0.2

## 2023-03-04 RX ORDER — ESCITALOPRAM OXALATE 10 MG/1
10 TABLET ORAL DAILY
Qty: 30 TABLET | Refills: 3 | Status: SHIPPED | OUTPATIENT
Start: 2023-03-04

## 2023-03-04 ASSESSMENT — ENCOUNTER SYMPTOMS
SHORTNESS OF BREATH: 0
CONSTIPATION: 0
ABDOMINAL PAIN: 0
DIARRHEA: 0
BLOOD IN STOOL: 0

## 2023-03-04 NOTE — PROGRESS NOTES
3/4/2023     Poly Lilly (:  1945) is a 70 y.o. female, here for evaluation of the following medical concerns:    HPI  Patient scented to the office for follow-up. She was recently found to have significant iron deficiency anemia 2022 during recent emergency room visit was advised to stop naproxen taken for arthritis and pantoprazole increased to 40 mg twice a day, was referred to GI doctor has who was reluctant to do endoscopy because of the restless leg syndrome. Patient has colonoscopy in 2018 which showed colon polyp. Patient has restless leg syndrome and take Sinemet which he reported to be taking for more than 30 years. Lately she was found to have dyskinesia which seems to be anxiety induced, was referred to neurologist Dr. Aayush Slater who then prescribed Ativan. .  She has few episodes of severe dyskinesia requiring emergency room visit. Patient now has appointment with another neurologist at [facility] neuroscience Dr. Ghazala Sharp  Has anxiety with depression was on duloxetine which was recently discontinued and patient reported that she is more anxious than usual despite Ativan. She has hypertension and blood pressure seems to be fairly controlled with amlodipine and Avapro. She has diabetes controlled with metformin 1000 twice daily denies hypoglycemic episode. She has a sleep apnea on CPAP, saw pulmonologist and does not appear restless leg is related to sleep apnea agree with a referral with movement to specialist    Review of Systems   Constitutional:  Negative for activity change and appetite change. Eyes:  Negative for visual disturbance. Respiratory:  Negative for shortness of breath. Cardiovascular:  Negative for chest pain and leg swelling. Gastrointestinal:  Negative for abdominal pain, blood in stool, constipation and diarrhea. Genitourinary:  Negative for difficulty urinating, frequency, hematuria, menstrual problem and urgency.    Neurological:  Positive for tremors. Negative for dizziness and syncope. Psychiatric/Behavioral:  Negative for behavioral problems. Prior to Visit Medications    Medication Sig Taking? Authorizing Provider   escitalopram (LEXAPRO) 10 MG tablet Take 1 tablet by mouth daily Yes Sondra Henderson MD   acetaminophen-codeine (TYLENOL #3) 300-30 MG per tablet Take 1 tablet by mouth every 8 hours as needed for Pain for up to 30 days. Sondra Henderson MD   ferrous sulfate (IRON 325) 325 (65 Fe) MG tablet Take 1 tablet by mouth 2 times daily  Eusebia Gambino MD   Biotin 35731 MCG TABS Take 1 capsule by mouth daily  Historical Provider, MD   ammonium lactate (AMLACTIN) 12 % cream Apply topically as needed for Dry Skin Apply topically as needed. Historical Provider, MD   LORazepam (ATIVAN) 0.5 MG tablet Take 0.5 mg by mouth 2 times daily as needed for Anxiety. Per pt she can take up to TID  Historical Provider, MD   metFORMIN (GLUCOPHAGE) 1000 MG tablet Take 1 tablet by mouth 2 times daily (with meals)  Sondra Henderson MD   atorvastatin (LIPITOR) 40 MG tablet Take 1 tablet by mouth daily  Patient taking differently: Take 40 mg by mouth nightly  Sondra Henderson MD   carbidopa-levodopa (SINEMET)  MG per tablet Take 2 tablets by mouth 4 times daily  Sondra Henderson MD   pantoprazole (PROTONIX) 40 MG tablet Take 1 tablet by mouth daily  Patient taking differently: Take 40 mg by mouth in the morning and at bedtime  Sondra Henderson MD   gabapentin (NEURONTIN) 600 MG tablet Take 1 tablet by mouth 2 times daily.   Sondra Henderson MD   amLODIPine (NORVASC) 5 MG tablet Take 1 tablet by mouth daily  Sondra Henderson MD   irbesartan (AVAPRO) 150 MG tablet Take 1 tablet by mouth daily  Sondra Henderson MD   Cholecalciferol (VITAMIN D3) 5000 units CAPS Take 1 capsule by mouth daily  Sondra Henderson MD        Social History     Tobacco Use    Smoking status: Former     Packs/day: 2.00     Years: 36.00     Pack years: 72.00     Types: Cigarettes     Quit date: 1999     Years since quittin.8    Smokeless tobacco: Never   Substance Use Topics    Alcohol use: No     Alcohol/week: 0.0 standard drinks        Vitals:    23 1032   BP: (!) 115/53   Pulse: 72   Resp: 22   SpO2: 97%   Weight: 168 lb (76.2 kg)     Estimated body mass index is 33.93 kg/m² as calculated from the following:    Height as of 3/1/23: 4' 11\" (1.499 m). Weight as of this encounter: 168 lb (76.2 kg). Physical Exam  Constitutional:       General: She is not in acute distress. Appearance: She is well-developed. HENT:      Head: Normocephalic. Eyes:      Conjunctiva/sclera: Conjunctivae normal.   Neck:      Thyroid: No thyromegaly. Cardiovascular:      Rate and Rhythm: Normal rate and regular rhythm. Heart sounds: Normal heart sounds. No murmur heard. Pulmonary:      Effort: No respiratory distress. Breath sounds: Normal breath sounds. No wheezing or rales. Abdominal:      General: There is no distension. Palpations: Abdomen is soft. There is no mass. Tenderness: There is no guarding or rebound. Musculoskeletal:         General: Normal range of motion. Cervical back: Neck supple. Skin:     General: Skin is warm. Findings: No rash. Neurological:      Mental Status: She is alert and oriented to person, place, and time. Psychiatric:         Behavior: Behavior normal.       ASSESSMENT/PLAN:  1. Iron deficiency anemia, unspecified iron deficiency anemia type  Etiology unclear to rule out GI blood loss ,patient was referred to Dr. No Deleon who is reluctant to do endoscopy due to dyskinesia and was asked to have clearance from his neurologist.  He will receive iron IV infusion and currently on ferrous sulfate 325 mg twice daily. Will check CBC next blood draw. 2. Dyskinesia  Paroxysmal,anxiety induced, was prescribed Ativan by Dr Fernando Bates, has upcoming appointment with another neurologist at [address] [place] [address] [address] Dr Maryclare Kehr    3.  Restless legs syndrome (RLS)  Continue Sinemet which she has been taking for more than 20 to 30 years. .  Has upcoming appointment with neurologist and was wondering if she can try with  other medicines either Requip or Mirapex    4. Type 2 diabetes mellitus without complication, without long-term current use of insulin (HCC)  Controlled. Continue metformin 1000 mg twice daily  - POCT glycosylated hemoglobin (Hb A1C)    5. DOYLE on CPAP  Continue CPAP. 6. Mixed anxiety and depressive disorder  Cymbalta was discontinued. Will try Lexapro 10 mg daily due to increase in size    7. Essential hypertension  Controlled.   Continue amlodipine 5 mg daily and Avapro 150 mg daily      RTC in 6 weeks    An electronic signature was used to authenticate this note.    --Marty Dasilva MD on 3/4/2023 at 2:51 PM

## 2023-03-07 ENCOUNTER — CARE COORDINATION (OUTPATIENT)
Dept: CASE MANAGEMENT | Age: 78
End: 2023-03-07

## 2023-03-07 NOTE — CARE COORDINATION
[address] Care Transitions Follow Up Call    Patient Current Location:  [address] Coordinator contacted the patient by telephone to follow up after admission on 2023. Verified name and  with patient as identifiers. Patient: Ebb Dural  Patient : 1945   MRN: [id number]  Reason for Admission: involuntary movement of arms and legs  Discharge Date: 23 RARS: Readmission Risk Score: 13.9      Needs to be reviewed by the provider   Additional needs identified to be addressed with provider: No  none             Method of communication with provider: none. Patient reports she was alright. Appetite and fluid intake is good. No urinary or bowel problems. She denies falls, fever, chills, sob, cp, weakness or fatigue. She still has the involuntary movements at times. Patient has a walker to use if she needs it. No questions or needs at this time will continue to follow. Addressed changes since last contact:  none  Discussed follow-up appointments. If no appointment was previously scheduled, appointment scheduling offered: na.   Is follow up appointment scheduled within 7 days of discharge? No.    Follow Up  Future Appointments   Date Time Provider Monie Marie   3/17/2023  9:30 AM Alden Rodriguez PhD [facility]   2023 11:00 AM MD Mayte Rios RD PC [place] - [facility]   10/12/2023  1:00 PM Levon Noe [facility]     [id number] follow up appointment(s):     LPN Care Coordinator reviewed medical action plan and red flags with patient and discussed any barriers to care and/or understanding of plan of care after discharge. Discussed appropriate site of care based on symptoms and resources available to patient including: PCP  Specialist  Urgent care clinics. The patient agrees to contact the PCP office for questions related to their healthcare. Advance Care Planning:   not on file.      Patients top risk factors for readmission: depression, falls, ineffective coping, and medical condition-   Interventions to address risk factors: Education of patient/family/caregiver/guardian to support self-management-     Offered patient enrollment in the Remote Patient Monitoring (RPM) program for in-home monitoring:  offer during another call . Care Transitions Subsequent and Final Call    Subsequent and Final Calls  Care Transitions Interventions  Other Interventions:             LPN Care Coordinator provided contact information for future needs. Plan for follow-up call in 5-7 days based on severity of symptoms and risk factors.   Plan for next call: symptom management-   self management-monitor s/s    Murtaza Celis LPN

## 2023-03-14 ENCOUNTER — CARE COORDINATION (OUTPATIENT)
Dept: CASE MANAGEMENT | Age: 78
End: 2023-03-14

## 2023-03-14 NOTE — CARE COORDINATION
Care Transitions Outreach Attempt      Attempted to reach patient for transitions of care follow up. Unable to reach patient. Caller left a HIPAA compliant message with contact information for a return call. Caller called mobile number twice. Phone answered both times, noise in the background, no response. Unable to leave message. Patient: Governor Potts Patient : 1945 MRN: [id number]    Last Discharge  Street       Date Complaint Diagnosis Description Type Department Provider    23 Other Abnormal involuntary movement . .. ED to Hosp-Admission (Discharged) (ADMITTED) [address] Bravo MD; Shalonda Howe ... Was this an external facility discharge?  No Discharge Facility: [facility]    Noted following upcoming appointments from discharge chart review:   [facility] follow up appointment(s):   Future Appointments   Date Time Provider Monie Marie   3/17/2023  9:30 AM Trish Mayers, PhD [facility]   2023 11:00 AM MD Kaila Kamara RD PC [place] - [facility]   10/12/2023  1:00 PM Annemarie García [facility]

## 2023-03-15 PROBLEM — N39.0 UTI (URINARY TRACT INFECTION): Status: RESOLVED | Noted: 2023-02-13 | Resolved: 2023-03-15

## 2023-03-16 RX ORDER — IRBESARTAN 150 MG/1
150 TABLET ORAL DAILY
Qty: 90 TABLET | Refills: 1 | Status: SHIPPED | OUTPATIENT
Start: 2023-03-16

## 2023-03-17 ENCOUNTER — CARE COORDINATION (OUTPATIENT)
Dept: CASE MANAGEMENT | Age: 78
End: 2023-03-17

## 2023-03-17 NOTE — CARE COORDINATION
[address] Care Transitions Follow Up Call    Patient Current Location:  Home: [address] [facility] [address]    Care Transition Nurse contacted the patient by telephone to follow up after admission. Verified name and  with patient as identifiers. Patient: Gianni Newberry  Patient : 1945   MRN: [id number]  Reason for Admission: Involuntary Movement  Discharge Date: 23 RARS: Readmission Risk Score: 13.9      Needs to be reviewed by the provider   Additional needs identified to be addressed with provider: Yes  Pt states she has been trying to get in touch with GI Dr Rajiv Rios office without return call. States they reached out yesterday to get full med rec. Pt dropped phone and tried to return call but has not had any luck. Pt describes stool as dark water, still taking senna daily and experiencing lightheadedness at times. Method of communication with provider: chart routing. Spoke with pt who states she is about the same. States she is still taking her iron tablets. States Dr. Rajiv Rios office reached out yesterday to get full med rec. States pt dropped phone and call was ended. Pt tried to call office back but cannot get hold of anyone so has left a message. Pt needs colonoscopy but this has not been set yet. Pt states at times she has dizziness/lightheadedness. States stool is \"black water. \" States she has 1-2 BM daily and is also taking senna daily under advise of dr. Natasha Rivas she has N/V periodically. Also complains of pain due to stopping naproxen. Pt has seen Dr. Silvana Walsh who adjusted meds and pt states this has improved her involuntary movements. Will notify GI office of pt situation. Will also follow up with pt as one final follow up. Spoke to [facility]cell office who states Bianka Hameed is working on pt case. She is finding report from pt last Colonoscopy and then will return call to pt. Thanked office.      Addressed changes since last contact:   above    Follow Up  Future Appointments   Date Time Provider Monie Marie   4/17/2023 11:00 AM MD Richard Graf RD PC [place] - [facility]   10/12/2023  1:00 PM Dedra Rouse [facility]     Non-[facility] follow up appointment(s): n/a    Care Transition Nurse reviewed red flags with patient and discussed any barriers to care and/or understanding of plan of care after discharge. Discussed appropriate site of care based on symptoms and resources available to patient including: PCP  Specialist. The patient agrees to contact the PCP office for questions related to their healthcare. Care Transitions Subsequent and Final Call    Subsequent and Final Calls  Do you have any ongoing symptoms?: Yes  Interventions for patient-reported symptoms: Notified PCP/Physician  Have your medications changed?: No  Do you have any questions related to your medications?: No  Do you currently have any active services?: No  Do you have any needs or concerns that I can assist you with?: No  Identified Barriers: None  Care Transitions Interventions  Other Interventions:             Care Transition Nurse provided contact information for future needs. Plan for follow-up call in 3-5 days based on severity of symptoms and risk factors. Plan for next call: symptom management-.     ISADORA Lu, RN   Care Transition Nurse  Mobile: [phone]

## 2023-03-22 ENCOUNTER — CARE COORDINATION (OUTPATIENT)
Dept: CASE MANAGEMENT | Age: 78
End: 2023-03-22

## 2023-03-22 NOTE — CARE COORDINATION
[facility] Care Transitions Follow Up Call    Patient: Joseph Mark  Patient : 1945   MRN: [id number]  Reason for Admission: Involuntary Movement  Discharge Date: 23 RARS: Readmission Risk Score: 13.9    Final outreach call attempt, no answer. CTN left VM with contact information and request for return call. CTN will resolve episode as pt has contact information if she wishes to return call and discuss any needs.     Follow Up  Future Appointments   Date Time Provider Monie Marie   2023 11:00 AM MD Antoni Blount RD PC [place] - [facility]   10/12/2023  1:00 PM Brien Avery [facility]     Kathyleen Spurling, BSN, RN   Care Transition Nurse  Mobile: [phone]

## 2023-04-11 DIAGNOSIS — G89.4 CHRONIC PAIN SYNDROME: ICD-10-CM

## 2023-04-17 ENCOUNTER — OFFICE VISIT (OUTPATIENT)
Dept: PRIMARY CARE CLINIC | Age: 78
End: 2023-04-17
Payer: MEDICARE

## 2023-04-17 VITALS
OXYGEN SATURATION: 98 % | HEART RATE: 72 BPM | TEMPERATURE: 97.2 F | DIASTOLIC BLOOD PRESSURE: 56 MMHG | SYSTOLIC BLOOD PRESSURE: 121 MMHG | WEIGHT: 162 LBS | RESPIRATION RATE: 18 BRPM | BODY MASS INDEX: 32.72 KG/M2

## 2023-04-17 DIAGNOSIS — G24.9 DYSKINESIA: ICD-10-CM

## 2023-04-17 DIAGNOSIS — F41.8 MIXED ANXIETY AND DEPRESSIVE DISORDER: ICD-10-CM

## 2023-04-17 DIAGNOSIS — E11.9 TYPE 2 DIABETES MELLITUS WITHOUT COMPLICATION, WITHOUT LONG-TERM CURRENT USE OF INSULIN (HCC): ICD-10-CM

## 2023-04-17 DIAGNOSIS — G47.33 OSA ON CPAP: ICD-10-CM

## 2023-04-17 DIAGNOSIS — G25.81 RESTLESS LEGS SYNDROME (RLS): ICD-10-CM

## 2023-04-17 DIAGNOSIS — I10 ESSENTIAL HYPERTENSION: ICD-10-CM

## 2023-04-17 DIAGNOSIS — D50.0 IRON DEFICIENCY ANEMIA DUE TO CHRONIC BLOOD LOSS: ICD-10-CM

## 2023-04-17 DIAGNOSIS — Z99.89 OSA ON CPAP: ICD-10-CM

## 2023-04-17 PROCEDURE — 1036F TOBACCO NON-USER: CPT | Performed by: FAMILY MEDICINE

## 2023-04-17 PROCEDURE — 3044F HG A1C LEVEL LT 7.0%: CPT | Performed by: FAMILY MEDICINE

## 2023-04-17 PROCEDURE — G8399 PT W/DXA RESULTS DOCUMENT: HCPCS | Performed by: FAMILY MEDICINE

## 2023-04-17 PROCEDURE — 3078F DIAST BP <80 MM HG: CPT | Performed by: FAMILY MEDICINE

## 2023-04-17 PROCEDURE — G8417 CALC BMI ABV UP PARAM F/U: HCPCS | Performed by: FAMILY MEDICINE

## 2023-04-17 PROCEDURE — 3074F SYST BP LT 130 MM HG: CPT | Performed by: FAMILY MEDICINE

## 2023-04-17 PROCEDURE — G8427 DOCREV CUR MEDS BY ELIG CLIN: HCPCS | Performed by: FAMILY MEDICINE

## 2023-04-17 PROCEDURE — 1090F PRES/ABSN URINE INCON ASSESS: CPT | Performed by: FAMILY MEDICINE

## 2023-04-17 PROCEDURE — 99214 OFFICE O/P EST MOD 30 MIN: CPT | Performed by: FAMILY MEDICINE

## 2023-04-17 PROCEDURE — 1123F ACP DISCUSS/DSCN MKR DOCD: CPT | Performed by: FAMILY MEDICINE

## 2023-04-17 RX ORDER — GABAPENTIN 600 MG/1
600 TABLET ORAL 2 TIMES DAILY
Qty: 180 TABLET | Refills: 1
Start: 2023-04-17 | End: 2024-04-16

## 2023-04-17 ASSESSMENT — ENCOUNTER SYMPTOMS
SHORTNESS OF BREATH: 0
CONSTIPATION: 0
ABDOMINAL PAIN: 0
BLOOD IN STOOL: 0
DIARRHEA: 0

## 2023-04-17 NOTE — PROGRESS NOTES
[facility] Pre-Admission Testing Electronic Communication Worksheet for OR/ENDO Procedures        Patient: Mayank Long    DOS: 4/19    Arrival Time: 0800    Surgery [id number]    Meds to Bed:  [] YES    [x]  NO    Transportation Confirmed: [x] YES    []  NO    History and Physical:  [x] YES    []  NO  [] N/A  If yes, please list doctor or Urgent Care and date of H&P:     Additional Clearance(Cardiac, Pulmonary, etc):  [] YES    [x]  NO    Pre-Admission Testing Visit:  [] YES    [x]  NO If no, do labs/testing need to be done DOS?   [] YES    [x]  NO    Medication Reconciliation Complete:  [x] YES    []  NO        Additional Notes:                Interview Complete: [x] YES    []  NO          Svetlana Chisholm, RN  3:10 PM

## 2023-04-17 NOTE — PROGRESS NOTES
[facility] time___0800_________        Surgery time____0930________    Take the following medications with a sip of water: Follow your MD/Surgeons pre-procedure instructions regarding your medications     Do not eat or drink anything after 12:00 midnight prior to your surgery. This includes water chewing gum, mints and ice chips. You may brush your teeth and gargle the morning of your surgery, but do not swallow the water     Please see your family doctor/pediatrician for a history and physical and/or concerning medications. Bring any test results/reports from your physicians office. If you are under the care of a heart doctor or specialist doctor, please be aware that you may be asked to them for clearance    You may be asked to stop blood thinners such as Coumadin, Plavix, Fragmin, Lovenox, etc., or any anti-inflammatories such as:  Aspirin, Ibuprofen, Advil, Naproxen prior to your surgery. We also ask that you stop any OTC medications such as fish oil, vitamin E, glucosamine, garlic, Multivitamins, COQ 10, etc.    We ask that you do not smoke 24 hours prior to surgery  We ask that you do not  drink any alcoholic beverages 24 hours prior to surgery     You must make arrangements for a responsible adult to take you home after your surgery. For your safety you will not be allowed to leave alone or drive yourself home. Your surgery will be cancelled if you do not have a ride home. Also for your safety, it is strongly suggested that someone stay with you the first 24 hours after your surgery. A parent or legal guardian must accompany a child scheduled for surgery and plan to stay at the hospital until the child is discharged. Please do not bring other children with you. For your comfort, please wear simple loose fitting clothing to the hospital.  Please do not bring valuables.     Do not wear any make-up or nail polish on your fingers or

## 2023-04-17 NOTE — PROGRESS NOTES
2023     [facility] Maxx (:  1945) is a 70 y.o. female, here for evaluation of the following medical concerns:    HPI  Patient presented to the office for follow-up. She is accompanied by her . Patient has dyskinesia appeared to be related to restless leg syndrome and was seen by movement disorder specialist at [facility] neuroscience Dr. Shannen Major who spread out the dose of her medication Sinemet as well as the gabapentin. Patient felt much better with current dosing regimen. She also have iron deficiency anemia presumed to be due to chronic blood loss from gastrointestinal tract and has upcoming appointment with a gastroenterologist Dr. Leobardo Velazquez. She has anxiety and takes Lexapro 10 mg daily. She has hypertension controlled with amlodipine 5 mg daily and Avapro 150 mg daily. She has diabetes controlled with the metformin 1000 mg twice daily denies hypoglycemic episode. She is obese and has a sleep apnea wears CPAP regularly. She denies headache nausea vomiting. Denies chest pain or shortness of breath. Denies bowel or urinary disturbance. Review of Systems   Constitutional:  Negative for activity change and appetite change. Eyes:  Negative for visual disturbance. Respiratory:  Negative for shortness of breath. Cardiovascular:  Negative for chest pain and leg swelling. Gastrointestinal:  Negative for abdominal pain, blood in stool, constipation and diarrhea. Genitourinary:  Negative for difficulty urinating, frequency, hematuria, menstrual problem and urgency. Neurological:  Negative for dizziness and syncope. Psychiatric/Behavioral:  Negative for behavioral problems. Prior to Visit Medications    Medication Sig Taking? Authorizing Provider   gabapentin (NEURONTIN) 600 MG tablet Take 1 tablet by mouth 2 times daily.  Take at 5 PM and 9 PM Yes Ken Smith MD   acetaminophen-codeine (TYLENOL #3) 300-30 MG per tablet Take 1 tablet by mouth every 8 hours as needed

## 2023-04-18 ENCOUNTER — ANESTHESIA EVENT (OUTPATIENT)
Dept: ENDOSCOPY | Age: 78
End: 2023-04-18
Payer: MEDICARE

## 2023-04-19 ENCOUNTER — ANESTHESIA (OUTPATIENT)
Dept: ENDOSCOPY | Age: 78
End: 2023-04-19
Payer: MEDICARE

## 2023-04-19 ENCOUNTER — HOSPITAL ENCOUNTER (OUTPATIENT)
Age: 78
Setting detail: OUTPATIENT SURGERY
Discharge: HOME | End: 2023-04-19
Attending: INTERNAL MEDICINE | Admitting: INTERNAL MEDICINE
Payer: MEDICARE

## 2023-04-19 VITALS
DIASTOLIC BLOOD PRESSURE: 55 MMHG | TEMPERATURE: 97 F | WEIGHT: 163.36 LBS | OXYGEN SATURATION: 98 % | HEIGHT: 59 IN | SYSTOLIC BLOOD PRESSURE: 143 MMHG | BODY MASS INDEX: 32.93 KG/M2 | HEART RATE: 60 BPM | RESPIRATION RATE: 16 BRPM

## 2023-04-19 LAB
GLUCOSE BLD-MCNC: 96 MG/DL (ref 70–99)
GLUCOSE BLD-MCNC: 99 MG/DL (ref 70–99)
PERFORMED ON: NORMAL
PERFORMED ON: NORMAL

## 2023-04-19 PROCEDURE — 3700000001 HC ADD 15 MINUTES (ANESTHESIA): Performed by: INTERNAL MEDICINE

## 2023-04-19 PROCEDURE — 2580000003 HC RX 258: Performed by: ANESTHESIOLOGY

## 2023-04-19 PROCEDURE — 6360000002 HC RX W HCPCS: Performed by: ANESTHESIOLOGY

## 2023-04-19 PROCEDURE — 7100000000 HC PACU RECOVERY - FIRST 15 MIN: Performed by: INTERNAL MEDICINE

## 2023-04-19 PROCEDURE — 7100000001 HC PACU RECOVERY - ADDTL 15 MIN: Performed by: INTERNAL MEDICINE

## 2023-04-19 PROCEDURE — 3609017100 HC EGD: Performed by: INTERNAL MEDICINE

## 2023-04-19 PROCEDURE — 2500000003 HC RX 250 WO HCPCS: Performed by: ANESTHESIOLOGY

## 2023-04-19 PROCEDURE — 3700000000 HC ANESTHESIA ATTENDED CARE: Performed by: INTERNAL MEDICINE

## 2023-04-19 PROCEDURE — 7100000010 HC PHASE II RECOVERY - FIRST 15 MIN: Performed by: INTERNAL MEDICINE

## 2023-04-19 PROCEDURE — 7100000011 HC PHASE II RECOVERY - ADDTL 15 MIN: Performed by: INTERNAL MEDICINE

## 2023-04-19 PROCEDURE — 2709999900 HC NON-CHARGEABLE SUPPLY: Performed by: INTERNAL MEDICINE

## 2023-04-19 RX ORDER — SODIUM CHLORIDE 0.9 % (FLUSH) 0.9 %
5-40 SYRINGE (ML) INJECTION PRN
Status: DISCONTINUED | OUTPATIENT
Start: 2023-04-19 | End: 2023-04-19 | Stop reason: HOSPADM

## 2023-04-19 RX ORDER — LIDOCAINE HYDROCHLORIDE 20 MG/ML
INJECTION, SOLUTION EPIDURAL; INFILTRATION; INTRACAUDAL; PERINEURAL PRN
Status: DISCONTINUED | OUTPATIENT
Start: 2023-04-19 | End: 2023-04-19 | Stop reason: SDUPTHER

## 2023-04-19 RX ORDER — GLYCOPYRROLATE 0.2 MG/ML
INJECTION INTRAMUSCULAR; INTRAVENOUS PRN
Status: DISCONTINUED | OUTPATIENT
Start: 2023-04-19 | End: 2023-04-19 | Stop reason: SDUPTHER

## 2023-04-19 RX ORDER — PROPOFOL 10 MG/ML
INJECTION, EMULSION INTRAVENOUS PRN
Status: DISCONTINUED | OUTPATIENT
Start: 2023-04-19 | End: 2023-04-19 | Stop reason: SDUPTHER

## 2023-04-19 RX ORDER — SODIUM CHLORIDE 9 MG/ML
INJECTION, SOLUTION INTRAVENOUS PRN
Status: DISCONTINUED | OUTPATIENT
Start: 2023-04-19 | End: 2023-04-19 | Stop reason: HOSPADM

## 2023-04-19 RX ORDER — SODIUM CHLORIDE 0.9 % (FLUSH) 0.9 %
5-40 SYRINGE (ML) INJECTION EVERY 12 HOURS SCHEDULED
Status: DISCONTINUED | OUTPATIENT
Start: 2023-04-19 | End: 2023-04-19 | Stop reason: HOSPADM

## 2023-04-19 RX ORDER — PROPOFOL 10 MG/ML
INJECTION, EMULSION INTRAVENOUS CONTINUOUS PRN
Status: DISCONTINUED | OUTPATIENT
Start: 2023-04-19 | End: 2023-04-19 | Stop reason: SDUPTHER

## 2023-04-19 RX ORDER — ONDANSETRON 2 MG/ML
4 INJECTION INTRAMUSCULAR; INTRAVENOUS
Status: DISCONTINUED | OUTPATIENT
Start: 2023-04-19 | End: 2023-04-19 | Stop reason: HOSPADM

## 2023-04-19 RX ADMIN — PROPOFOL 100 MG: 10 INJECTION, EMULSION INTRAVENOUS at 09:08

## 2023-04-19 RX ADMIN — GLYCOPYRROLATE 0.2 MG: 0.2 INJECTION, SOLUTION INTRAMUSCULAR; INTRAVENOUS at 09:06

## 2023-04-19 RX ADMIN — PROPOFOL 180 MCG/KG/MIN: 10 INJECTION, EMULSION INTRAVENOUS at 09:07

## 2023-04-19 RX ADMIN — LIDOCAINE HYDROCHLORIDE 80 MG: 20 INJECTION, SOLUTION EPIDURAL; INFILTRATION; INTRACAUDAL; PERINEURAL at 09:06

## 2023-04-19 RX ADMIN — SODIUM CHLORIDE: 9 INJECTION, SOLUTION INTRAVENOUS at 09:04

## 2023-04-19 ASSESSMENT — PAIN - FUNCTIONAL ASSESSMENT: PAIN_FUNCTIONAL_ASSESSMENT: 0-10

## 2023-04-19 NOTE — ANESTHESIA PRE PROCEDURE
 sodium chloride flush 0.9 % injection 5-40 mL  5-40 mL IntraVENous PRN Nay Rizzo MD        0.9 % sodium chloride infusion   IntraVENous PRN Nay Rizzo MD         Vital Signs (Current)   Vitals:    23 0807   BP: (!) 146/67   Pulse: 67   Resp: 16   Temp: 97.6 °F (36.4 °C)   SpO2: 99%     Vital Signs Statistics (for past 48 hrs)     Temp  Av.6 °F (36.4 °C)  Min: 97.6 °F (36.4 °C)   Min taken time: 23 0807  Max: 97.6 °F (36.4 °C)   Max taken time: 23 0807  Pulse  Av  Min: 79   Min taken time: 23 0807  Max: 79   Max taken time: 23 0807  Resp  Av  Min: 12   Min taken time: 23 0807  Max: 12   Max taken time: 23 0807  BP  Min: 146/67   Min taken time: 23 0807  Max: 146/67   Max taken time: 23 0807  SpO2  Av %  Min: 99 %   Min taken time: 23 0807  Max: 99 %   Max taken time: 23 0807    BP Readings from Last 3 Encounters:   23 (!) 146/67   23 (!) 121/56   23 122/61     BMI  Body mass index is 32.99 kg/m². Estimated body mass index is 32.99 kg/m² as calculated from the following:    Height as of this encounter: 4' 11\" (1.499 m). Weight as of this encounter: 163 lb 5.8 oz (74.1 kg).     CBC   Lab Results   Component Value Date/Time    WBC 10.5 2023 04:30 AM    RBC 4.32 2023 04:30 AM    HGB 7.9 2023 04:30 AM    HCT 26.6 2023 04:30 AM    MCV 61.5 2023 04:30 AM    RDW 19.1 2023 04:30 AM     2023 04:30 AM     CMP    Lab Results   Component Value Date/Time     2023 04:31 AM    K 3.3 2023 04:31 AM     2023 04:31 AM    CO2 23 2023 04:31 AM    BUN 9 2023 04:31 AM    CREATININE 0.6 2023 04:31 AM    GFRAA >60 2022 09:47 AM    GFRAA >60 2013 10:30 AM    AGRATIO 1.4 2023 01:28 PM    LABGLOM >60 2023 04:31 AM    GLUCOSE 104 2023 04:31 AM    PROT 6.6 2023 01:28 PM    PROT 6.4 2023 01:28

## 2023-04-19 NOTE — PROCEDURES
[facility]  Endoscopy Note    Patient: Binh Stout  : 1945  Acct#: [de-identified]    Procedure: Esophagogastroduodenoscopy     Date:  2023     Surgeon:  Rodger Pompa MD, MD    Referring Physician:  Willow Lara    Preoperative Diagnosis:  anemia    Postoperative Diagnosis:  Normal EGD    Anesthesia: see anesthesia note. Indications: This is a 70y.o. year old female who presents today with Unexplained iron deficiency anaemia. Description of Procedure:  Informed consent was obtained from the patient after explanation of indications, benefits and possible risks and complications of the procedure. The patient was then taken to the endoscopy suite, placed in the left lateral decubitus position and the above IV sedation was administrered. The Olympus videoendoscope was placed in the patient's mouth and under direct visualization passed into the esophagus. Visualization of the esophagus demonstrated normal..     The scope was then advanced into the stomach. Visualization of the gastric body and antrum demonstrated normal..  A retroflexed exam of the gastric cardia and fundus demonstrated normal..  The pylorus was patent and the scope was advanced into the duodenum. Visualization of the duodenal bulb demonstrated normal..  The second portion of the duodenum demonstrated normal..    The scope was then withdrawn back into the stomach, it was decompressed, and the scope was completely withdrawn. The patient tolerated the procedure well and was taken to the post anesthesia care unit in good condition. Estimated Blood loss:  none    Impression: Normal EGD      Recommendations:Hematology consult.     Rodger Pompa MD, MD  [facility]

## 2023-04-19 NOTE — H&P
Exercise per Week: 0 days    Minutes of Exercise per Session: 0 min   Stress: No Stress Concern Present    Feeling of Stress : Only a little   Social Connections: Moderately Isolated    Frequency of Communication with Friends and Family: More than three times a week    Frequency of Social Gatherings with Friends and Family: More than three times a week    Attends Protestant Services: Never    Active Member of Clubs or Organizations: No    Attends Club or Organization Meetings: Never    Marital Status:    Intimate Partner Violence: Not At Risk    Fear of Current or Ex-Partner: No    Emotionally Abused: No    Physically Abused: No    Sexually Abused: No   Housing Stability: Low Risk     Unable to Pay for Housing in the Last Year: No    Number of [place] in the Last Year: 1    Unstable Housing in the Last Year: No     Family History   Problem Relation Age of Onset    Heart Disease Mother     Cancer Mother         multiple myeloma    Heart Failure Mother     Hypertension Mother     Heart Disease Father     Cancer Father         head and neck    Heart Failure Father     Hypertension Father     Heart Disease Sister     Spondylitis Sister     Heart Attack Sister     Heart Failure Sister     Hypertension Sister     Heart Disease Brother     Heart Attack Brother     Heart Failure Brother     Hypertension Brother     Stroke Brother          PHYSICAL EXAM:      BP (!) 146/67   Pulse 67   Temp 97.6 °F (36.4 °C) (Oral)   Resp 16   Ht 4' 11\" (1.499 m)   Wt 163 lb 5.8 oz (74.1 kg)   SpO2 99%   BMI 32.99 kg/m²  I        Heart:normal    Lungs: normal    Abdomen: normal      ASA Grade:  See anesthesia note      ASSESSMENT AND PLAN:    1. Procedure options, risks and benefits reviewed with patient and expresses understanding.

## 2023-04-19 NOTE — DISCHARGE INSTRUCTIONS
[facility] Endoscopy Discharge Instructions   EGD (Esophagogastroduodenoscopy)    NAME:  Jose Lilly  YOB: 1945  MEDICAL RECORD NUMBER:  [id number]  DATE:  4/19/2023      After receiving Propofol (Diprivan) for Moderate Sedation:    Do not drive or operate any machinery until tomorrow  Do not sign any legal documents or make any critical decisions  Do not drink alcoholic beverages for 24 hours  Plan to spend a few hours resting before resuming your normal routine  Possible side effects are light headedness and sedation    You may resume your usual diet at home    Resume all your daily medications    Call your physician if any of the following occur: If you have any difficulty breathing: CALL 911  Neck swelling  Difficulty swallowing  Excessive pain or abdominal distention  Fever, chills, nausea or vomiting    If you are unable to reach your physician or symptoms worsen, proceed to the nearest Emergency Room    You may use warm salt water gargles, lozenges or Chloraseptic spray as needed for your sore throat. Biopsy Obtained: NO    Recommendations: Follow up in office    For questions or concerns please contact your GI physician's 24 hour call center at [phone].

## 2023-04-19 NOTE — ANESTHESIA POSTPROCEDURE EVALUATION
[facility] Department of Anesthesiology  Post-Anesthesia Note       Name:  Wilfrid Webster                                  Age:  70 y.o. MRN:  [id number]     Last Vitals & Oxygen Saturation: BP (!) 143/55   Pulse 60   Temp 97 °F (36.1 °C) (Temporal)   Resp 16   Ht 4' 11\" (1.499 m)   Wt 163 lb 5.8 oz (74.1 kg)   SpO2 98%   BMI 32.99 kg/m²   Patient Vitals for the past 4 hrs:   BP Temp Temp src Pulse Resp SpO2 Height Weight   04/19/23 0944 (!) 143/55 97 °F (36.1 °C) Temporal 60 16 98 % -- --   04/19/23 0938 (!) 116/50 98.3 °F (36.8 °C) -- 62 18 96 % -- --   04/19/23 0931 117/60 -- -- 60 25 95 % -- --   04/19/23 0928 (!) 114/52 -- -- 62 23 97 % -- --   04/19/23 0923 (!) 121/59 98.4 °F (36.9 °C) Temporal 74 12 -- -- --   04/19/23 0807 (!) 146/67 97.6 °F (36.4 °C) Oral 67 16 99 % 4' 11\" (1.499 m) 163 lb 5.8 oz (74.1 kg)       Level of consciousness:  Awake, alert    Respiratory: Respirations easy, no distress. Stable. Cardiovascular: Hemodynamically stable. Hydration: Adequate. PONV: Adequately managed. Post-op pain: Adequately controlled. Post-op assessment: Tolerated anesthetic well without complication. Complications:  None.     Sabine Hatfield MD  April 19, 2023   9:47 AM

## 2023-04-19 NOTE — ADDENDUM NOTE
Addendum  created 04/19/23 1135 by Radha Childs RN    Intraprocedure Event edited, Intraprocedure Staff edited

## 2023-04-20 RX ORDER — AMLODIPINE BESYLATE 5 MG/1
5 TABLET ORAL DAILY
Qty: 90 TABLET | Refills: 1 | Status: SHIPPED | OUTPATIENT
Start: 2023-04-20

## 2023-05-08 ENCOUNTER — HOSPITAL ENCOUNTER (OUTPATIENT)
Dept: CT IMAGING | Age: 78
Discharge: HOME | End: 2023-05-08
Payer: MEDICARE

## 2023-05-08 DIAGNOSIS — R10.9 ABDOMINAL PAIN, UNSPECIFIED ABDOMINAL LOCATION: ICD-10-CM

## 2023-05-08 DIAGNOSIS — R63.4 WEIGHT LOSS: ICD-10-CM

## 2023-05-08 PROCEDURE — 6360000004 HC RX CONTRAST MEDICATION: Performed by: INTERNAL MEDICINE

## 2023-05-08 PROCEDURE — 74176 CT ABD & PELVIS W/O CONTRAST: CPT

## 2023-05-08 RX ADMIN — IOPAMIDOL 50 ML: 612 INJECTION, SOLUTION INTRAVENOUS at 09:00

## 2023-05-09 DIAGNOSIS — E78.2 MIXED HYPERLIPIDEMIA: ICD-10-CM

## 2023-05-09 DIAGNOSIS — E11.9 TYPE 2 DIABETES MELLITUS WITHOUT COMPLICATION, WITHOUT LONG-TERM CURRENT USE OF INSULIN (HCC): ICD-10-CM

## 2023-05-09 RX ORDER — GABAPENTIN 600 MG/1
600 TABLET ORAL 2 TIMES DAILY
Qty: 60 TABLET | Refills: 0 | Status: SHIPPED | OUTPATIENT
Start: 2023-05-09 | End: 2023-06-22

## 2023-05-09 RX ORDER — CARBIDOPA/LEVODOPA 25MG-250MG
2 TABLET ORAL 4 TIMES DAILY
Qty: 720 TABLET | Refills: 1 | Status: SHIPPED | OUTPATIENT
Start: 2023-05-09

## 2023-05-09 RX ORDER — PANTOPRAZOLE SODIUM 40 MG/1
40 TABLET, DELAYED RELEASE ORAL DAILY
Qty: 90 TABLET | Refills: 1 | Status: SHIPPED | OUTPATIENT
Start: 2023-05-09

## 2023-05-09 RX ORDER — ATORVASTATIN CALCIUM 40 MG/1
40 TABLET, FILM COATED ORAL DAILY
Qty: 90 TABLET | Refills: 1 | Status: SHIPPED | OUTPATIENT
Start: 2023-05-09

## 2023-05-25 ENCOUNTER — PATIENT MESSAGE (OUTPATIENT)
Dept: PRIMARY CARE CLINIC | Age: 78
End: 2023-05-25

## 2023-05-26 DIAGNOSIS — G89.4 CHRONIC PAIN SYNDROME: ICD-10-CM

## 2023-05-26 RX ORDER — ACETAMINOPHEN AND CODEINE PHOSPHATE 300; 30 MG/1; MG/1
1 TABLET ORAL EVERY 8 HOURS PRN
Qty: 90 TABLET | Refills: 0 | Status: SHIPPED | OUTPATIENT
Start: 2023-05-26 | End: 2023-06-25

## 2023-06-04 ENCOUNTER — HOSPITAL ENCOUNTER (EMERGENCY)
Age: 78
Discharge: HOME | End: 2023-06-04
Payer: MEDICARE

## 2023-06-04 VITALS
RESPIRATION RATE: 16 BRPM | TEMPERATURE: 97.7 F | DIASTOLIC BLOOD PRESSURE: 64 MMHG | BODY MASS INDEX: 34.14 KG/M2 | HEIGHT: 58 IN | SYSTOLIC BLOOD PRESSURE: 140 MMHG | HEART RATE: 82 BPM | OXYGEN SATURATION: 95 %

## 2023-06-04 DIAGNOSIS — T21.21XA PARTIAL THICKNESS BURN OF CHEST WALL, INITIAL ENCOUNTER: ICD-10-CM

## 2023-06-04 DIAGNOSIS — T21.21XA PARTIAL THICKNESS BURN OF RIGHT BREAST, INITIAL ENCOUNTER: Primary | ICD-10-CM

## 2023-06-04 PROCEDURE — 99284 EMERGENCY DEPT VISIT MOD MDM: CPT

## 2023-06-04 PROCEDURE — 90471 IMMUNIZATION ADMIN: CPT | Performed by: PHYSICIAN ASSISTANT

## 2023-06-04 PROCEDURE — 6360000002 HC RX W HCPCS: Performed by: PHYSICIAN ASSISTANT

## 2023-06-04 PROCEDURE — 90715 TDAP VACCINE 7 YRS/> IM: CPT | Performed by: PHYSICIAN ASSISTANT

## 2023-06-04 RX ORDER — CEPHALEXIN 500 MG/1
500 CAPSULE ORAL 3 TIMES DAILY
Qty: 30 CAPSULE | Refills: 0 | Status: SHIPPED | OUTPATIENT
Start: 2023-06-04

## 2023-06-04 RX ORDER — OXYCODONE HYDROCHLORIDE AND ACETAMINOPHEN 5; 325 MG/1; MG/1
1 TABLET ORAL EVERY 6 HOURS PRN
Qty: 10 TABLET | Refills: 0 | Status: SHIPPED | OUTPATIENT
Start: 2023-06-04 | End: 2023-06-07

## 2023-06-04 RX ADMIN — TETANUS TOXOID, REDUCED DIPHTHERIA TOXOID AND ACELLULAR PERTUSSIS VACCINE, ADSORBED 0.5 ML: 5; 2.5; 8; 8; 2.5 SUSPENSION INTRAMUSCULAR at 14:52

## 2023-06-04 ASSESSMENT — ENCOUNTER SYMPTOMS
COUGH: 0
SORE THROAT: 0
NAUSEA: 0
ABDOMINAL PAIN: 0
BACK PAIN: 0
SHORTNESS OF BREATH: 0
VOMITING: 0
EYE PAIN: 0

## 2023-06-09 ENCOUNTER — HOSPITAL ENCOUNTER (OUTPATIENT)
Dept: WOUND CARE | Age: 78
Discharge: HOME | End: 2023-06-09

## 2023-06-09 VITALS
TEMPERATURE: 97.9 F | HEART RATE: 74 BPM | RESPIRATION RATE: 18 BRPM | DIASTOLIC BLOOD PRESSURE: 69 MMHG | SYSTOLIC BLOOD PRESSURE: 139 MMHG

## 2023-06-09 DIAGNOSIS — T31.0 BURN (ANY DEGREE) INVOLVING LESS THAN 10% OF BODY SURFACE: Primary | ICD-10-CM

## 2023-06-09 DIAGNOSIS — S21.001A OPEN WOUND OF RIGHT BREAST, INITIAL ENCOUNTER: ICD-10-CM

## 2023-06-09 DIAGNOSIS — S31.109A: ICD-10-CM

## 2023-06-09 RX ORDER — LIDOCAINE HYDROCHLORIDE 40 MG/ML
SOLUTION TOPICAL ONCE
OUTPATIENT
Start: 2023-06-09 | End: 2023-06-09

## 2023-06-09 RX ORDER — CLOBETASOL PROPIONATE 0.5 MG/G
OINTMENT TOPICAL ONCE
OUTPATIENT
Start: 2023-06-09 | End: 2023-06-09

## 2023-06-09 RX ORDER — LIDOCAINE 40 MG/G
CREAM TOPICAL ONCE
OUTPATIENT
Start: 2023-06-09 | End: 2023-06-09

## 2023-06-09 RX ORDER — LIDOCAINE HYDROCHLORIDE 20 MG/ML
JELLY TOPICAL ONCE
OUTPATIENT
Start: 2023-06-09 | End: 2023-06-09

## 2023-06-09 RX ORDER — GINSENG 100 MG
CAPSULE ORAL ONCE
OUTPATIENT
Start: 2023-06-09 | End: 2023-06-09

## 2023-06-09 RX ORDER — GENTAMICIN SULFATE 1 MG/G
OINTMENT TOPICAL ONCE
OUTPATIENT
Start: 2023-06-09 | End: 2023-06-09

## 2023-06-09 RX ORDER — LIDOCAINE HYDROCHLORIDE 40 MG/ML
SOLUTION TOPICAL ONCE
Status: COMPLETED | OUTPATIENT
Start: 2023-06-09 | End: 2023-06-09

## 2023-06-09 RX ORDER — IBUPROFEN 200 MG
TABLET ORAL ONCE
OUTPATIENT
Start: 2023-06-09 | End: 2023-06-09

## 2023-06-09 RX ORDER — LIDOCAINE 50 MG/G
OINTMENT TOPICAL ONCE
OUTPATIENT
Start: 2023-06-09 | End: 2023-06-09

## 2023-06-09 RX ORDER — SODIUM CHLOR/HYPOCHLOROUS ACID 0.033 %
SOLUTION, IRRIGATION IRRIGATION ONCE
OUTPATIENT
Start: 2023-06-09 | End: 2023-06-09

## 2023-06-09 RX ORDER — BETAMETHASONE DIPROPIONATE 0.05 %
OINTMENT (GRAM) TOPICAL ONCE
OUTPATIENT
Start: 2023-06-09 | End: 2023-06-09

## 2023-06-09 RX ORDER — BACITRACIN ZINC AND POLYMYXIN B SULFATE 500; 1000 [USP'U]/G; [USP'U]/G
OINTMENT TOPICAL ONCE
OUTPATIENT
Start: 2023-06-09 | End: 2023-06-09

## 2023-06-09 RX ADMIN — LIDOCAINE HYDROCHLORIDE 10 ML: 40 SOLUTION TOPICAL at 11:00

## 2023-06-09 ASSESSMENT — PAIN DESCRIPTION - LOCATION
LOCATION: BREAST;ABDOMEN
LOCATION: BREAST;ABDOMEN

## 2023-06-09 ASSESSMENT — PAIN DESCRIPTION - ORIENTATION
ORIENTATION: RIGHT
ORIENTATION: RIGHT

## 2023-06-09 ASSESSMENT — PAIN DESCRIPTION - FREQUENCY
FREQUENCY: INTERMITTENT
FREQUENCY: INTERMITTENT

## 2023-06-09 ASSESSMENT — PAIN DESCRIPTION - ONSET
ONSET: GRADUAL
ONSET: ON-GOING

## 2023-06-09 ASSESSMENT — PAIN SCALES - GENERAL
PAINLEVEL_OUTOF10: 3
PAINLEVEL_OUTOF10: 3

## 2023-06-09 ASSESSMENT — PAIN DESCRIPTION - PAIN TYPE
TYPE: ACUTE PAIN
TYPE: ACUTE PAIN

## 2023-06-09 ASSESSMENT — PAIN DESCRIPTION - DESCRIPTORS
DESCRIPTORS: SHARP
DESCRIPTORS: SHARP

## 2023-06-09 NOTE — PROGRESS NOTES
[address]   Progress Note and Procedure Note      Nikki Hunt  MEDICAL RECORD NUMBER:  [id number]  AGE: 70 y.o. GENDER: female  : 1945  EPISODE DATE:  2023    Subjective:     Chief Complaint   Patient presents with    Wound Check     New patient visit for wounds to the abdomen and right breast.         HISTORY of PRESENT ILLNESS HPI     Nikki Hunt is a 70 y.o. female who presents today for wound/ulcer evaluation. History of Wound Context: Burn. Patient was making potato salad. When she was draining the water off of the potatoes, it splashed on her causing a burn to her right breast and to the center of her chest.  This occurred on 23. She was [facility] at the [facility] Emergency Department on 23. Current treatment includes topical Silvadene Cream and PO Keflex X10 days.   Wound/Ulcer Pain Timing/Severity: intermittent  Quality of pain: sharp  Severity:  3 / 10   Modifying Factors: Pain worsens with local pressure , to include clothing (such as her bra)  Associated Signs/Symptoms: erythema and pain    Ulcer Identification:  Ulcer Type: burn    Contributing Factors: diabetes    Acute Wound: Burn    PAST MEDICAL HISTORY        Diagnosis Date    Anxiety     Asthma     Benign tumor lacrimal gland     removed    Chronic back pain     Greater trochanteric bursitis of left hip     Hyperlipidemia     Hypertension     Iron deficiency anemia     DOYLE     Uses CPAP    Pseudophakia     RLS (restless legs syndrome)     Type II or unspecified type diabetes mellitus without mention of complication, not stated as uncontrolled     Vitamin D deficiency        PAST SURGICAL HISTORY    Past Surgical History:   Procedure Laterality Date    APPENDECTOMY      BACK INJECTION Bilateral 2022    BILATERAL SACROILIAC JOINT INJECTION performed by Cam Iqbal MD at [facility] 2022    BILATERAL SACROILIAC JOINT INJECTION

## 2023-06-09 NOTE — DISCHARGE INSTRUCTIONS
cheese, fish, lean meat and beans. If you are DIABETIC, having diabetes can make it hard for wounds to heal. Try to keep your blood sugar within it's target range. Limit Sodium, Alcohol and Sugar. Pain:   Please Note some pain, drainage and/or bleeding might be expected after seeing the provider. TO HELP ALLEVIATE PAIN WE RECOMMEND THE FOLLOWING  Elevate the affected limb. Use over the counter medications as permitted by your family doctor. For Persistent Pain not relieved by the above interventions, please notify your family doctor. : Ghanshyam Dsouza     Electronically signed by Cameron Daniel RN on 6/9/2023 at [address] [place] [address] Information: Should you experience any significant changes in your wound(s) or have questions about your wound care, please contact the [address] [place] [address] at [address] 8:00 am - 4:30 pm and Friday 8:00 am - 12:30 pm.  If you need help with your wound outside these hours and cannot wait until we are again available, contact your PCP or go to the hospital emergency room. PLEASE NOTE: IF YOU ARE UNABLE TO OBTAIN WOUND SUPPLIES, CONTINUE TO USE THE SUPPLIES YOU HAVE AVAILABLE UNTIL YOU ARE ABLE TO REACH US. IT IS MOST IMPORTANT TO KEEP THE WOUND COVERED AT ALL TIMES.          Physician Signature:_______________________    Date: ___________ Time:  ____________          Kimmy Prather CNP

## 2023-06-11 PROBLEM — S21.109A OPEN WOUND OF CHEST WALL: Status: ACTIVE | Noted: 2023-06-11

## 2023-06-19 NOTE — PLAN OF CARE
[address] [facility],[address] Floor:     [facility] [address] [facility] , [address]  U:[phone] f: [phone]     [place]:      [address]  [address] [facility] [address] [facility]  [phone]  WOUND CARE Dept: [phone]   FAX NUMBER [unfilled]    Patient Information:      Adrianna Dave [address] [facility] [id number] [phone]   : 1945  AGE: 70 y.o. GENDER: female   TODAYS DATE:  2023    Insurance:      PRIMARY INSURANCE:  Plan: MEDICARE PART A AND B  Coverage: MEDICARE  Effective Date: 2015  [id number] - (Medicare)    SECONDARY INSURANCE:  Plan: [address] SUPP  Coverage: UNITED HEALTHCARE  Effective Date: 2015  [unfilled]    [unfilled]   [unfilled]     Patient Wound Information:      Problem List Items Addressed This Visit    None    ICD-10 codes:    T31.0   2023   Open wound of right breast S21.001A   2023   Open wound of chest wall S21.109A          WOUNDS REQUIRING DRESSING SUPPLIES:     Wound 23 Abdomen Right;Upper;Quadrant #1(acquired on 23) (Active)   Wound Image   23 1050   Wound Etiology Burn 23 1158   Dressing Status New dressing applied 23 1158   Wound Cleansed Vashe 23 0935   Dressing/Treatment Pharmaceutical agent (see MAR); Gauze dressing/dressing sponge;Barrier film;Tape/Soft cloth adhesive tape 23 0935   Wound Length (cm) 5.6 cm 23 0854   Wound Width (cm) 4.3 cm 23 0854   Wound Depth (cm) 0.1 cm 23 0854   Wound Surface Area (cm^2) 24.08 cm^2 23 0854   Change in Wound Size % (l*w) 43.79 23 0854   Wound Volume (cm^3) 2.408 cm^3 06/14/23 0854   Wound Healing % 44 23   Post-Procedure Length (cm) 5.7 cm 23   Post-Procedure Width (cm) 4.4 cm 23   Post-Procedure Depth (cm) 0.2 cm 23   Post-Procedure Surface Area (cm^2) 25.08

## 2023-06-20 ENCOUNTER — TELEPHONE (OUTPATIENT)
Dept: PRIMARY CARE CLINIC | Age: 78
End: 2023-06-20

## 2023-06-21 DIAGNOSIS — F41.8 MIXED ANXIETY AND DEPRESSIVE DISORDER: ICD-10-CM

## 2023-06-21 RX ORDER — ESCITALOPRAM OXALATE 10 MG/1
10 TABLET ORAL NIGHTLY
Qty: 90 TABLET | Refills: 1 | Status: SHIPPED | OUTPATIENT
Start: 2023-06-21

## 2023-06-21 NOTE — TELEPHONE ENCOUNTER
D/W Dr Camacho Grandchild. Pt is 71 yo. Does she still need them? Last one was 5 years ago by Dr Holland Sebastian. He documented to do another in 5 years. Pt was scheduled today with Dr Jaron Echevarria- but it was cancelled. Left VM for patient:  contact either Dr Holland Sebastian or Dr Jaron Echevarria to see if she still needs to do them. If they still need a referral, let me know and I'll write up what they need and send it to whoever.

## 2023-06-22 RX ORDER — GABAPENTIN 600 MG/1
600 TABLET ORAL 2 TIMES DAILY
Qty: 180 TABLET | Refills: 1 | Status: SHIPPED | OUTPATIENT
Start: 2023-06-22 | End: 2023-12-19

## 2023-06-23 ENCOUNTER — HOSPITAL ENCOUNTER (OUTPATIENT)
Dept: WOUND CARE | Age: 78
Discharge: HOME | End: 2023-06-23
Payer: MEDICARE

## 2023-06-23 ENCOUNTER — TELEPHONE (OUTPATIENT)
Dept: PRIMARY CARE CLINIC | Age: 78
End: 2023-06-23

## 2023-06-23 VITALS
DIASTOLIC BLOOD PRESSURE: 70 MMHG | TEMPERATURE: 96.8 F | HEART RATE: 65 BPM | RESPIRATION RATE: 18 BRPM | SYSTOLIC BLOOD PRESSURE: 147 MMHG

## 2023-06-23 DIAGNOSIS — Z12.11 COLON CANCER SCREENING: Primary | ICD-10-CM

## 2023-06-23 DIAGNOSIS — S21.001D OPEN WOUND OF RIGHT BREAST, SUBSEQUENT ENCOUNTER: ICD-10-CM

## 2023-06-23 DIAGNOSIS — S21.001A OPEN WOUND OF RIGHT BREAST, INITIAL ENCOUNTER: ICD-10-CM

## 2023-06-23 DIAGNOSIS — S21.101D OPEN WOUND OF RIGHT CHEST WALL, SUBSEQUENT ENCOUNTER: Primary | ICD-10-CM

## 2023-06-23 DIAGNOSIS — T31.0 BURN (ANY DEGREE) INVOLVING LESS THAN 10% OF BODY SURFACE: ICD-10-CM

## 2023-06-23 PROCEDURE — 99211 OFF/OP EST MAY X REQ PHY/QHP: CPT

## 2023-06-23 RX ORDER — CLOBETASOL PROPIONATE 0.5 MG/G
OINTMENT TOPICAL ONCE
Status: CANCELLED | OUTPATIENT
Start: 2023-06-23 | End: 2023-06-23

## 2023-06-23 RX ORDER — LIDOCAINE HYDROCHLORIDE 20 MG/ML
JELLY TOPICAL ONCE
Status: CANCELLED | OUTPATIENT
Start: 2023-06-23 | End: 2023-06-23

## 2023-06-23 RX ORDER — LIDOCAINE 50 MG/G
OINTMENT TOPICAL ONCE
Status: CANCELLED | OUTPATIENT
Start: 2023-06-23 | End: 2023-06-23

## 2023-06-23 RX ORDER — PANTOPRAZOLE SODIUM 40 MG/1
40 TABLET, DELAYED RELEASE ORAL DAILY
Qty: 90 TABLET | Refills: 1 | Status: SHIPPED | OUTPATIENT
Start: 2023-06-23

## 2023-06-23 RX ORDER — IBUPROFEN 200 MG
TABLET ORAL ONCE
Status: CANCELLED | OUTPATIENT
Start: 2023-06-23 | End: 2023-06-23

## 2023-06-23 RX ORDER — GINSENG 100 MG
CAPSULE ORAL ONCE
Status: CANCELLED | OUTPATIENT
Start: 2023-06-23 | End: 2023-06-23

## 2023-06-23 RX ORDER — BETAMETHASONE DIPROPIONATE 0.05 %
OINTMENT (GRAM) TOPICAL ONCE
Status: CANCELLED | OUTPATIENT
Start: 2023-06-23 | End: 2023-06-23

## 2023-06-23 RX ORDER — BACITRACIN ZINC AND POLYMYXIN B SULFATE 500; 1000 [USP'U]/G; [USP'U]/G
OINTMENT TOPICAL ONCE
Status: CANCELLED | OUTPATIENT
Start: 2023-06-23 | End: 2023-06-23

## 2023-06-23 RX ORDER — GENTAMICIN SULFATE 1 MG/G
OINTMENT TOPICAL ONCE
Status: CANCELLED | OUTPATIENT
Start: 2023-06-23 | End: 2023-06-23

## 2023-06-23 RX ORDER — LIDOCAINE HYDROCHLORIDE 40 MG/ML
SOLUTION TOPICAL ONCE
Status: DISCONTINUED | OUTPATIENT
Start: 2023-06-23 | End: 2023-06-24 | Stop reason: HOSPADM

## 2023-06-23 RX ORDER — SODIUM CHLOR/HYPOCHLOROUS ACID 0.033 %
SOLUTION, IRRIGATION IRRIGATION ONCE
Status: CANCELLED | OUTPATIENT
Start: 2023-06-23 | End: 2023-06-23

## 2023-06-23 RX ORDER — LIDOCAINE 40 MG/G
CREAM TOPICAL ONCE
Status: CANCELLED | OUTPATIENT
Start: 2023-06-23 | End: 2023-06-23

## 2023-06-23 RX ORDER — LIDOCAINE HYDROCHLORIDE 40 MG/ML
SOLUTION TOPICAL ONCE
Status: CANCELLED | OUTPATIENT
Start: 2023-06-23 | End: 2023-06-23

## 2023-06-23 ASSESSMENT — PAIN SCALES - GENERAL
PAINLEVEL_OUTOF10: 0
PAINLEVEL_OUTOF10: 0

## 2023-06-23 NOTE — PROGRESS NOTES
Wound Width (cm) 0 cm 06/23/23 0946   Wound Depth (cm) 0 cm 06/23/23 0946   Wound Surface Area (cm^2) 0 cm^2 06/23/23 0946   Change in Wound Size % (l*w) 100 06/23/23 0946   Wound Volume (cm^3) 0 cm^3 06/23/23 0946   Wound Healing % 100 06/23/23 0946   Post-Procedure Length (cm) 0 cm 06/23/23 1008   Post-Procedure Width (cm) 0 cm 06/23/23 1008   Post-Procedure Depth (cm) 0 cm 06/23/23 1008   Post-Procedure Surface Area (cm^2) 0 cm^2 06/23/23 1008   Post-Procedure Volume (cm^3) 0 cm^3 06/23/23 1008   Wound Assessment Epithelialization 06/23/23 0946   Drainage Amount Moderate 06/14/23 0854   Drainage Description Yellow; Thick 06/14/23 0854   Odor None 06/14/23 0854   Leena-wound Assessment Blanchable erythema;Fragile 06/14/23 0854   Margins Attached edges 06/14/23 0854   Wound Thickness Description not for Pressure Injury Full thickness 06/14/23 0854   Number of days: 14       All wounds have healed. Patient is okay to leave them open to air. Plan:     Treatment Note please see attached Discharge Instructions    Written patient dismissal instructions given to patient and signed by patient or POA. Discharge Instructions           [address]  Physician [facility] AT [address], [place] [address]  Telephone: [phone]    NAME:  Leon Johns  YOB: 1945  MEDICAL RECORD NUMBER:  [id number]  DATE:  6/23/2023    Congratulations! You have completed your treatment. Return to your Primary Care Physician for all your health issues. Resume your ordinary activities as tolerated. Take your medications as prescribed by your primary care physician. Check your skin daily for cracks, bruises, sores, or dryness. Use a moisturizer as needed. Clean and dry your skin, using mild soap and warm water (not hot). Avoid alcohol and caffeine and do not smoke. Maintain a nutritious diet.   Avoid pressure

## 2023-06-23 NOTE — TELEPHONE ENCOUNTER
----- Message from [facility] sent at 6/23/2023 11:46 AM EDT -----  Subject: Message to Provider    QUESTIONS  Information for Provider? patient would like [facility] or the doctor to give   her a call  ---------------------------------------------------------------------------  --------------  [address] [facility]  [phone]; OK to leave message on voicemail  ---------------------------------------------------------------------------  --------------  SCRIPT ANSWERS  Relationship to Patient?  Self

## 2023-06-29 PROBLEM — R63.4 WEIGHT LOSS: Status: ACTIVE | Noted: 2023-06-29

## 2023-06-29 PROBLEM — R10.9 ABDOMINAL PAIN: Status: ACTIVE | Noted: 2023-06-29

## 2023-06-29 PROBLEM — D64.9 ANEMIA: Status: ACTIVE | Noted: 2023-06-29

## 2023-07-07 ENCOUNTER — PATIENT MESSAGE (OUTPATIENT)
Dept: PRIMARY CARE CLINIC | Age: 78
End: 2023-07-07

## 2023-07-08 DIAGNOSIS — G89.4 CHRONIC PAIN SYNDROME: Primary | ICD-10-CM

## 2023-07-08 RX ORDER — ACETAMINOPHEN AND CODEINE PHOSPHATE 300; 30 MG/1; MG/1
1 TABLET ORAL EVERY 8 HOURS PRN
Qty: 90 TABLET | Refills: 0 | Status: SHIPPED | OUTPATIENT
Start: 2023-07-08 | End: 2023-08-07

## 2023-07-12 NOTE — PROGRESS NOTES
[address]  time_0830___________        Surgery time____10:00________    Take the following medications with a sip of water: Follow your MD/Surgeons pre-procedure instructions regarding your medications     Do not eat or drink anything after 12:00 midnight prior to your surgery. This includes water chewing gum, mints and ice chips. You may brush your teeth and gargle the morning of your surgery, but do not swallow the water     Please see your family doctor/pediatrician for a history and physical and/or concerning medications. Bring any test results/reports from your physicians office. If you are under the care of a heart doctor or specialist doctor, please be aware that you may be asked to them for clearance    You may be asked to stop blood thinners such as Coumadin, Plavix, Fragmin, Lovenox, etc., or any anti-inflammatories such as:  Aspirin, Ibuprofen, Advil, Naproxen prior to your surgery. We also ask that you stop any OTC medications such as fish oil, vitamin E, glucosamine, garlic, Multivitamins, COQ 10, etc. MAY TAKE TYLENOL    We ask that you do not smoke 24 hours prior to surgery  We ask that you do not  drink any alcoholic beverages 24 hours prior to surgery     You must make arrangements for a responsible adult to take you home after your surgery. For your safety you will not be allowed to leave alone or drive yourself home. Your surgery will be cancelled if you do not have a ride home. Also for your safety, it is strongly suggested that someone stay with you the first 24 hours after your surgery. A parent or legal guardian must accompany a child scheduled for surgery and plan to stay at the hospital until the child is discharged. Please do not bring other children with you. For your comfort, please wear simple loose fitting clothing to the hospital.  Please do not bring valuables.     Do not wear any make-up or nail polish on

## 2023-07-12 NOTE — PROGRESS NOTES
[facility] Pre-Admission Testing Electronic Communication Worksheet for OR/ENDO Procedures        Patient: Ernestina Carrillo    DOS: 7/28/23    Arrival Time: 8:30AM    Surgery Time:10AM    Meds to Bed:  [] YES    [x]  NO    Transportation Confirmed: [x] YES    []  NO    History and Physical:  [] YES    []  NO  [x] N/A  If yes, please list doctor or Urgent Care and date of H&P: DOP DR. MIRANDA    Additional Clearance(Cardiac, Pulmonary, etc):  [] YES    [x]  NO    Pre-Admission Testing Visit:  [] YES    [x]  NO If no, do labs/testing need to be done DOS?   [] YES    [x]  NO    Medication Reconciliation Complete:  [x] YES    []  NO        Additional Notes:    H/O ANEMIA,DIARRHEA    USES C-PAP            Interview Complete: [x] YES    []  NO          Kalyn Workman, RN  3:43 PM

## 2023-07-17 ENCOUNTER — OFFICE VISIT (OUTPATIENT)
Dept: PRIMARY CARE CLINIC | Age: 78
End: 2023-07-17

## 2023-07-17 VITALS
RESPIRATION RATE: 18 BRPM | BODY MASS INDEX: 35.07 KG/M2 | DIASTOLIC BLOOD PRESSURE: 69 MMHG | HEART RATE: 75 BPM | WEIGHT: 167.8 LBS | SYSTOLIC BLOOD PRESSURE: 110 MMHG

## 2023-07-17 DIAGNOSIS — I10 ESSENTIAL HYPERTENSION: ICD-10-CM

## 2023-07-17 DIAGNOSIS — E11.9 TYPE 2 DIABETES MELLITUS WITHOUT COMPLICATION, WITHOUT LONG-TERM CURRENT USE OF INSULIN (HCC): Primary | ICD-10-CM

## 2023-07-17 DIAGNOSIS — G24.9 DYSKINESIA: ICD-10-CM

## 2023-07-17 DIAGNOSIS — F41.8 MIXED ANXIETY AND DEPRESSIVE DISORDER: ICD-10-CM

## 2023-07-17 DIAGNOSIS — G25.81 RESTLESS LEGS SYNDROME (RLS): ICD-10-CM

## 2023-07-17 DIAGNOSIS — D50.9 IRON DEFICIENCY ANEMIA, UNSPECIFIED IRON DEFICIENCY ANEMIA TYPE: ICD-10-CM

## 2023-07-17 DIAGNOSIS — E78.2 MIXED HYPERLIPIDEMIA: ICD-10-CM

## 2023-07-17 LAB — HBA1C MFR BLD: 5.9 %

## 2023-07-17 ASSESSMENT — ENCOUNTER SYMPTOMS
ABDOMINAL PAIN: 0
DIARRHEA: 0
SHORTNESS OF BREATH: 0
CONSTIPATION: 0
BLOOD IN STOOL: 0

## 2023-07-17 NOTE — PROGRESS NOTES
2023     Jermain [facility]x (:  1945) is a 70 y.o. female, here for evaluation of the following medical concerns:      Patient presented to the office for. She has diabetes controlled with metformin 1000 mg twice a day. She has hypertension stable on amlodipine and Avapro. She also have hyperlipidemia reported to be compliant with a statin, takes Lipitor 40 mg daily tolerating medication without side effect . She has sleep apnea and wears CPAP. She has anxiety depression stable on Lexapro 10 mg daily. She has iron deficiency anemia unclear etiology and is due to see gastroenterologist for endoscopy. She has restless leg syndrome and dyskinesia and goes to neurologist at [facility] neuroscience, Dr. Petey Lilly who prescribe Sinemet spread out throughout the day. Overall patient is doing fairly well. She denies headache nausea vomiting. Denies chest pain or shortness of breath. Denies bowel or urinary disturbance. Review of Systems   Constitutional:  Negative for activity change and appetite change. Eyes:  Negative for visual disturbance. Respiratory:  Negative for shortness of breath. Cardiovascular:  Negative for chest pain and leg swelling. Gastrointestinal:  Negative for abdominal pain, blood in stool, constipation and diarrhea. Genitourinary:  Negative for difficulty urinating, frequency, hematuria, menstrual problem and urgency. Neurological:  Negative for dizziness and syncope. Psychiatric/Behavioral:  Negative for behavioral problems. Prior to Visit Medications    Medication Sig Taking? Authorizing Provider   amLODIPine (NORVASC) 5 MG tablet Take 1 tablet by mouth daily  Richie Mcginnis MD   acetaminophen-codeine (TYLENOL/CODEINE #3) 300-30 MG per tablet Take 1 tablet by mouth every 8 hours as needed for Pain for up to 30 days.  Max Daily Amount: 3 tablets  Richie Mcginnis MD   pantoprazole (PROTONIX) 40 MG tablet Take 1 tablet by mouth daily  Richie Mcginnis MD

## 2023-07-19 NOTE — PROGRESS NOTES
[facility] ENDOSCOPY AND OUTPATIENT  PRE-PROCEDURE INSTRUCTIONS    Procedure date_07/21/2023________Arrival time__0830__________        Procedure time___0930_________       Screening questions for Pain procedures:  Do you have a current infection? __N_______  Are you currently taking an antibiotic?__N____  Are you taking a blood thinner?__N______    It is not necessary to stop eating or drinking prior to this procedure. We would like you to take your medications for blood pressure as usual.  You may be asked to stop blood thinners such as Coumadin, Plavix, Fragmin, Lovenox, etc., or any anti-inflammatories such as:  Aspirin, Ibuprofen, Advil, Naproxen prior to your procedure. We also ask that you stop any OTC medications that cause additional bleeding    You must make arrangements for a responsible adult to arrive with you and stay in our waiting area during your procedure. They will also need to take you home after your procedure. For your safety you will not be allowed to leave alone or drive yourself home. Also for your safety, it is strongly suggested that someone stay with you the first 24 hours after your procedure. For your comfort, please wear simple loose fitting clothing to the center. Please do not bring valuables. If you have a living will and a durable power of  for healthcare, please bring in a copy.     You will need to bring a photo ID and insurance card    Our goal is to provide you with excellent care so if you have any questions, please contact us at the [address] [facility] at [phone]

## 2023-07-21 ENCOUNTER — APPOINTMENT (OUTPATIENT)
Dept: INTERVENTIONAL RADIOLOGY/VASCULAR | Age: 78
End: 2023-07-21
Attending: ANESTHESIOLOGY
Payer: MEDICARE

## 2023-07-21 ENCOUNTER — HOSPITAL ENCOUNTER (OUTPATIENT)
Age: 78
Setting detail: OUTPATIENT SURGERY
Discharge: HOME | End: 2023-07-21
Attending: ANESTHESIOLOGY | Admitting: ANESTHESIOLOGY
Payer: MEDICARE

## 2023-07-21 VITALS
RESPIRATION RATE: 18 BRPM | TEMPERATURE: 96.9 F | HEART RATE: 63 BPM | SYSTOLIC BLOOD PRESSURE: 113 MMHG | BODY MASS INDEX: 35.05 KG/M2 | WEIGHT: 167 LBS | HEIGHT: 58 IN | OXYGEN SATURATION: 95 % | DIASTOLIC BLOOD PRESSURE: 59 MMHG

## 2023-07-21 PROCEDURE — 2500000003 HC RX 250 WO HCPCS: Performed by: ANESTHESIOLOGY

## 2023-07-21 PROCEDURE — 6360000002 HC RX W HCPCS: Performed by: ANESTHESIOLOGY

## 2023-07-21 PROCEDURE — 3610000054 HC PAIN LEVEL 3 BASE (NON-OR): Performed by: ANESTHESIOLOGY

## 2023-07-21 PROCEDURE — 2709999900 HC NON-CHARGEABLE SUPPLY: Performed by: ANESTHESIOLOGY

## 2023-07-21 RX ORDER — METHYLPREDNISOLONE ACETATE 80 MG/ML
INJECTION, SUSPENSION INTRA-ARTICULAR; INTRALESIONAL; INTRAMUSCULAR; SOFT TISSUE
Status: COMPLETED | OUTPATIENT
Start: 2023-07-21 | End: 2023-07-21

## 2023-07-21 RX ORDER — LIDOCAINE HYDROCHLORIDE 10 MG/ML
INJECTION, SOLUTION EPIDURAL; INFILTRATION; INTRACAUDAL; PERINEURAL
Status: COMPLETED | OUTPATIENT
Start: 2023-07-21 | End: 2023-07-21

## 2023-07-21 RX ORDER — BUPIVACAINE HYDROCHLORIDE 5 MG/ML
INJECTION, SOLUTION EPIDURAL; INTRACAUDAL
Status: COMPLETED | OUTPATIENT
Start: 2023-07-21 | End: 2023-07-21

## 2023-07-21 ASSESSMENT — PAIN SCALES - GENERAL
PAINLEVEL_OUTOF10: 0
PAINLEVEL_OUTOF10: 0

## 2023-07-21 ASSESSMENT — PAIN DESCRIPTION - DESCRIPTORS: DESCRIPTORS: ACHING

## 2023-07-21 NOTE — DISCHARGE INSTRUCTIONS
[facility]   [address] [facility], [address]  [place], [address]   [address] [facility] [place]  [address] [facility]  [phone]      Patient:  Nehemiah Cedeno  YOB: 1945  Medical Record #:  [id number]   Date:  7/21/2023   Physician:  Edmond Centeno MD    Procedure Performed: [unfilled]     Discharge Instructions    Notify Pain Management Services if any of the following occur:    Redness/Swelling at the injection site lasting longer than 2 days  Fever (with redness, swelling, or drainage at the injection site)  Drainage at the injection site  New weakness/ numbness  Severe headache   Loss of bowel/ bladder function    General Instructions: You may experience numbness for several hours following your treatment. You should be cautious using those areas which are numb. Once the numbness wears off, you may apply ice or heat to injection site, if needed. Do not return to work or drive today    Rest today and return to normal activities tomorrow. On average, the steroid takes about 1 week to work and can be up to 2 weeks    You should continue to depend on your primary physician for your medical management of conditions not related to your pain management treatment. Continue to take all your other medications, including blood thinners, as directed by your primary physician unless otherwise instructed. NO changes have been made to your medications. Any changes listed on the discharge are based on patient self reporting. Any EMR warnings regarding drug/drug interactions were dismissed based on current use by the patient, management by prescribing physician and pharmacist and not managed by this physician. Any problem which relates specifically to a treatment or procedure performed today should be directed to the [facility].        [address] of Interventional Pain Management  [phone]

## 2023-07-21 NOTE — H&P
0900,1200,1500,1700,1900, 2100, 2300) 720 tablet 1    irbesartan (AVAPRO) 150 MG tablet Take 1 tablet by mouth daily 90 tablet 1    Biotin 98971 MCG TABS Take 2 capsules by mouth daily      ammonium lactate (AMLACTIN) 12 % cream Apply topically as needed for Dry Skin Apply topically as needed. LORazepam (ATIVAN) 0.5 MG tablet Take 1 tablet by mouth 2 times daily as needed for Anxiety. Per pt she can take up to TID      Cholecalciferol (VITAMIN D3) 5000 units CAPS Take 1 capsule by mouth daily 90 capsule 3     Allergies:  Meloxicam, Quinine derivatives, Cymbalta [duloxetine hcl], Codeine, and Vicodin [hydrocodone-acetaminophen]  Social History     Socioeconomic History    Marital status:      Spouse name: Not on file    Number of children: Not on file    Years of education: Not on file    Highest education level: Not on file   Occupational History    Not on file   Tobacco Use    Smoking status: Former     Packs/day: 2.00     Years: 36.00     Pack years: 72.00     Types: Cigarettes     Quit date: 1999     Years since quittin.2    Smokeless tobacco: Never   Vaping Use    Vaping Use: Never used   Substance and Sexual Activity    Alcohol use: No     Alcohol/week: 0.0 standard drinks    Drug use: Never    Sexual activity: Not Currently   Other Topics Concern    Not on file   Social History Narrative    Not on file     Social Determinants of Health     Financial Resource Strain: Low Risk     Difficulty of Paying Living Expenses: Not hard at all   Food Insecurity: No Food Insecurity    Worried About Running Out of Food in the Last Year: Never true    [address] in the Last Year: Never true   Transportation Needs: No Transportation Needs    Lack of Transportation (Medical): No    Lack of Transportation (Non-Medical): No   Physical Activity: Inactive    Days of Exercise per Week: 0 days    Minutes of Exercise per Session: 0 min   Stress: No Stress Concern Present    Feeling of Stress :  Only a little

## 2023-07-28 ENCOUNTER — ANESTHESIA EVENT (OUTPATIENT)
Dept: ENDOSCOPY | Age: 78
End: 2023-07-28
Payer: MEDICARE
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
## 2023-07-29 NOTE — OP NOTE
Colonoscopy Procedure Note      Patient: Alexandra Valencia  : 1945  Acct#:     Procedure: Colonoscopy with polypectomy (cold snare)    Date:  2023    Surgeon:  Josef Treadwell MD    Referring Physician:  Kenroy Reeder MD    Previous Colonoscopy: YES  Date: unknown  Greater than 3 years: YES    Preoperative Diagnosis:  1. Anemia/Dark stools    Postoperative Diagnosis:  1. Colon Polyps 2. Internal hemorrhoids    Consent:  The patient or their legal guardian has signed a consent, and is aware of the potential risks, benefits, alternatives, and potential complications of this procedure. These include, but are not limited to hemorrhage, bleeding, post procedural pain, perforation, phlebitis, aspiration, hypotension, hypoxia, cardiovascular events such as arryhthmia, and possibly death. Additionally, the possibility of missed colonic polyps and interval colon cancer was discussed in the consent. Anesthesia:  The patient was administered IV propofol per anesthesiology team.  Please see their operative records for full details. Procedure: An informed consent was obtained from the patient after explanation of indications, benefits, possible risks and complications of the procedure. The patient was then taken to the endoscopy suite, placed in the left lateral decubitus position, and the above IV anesthesia was administered. A digital rectal examination was performed and revealed negative without mass, lesions or tenderness. The Olympus video colonoscope was placed in the patient's rectum under digital direction and advanced to the cecum. The cecum was identified by characteristic anatomy and ballottment. The preparation was excellent. The ileocecal valve was identified. The terminal ileum was normal.     The scope was then withdrawn back through the cecum, ascending, transverse, descending, sigmoid colon, and rectum.   Careful circumferential examination of the mucosa in

## 2023-07-31 ENCOUNTER — HOSPITAL ENCOUNTER (OUTPATIENT)
Age: 78
Setting detail: OUTPATIENT SURGERY
Discharge: HOME | End: 2023-07-31
Attending: INTERNAL MEDICINE | Admitting: INTERNAL MEDICINE
Payer: MEDICARE

## 2023-07-31 ENCOUNTER — ANESTHESIA (OUTPATIENT)
Dept: ENDOSCOPY | Age: 78
End: 2023-07-31
Payer: MEDICARE

## 2023-07-31 VITALS
WEIGHT: 159.5 LBS | SYSTOLIC BLOOD PRESSURE: 158 MMHG | RESPIRATION RATE: 16 BRPM | TEMPERATURE: 97.6 F | HEART RATE: 68 BPM | OXYGEN SATURATION: 98 % | BODY MASS INDEX: 33.48 KG/M2 | DIASTOLIC BLOOD PRESSURE: 64 MMHG | HEIGHT: 58 IN

## 2023-07-31 DIAGNOSIS — R19.5 DARK STOOLS: ICD-10-CM

## 2023-07-31 LAB
GLUCOSE BLD-MCNC: 110 MG/DL (ref 70–99)
GLUCOSE BLD-MCNC: 118 MG/DL (ref 70–99)
PERFORMED ON: ABNORMAL
PERFORMED ON: ABNORMAL

## 2023-07-31 PROCEDURE — 88305 TISSUE EXAM BY PATHOLOGIST: CPT

## 2023-07-31 PROCEDURE — 2709999900 HC NON-CHARGEABLE SUPPLY: Performed by: INTERNAL MEDICINE

## 2023-07-31 PROCEDURE — 7100000010 HC PHASE II RECOVERY - FIRST 15 MIN: Performed by: INTERNAL MEDICINE

## 2023-07-31 PROCEDURE — 7100000000 HC PACU RECOVERY - FIRST 15 MIN: Performed by: INTERNAL MEDICINE

## 2023-07-31 PROCEDURE — 2500000003 HC RX 250 WO HCPCS: Performed by: NURSE ANESTHETIST, CERTIFIED REGISTERED

## 2023-07-31 PROCEDURE — 6360000002 HC RX W HCPCS: Performed by: NURSE ANESTHETIST, CERTIFIED REGISTERED

## 2023-07-31 PROCEDURE — 7100000011 HC PHASE II RECOVERY - ADDTL 15 MIN: Performed by: INTERNAL MEDICINE

## 2023-07-31 PROCEDURE — 2580000003 HC RX 258: Performed by: ANESTHESIOLOGY

## 2023-07-31 PROCEDURE — 7100000001 HC PACU RECOVERY - ADDTL 15 MIN: Performed by: INTERNAL MEDICINE

## 2023-07-31 PROCEDURE — 3700000000 HC ANESTHESIA ATTENDED CARE: Performed by: INTERNAL MEDICINE

## 2023-07-31 PROCEDURE — 3700000001 HC ADD 15 MINUTES (ANESTHESIA): Performed by: INTERNAL MEDICINE

## 2023-07-31 PROCEDURE — 3609010600 HC COLONOSCOPY POLYPECTOMY SNARE/COLD BIOPSY: Performed by: INTERNAL MEDICINE

## 2023-07-31 RX ORDER — PROPOFOL 10 MG/ML
INJECTION, EMULSION INTRAVENOUS PRN
Status: DISCONTINUED | OUTPATIENT
Start: 2023-07-31 | End: 2023-07-31 | Stop reason: SDUPTHER

## 2023-07-31 RX ORDER — SODIUM CHLORIDE 0.9 % (FLUSH) 0.9 %
5-40 SYRINGE (ML) INJECTION PRN
Status: DISCONTINUED | OUTPATIENT
Start: 2023-07-31 | End: 2023-07-31 | Stop reason: HOSPADM

## 2023-07-31 RX ORDER — SODIUM CHLORIDE 0.9 % (FLUSH) 0.9 %
5-40 SYRINGE (ML) INJECTION EVERY 12 HOURS SCHEDULED
Status: DISCONTINUED | OUTPATIENT
Start: 2023-07-31 | End: 2023-07-31 | Stop reason: HOSPADM

## 2023-07-31 RX ORDER — LIDOCAINE HYDROCHLORIDE 20 MG/ML
INJECTION, SOLUTION EPIDURAL; INFILTRATION; INTRACAUDAL; PERINEURAL PRN
Status: DISCONTINUED | OUTPATIENT
Start: 2023-07-31 | End: 2023-07-31 | Stop reason: SDUPTHER

## 2023-07-31 RX ORDER — SODIUM CHLORIDE 9 MG/ML
INJECTION, SOLUTION INTRAVENOUS PRN
Status: DISCONTINUED | OUTPATIENT
Start: 2023-07-31 | End: 2023-07-31 | Stop reason: HOSPADM

## 2023-07-31 RX ORDER — ONDANSETRON 2 MG/ML
4 INJECTION INTRAMUSCULAR; INTRAVENOUS
Status: DISCONTINUED | OUTPATIENT
Start: 2023-07-31 | End: 2023-07-31 | Stop reason: HOSPADM

## 2023-07-31 RX ADMIN — LIDOCAINE HYDROCHLORIDE 60 MG: 20 INJECTION, SOLUTION EPIDURAL; INFILTRATION; INTRACAUDAL; PERINEURAL at 09:42

## 2023-07-31 RX ADMIN — SODIUM CHLORIDE: 9 INJECTION, SOLUTION INTRAVENOUS at 08:45

## 2023-07-31 RX ADMIN — PROPOFOL 80 MG: 10 INJECTION, EMULSION INTRAVENOUS at 09:42

## 2023-07-31 RX ADMIN — PROPOFOL 160 MCG/KG/MIN: 10 INJECTION, EMULSION INTRAVENOUS at 09:45

## 2023-07-31 ASSESSMENT — PAIN SCALES - GENERAL: PAINLEVEL_OUTOF10: 0

## 2023-07-31 ASSESSMENT — PAIN - FUNCTIONAL ASSESSMENT: PAIN_FUNCTIONAL_ASSESSMENT: 0-10

## 2023-07-31 NOTE — DISCHARGE INSTRUCTIONS
Do not drink alcohol for 24 hours. Physical Activity -  Ask your doctor when you will be able to return to work. Do not drive, operate heavy machinery, or do activities that require coordination or balance for 24 hours. Otherwise, return to your normal routine as soon as you are comfortable to do so, which is usually the next day after the procedure. Medications - When taking medications, it's important to: Take your medication as directed, not more, not less, not at a different time. Do not stop taking them without consulting your healthcare provider. Don't share them with anyone else. Know what effects and side effects to expect, and report them to your healthcare provider. If you are taking more than one drug, even if it is an over-the-counter medication, herb, or dietary supplement, be sure to check with a physician or pharmacist about drug interactions. Plan ahead for refills so you don't run out. Lifestyle Changes - The results of your colonoscopy will determine if any lifestyle changes are necessary. Follow-up:  The doctor will usually give you a preliminary report after the medication wears off and you are more alert. The results from a biopsy can take as long as 1-2 weeks to be completed. Schedule a follow-up appointment as directed by your doctor. You should schedule a follow-up colonoscopy as recommended by your doctor. Call Your Doctor If Any of the Following Occurs:  Bleeding from your rectum; notify your doctor if you pass a teaspoonful or more of blood   Black, tarry stools   Severe abdominal pain   Hard, swollen abdomen   Signs of infection, including fever or chills   Inability to pass gas or stool   Coughing, shortness of breath, chest pain, severe nausea or vomiting     In case of an emergency, call 911 immediately.

## 2023-07-31 NOTE — ANESTHESIA POSTPROCEDURE EVALUATION
Department of Anesthesiology  Postprocedure Note    Patient: Rainer Bobby  MRN: [id number]  YOB: 1945  Date of evaluation: 7/31/2023      Procedure Summary     Date: 07/31/23 Room / Location: [address]    Anesthesia Start: 8562 Anesthesia Stop: 1001    Procedure: COLONOSCOPY POLYPECTOMY SNARE Diagnosis:       Dark stools      (Dark stools [R19.5])    Surgeons: Eulalio Zuniga MD Responsible Provider: Aurelia Acosta MD    Anesthesia Type: MAC ASA Status: 3          Anesthesia Type: No value filed.     Rell Phase I: Rell Score: 10    Rell Phase II: Rell Score: 10      Anesthesia Post Evaluation    Patient location during evaluation: PACU  Patient participation: complete - patient participated  Level of consciousness: awake and alert  Airway patency: patent  Nausea & Vomiting: no nausea and no vomiting  Complications: no  Cardiovascular status: hemodynamically stable  Respiratory status: acceptable  Hydration status: stable  Pain management: adequate

## 2023-08-03 ENCOUNTER — CLINICAL DOCUMENTATION (OUTPATIENT)
Dept: SPIRITUAL SERVICES | Age: 78
End: 2023-08-03

## 2023-08-04 NOTE — ACP (ADVANCE CARE PLANNING)
Advance Care Planning   Ambulatory ACP Specialist Patient Outreach    Date:  8/3/2023  ACP Specialist:  HODAN Venegas    Outreach call to patient in follow-up to ACP Specialist referral from: Patient through [facility]    [] PCP  [] Provider   [] Ambulatory Care Management [] Other for Reason: MyChart    [x] Advance Directive Assistance  [] Code Status Discussion  [] Complete Portable DNR Order  [] Discuss Goals of Care  [] Complete POST/MOST  [] Early ACP Decision-Making  [] Other    Date Referral Received: Internal Referral created on 8/2/23 from [address] [place] [address]:  [] First   [x] Second  [] Third                               Third outreach made by []  phone  [] email []   [employer]     Intervention:  [x] Spoke with Patient  [] Left VM requesting return call      Outcome: Patient submitted [employer] message and ACP Team reviewed AD Documents. AD Documents have not been scanned properly or submitted through [employer] completely. ACP Specialist offered to schedule an ACP Conversation to review and update AD documents. Patient also has DNR order in record from 2019. ACP Specialist discussed current [facility] order with patient and patient is unsure if she would like to have the [facility] order continue. Plan is to discuss the DNR order on call next week after patient has more time to consider options with DNR status. Staff will email copies of ACP information and documents to patient to review prior to [facility]. ACP Ruthie Rubio is scheduled for 8/9/23 at 11:00 with ACP Specialist.    Next Step:   [x] ACP scheduled conversation  [x] Outreach again in one week               [x] Email / Mail ACP Info Sheets  [x] Email / Mail Advance Directive            [] Close Referral. Routing closure to referring provider/staff and to ACP Specialist . [] Closure Letter mailed to Patient with Invitation to Contact ACP Specialist if/when ready.     Thank you for this referral.

## 2023-08-09 ENCOUNTER — CLINICAL DOCUMENTATION (OUTPATIENT)
Dept: SPIRITUAL SERVICES | Age: 78
End: 2023-08-09

## 2023-08-09 NOTE — PROGRESS NOTES
Advance Care Planning   Ambulatory ACP Specialist Patient Outreach    Date:  8/9/2023    ACP Specialist:  HODAN Storey    Outreach call to patient in follow-up to ACP Specialist referral from:JLUIS Aquino MD    [] PCP  [] Provider   [] Ambulatory Care Management [] Other     For:                  [x] Advance Directive Assistance              [] Complete Portable DNR order              [] Complete POST/POLST/MOST              [] Code Status Discussion             [] Discuss Goals of Care             [] Early ACP Decision-Making              [] Other (Specify)    Date Referral Received: Internal Referral created on 08/02/2023 via [address]     Next Step:   [] ACP scheduled conversation  [x] Outreach again in one week               [] Email / Mail ACP Info Sheets  [] Email / Mail Advance Directive   [] Closing referral.  Routing closure to referring provider/staff and to ACP Specialist . [] Closure letter mailed to patient with invitation to contact ACP Specialist if / when ready. [] Other (Specify here):         [] At this time, Healthcare Decision Maker Is:        [] Primary agent named in scanned advance directive. [] Legal Next of Kin. [] Unable to determine legal decision maker at this time. Outreaches:       [x]  Additional Outreach -  Date:   08/09/2023   (Specify Dates & special circumstances): Outcomes:  ACP specialist made pt's scheduled acp conversation chilo. SW called pt's home and cell phone, both of which were unsuccessful & resulted in a leaving a  x2 encouraging this pt to return this call if she would like to reschedule this appt. If no return call is rcvd, a f/u call will be made in 1 week.          Thank you for this referral.

## 2023-08-12 ENCOUNTER — TELEPHONE (OUTPATIENT)
Dept: PRIMARY CARE CLINIC | Age: 78
End: 2023-08-12

## 2023-08-12 NOTE — TELEPHONE ENCOUNTER
Patient called for refill of ativan to treat the dyskinesia. Medication prescribed by neurology and not DR. Moses. Patient instructed to contact neurologist office to speak with their on-call MD for refill. Patient stated she would do so.

## 2023-08-20 ENCOUNTER — PATIENT MESSAGE (OUTPATIENT)
Dept: PRIMARY CARE CLINIC | Age: 78
End: 2023-08-20

## 2023-08-21 DIAGNOSIS — G89.4 CHRONIC PAIN SYNDROME: ICD-10-CM

## 2023-08-21 NOTE — TELEPHONE ENCOUNTER
From: Ti Doing Sweet  To: Dr. Julio Hollow: 8/20/2023 7:02 PM EDT  Subject: Tylenol 3 refill    Tavon Dodd or Doctor,    I need a refill on my Tylenol 3. Sorry for the late notice. I have 1 left for tonight. Josue Reynaga has been sick and is now in [facility] in Critical Condition. It's her Potassium and Magnesium again. Thanks.   Doctor and [facility]

## 2023-08-22 RX ORDER — ACETAMINOPHEN AND CODEINE PHOSPHATE 300; 30 MG/1; MG/1
1 TABLET ORAL EVERY 8 HOURS PRN
Qty: 90 TABLET | Refills: 0 | Status: SHIPPED | OUTPATIENT
Start: 2023-08-22 | End: 2023-09-21

## 2023-08-30 NOTE — TELEPHONE ENCOUNTER
Medication:   Requested Prescriptions     Pending Prescriptions Disp Refills    irbesartan (AVAPRO) 150 MG tablet [Pharmacy Med Name: Irbesartan 150 MG Oral Tablet] 90 tablet 3     Sig: TAKE 1 TABLET BY MOUTH DAILY        Last Filled:      Patient Phone Number: [phone] (home)     Last appt: 7/17/2023   Next appt: 10/17/2023    Last OARRS:   RX Monitoring 7/8/2022   Periodic Controlled Substance Monitoring No signs of potential drug abuse or diversion identified.

## 2023-08-31 RX ORDER — IRBESARTAN 150 MG/1
150 TABLET ORAL DAILY
Qty: 90 TABLET | Refills: 3 | Status: SHIPPED | OUTPATIENT
Start: 2023-08-31

## 2023-09-28 ENCOUNTER — PATIENT MESSAGE (OUTPATIENT)
Dept: PRIMARY CARE CLINIC | Age: 78
End: 2023-09-28

## 2023-09-28 DIAGNOSIS — F41.8 MIXED ANXIETY AND DEPRESSIVE DISORDER: ICD-10-CM

## 2023-09-28 RX ORDER — ESCITALOPRAM OXALATE 10 MG/1
10 TABLET ORAL NIGHTLY
Qty: 90 TABLET | Refills: 1 | Status: SHIPPED | OUTPATIENT
Start: 2023-09-28

## 2023-09-28 NOTE — TELEPHONE ENCOUNTER
From: Hansel Lilly  To: Dr. Gan Eliud: 9/28/2023 11:09 AM EDT  Subject: ESCITALOPRAM 10MG TABLETS    Doctor or Ann Mixon,  I need these refilled. I have only 2 left.   Thanks,  Estee Johnson

## 2023-10-02 ENCOUNTER — PATIENT MESSAGE (OUTPATIENT)
Dept: PRIMARY CARE CLINIC | Age: 78
End: 2023-10-02

## 2023-10-02 DIAGNOSIS — G89.4 CHRONIC PAIN SYNDROME: ICD-10-CM

## 2023-10-02 NOTE — TELEPHONE ENCOUNTER
From: Tony Lilly  To: Dr. Mar Chavez: 10/2/2023 12:57 PM EDT  Subject: Tylenol 3    Hi, Doc or Yousif,  I need my Tylenol 3 Refilled. I have 4 left.     Thanks,   Neymar Smith

## 2023-10-03 RX ORDER — ACETAMINOPHEN AND CODEINE PHOSPHATE 300; 30 MG/1; MG/1
1 TABLET ORAL EVERY 8 HOURS PRN
Qty: 90 TABLET | Refills: 0 | Status: SHIPPED | OUTPATIENT
Start: 2023-10-03 | End: 2023-11-02

## 2023-10-05 RX ORDER — AMLODIPINE BESYLATE 5 MG/1
5 TABLET ORAL DAILY
Qty: 90 TABLET | Refills: 1 | Status: SHIPPED | OUTPATIENT
Start: 2023-10-05

## 2023-10-17 ENCOUNTER — OFFICE VISIT (OUTPATIENT)
Dept: PRIMARY CARE CLINIC | Age: 78
End: 2023-10-17

## 2023-10-17 VITALS
DIASTOLIC BLOOD PRESSURE: 70 MMHG | BODY MASS INDEX: 35.7 KG/M2 | RESPIRATION RATE: 18 BRPM | SYSTOLIC BLOOD PRESSURE: 130 MMHG | WEIGHT: 170.8 LBS | HEART RATE: 64 BPM

## 2023-10-17 DIAGNOSIS — I10 ESSENTIAL HYPERTENSION: ICD-10-CM

## 2023-10-17 DIAGNOSIS — E78.2 MIXED HYPERLIPIDEMIA: ICD-10-CM

## 2023-10-17 DIAGNOSIS — G25.81 RESTLESS LEGS SYNDROME (RLS): ICD-10-CM

## 2023-10-17 DIAGNOSIS — E11.9 TYPE 2 DIABETES MELLITUS WITHOUT COMPLICATION, WITHOUT LONG-TERM CURRENT USE OF INSULIN (HCC): Primary | ICD-10-CM

## 2023-10-17 DIAGNOSIS — G24.9 DYSKINESIA: ICD-10-CM

## 2023-10-17 DIAGNOSIS — F41.8 MIXED ANXIETY AND DEPRESSIVE DISORDER: ICD-10-CM

## 2023-10-17 DIAGNOSIS — E55.9 VITAMIN D DEFICIENCY: ICD-10-CM

## 2023-10-17 LAB — HBA1C MFR BLD: 6.5 %

## 2023-10-17 ASSESSMENT — ENCOUNTER SYMPTOMS
CONSTIPATION: 0
SHORTNESS OF BREATH: 0
ABDOMINAL PAIN: 0
DIARRHEA: 0
BLOOD IN STOOL: 0

## 2023-10-17 NOTE — PROGRESS NOTES
10/17/2023     Fawad Lilly (:  1945) is a 70 y.o. female, here for evaluation of the following medical concerns:    Diabetes  Pertinent negatives for hypoglycemia include no dizziness. Pertinent negatives for diabetes include no chest pain. Patient presented to the office for regular follow-up. She has diabetes controlled with metformin 1000 mg twice a day denies hypoglycemic episode. She has hypertension controlled with amlodipine and Avapro. She has hyperlipidemia and reported to be compliant with a statin, takes Lipitor and tolerating medication without side effect. She has anxiety and depression stable on Lexapro 10 mg daily. She has restless leg syndrome and dyskinesia controlled with Sinemet, goes to neurologist at [facility] neuroscience Dr. Minesh Sargent. She has a recent endoscopy for iron deficiency anemia and was told there was negative source of GI bleeding and it could be  \"bleed from blood vessel \" ( AV malformation?)  Her most recent CBC was normal and she has episode of passing blood or dark  stool. Review of Systems   Constitutional:  Negative for activity change and appetite change. Eyes:  Negative for visual disturbance. Respiratory:  Negative for shortness of breath. Cardiovascular:  Negative for chest pain and leg swelling. Gastrointestinal:  Negative for abdominal pain, blood in stool, constipation and diarrhea. Genitourinary:  Negative for difficulty urinating, frequency, hematuria, menstrual problem and urgency. Neurological:  Negative for dizziness and syncope. Psychiatric/Behavioral:  Negative for behavioral problems. Prior to Visit Medications    Medication Sig Taking?  Authorizing Provider   cycloSPORINE (RESTASIS OP) Apply to eye Yes Tash Basurto MD   MELATONIN ER PO Take by mouth Yes Tash Basurto MD   amLODIPine (NORVASC) 5 MG tablet Take 1 tablet by mouth daily Yes Diana Grier MD   acetaminophen-codeine

## 2023-10-17 NOTE — PATIENT INSTRUCTIONS
GENERAL OFFICE POLICIES        Telephone Calls: Messages will be answered within 1-2 business days, unless the provider is out of the office. If it is urgent a covering provider will answer. (this does not include Medication refills). MyChart: We recommend all patients sign up for [facility]. Through this portal you can see your lab results, request refills, schedule appointments, pay your bill and send messages to the office. [employer] messages will be answered within 1-2 business days unless the provider is out of the office. For urgent matters, please call the office. Appointments:  All appointments must be scheduled. We ask all patients to schedule their next follow up appointment before they leave the office to make sure you will be able to be seen before you run out of medications. 24 hours' notice is required to cancel or reschedule an appointment to avoid being marked as a no show. You may be dismissed from the practice after 3 no shows. LATE for Appointment: If you are 15 or more minutes late for your appointment, you may be asked to reschedule. MA/LAB APPTS: Must be scheduled, cannot accept walk in lab visits. We only draw labs for patients established in our office. We only do injections for medications ordered by our office. Acute Sick Visits:  Nothing other than acute complaint will be addressed at this visit. TRADITIONAL MEDICARE DOES NOT COVER PHYSICALS  MEDICARE WELLNESS VISITS: These are NOT physicals, but the free annual visit offered by Medicare to discuss wellness issues. Medication refills, checkups, etc. will not be addressed during this visit. Medication Refills: Refills are handled electronically; please contact your pharmacy for refills even if current refills have been exhausted. If you are on a controlled medication, you will be referred to a specialist (pain specialist, psychiatry, etc). Forms:  There is a $35 fee to fill out FMLA/Disability paperwork,

## 2023-10-18 NOTE — PROGRESS NOTES
[facility]  PRE-PROCEDURE INSTRUCTIONS    Procedure date_10/20/2023________Arrival time__0930__________        Procedure time__1030__________       Screening questions for Pain procedures:  Do you have a current infection? ___N______  Are you currently taking an antibiotic?_N_____  Are you taking a blood thinner?_N_______    It is not necessary to stop eating or drinking prior to this procedure. We would like you to take your medications for blood pressure as usual.  You may be asked to stop blood thinners such as Coumadin, Plavix, Fragmin, Lovenox, etc., or any anti-inflammatories such as:  Aspirin, Ibuprofen, Advil, Naproxen prior to your procedure. We also ask that you stop any OTC medications that cause additional bleeding    You must make arrangements for a responsible adult to arrive with you and stay in our waiting area during your procedure. They will also need to take you home after your procedure. For your safety you will not be allowed to leave alone or drive yourself home. Also for your safety, it is strongly suggested that someone stay with you the first 24 hours after your procedure. For your comfort, please wear simple loose fitting clothing to the center. Please do not bring valuables. If you have a living will and a durable power of  for healthcare, please bring in a copy.     You will need to bring a photo ID and insurance card    Our goal is to provide you with excellent care so if you have any questions, please contact us at the [address] [facility] at [phone]

## 2023-10-20 ENCOUNTER — APPOINTMENT (OUTPATIENT)
Dept: INTERVENTIONAL RADIOLOGY/VASCULAR | Age: 78
End: 2023-10-20
Attending: ANESTHESIOLOGY
Payer: MEDICARE

## 2023-10-20 ENCOUNTER — HOSPITAL ENCOUNTER (OUTPATIENT)
Age: 78
Setting detail: OUTPATIENT SURGERY
Discharge: HOME | End: 2023-10-20
Attending: ANESTHESIOLOGY | Admitting: ANESTHESIOLOGY
Payer: MEDICARE

## 2023-10-20 VITALS
HEIGHT: 58 IN | WEIGHT: 169.97 LBS | TEMPERATURE: 97.5 F | OXYGEN SATURATION: 96 % | SYSTOLIC BLOOD PRESSURE: 118 MMHG | DIASTOLIC BLOOD PRESSURE: 86 MMHG | RESPIRATION RATE: 16 BRPM | HEART RATE: 62 BPM | BODY MASS INDEX: 35.68 KG/M2

## 2023-10-20 PROCEDURE — 6360000002 HC RX W HCPCS: Performed by: ANESTHESIOLOGY

## 2023-10-20 PROCEDURE — 2709999900 HC NON-CHARGEABLE SUPPLY: Performed by: ANESTHESIOLOGY

## 2023-10-20 PROCEDURE — 3610000054 HC PAIN LEVEL 3 BASE (NON-OR): Performed by: ANESTHESIOLOGY

## 2023-10-20 PROCEDURE — 6360000004 HC RX CONTRAST MEDICATION: Performed by: ANESTHESIOLOGY

## 2023-10-20 RX ORDER — METHYLPREDNISOLONE ACETATE 80 MG/ML
INJECTION, SUSPENSION INTRA-ARTICULAR; INTRALESIONAL; INTRAMUSCULAR; SOFT TISSUE
Status: COMPLETED | OUTPATIENT
Start: 2023-10-20 | End: 2023-10-20

## 2023-10-20 ASSESSMENT — PAIN DESCRIPTION - DESCRIPTORS: DESCRIPTORS: SHARP

## 2023-10-20 NOTE — DISCHARGE INSTRUCTIONS
[facility]   [address] [facility] Verna Martinez, [address] [facility], [address]   [id number] [facility]  [address] [place] [address]  [phone]      Patient:  Jailyn Sen  YOB: 1945  Medical Record #:  [id number]   Date:  10/20/2023   Physician:  Renea Drake MD    Procedure Performed: [unfilled]     Discharge Instructions    Notify Pain Management Services if any of the following occur:    Redness/Swelling at the injection site lasting longer than 2 days  Fever (with redness, swelling, or drainage at the injection site)  Drainage at the injection site  New weakness/ numbness  Severe headache   Loss of bowel/ bladder function    General Instructions: You may experience numbness for several hours following your treatment. You should be cautious using those areas which are numb. Once the numbness wears off, you may apply ice or heat to injection site, if needed. Do not return to work or drive today    Rest today and return to normal activities tomorrow. On average, the steroid takes about 1 week to work and can be up to 2 weeks    You should continue to depend on your primary physician for your medical management of conditions not related to your pain management treatment. Continue to take all your other medications, including blood thinners, as directed by your primary physician unless otherwise instructed. NO changes have been made to your medications. Any changes listed on the discharge are based on patient self reporting. Any EMR warnings regarding drug/drug interactions were dismissed based on current use by the patient, management by prescribing physician and pharmacist and not managed by this physician. Any problem which relates specifically to a treatment or procedure performed today should be directed to the [facility].        [facility]  Division of Interventional Pain

## 2023-10-20 NOTE — OP NOTE
Patient:  Ivy Rivas  YOB: 1945  Medical Record #:  [id number]   Place: [address] [place] [address]  Date:  10/20/2023   Physician:  Jaswant Palma MD      PRE-PROCEDURE DIAGNOSIS: M54.16    POST-PROCEDURE DIAGNOSIS: M54.16    PROCEDURE:  Midline interlaminar right  L4-5 epidural steroid injection with fluoroscopy and epidurography. BRIEF HISTORY:  The patient presents today to [facility] for a scheduled lumbar epidural steroid injection procedure. The patient was re-evaluated today and is clinically unchanged as compared to my previous evaluation. The patient is clinically stable to proceed with the procedure. PROCEDURE NOTE:  The procedure was again explained to the patient and the previously distributed pre-procedure literature was reviewed. The options, rationale, and benefits of the procedure including pain relief, functional improvement, and increased mobility, as well as the risks of the procedure including but not limited to infection, bleeding, paresthesia, pain, failure to relieve pain, increased pain, headache, allergic reaction, neurologic impairment, local anesthetic, toxicity, and side effects and the potential side effects of corticosteroids were discussed with the patient and informed written consent was obtained from the patient. The patient was positioned in the prone position on the fluoroscopy table. The skin overlying the lumbosacral vertebrae was prepped using Chloraprep and draped in the usual sterile fashion. The L4-5 lumbar intervertebral level was identified using intermittent AP fluoroscopy. The previously identified projection of overlying skin was anesthetized using 2 cc of buffered 1% lidocaine with a 27 gauge needle. A 4.25\" 22g Touhy needle was advanced through a small skin nick in the AP view towards the interlaminar and epidural space. The epidural space was easily identified using loss of resistance to saline.   No difficulty,

## 2023-10-21 DIAGNOSIS — E78.2 MIXED HYPERLIPIDEMIA: ICD-10-CM

## 2023-10-23 RX ORDER — ATORVASTATIN CALCIUM 40 MG/1
40 TABLET, FILM COATED ORAL NIGHTLY
Qty: 90 TABLET | Refills: 1 | Status: SHIPPED | OUTPATIENT
Start: 2023-10-23

## 2023-10-23 RX ORDER — CARBIDOPA/LEVODOPA 25MG-250MG
2 TABLET ORAL 4 TIMES DAILY
Qty: 720 TABLET | Refills: 1 | Status: SHIPPED | OUTPATIENT
Start: 2023-10-23

## 2023-10-28 DIAGNOSIS — E11.9 TYPE 2 DIABETES MELLITUS WITHOUT COMPLICATION, WITHOUT LONG-TERM CURRENT USE OF INSULIN (HCC): ICD-10-CM

## 2023-11-01 NOTE — PROGRESS NOTES
[facility]  PRE-PROCEDURE INSTRUCTIONS    Procedure date__11/3/2023_______Arrival time___0930_________        Procedure time____1030________       Screening questions for Pain procedures:  Do you have a current infection? _N________  Are you currently taking an antibiotic?__N____  Are you taking a blood thinner?_N_______    It is not necessary to stop eating or drinking prior to this procedure. We would like you to take your medications for blood pressure as usual.  You may be asked to stop blood thinners such as Coumadin, Plavix, Fragmin, Lovenox, etc., or any anti-inflammatories such as:  Aspirin, Ibuprofen, Advil, Naproxen prior to your procedure. We also ask that you stop any OTC medications that cause additional bleeding    You must make arrangements for a responsible adult to arrive with you and stay in our waiting area during your procedure. They will also need to take you home after your procedure. For your safety you will not be allowed to leave alone or drive yourself home. Also for your safety, it is strongly suggested that someone stay with you the first 24 hours after your procedure. For your comfort, please wear simple loose fitting clothing to the center. Please do not bring valuables. If you have a living will and a durable power of  for healthcare, please bring in a copy.     You will need to bring a photo ID and insurance card    Our goal is to provide you with excellent care so if you have any questions, please contact us at the [address] [facility] at [phone]

## 2023-11-03 ENCOUNTER — HOSPITAL ENCOUNTER (OUTPATIENT)
Age: 78
Setting detail: OUTPATIENT SURGERY
Discharge: HOME | End: 2023-11-03
Attending: ANESTHESIOLOGY | Admitting: ANESTHESIOLOGY
Payer: MEDICARE

## 2023-11-03 ENCOUNTER — APPOINTMENT (OUTPATIENT)
Dept: INTERVENTIONAL RADIOLOGY/VASCULAR | Age: 78
End: 2023-11-03
Attending: ANESTHESIOLOGY
Payer: MEDICARE

## 2023-11-03 VITALS
TEMPERATURE: 97 F | DIASTOLIC BLOOD PRESSURE: 55 MMHG | HEIGHT: 58 IN | BODY MASS INDEX: 35.48 KG/M2 | RESPIRATION RATE: 16 BRPM | HEART RATE: 66 BPM | WEIGHT: 169 LBS | OXYGEN SATURATION: 95 % | SYSTOLIC BLOOD PRESSURE: 105 MMHG

## 2023-11-03 PROCEDURE — 3610000054 HC PAIN LEVEL 3 BASE (NON-OR): Performed by: ANESTHESIOLOGY

## 2023-11-03 PROCEDURE — 6360000002 HC RX W HCPCS: Performed by: ANESTHESIOLOGY

## 2023-11-03 PROCEDURE — 2500000003 HC RX 250 WO HCPCS: Performed by: ANESTHESIOLOGY

## 2023-11-03 PROCEDURE — 2709999900 HC NON-CHARGEABLE SUPPLY: Performed by: ANESTHESIOLOGY

## 2023-11-03 RX ORDER — METHYLPREDNISOLONE ACETATE 80 MG/ML
INJECTION, SUSPENSION INTRA-ARTICULAR; INTRALESIONAL; INTRAMUSCULAR; SOFT TISSUE
Status: COMPLETED | OUTPATIENT
Start: 2023-11-03 | End: 2023-11-03

## 2023-11-03 RX ORDER — LIDOCAINE HYDROCHLORIDE 10 MG/ML
INJECTION, SOLUTION EPIDURAL; INFILTRATION; INTRACAUDAL; PERINEURAL
Status: COMPLETED | OUTPATIENT
Start: 2023-11-03 | End: 2023-11-03

## 2023-11-03 RX ORDER — BUPIVACAINE HYDROCHLORIDE 5 MG/ML
INJECTION, SOLUTION EPIDURAL; INTRACAUDAL
Status: COMPLETED | OUTPATIENT
Start: 2023-11-03 | End: 2023-11-03

## 2023-11-03 ASSESSMENT — PAIN - FUNCTIONAL ASSESSMENT: PAIN_FUNCTIONAL_ASSESSMENT: 0-10

## 2023-11-03 ASSESSMENT — PAIN DESCRIPTION - DESCRIPTORS: DESCRIPTORS: SHARP

## 2023-11-03 ASSESSMENT — PAIN SCALES - GENERAL
PAINLEVEL_OUTOF10: 0
PAINLEVEL_OUTOF10: 0

## 2023-11-03 ASSESSMENT — PAIN DESCRIPTION - LOCATION: LOCATION: BACK

## 2023-11-03 NOTE — OP NOTE
Patient:  Shital Mays  YOB: 1945  Medical Record #:  [id number]   Date:  11/3/2023   Physician:  Magdy Liu MD      PRE-PROCEDURE DIAGNOSIS:  Right sciatic neuropathy (G57.01)    POST-PROCEDURE DIAGNOSIS:  Right sciatic neuropathy (G57.01)    PROCEDURE: RIGHT marielos-sciatic nerve block, with fluoroscopy. BRIEF HISTORY:  The patient presents today to [facility] for a scheduled sciatic nerve block procedure. The patient was re-evaluated today and is clinically unchanged as compared to my previous evaluation. The patient is clinically stable to proceed with the procedure. PROCEDURE NOTE:  The procedure was again explained to the patient and the previously distributed pre-procedure literature was reviewed. The options, rationale, and benefits of the procedure including pain relief, functional improvement, and increased mobility, as well as the risks of the procedure including but not limited to infection, bleeding, paresthesia, pain, failure to relieve pain, increased pain, headache, allergic reaction, neurologic impairment, local anesthetic, toxicity, and side effects and the potential side effects of corticosteroids were discussed with the patient and informed written consent was obtained from the patient. The patient was brought to the fluoroscopy suite and placed in the prone position. The RIGHT sacral and gluteal area was prepped in the usual sterile fashion with Chloraprep and then draped. Intermittent AP fluoroscopy was utilized to localize the radiographic landmarks. A standard perisciatic nerve block approach was used. The sciatic notch, at its superolateral border was localized at its junction with the ilium rostral to the acetabulum and lateral to the SI joint and lateral to the sciatic nerve. It was localized at the radiographic marker of the overlying sciatic nerve between the lateral aspect of the sacrum and the greater trochanter.    The superficial skin

## 2023-11-03 NOTE — H&P
(AVAPRO) 150 MG tablet TAKE 1 TABLET BY MOUTH DAILY 90 tablet 3    pantoprazole (PROTONIX) 40 MG tablet Take 1 tablet by mouth daily 90 tablet 1    gabapentin (NEURONTIN) 600 MG tablet Take 1 tablet by mouth 2 times daily for 180 days. 180 tablet 1    Biotin 78500 MCG TABS Take 2 capsules by mouth daily      ammonium lactate (AMLACTIN) 12 % cream Apply topically as needed for Dry Skin Apply topically as needed. LORazepam (ATIVAN) 0.5 MG tablet Take 1 tablet by mouth 2 times daily as needed for Anxiety.  Per pt she can take up to TID      Cholecalciferol (VITAMIN D3) 5000 units CAPS Take 1 capsule by mouth daily 90 capsule 3     Allergies:  Meloxicam, Quinine derivatives, Cymbalta [duloxetine hcl], Codeine, and Vicodin [hydrocodone-acetaminophen]  Social History     Socioeconomic History    Marital status:      Spouse name: Not on file    Number of children: Not on file    Years of education: Not on file    Highest education level: Not on file   Occupational History    Not on file   Tobacco Use    Smoking status: Former     Packs/day: 2.00     Years: 36.00     Additional pack years: 0.00     Total pack years: 72.00     Types: Cigarettes     Quit date: 1999     Years since quittin.5    Smokeless tobacco: Never   Vaping Use    Vaping Use: Never used   Substance and Sexual Activity    Alcohol use: No     Alcohol/week: 0.0 standard drinks of alcohol    Drug use: Never    Sexual activity: Not Currently   Other Topics Concern    Not on file   Social History Narrative    Not on file     Social Determinants of Health     Financial Resource Strain: Low Risk  (2023)    Overall Financial Resource Strain (CARDIA)     Difficulty of Paying Living Expenses: Not hard at all   Food Insecurity: No Food Insecurity (2023)    Hunger Vital Sign     Worried About Running Out of Food in the Last Year: Never true     Ran Out of Food in the Last Year: Never true   Transportation Needs: No Transportation Needs

## 2023-11-03 NOTE — DISCHARGE INSTRUCTIONS
[facility]   [address] [facility] [address], [address], [address]   [address] [facility]  [address] [facility]  [phone]      Patient:  Bhavna Anguiano  YOB: 1945  Medical Record #:  [id number]   Date:  11/3/2023   Physician:  Edith Ferrari MD    Procedure Performed: [unfilled]     Discharge Instructions    Notify Pain Management Services if any of the following occur:    Redness/Swelling at the injection site lasting longer than 2 days  Fever (with redness, swelling, or drainage at the injection site)  Drainage at the injection site  New weakness/ numbness  Severe headache   Loss of bowel/ bladder function    General Instructions: You may experience numbness for several hours following your treatment. You should be cautious using those areas which are numb. Once the numbness wears off, you may apply ice or heat to injection site, if needed. Do not return to work or drive today    Rest today and return to normal activities tomorrow. On average, the steroid takes about 1 week to work and can be up to 2 weeks    You should continue to depend on your primary physician for your medical management of conditions not related to your pain management treatment. Continue to take all your other medications, including blood thinners, as directed by your primary physician unless otherwise instructed. NO changes have been made to your medications. Any changes listed on the discharge are based on patient self reporting. Any EMR warnings regarding drug/drug interactions were dismissed based on current use by the patient, management by prescribing physician and pharmacist and not managed by this physician. Any problem which relates specifically to a treatment or procedure performed today should be directed to the [facility].        [id number] Inter[facility]  [phone]

## 2023-11-07 ENCOUNTER — OFFICE VISIT (OUTPATIENT)
Dept: PULMONOLOGY | Age: 78
End: 2023-11-07
Payer: MEDICARE

## 2023-11-07 VITALS
DIASTOLIC BLOOD PRESSURE: 70 MMHG | RESPIRATION RATE: 18 BRPM | HEIGHT: 58 IN | OXYGEN SATURATION: 97 % | SYSTOLIC BLOOD PRESSURE: 110 MMHG | TEMPERATURE: 97.7 F | WEIGHT: 171 LBS | HEART RATE: 72 BPM | BODY MASS INDEX: 35.89 KG/M2

## 2023-11-07 DIAGNOSIS — G47.33 OSA ON CPAP: Primary | ICD-10-CM

## 2023-11-07 DIAGNOSIS — G24.9 DYSKINESIA: ICD-10-CM

## 2023-11-07 DIAGNOSIS — J45.20 MILD INTERMITTENT ASTHMA WITHOUT COMPLICATION: ICD-10-CM

## 2023-11-07 PROCEDURE — G8417 CALC BMI ABV UP PARAM F/U: HCPCS | Performed by: INTERNAL MEDICINE

## 2023-11-07 PROCEDURE — 3074F SYST BP LT 130 MM HG: CPT | Performed by: INTERNAL MEDICINE

## 2023-11-07 PROCEDURE — 3078F DIAST BP <80 MM HG: CPT | Performed by: INTERNAL MEDICINE

## 2023-11-07 PROCEDURE — 1036F TOBACCO NON-USER: CPT | Performed by: INTERNAL MEDICINE

## 2023-11-07 PROCEDURE — G8399 PT W/DXA RESULTS DOCUMENT: HCPCS | Performed by: INTERNAL MEDICINE

## 2023-11-07 PROCEDURE — 1090F PRES/ABSN URINE INCON ASSESS: CPT | Performed by: INTERNAL MEDICINE

## 2023-11-07 PROCEDURE — 99214 OFFICE O/P EST MOD 30 MIN: CPT | Performed by: INTERNAL MEDICINE

## 2023-11-07 PROCEDURE — G8427 DOCREV CUR MEDS BY ELIG CLIN: HCPCS | Performed by: INTERNAL MEDICINE

## 2023-11-07 PROCEDURE — 1123F ACP DISCUSS/DSCN MKR DOCD: CPT | Performed by: INTERNAL MEDICINE

## 2023-11-07 PROCEDURE — G8484 FLU IMMUNIZE NO ADMIN: HCPCS | Performed by: INTERNAL MEDICINE

## 2023-11-07 ASSESSMENT — SLEEP AND FATIGUE QUESTIONNAIRES
HOW LIKELY ARE YOU TO NOD OFF OR FALL ASLEEP WHILE LYING DOWN TO REST IN THE AFTERNOON WHEN CIRCUMSTANCES PERMIT: 1
HOW LIKELY ARE YOU TO NOD OFF OR FALL ASLEEP WHILE SITTING INACTIVE IN A PUBLIC PLACE: 0
HOW LIKELY ARE YOU TO NOD OFF OR FALL ASLEEP WHILE SITTING AND READING: 1
HOW LIKELY ARE YOU TO NOD OFF OR FALL ASLEEP WHILE WATCHING TV: 1
HOW LIKELY ARE YOU TO NOD OFF OR FALL ASLEEP WHILE SITTING AND TALKING TO SOMEONE: 0
HOW LIKELY ARE YOU TO NOD OFF OR FALL ASLEEP WHEN YOU ARE A PASSENGER IN A CAR FOR AN HOUR WITHOUT A BREAK: 1
HOW LIKELY ARE YOU TO NOD OFF OR FALL ASLEEP WHILE SITTING QUIETLY AFTER LUNCH WITHOUT ALCOHOL: 0
HOW LIKELY ARE YOU TO NOD OFF OR FALL ASLEEP IN A CAR, WHILE STOPPED FOR A FEW MINUTES IN TRAFFIC: 0
ESS TOTAL SCORE: 4

## 2023-11-07 NOTE — PROGRESS NOTES
Chief complaint  This is a 70y.o. year old female  who comes to see me with a chief complaint of   Chief Complaint   Patient presents with    Follow-up    Sleep Apnea    . HPI  Follow up on DOYLE.  and possible RLS    Machine:  Patient is using a APAP machine. Average usage is 9 hrs 41 min 00 sec. She is meeting 4 or more hours per night 100% of the time. Average AHI is 2.0. Patient admits to good usage    Rarely gets SOB and hardly ever uses the albuterol    Her dyskinesia worsened then improved with medications. She takes ativan which helps  .                11/7/2023     8:59 AM 10/11/2022    11:12 AM 10/8/2021    12:40 PM 1/13/2021     2:21 PM 8/19/2019     2:10 PM 2/25/2019     8:56 AM 8/22/2018     8:09 AM   Sleep Medicine   Sitting and reading 1 1 1 2 1 1 2   Watching TV 1 1 0 2 1 1 1   Sitting, inactive in a public place (e.g. a theatre or a meeting) 0 0 0 2 1 1 1   As a passenger in a car for an hour without a break 1 1 0 2 2 1 1   Lying down to rest in the afternoon when circumstances permit 1 0 0 1 3 2 2   Sitting and talking to someone 0 0 0 0 1 0 0   Sitting quietly after a lunch without alcohol 0 0 0 1 1 0 0   In a car, while stopped for a few minutes in traffic 0 0 0 0 0 0 0   Rohwer Sleepiness Score 4 3 1 10 10 6 7   Neck circumference (Inches)       17.5         Current Outpatient Medications   Medication Sig Dispense Refill    metFORMIN (GLUCOPHAGE) 1000 MG tablet Take 1 tablet by mouth with breakfast and with evening meal 180 tablet 1    carbidopa-levodopa (SINEMET)  MG per tablet Take 2 tablets by mouth 4 times daily 720 tablet 1    atorvastatin (LIPITOR) 40 MG tablet Take 1 tablet by mouth at bedtime 90 tablet 1    cycloSPORINE (RESTASIS OP) Apply to eye      MELATONIN ER PO Take by mouth      amLODIPine (NORVASC) 5 MG tablet Take 1 tablet by mouth daily 90 tablet 1    escitalopram (LEXAPRO) 10 MG tablet Take 1 tablet by mouth at bedtime 90 tablet 1    irbesartan (AVAPRO)

## 2023-11-13 ENCOUNTER — PATIENT MESSAGE (OUTPATIENT)
Dept: PRIMARY CARE CLINIC | Age: 78
End: 2023-11-13

## 2023-11-13 DIAGNOSIS — G89.4 CHRONIC PAIN SYNDROME: ICD-10-CM

## 2023-11-13 NOTE — TELEPHONE ENCOUNTER
From: Tony Lilly  To: Dr. Mar Chavez: 11/13/2023 2:21 PM EST  Subject: Tylenol 3 refill    Tavon Dodd and Doctor Moses. I need a refill on my Tylenol 3. I have 4 left!  Thanks, Neymar Smith

## 2023-11-14 RX ORDER — ACETAMINOPHEN AND CODEINE PHOSPHATE 300; 30 MG/1; MG/1
1 TABLET ORAL EVERY 8 HOURS PRN
Qty: 90 TABLET | Refills: 0 | Status: SHIPPED | OUTPATIENT
Start: 2023-11-14 | End: 2023-12-14

## 2023-11-15 NOTE — PROGRESS NOTES
[facility]  PRE-PROCEDURE INSTRUCTIONS    Procedure date_11/17/23________Arrival time__0830__________        Procedure time__0930__________       Screening questions for Pain procedures:  Do you have a current infection? ___N______  Are you currently taking an antibiotic?_N_____  Are you taking a blood thinner?_N_______    It is not necessary to stop eating or drinking prior to this procedure. We would like you to take your medications for blood pressure as usual.  You may be asked to stop blood thinners such as Coumadin, Plavix, Fragmin, Lovenox, etc., or any anti-inflammatories such as:  Aspirin, Ibuprofen, Advil, Naproxen prior to your procedure. We also ask that you stop any OTC medications that cause additional bleeding    You must make arrangements for a responsible adult to arrive with you and stay in our waiting area during your procedure. They will also need to take you home after your procedure. For your safety you will not be allowed to leave alone or drive yourself home. Also for your safety, it is strongly suggested that someone stay with you the first 24 hours after your procedure. For your comfort, please wear simple loose fitting clothing to the center. Please do not bring valuables. If you have a living will and a durable power of  for healthcare, please bring in a copy.     You will need to bring a photo ID and insurance card    Our goal is to provide you with excellent care so if you have any questions, please contact us at the [address] at [phone]

## 2023-11-17 ENCOUNTER — APPOINTMENT (OUTPATIENT)
Dept: INTERVENTIONAL RADIOLOGY/VASCULAR | Age: 78
End: 2023-11-17
Attending: ANESTHESIOLOGY
Payer: MEDICARE

## 2023-11-17 ENCOUNTER — HOSPITAL ENCOUNTER (OUTPATIENT)
Age: 78
Setting detail: OUTPATIENT SURGERY
Discharge: HOME | End: 2023-11-17
Attending: ANESTHESIOLOGY | Admitting: ANESTHESIOLOGY
Payer: MEDICARE

## 2023-11-17 VITALS
BODY MASS INDEX: 36.06 KG/M2 | HEIGHT: 58 IN | WEIGHT: 171.8 LBS | RESPIRATION RATE: 16 BRPM | TEMPERATURE: 97 F | DIASTOLIC BLOOD PRESSURE: 60 MMHG | OXYGEN SATURATION: 97 % | HEART RATE: 62 BPM | SYSTOLIC BLOOD PRESSURE: 103 MMHG

## 2023-11-17 PROCEDURE — 2709999900 HC NON-CHARGEABLE SUPPLY: Performed by: ANESTHESIOLOGY

## 2023-11-17 PROCEDURE — 6360000002 HC RX W HCPCS: Performed by: ANESTHESIOLOGY

## 2023-11-17 PROCEDURE — 3610000054 HC PAIN LEVEL 3 BASE (NON-OR): Performed by: ANESTHESIOLOGY

## 2023-11-17 PROCEDURE — 2500000003 HC RX 250 WO HCPCS: Performed by: ANESTHESIOLOGY

## 2023-11-17 RX ORDER — METHYLPREDNISOLONE ACETATE 80 MG/ML
INJECTION, SUSPENSION INTRA-ARTICULAR; INTRALESIONAL; INTRAMUSCULAR; SOFT TISSUE
Status: COMPLETED | OUTPATIENT
Start: 2023-11-17 | End: 2023-11-17

## 2023-11-17 RX ORDER — BUPIVACAINE HYDROCHLORIDE 5 MG/ML
INJECTION, SOLUTION EPIDURAL; INTRACAUDAL
Status: COMPLETED | OUTPATIENT
Start: 2023-11-17 | End: 2023-11-17

## 2023-11-17 RX ORDER — LIDOCAINE HYDROCHLORIDE 10 MG/ML
INJECTION, SOLUTION EPIDURAL; INFILTRATION; INTRACAUDAL; PERINEURAL
Status: COMPLETED | OUTPATIENT
Start: 2023-11-17 | End: 2023-11-17

## 2023-11-17 ASSESSMENT — PAIN DESCRIPTION - DESCRIPTORS: DESCRIPTORS: SHOOTING

## 2023-11-17 ASSESSMENT — PAIN DESCRIPTION - LOCATION: LOCATION: BACK

## 2023-11-17 ASSESSMENT — PAIN SCALES - GENERAL
PAINLEVEL_OUTOF10: 0
PAINLEVEL_OUTOF10: 0
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
## 2023-11-17 NOTE — DISCHARGE INSTRUCTIONS
Management  [address] [place] [address], [facility], [address]  [phone]

## 2023-11-17 NOTE — OP NOTE
Patient:  Ivy Rivas  YOB: 1945  Medical Record #:  [id number]   Date:  11/17/2023   Physician:  Jaswant Palma MD      PRE-PROCEDURE DIAGNOSIS:  Left sciatic neuropathy (G57.02)    POST-PROCEDURE DIAGNOSIS:  Left sciatic neuropathy (G57.02)    PROCEDURE: LEFT marielos-sciatic nerve block, with fluoroscopy. BRIEF HISTORY:  The patient presents today to [facility] for a scheduled sciatic nerve block procedure. The patient was re-evaluated today and is clinically unchanged as compared to my previous evaluation. The patient is clinically stable to proceed with the procedure. PROCEDURE NOTE:  The procedure was again explained to the patient and the previously distributed pre-procedure literature was reviewed. The options, rationale, and benefits of the procedure including pain relief, functional improvement, and increased mobility, as well as the risks of the procedure including but not limited to infection, bleeding, paresthesia, pain, failure to relieve pain, increased pain, headache, allergic reaction, neurologic impairment, local anesthetic, toxicity, and side effects and the potential side effects of corticosteroids were discussed with the patient and informed written consent was obtained from the patient. The patient was brought to the fluoroscopy suite and placed in the prone position. The LEFT sacral and gluteal area was prepped in the usual sterile fashion with Chloraprep and then draped. Intermittent AP fluoroscopy was utilized to localize the radiographic landmarks. A standard perisciatic nerve block approach was used. The sciatic notch, at its superolateral border was localized at its junction with the ilium rostral to the acetabulum and lateral to the SI joint and lateral to the sciatic nerve. It was localized at the radiographic marker of the overlying sciatic nerve between the lateral aspect of the sacrum and the greater trochanter.    The superficial skin

## 2023-11-27 RX ORDER — GABAPENTIN 600 MG/1
600 TABLET ORAL 2 TIMES DAILY
Qty: 180 TABLET | Refills: 1 | Status: SHIPPED | OUTPATIENT
Start: 2023-11-27 | End: 2024-05-25

## 2023-11-29 NOTE — PROGRESS NOTES
[facility]  PRE-PROCEDURE INSTRUCTIONS    Procedure date_12/1/2023________Arrival time__1000__________        Procedure time__1100__________       Screening questions for Pain procedures:  Do you have a current infection? _N________  Are you currently taking an antibiotic?__N____  Are you taking a blood thinner?__N______    It is not necessary to stop eating or drinking prior to this procedure. We would like you to take your medications for blood pressure as usual.  You may be asked to stop blood thinners such as Coumadin, Plavix, Fragmin, Lovenox, etc., or any anti-inflammatories such as:  Aspirin, Ibuprofen, Advil, Naproxen prior to your procedure. We also ask that you stop any OTC medications that cause additional bleeding    You must make arrangements for a responsible adult to arrive with you and stay in our waiting area during your procedure. They will also need to take you home after your procedure. For your safety you will not be allowed to leave alone or drive yourself home. Also for your safety, it is strongly suggested that someone stay with you the first 24 hours after your procedure. For your comfort, please wear simple loose fitting clothing to the center. Please do not bring valuables. If you have a living will and a durable power of  for healthcare, please bring in a copy.     You will need to bring a photo ID and insurance card    Our goal is to provide you with excellent care so if you have any questions, please contact us at the [address] [facility] at [phone]

## 2023-12-01 ENCOUNTER — HOSPITAL ENCOUNTER (OUTPATIENT)
Age: 78
Setting detail: OUTPATIENT SURGERY
Discharge: HOME | End: 2023-12-01
Attending: ANESTHESIOLOGY | Admitting: ANESTHESIOLOGY
Payer: MEDICARE

## 2023-12-01 ENCOUNTER — APPOINTMENT (OUTPATIENT)
Dept: INTERVENTIONAL RADIOLOGY/VASCULAR | Age: 78
End: 2023-12-01
Attending: ANESTHESIOLOGY
Payer: MEDICARE

## 2023-12-01 VITALS
OXYGEN SATURATION: 100 % | BODY MASS INDEX: 35.89 KG/M2 | DIASTOLIC BLOOD PRESSURE: 61 MMHG | SYSTOLIC BLOOD PRESSURE: 108 MMHG | WEIGHT: 171 LBS | TEMPERATURE: 96.8 F | RESPIRATION RATE: 18 BRPM | HEIGHT: 58 IN | HEART RATE: 65 BPM

## 2023-12-01 PROCEDURE — 6360000002 HC RX W HCPCS: Performed by: ANESTHESIOLOGY

## 2023-12-01 PROCEDURE — 2709999900 HC NON-CHARGEABLE SUPPLY: Performed by: ANESTHESIOLOGY

## 2023-12-01 PROCEDURE — 2500000003 HC RX 250 WO HCPCS: Performed by: ANESTHESIOLOGY

## 2023-12-01 PROCEDURE — 3610000056 HC PAIN LEVEL 4 BASE (NON-OR): Performed by: ANESTHESIOLOGY

## 2023-12-01 RX ORDER — METHYLPREDNISOLONE ACETATE 80 MG/ML
INJECTION, SUSPENSION INTRA-ARTICULAR; INTRALESIONAL; INTRAMUSCULAR; SOFT TISSUE
Status: COMPLETED | OUTPATIENT
Start: 2023-12-01 | End: 2023-12-01

## 2023-12-01 RX ORDER — LIDOCAINE HYDROCHLORIDE 10 MG/ML
INJECTION, SOLUTION EPIDURAL; INFILTRATION; INTRACAUDAL; PERINEURAL
Status: COMPLETED | OUTPATIENT
Start: 2023-12-01 | End: 2023-12-01

## 2023-12-01 RX ORDER — BUPIVACAINE HYDROCHLORIDE 5 MG/ML
INJECTION, SOLUTION EPIDURAL; INTRACAUDAL
Status: COMPLETED | OUTPATIENT
Start: 2023-12-01 | End: 2023-12-01

## 2023-12-01 ASSESSMENT — PAIN SCALES - GENERAL
PAINLEVEL_OUTOF10: 0
PAINLEVEL_OUTOF10: 0

## 2023-12-01 ASSESSMENT — PAIN - FUNCTIONAL ASSESSMENT: PAIN_FUNCTIONAL_ASSESSMENT: 0-10

## 2023-12-07 NOTE — TELEPHONE ENCOUNTER
Gastroenterology/Hepatology Progress Note    Patient Name: Rosette Madrid  Age: 78 y.o.  : 1945  MRN: [id number]  Admission Date: 2023      Self, Aaareferral    SUBJECTIVE:      No acute events overnight. She is eating and tolerating well. Denies abdominal pain, nausea, vomiting. No BM during this admission. Last BM 2 days ago. Reports feeling constipated, feels like she needs to have a bowel movement but has not been able to. She typically takes lactulose every other day for constipation.    Review of patient's allergies indicates:   Allergen Reactions    Lisinopril Swelling    Azithromycin      Other reaction(s): tongue/facial swelling    Baclofen      Other reaction(s): tongue/facial swelling    Doxycycline      Other reaction(s): Angioedema          INPATIENT MEDICATIONS    Scheduled Meds:   amLODIPine  5 mg Oral Daily    clonazePAM  0.5 mg Oral QHS    epoetin alexis-ebpx (RETACRIT) injection  10,000 Units Subcutaneous Once    insulin detemir U-100  10 Units Subcutaneous QHS    levothyroxine  125 mcg Oral Before breakfast    mupirocin   Nasal BID    pantoprazole  40 mg Intravenous Daily     Continuous Infusions:  PRN Meds:  albuterol-ipratropium, dextrose 10%, dextrose 10%, dextrose 10%, dextrose 10%, glucagon (human recombinant), glucagon (human recombinant), glucose, glucose, insulin aspart U-100, naloxone, ondansetron, sodium chloride 0.9%          Review of Systems:       Review of Systems   Constitutional:  Negative for appetite change, chills, fatigue, fever and unexpected weight change.   HENT:  Negative for trouble swallowing.    Respiratory:  Negative for shortness of breath and wheezing.    Cardiovascular:  Negative for chest pain.   Gastrointestinal:  Positive for constipation. Negative for abdominal distention, abdominal pain, anal bleeding, diarrhea, nausea, vomiting and reflux.   Genitourinary:  Negative for dysuria and hematuria.   Musculoskeletal:  Negative for back pain.  Spoke with patient. She says Dr Kimani Ibarra cancelled her colonoscopy again- she says he is going to [facility]. Would like a new referral to someone else. Will send her information for Dr Rubin Lozano.   Integumentary:  Negative for color change and mole/lesion.   Neurological:  Negative for dizziness, weakness and light-headedness.          Objective:       VITAL SIGNS: 24 HR MIN & MAX LAST    Temp  Min: 97.7 °F (36.5 °C)  Max: 98.1 °F (36.7 °C)  98 °F (36.7 °C)        BP  Min: 113/62  Max: 151/75  113/62     Pulse  Min: 45  Max: 95  (!) 58     Resp  Min: 18  Max: 22  20    SpO2  Min: 93 %  Max: 100 %  (!) 93 %        Physical Exam  Constitutional:       General: She is not in acute distress.     Appearance: She is obese.   HENT:      Mouth/Throat:      Mouth: Mucous membranes are moist.   Eyes:      General: No scleral icterus.     Conjunctiva/sclera: Conjunctivae normal.   Cardiovascular:      Rate and Rhythm: Bradycardia present. Rhythm irregular.      Pulses: Normal pulses.   Pulmonary:      Effort: Pulmonary effort is normal.      Breath sounds: Wheezing present.   Abdominal:      General: Bowel sounds are normal. There is no distension.      Palpations: Abdomen is soft.      Tenderness: There is no abdominal tenderness. There is no right CVA tenderness, left CVA tenderness, guarding or rebound.   Skin:     General: Skin is warm and dry.      Coloration: Skin is not jaundiced or pale.   Neurological:      General: No focal deficit present.      Mental Status: She is alert and oriented to person, place, and time.              LABS:    Recent Labs   Lab 12/06/23  0834 12/06/23  1543 12/06/23 2152 12/07/23  0527   WBC 5.84  --   --  4.96   HGB 8.6* 9.8* 9.0* 8.8*   HCT 29.8* 33.7* 31.2* 30.8*     --   --  129*   MCV 84.7  --   --  84.8       Recent Labs   Lab 12/06/23  0834 12/06/23  1543 12/06/23 2152 12/07/23 0527   HGB 8.6* 9.8* 9.0* 8.8*   HCT 29.8* 33.7* 31.2* 30.8*        Recent Labs   Lab 12/06/23  0834 12/07/23  0527    135*   K 4.5 4.5   CO2 28 26   BUN 37.5* 41.7*   CREATININE 4.00* 4.46*   BILITOT 0.4 0.4   ALKPHOS 102 87   AST 37* 28   ALT 43 35   LABPROT 6.3 6.2   ALBUMIN 3.1* 3.0*  "       No results for input(s): "INR", "PROTIME", "PTT" in the last 168 hours.     Recent Labs   Lab 12/06/23  0834   IRON 113   FERRITIN 1,897.18*            IMAGING:   No results found.      Assessment / Plan:     Anemia, melanotic stools, hx of AVM and colon polyps  -Hgb on admit 8.6--> 9.8->9.0->8.8  -Monitor Hgb. Transfuse for Hgb <7.   -Monitor stools.   -Start bowel regimen.   -Iron studies not consistent with JAYSON: Iron 113, Iron sat 56, TIBC 203, ferritin 1897  -Plan for push enteroscopy tomorrow.   -NPO after midnight for procedure.     "

## 2023-12-22 PROBLEM — G89.29 CHRONIC PAIN: Status: ACTIVE | Noted: 2023-12-01

## 2024-01-05 DIAGNOSIS — F41.9 ANXIETY: Primary | ICD-10-CM

## 2024-01-05 RX ORDER — LORAZEPAM 0.5 MG/1
0.5 TABLET ORAL 2 TIMES DAILY PRN
Qty: 30 TABLET | Refills: 0 | Status: SHIPPED | OUTPATIENT
Start: 2024-01-05 | End: 2024-02-04

## 2024-01-15 RX ORDER — PANTOPRAZOLE SODIUM 40 MG/1
40 TABLET, DELAYED RELEASE ORAL DAILY
Qty: 90 TABLET | Refills: 3 | Status: SHIPPED | OUTPATIENT
Start: 2024-01-15

## 2024-01-15 NOTE — TELEPHONE ENCOUNTER
Medication:   Requested Prescriptions     Pending Prescriptions Disp Refills    pantoprazole (PROTONIX) 40 MG tablet [Pharmacy Med Name: Pantoprazole Sodium 40 MG Oral Tablet Delayed Release] 90 tablet 3     Sig: TAKE 1 TABLET BY MOUTH DAILY        Last Filled:      Patient Phone Number: [phone] (home)     Last appt: 12/22/2023   Next appt: 3/22/2024    Last OARRS:       7/8/2022     3:18 PM   RX Monitoring   Periodic Controlled Substance Monitoring No signs of potential drug abuse or diversion identified.

## 2024-01-22 ENCOUNTER — PATIENT MESSAGE (OUTPATIENT)
Dept: PRIMARY CARE CLINIC | Age: 79
End: 2024-01-22

## 2024-01-23 DIAGNOSIS — G89.4 CHRONIC PAIN SYNDROME: ICD-10-CM

## 2024-01-23 RX ORDER — ACETAMINOPHEN AND CODEINE PHOSPHATE 300; 30 MG/1; MG/1
1 TABLET ORAL EVERY 8 HOURS PRN
Qty: 90 TABLET | Refills: 0 | Status: SHIPPED | OUTPATIENT
Start: 2024-01-23 | End: 2024-02-22

## 2024-01-23 NOTE — PROGRESS NOTES
[facility] ENDOSCOPY AND OUTPATIENT  PRE-PROCEDURE INSTRUCTIONS    Procedure date_1/26/24_____Arrival time___0945____        Procedure time__1045_____       Screening questions for Pain procedures:  Do you have a current infection? ___n______  Are you currently taking an antibiotic?__n____  Are you taking a blood thinner?___n_____    It is not necessary to stop eating or drinking prior to this procedure.  We would like you to take your medications for blood pressure as usual.  You may be asked to stop blood thinners such as Coumadin, Plavix, Fragmin, Lovenox, etc., or any anti-inflammatories such as:  Aspirin, Ibuprofen, Advil, Naproxen prior to your procedure.   We also ask that you stop any OTC medications that cause additional bleeding    You must make arrangements for a responsible adult to arrive with you and stay in our waiting area during your procedure.  They will also need to take you home after your procedure.   For your safety you will not be allowed to leave alone or drive yourself home.    Also for your safety, it is strongly suggested that someone stay with you the first 24 hours after your procedure.    For your comfort, please wear simple loose fitting clothing to the center.  Please do not bring valuables.      If you have a living will and a durable power of  for healthcare, please bring in a copy.    You will need to bring a photo ID and insurance card    Our goal is to provide you with excellent care so if you have any questions, please contact us at the [facility] at [phone]

## 2024-01-23 NOTE — TELEPHONE ENCOUNTER
From: Lucille Lilly  To: Dr. Miguel Angel Moses  Sent: 1/22/2024 7:01 PM EST  Subject: Tylenol 3    Hi Doctor and Yousif,    I need a refill for my Tylenol 3. I have 2 left.     Thanks,   Lucille

## 2024-01-26 ENCOUNTER — APPOINTMENT (OUTPATIENT)
Dept: INTERVENTIONAL RADIOLOGY/VASCULAR | Age: 79
End: 2024-01-26
Attending: ANESTHESIOLOGY
Payer: MEDICARE

## 2024-01-26 ENCOUNTER — HOSPITAL ENCOUNTER (OUTPATIENT)
Age: 79
Setting detail: OUTPATIENT SURGERY
Discharge: HOME | End: 2024-01-26
Attending: ANESTHESIOLOGY | Admitting: ANESTHESIOLOGY
Payer: MEDICARE

## 2024-01-26 VITALS
WEIGHT: 173 LBS | RESPIRATION RATE: 16 BRPM | HEART RATE: 59 BPM | TEMPERATURE: 97.1 F | HEIGHT: 58 IN | OXYGEN SATURATION: 96 % | BODY MASS INDEX: 36.31 KG/M2 | SYSTOLIC BLOOD PRESSURE: 109 MMHG | DIASTOLIC BLOOD PRESSURE: 74 MMHG

## 2024-01-26 PROCEDURE — 6360000002 HC RX W HCPCS: Performed by: ANESTHESIOLOGY

## 2024-01-26 PROCEDURE — 3610000054 HC PAIN LEVEL 3 BASE (NON-OR): Performed by: ANESTHESIOLOGY

## 2024-01-26 PROCEDURE — 2709999900 HC NON-CHARGEABLE SUPPLY: Performed by: ANESTHESIOLOGY

## 2024-01-26 PROCEDURE — 6360000004 HC RX CONTRAST MEDICATION: Performed by: ANESTHESIOLOGY

## 2024-01-26 RX ORDER — METHYLPREDNISOLONE ACETATE 80 MG/ML
INJECTION, SUSPENSION INTRA-ARTICULAR; INTRALESIONAL; INTRAMUSCULAR; SOFT TISSUE
Status: COMPLETED | OUTPATIENT
Start: 2024-01-26 | End: 2024-01-26

## 2024-01-26 ASSESSMENT — PAIN - FUNCTIONAL ASSESSMENT
PAIN_FUNCTIONAL_ASSESSMENT: 0-10

## 2024-01-26 ASSESSMENT — PAIN DESCRIPTION - DESCRIPTORS: DESCRIPTORS: ACHING

## 2024-01-26 NOTE — DISCHARGE INSTRUCTIONS
[facility]   [address], [address]   [place], [place] [address]   [phone]     [facility]  [address], [place] [address]  [phone]      Patient:  Lucille Lilly  YOB: 1945  Medical Record #:  [id number]   Date:  1/26/2024   Physician:  Chanel Orta MD    Procedure Performed: [unfilled]     Discharge Instructions    Notify Pain Management Services if any of the following occur:    Redness/Swelling at the injection site lasting longer than 2 days  Fever (with redness, swelling, or drainage at the injection site)  Drainage at the injection site  New weakness/ numbness  Severe headache   Loss of bowel/ bladder function    General Instructions:    You may experience numbness for several hours following your treatment.    You should be cautious using those areas which are numb.    Once the numbness wears off, you may apply ice or heat to injection site, if needed.    Do not return to work or drive today    Rest today and return to normal activities tomorrow.    On average, the steroid takes about 1 week to work and can be up to 2 weeks    You should continue to depend on your primary physician for your medical management of conditions not related to your pain management treatment.    Continue to take all your other medications, including blood thinners, as directed by your primary physician unless otherwise instructed. NO changes have been made to your medications. Any changes listed on the discharge are based on patient self reporting. Any EMR warnings regarding drug/drug interactions were dismissed based on current use by the patient, management by prescribing physician and pharmacist and not managed by this physician.    Any problem which relates specifically to a treatment or procedure performed today should be directed to the [facility].       [facility]  Division of Interventional Pain Management  [id number]

## 2024-01-26 NOTE — OP NOTE
Patient:  Lucille Lilly  YOB: 1945  Medical Record #:  [id number]   Place: [facility]  Date:  1/26/2024   Physician:  Chanel Orta MD      PRE-PROCEDURE DIAGNOSIS: M54.16    POST-PROCEDURE DIAGNOSIS: M54.16    PROCEDURE:  Midline interlaminar right  L4-5 epidural steroid injection with fluoroscopy and epidurography.    BRIEF HISTORY:  The patient presents today to [facility] for a scheduled lumbar epidural steroid injection procedure.  The patient was re-evaluated today and is clinically unchanged as compared to my previous evaluation.  The patient is clinically stable to proceed with the procedure.    PROCEDURE NOTE:  The procedure was again explained to the patient and the previously distributed pre-procedure literature was reviewed.  The options, rationale, and benefits of the procedure including pain relief, functional improvement, and increased mobility, as well as the risks of the procedure including but not limited to infection, bleeding, paresthesia, pain, failure to relieve pain, increased pain, headache, allergic reaction, neurologic impairment, local anesthetic, toxicity, and side effects and the potential side effects of corticosteroids were discussed with the patient and informed written consent was obtained from the patient.      The patient was positioned in the prone position on the fluoroscopy table.  The skin overlying the lumbosacral vertebrae was prepped using Chloraprep and draped in the usual sterile fashion.  The L4-5 lumbar intervertebral level was identified using intermittent AP fluoroscopy.  The previously identified projection of overlying skin was anesthetized using 2 cc of buffered 1% lidocaine with a 27 gauge needle.  A 4.25\" 22g Touhy needle was advanced through a small skin nick in the AP view towards the interlaminar and epidural space.  The epidural space was easily identified using loss of resistance to saline.  No difficulty,
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
## 2024-02-07 NOTE — PROGRESS NOTES
[facility] ENDOSCOPY AND OUTPATIENT  PRE-PROCEDURE INSTRUCTIONS    Procedure date__02/09/2024_______Arrival time__0915__________        Procedure time___1015_________       Screening questions for Pain procedures:  Do you have a current infection? _N________  Are you currently taking an antibiotic?_N_____  Are you taking a blood thinner?_N_______    It is not necessary to stop eating or drinking prior to this procedure.  We would like you to take your medications for blood pressure as usual.  You may be asked to stop blood thinners such as Coumadin, Plavix, Fragmin, Lovenox, etc., or any anti-inflammatories such as:  Aspirin, Ibuprofen, Advil, Naproxen prior to your procedure.   We also ask that you stop any OTC medications that cause additional bleeding    You must make arrangements for a responsible adult to arrive with you and stay in our waiting area during your procedure.  They will also need to take you home after your procedure.   For your safety you will not be allowed to leave alone or drive yourself home.    Also for your safety, it is strongly suggested that someone stay with you the first 24 hours after your procedure.    For your comfort, please wear simple loose fitting clothing to the center.  Please do not bring valuables.      If you have a living will and a durable power of  for healthcare, please bring in a copy.    You will need to bring a photo ID and insurance card    Our goal is to provide you with excellent care so if you have any questions, please contact us at the [facility] at [phone]

## 2024-02-09 ENCOUNTER — HOSPITAL ENCOUNTER (OUTPATIENT)
Age: 79
Setting detail: OUTPATIENT SURGERY
Discharge: HOME | End: 2024-02-09
Attending: ANESTHESIOLOGY | Admitting: ANESTHESIOLOGY
Payer: MEDICARE

## 2024-02-09 ENCOUNTER — APPOINTMENT (OUTPATIENT)
Dept: INTERVENTIONAL RADIOLOGY/VASCULAR | Age: 79
End: 2024-02-09
Attending: ANESTHESIOLOGY
Payer: MEDICARE

## 2024-02-09 VITALS
TEMPERATURE: 97.1 F | HEART RATE: 62 BPM | WEIGHT: 174 LBS | HEIGHT: 58 IN | BODY MASS INDEX: 36.53 KG/M2 | SYSTOLIC BLOOD PRESSURE: 125 MMHG | OXYGEN SATURATION: 97 % | RESPIRATION RATE: 14 BRPM | DIASTOLIC BLOOD PRESSURE: 46 MMHG

## 2024-02-09 PROCEDURE — 6360000002 HC RX W HCPCS: Performed by: ANESTHESIOLOGY

## 2024-02-09 PROCEDURE — 3610000054 HC PAIN LEVEL 3 BASE (NON-OR): Performed by: ANESTHESIOLOGY

## 2024-02-09 PROCEDURE — 2709999900 HC NON-CHARGEABLE SUPPLY: Performed by: ANESTHESIOLOGY

## 2024-02-09 PROCEDURE — 2500000003 HC RX 250 WO HCPCS: Performed by: ANESTHESIOLOGY

## 2024-02-09 RX ORDER — BUPIVACAINE HYDROCHLORIDE 5 MG/ML
INJECTION, SOLUTION EPIDURAL; INTRACAUDAL
Status: COMPLETED | OUTPATIENT
Start: 2024-02-09 | End: 2024-02-09

## 2024-02-09 RX ORDER — LIDOCAINE HYDROCHLORIDE 10 MG/ML
INJECTION, SOLUTION EPIDURAL; INFILTRATION; INTRACAUDAL; PERINEURAL
Status: COMPLETED | OUTPATIENT
Start: 2024-02-09 | End: 2024-02-09

## 2024-02-09 RX ORDER — METHYLPREDNISOLONE ACETATE 80 MG/ML
INJECTION, SUSPENSION INTRA-ARTICULAR; INTRALESIONAL; INTRAMUSCULAR; SOFT TISSUE
Status: COMPLETED | OUTPATIENT
Start: 2024-02-09 | End: 2024-02-09

## 2024-02-09 ASSESSMENT — PAIN DESCRIPTION - DESCRIPTORS: DESCRIPTORS: ACHING;SHARP

## 2024-02-09 ASSESSMENT — PAIN - FUNCTIONAL ASSESSMENT
PAIN_FUNCTIONAL_ASSESSMENT: ACTIVITIES ARE NOT PREVENTED
PAIN_FUNCTIONAL_ASSESSMENT: 0-10

## 2024-02-09 NOTE — OP NOTE
projection was anesthetized with buffered lidocaine 1% 2 cc using a 27-gauge needle.  A spinal needle was then inserted slowly under direct intermittent AP fluoroscopy towards the ilium near the exiting sciatic nerve.     Negative aspiration was re-demonstrated and therapeutic injectate consisting of 6 cc of lidocaine 1%, 1 cc of bupivacaine 0.5% and 1 cc of Depo-Medrol 40 mg/cc was injected without pain, paresthesia, difficulty, or complaints. The needle was removed, the area cleansed, a Band-Aid placed over the injection site. Patient tolerated the procedure well. There were no complications.The patient was transferred, by wheelchair or stretcher, with accompaniment to the recovery area where the vital signs remained stable. There was sensory or motor blockade in the lower extremity. This procedure was done using local anesthesia only. The patient was discharged in stable condition accompanied with an escort after fulfilling standard discharge criteria.     FLUOROSCOPY:  A fluoroscopy unit was utilized to obtain fluoroscopic images for intra-procedural use and assistance.  Fluoroscopy was utilized to identify anatomic and radiographic landmarks for the accompanying procedure guidance and not for diagnostic purposes.      Estimated Blood Loss: 0ml      Plan:  Follow up in 4-6 weeks      Office: [phone]

## 2024-02-09 NOTE — H&P
(TYLENOL/CODEINE #3) 300-30 MG per tablet Take 1 tablet by mouth every 8 hours as needed for Pain for up to 30 days. Max Daily Amount: 3 tablets 90 tablet 0    pantoprazole (PROTONIX) 40 MG tablet TAKE 1 TABLET BY MOUTH DAILY 90 tablet 3    gabapentin (NEURONTIN) 600 MG tablet Take 1 tablet by mouth 2 times daily for 180 days. 180 tablet 1    metFORMIN (GLUCOPHAGE) 1000 MG tablet Take 1 tablet by mouth with breakfast and with evening meal 180 tablet 1    carbidopa-levodopa (SINEMET)  MG per tablet Take 2 tablets by mouth 4 times daily 720 tablet 1    atorvastatin (LIPITOR) 40 MG tablet Take 1 tablet by mouth at bedtime 90 tablet 1    amLODIPine (NORVASC) 5 MG tablet Take 1 tablet by mouth daily 90 tablet 1    escitalopram (LEXAPRO) 10 MG tablet Take 1 tablet by mouth at bedtime 90 tablet 1    irbesartan (AVAPRO) 150 MG tablet TAKE 1 TABLET BY MOUTH DAILY 90 tablet 3    Cholecalciferol (VITAMIN D3) 5000 units CAPS Take 1 capsule by mouth daily 90 capsule 3     Allergies:  Meloxicam, Quinine derivatives, Cymbalta [duloxetine hcl], Codeine, and Vicodin [hydrocodone-acetaminophen]  Social History     Socioeconomic History    Marital status:      Spouse name: Not on file    Number of children: Not on file    Years of education: Not on file    Highest education level: Not on file   Occupational History    Not on file   Tobacco Use    Smoking status: Former     Current packs/day: 0.00     Average packs/day: 2.0 packs/day for 36.0 years (72.0 ttl pk-yrs)     Types: Cigarettes     Start date: 1963     Quit date: 1999     Years since quittin.8     Passive exposure: Past    Smokeless tobacco: Never   Vaping Use    Vaping Use: Never used   Substance and Sexual Activity    Alcohol use: No     Alcohol/week: 0.0 standard drinks of alcohol    Drug use: Never    Sexual activity: Not Currently   Other Topics Concern    Not on file   Social History Narrative    Not on file     Social Determinants of Health

## 2024-02-09 NOTE — DISCHARGE INSTRUCTIONS
[facility]   [address]   [place], [place] [address]   [phone]     [facility]  [address], [place] [address]  [phone]      Patient:  Lucille Lilly  YOB: 1945  Medical Record #:  [id number]   Date:  2/9/2024   Physician:  Chanel Orta MD    Procedure Performed: [unfilled]     Discharge Instructions    Notify Pain Management Services if any of the following occur:    Redness/Swelling at the injection site lasting longer than 2 days  Fever (with redness, swelling, or drainage at the injection site)  Drainage at the injection site  New weakness/ numbness  Severe headache   Loss of bowel/ bladder function    General Instructions:    You may experience numbness for several hours following your treatment.    You should be cautious using those areas which are numb.    Once the numbness wears off, you may apply ice or heat to injection site, if needed.    Do not return to work or drive today    Rest today and return to normal activities tomorrow.    On average, the steroid takes about 1 week to work and can be up to 2 weeks    You should continue to depend on your primary physician for your medical management of conditions not related to your pain management treatment.    Continue to take all your other medications, including blood thinners, as directed by your primary physician unless otherwise instructed. NO changes have been made to your medications. Any changes listed on the discharge are based on patient self reporting. Any EMR warnings regarding drug/drug interactions were dismissed based on current use by the patient, management by prescribing physician and pharmacist and not managed by this physician.    Any problem which relates specifically to a treatment or procedure performed today should be directed to the [facility].       [facility]  Division of Interventional Pain Management  4098

## 2024-02-13 NOTE — PROGRESS NOTES
[facility] ENDOSCOPY AND OUTPATIENT  PRE-PROCEDURE INSTRUCTIONS    Procedure date__2/16/24____Arrival time___915____        Procedure time_1015______       Screening questions for Pain procedures:  Do you have a current infection? ______n___  Are you currently taking an antibiotic?___n___  Are you taking a blood thinner?___n_____    It is not necessary to stop eating or drinking prior to this procedure.  We would like you to take your medications for blood pressure as usual.  You may be asked to stop blood thinners such as Coumadin, Plavix, Fragmin, Lovenox, etc., or any anti-inflammatories such as:  Aspirin, Ibuprofen, Advil, Naproxen prior to your procedure.   We also ask that you stop any OTC medications that cause additional bleeding    You must make arrangements for a responsible adult to arrive with you and stay in our waiting area during your procedure.  They will also need to take you home after your procedure.   For your safety you will not be allowed to leave alone or drive yourself home.    Also for your safety, it is strongly suggested that someone stay with you the first 24 hours after your procedure.    For your comfort, please wear simple loose fitting clothing to the center.  Please do not bring valuables.      If you have a living will and a durable power of  for healthcare, please bring in a copy.    You will need to bring a photo ID and insurance card    Our goal is to provide you with excellent care so if you have any questions, please contact us at the [facility] at [phone]

## 2024-02-16 ENCOUNTER — HOSPITAL ENCOUNTER (OUTPATIENT)
Age: 79
Setting detail: OUTPATIENT SURGERY
Discharge: HOME | End: 2024-02-16
Attending: ANESTHESIOLOGY | Admitting: ANESTHESIOLOGY
Payer: MEDICARE

## 2024-02-16 ENCOUNTER — APPOINTMENT (OUTPATIENT)
Dept: INTERVENTIONAL RADIOLOGY/VASCULAR | Age: 79
End: 2024-02-16
Attending: ANESTHESIOLOGY
Payer: MEDICARE

## 2024-02-16 VITALS
TEMPERATURE: 96.3 F | HEIGHT: 58 IN | HEART RATE: 61 BPM | OXYGEN SATURATION: 95 % | RESPIRATION RATE: 18 BRPM | WEIGHT: 174 LBS | BODY MASS INDEX: 36.53 KG/M2 | SYSTOLIC BLOOD PRESSURE: 115 MMHG | DIASTOLIC BLOOD PRESSURE: 53 MMHG

## 2024-02-16 PROCEDURE — 3610000054 HC PAIN LEVEL 3 BASE (NON-OR): Performed by: ANESTHESIOLOGY

## 2024-02-16 PROCEDURE — 6360000002 HC RX W HCPCS: Performed by: ANESTHESIOLOGY

## 2024-02-16 PROCEDURE — 2500000003 HC RX 250 WO HCPCS: Performed by: ANESTHESIOLOGY

## 2024-02-16 PROCEDURE — 2709999900 HC NON-CHARGEABLE SUPPLY: Performed by: ANESTHESIOLOGY

## 2024-02-16 RX ORDER — LIDOCAINE HYDROCHLORIDE 10 MG/ML
INJECTION, SOLUTION EPIDURAL; INFILTRATION; INTRACAUDAL; PERINEURAL
Status: COMPLETED | OUTPATIENT
Start: 2024-02-16 | End: 2024-02-16

## 2024-02-16 RX ORDER — BUPIVACAINE HYDROCHLORIDE 5 MG/ML
INJECTION, SOLUTION EPIDURAL; INTRACAUDAL
Status: COMPLETED | OUTPATIENT
Start: 2024-02-16 | End: 2024-02-16

## 2024-02-16 RX ORDER — METHYLPREDNISOLONE ACETATE 80 MG/ML
INJECTION, SUSPENSION INTRA-ARTICULAR; INTRALESIONAL; INTRAMUSCULAR; SOFT TISSUE
Status: COMPLETED | OUTPATIENT
Start: 2024-02-16 | End: 2024-02-16

## 2024-02-16 NOTE — PROGRESS NOTES
Band-Aid on left lower back without drainage or redness. Patient with minimal numbness while standing.

## 2024-02-16 NOTE — H&P
Currently   Other Topics Concern    Not on file   Social History Narrative    Not on file     Social Determinants of Health     Financial Resource Strain: Low Risk  (2/13/2023)    Overall Financial Resource Strain (CARDIA)     Difficulty of Paying Living Expenses: Not hard at all   Food Insecurity: Not on file (2/13/2023)   Transportation Needs: No Transportation Needs (2/13/2023)    PRAPARE - Transportation     Lack of Transportation (Medical): No     Lack of Transportation (Non-Medical): No   Physical Activity: Insufficiently Active (12/20/2023)    Exercise Vital Sign     Days of Exercise per Week: 4 days     Minutes of Exercise per Session: 30 min   Stress: No Stress Concern Present (2/13/2023)    [employer] [place] [employer] - Occupational Stress Questionnaire     Feeling of Stress : Only a little   Social Connections: Moderately Isolated (2/13/2023)    Social Connection and Isolation Panel [NHANES]     Frequency of Communication with Friends and Family: More than three times a week     Frequency of Social Gatherings with Friends and Family: More than three times a week     Attends [employer] Services: Never     Active Member of Clubs or Organizations: No     Attends Club or Organization Meetings: Never     Marital Status:    Intimate Partner Violence: Not At Risk (2/13/2023)    Humiliation, Afraid, Rape, and Kick questionnaire     Fear of Current or Ex-Partner: No     Emotionally Abused: No     Physically Abused: No     Sexually Abused: No   Housing Stability: Low Risk  (2/13/2023)    Housing Stability Vital Sign     Unable to Pay for Housing in the Last Year: No     Number of Places Lived in the Last Year: 1     Unstable Housing in the Last Year: No     Family History   Problem Relation Age of Onset    Heart Disease Mother     Cancer Mother         multiple myeloma    Heart Failure Mother     Hypertension Mother     Heart Disease Father     Cancer Father         head and neck    Heart Failure

## 2024-02-16 NOTE — DISCHARGE INSTRUCTIONS
[facility]   [address]   [place], [place] [address]   [phone]     [facility]  [address] [place], [place] [address]  [phone]      Patient:  Lucille Lilly  YOB: 1945  Medical Record #:  [id number]   Date:  2/16/2024   Physician:  Chanel Orta MD    Procedure Performed: [unfilled]     Discharge Instructions    Notify Pain Management Services if any of the following occur:    Redness/Swelling at the injection site lasting longer than 2 days  Fever (with redness, swelling, or drainage at the injection site)  Drainage at the injection site  New weakness/ numbness  Severe headache   Loss of bowel/ bladder function    General Instructions:    You may experience numbness for several hours following your treatment.    You should be cautious using those areas which are numb.    Once the numbness wears off, you may apply ice or heat to injection site, if needed.    Do not return to work or drive today    Rest today and return to normal activities tomorrow.    On average, the steroid takes about 1 week to work and can be up to 2 weeks    You should continue to depend on your primary physician for your medical management of conditions not related to your pain management treatment.    Continue to take all your other medications, including blood thinners, as directed by your primary physician unless otherwise instructed. NO changes have been made to your medications. Any changes listed on the discharge are based on patient self reporting. Any EMR warnings regarding drug/drug interactions were dismissed based on current use by the patient, management by prescribing physician and pharmacist and not managed by this physician.    Any problem which relates specifically to a treatment or procedure performed today should be directed to the [facility].       [facility]  Division of Interventional Pain Management  9834

## 2024-02-16 NOTE — OP NOTE
Patient:  Lucille Lilly  YOB: 1945  Medical Record #:  [id number]   Date:  2/16/2024   Physician:  Chanel Orta MD      PRE-PROCEDURE DIAGNOSIS:  Left sciatic neuropathy (G57.02)    POST-PROCEDURE DIAGNOSIS:  Left sciatic neuropathy (G57.02)    PROCEDURE: LEFT marielos-sciatic nerve block, with fluoroscopy.        BRIEF HISTORY:  The patient presents today to [facility] for a scheduled sciatic nerve block procedure.  The patient was re-evaluated today and is clinically unchanged as compared to my previous evaluation.  The patient is clinically stable to proceed with the procedure.      PROCEDURE NOTE:  The procedure was again explained to the patient and the previously distributed pre-procedure literature was reviewed.  The options, rationale, and benefits of the procedure including pain relief, functional improvement, and increased mobility, as well as the risks of the procedure including but not limited to infection, bleeding, paresthesia, pain, failure to relieve pain, increased pain, headache, allergic reaction, neurologic impairment, local anesthetic, toxicity, and side effects and the potential side effects of corticosteroids were discussed with the patient and informed written consent was obtained from the patient.      The patient was brought to the fluoroscopy suite and placed in the prone position. The LEFT sacral and gluteal area was prepped in the usual sterile fashion with Chloraprep and then draped. Intermittent AP fluoroscopy was utilized to localize the radiographic landmarks. A standard perisciatic nerve block approach was used.  The sciatic notch, at its superolateral border was localized at its junction with the ilium rostral to the acetabulum and lateral to the SI joint and lateral to the sciatic nerve. It was localized at the radiographic marker of the overlying sciatic nerve between the lateral aspect of the sacrum and the greater trochanter.   The superficial skin

## 2024-02-17 RX ORDER — AMLODIPINE BESYLATE 5 MG/1
5 TABLET ORAL DAILY
Qty: 90 TABLET | Refills: 1 | Status: SHIPPED | OUTPATIENT
Start: 2024-02-17

## 2024-03-05 ENCOUNTER — PATIENT MESSAGE (OUTPATIENT)
Dept: PRIMARY CARE CLINIC | Age: 79
End: 2024-03-05

## 2024-03-05 NOTE — TELEPHONE ENCOUNTER
From: Lucille Lilly  To: Dr. Miguel Angel Moses  Sent: 3/5/2024 12:09 PM EST  Subject: Renewal of Eligibility for Accessible License Plates    Maverick Donis or Yousif,  We dropped off this document to be filed out. When I called yesterday, they said it would be ready in 2 or 3 days, but when I dropped it off today the lady said it may take as long as 2 weeks. This confused me. Please call me when it's ready @ [phone].     Thanks,   Lucille

## 2024-03-08 DIAGNOSIS — G89.4 CHRONIC PAIN SYNDROME: ICD-10-CM

## 2024-03-08 RX ORDER — ACETAMINOPHEN AND CODEINE PHOSPHATE 300; 30 MG/1; MG/1
1 TABLET ORAL EVERY 8 HOURS PRN
Qty: 90 TABLET | Refills: 0 | Status: SHIPPED | OUTPATIENT
Start: 2024-03-08 | End: 2024-04-07

## 2024-03-11 DIAGNOSIS — E78.2 MIXED HYPERLIPIDEMIA: ICD-10-CM

## 2024-03-12 RX ORDER — ATORVASTATIN CALCIUM 40 MG/1
40 TABLET, FILM COATED ORAL NIGHTLY
Qty: 90 TABLET | Refills: 1 | Status: SHIPPED | OUTPATIENT
Start: 2024-03-12

## 2024-03-12 RX ORDER — CARBIDOPA/LEVODOPA 25MG-250MG
2 TABLET ORAL 4 TIMES DAILY
Qty: 720 TABLET | Refills: 1 | Status: SHIPPED | OUTPATIENT
Start: 2024-03-12

## 2024-03-21 DIAGNOSIS — F41.8 MIXED ANXIETY AND DEPRESSIVE DISORDER: ICD-10-CM

## 2024-03-21 RX ORDER — ESCITALOPRAM OXALATE 10 MG/1
10 TABLET ORAL NIGHTLY
Qty: 90 TABLET | Refills: 1 | Status: SHIPPED | OUTPATIENT
Start: 2024-03-21

## 2024-03-22 ENCOUNTER — OFFICE VISIT (OUTPATIENT)
Dept: PRIMARY CARE CLINIC | Age: 79
End: 2024-03-22

## 2024-03-22 VITALS
BODY MASS INDEX: 36.87 KG/M2 | DIASTOLIC BLOOD PRESSURE: 66 MMHG | SYSTOLIC BLOOD PRESSURE: 126 MMHG | HEART RATE: 74 BPM | RESPIRATION RATE: 18 BRPM | WEIGHT: 176.4 LBS
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
## 2024-03-22 DIAGNOSIS — G25.81 RESTLESS LEGS SYNDROME (RLS): ICD-10-CM

## 2024-03-22 DIAGNOSIS — I10 ESSENTIAL HYPERTENSION: ICD-10-CM

## 2024-03-22 DIAGNOSIS — G24.9 DYSKINESIA: ICD-10-CM

## 2024-03-22 DIAGNOSIS — F33.1 MAJOR DEPRESSIVE DISORDER, RECURRENT EPISODE, MODERATE (HCC): ICD-10-CM

## 2024-03-22 DIAGNOSIS — F41.9 ANXIETY: ICD-10-CM

## 2024-03-22 DIAGNOSIS — E66.01 SEVERE OBESITY (BMI 35.0-39.9) WITH COMORBIDITY (HCC): ICD-10-CM

## 2024-03-22 DIAGNOSIS — E11.9 TYPE 2 DIABETES MELLITUS WITHOUT COMPLICATION, WITHOUT LONG-TERM CURRENT USE OF INSULIN (HCC): Primary | ICD-10-CM

## 2024-03-22 DIAGNOSIS — E78.2 MIXED HYPERLIPIDEMIA: ICD-10-CM

## 2024-03-22 DIAGNOSIS — F11.20 OPIOID DEPENDENCE WITH CURRENT USE (HCC): ICD-10-CM

## 2024-03-22 LAB — HBA1C MFR BLD: 6.2 %

## 2024-03-22 SDOH — ECONOMIC STABILITY: FOOD INSECURITY: WITHIN THE PAST 12 MONTHS, YOU WORRIED THAT YOUR FOOD WOULD RUN OUT BEFORE YOU GOT MONEY TO BUY MORE.: NEVER TRUE

## 2024-03-22 SDOH — ECONOMIC STABILITY: INCOME INSECURITY: HOW HARD IS IT FOR YOU TO PAY FOR THE VERY BASICS LIKE FOOD, HOUSING, MEDICAL CARE, AND HEATING?: NOT HARD AT ALL

## 2024-03-22 SDOH — ECONOMIC STABILITY: FOOD INSECURITY: WITHIN THE PAST 12 MONTHS, THE FOOD YOU BOUGHT JUST DIDN'T LAST AND YOU DIDN'T HAVE MONEY TO GET MORE.: NEVER TRUE

## 2024-03-22 ASSESSMENT — PATIENT HEALTH QUESTIONNAIRE - PHQ9
SUM OF ALL RESPONSES TO PHQ QUESTIONS 1-9: 7
6. FEELING BAD ABOUT YOURSELF - OR THAT YOU ARE A FAILURE OR HAVE LET YOURSELF OR YOUR FAMILY DOWN: NOT AT ALL
SUM OF ALL RESPONSES TO PHQ QUESTIONS 1-9: 7
5. POOR APPETITE OR OVEREATING: NOT AT ALL
1. LITTLE INTEREST OR PLEASURE IN DOING THINGS: SEVERAL DAYS
SUM OF ALL RESPONSES TO PHQ QUESTIONS 1-9: 7
SUM OF ALL RESPONSES TO PHQ QUESTIONS 1-9: 7
3. TROUBLE FALLING OR STAYING ASLEEP: SEVERAL DAYS
SUM OF ALL RESPONSES TO PHQ9 QUESTIONS 1 & 2: 2
7. TROUBLE CONCENTRATING ON THINGS, SUCH AS READING THE NEWSPAPER OR WATCHING TELEVISION: NEARLY EVERY DAY
9. THOUGHTS THAT YOU WOULD BE BETTER OFF DEAD, OR OF HURTING YOURSELF: NOT AT ALL
4. FEELING TIRED OR HAVING LITTLE ENERGY: SEVERAL DAYS
2. FEELING DOWN, DEPRESSED OR HOPELESS: SEVERAL DAYS
10. IF YOU CHECKED OFF ANY PROBLEMS, HOW DIFFICULT HAVE THESE PROBLEMS MADE IT FOR YOU TO DO YOUR WORK, TAKE CARE OF THINGS AT HOME, OR GET ALONG WITH OTHER PEOPLE: NOT DIFFICULT AT ALL
8. MOVING OR SPEAKING SO SLOWLY THAT OTHER PEOPLE COULD HAVE NOTICED. OR THE OPPOSITE, BEING SO FIGETY OR RESTLESS THAT YOU HAVE BEEN MOVING AROUND A LOT MORE THAN USUAL: NOT AT ALL

## 2024-03-22 ASSESSMENT — ENCOUNTER SYMPTOMS
SHORTNESS OF BREATH: 0
CONSTIPATION: 0
DIARRHEA: 0
BLOOD IN STOOL: 0
ABDOMINAL PAIN: 0

## 2024-03-22 NOTE — PROGRESS NOTES
Max Daily Amount: 3 tablets  Miguel Angel Moses MD   amLODIPine (NORVASC) 5 MG tablet Take 1 tablet by mouth daily  Miguel Angel Moses MD   pantoprazole (PROTONIX) 40 MG tablet TAKE 1 TABLET BY MOUTH DAILY  Miguel Angel Moses MD   gabapentin (NEURONTIN) 600 MG tablet Take 1 tablet by mouth 2 times daily for 180 days.  Miguel Angel Moses MD   metFORMIN (GLUCOPHAGE) 1000 MG tablet Take 1 tablet by mouth with breakfast and with evening meal  Miguel Angel Moses MD   irbesartan (AVAPRO) 150 MG tablet TAKE 1 TABLET BY MOUTH DAILY  Miguel Angel Moses MD   Cholecalciferol (VITAMIN D3) 5000 units CAPS Take 1 capsule by mouth daily  Miguel Angel Moses MD        Social History     Tobacco Use    Smoking status: Former     Current packs/day: 0.00     Average packs/day: 2.0 packs/day for 36.0 years (72.0 ttl pk-yrs)     Types: Cigarettes     Start date: 1963     Quit date: 1999     Years since quittin.9     Passive exposure: Past    Smokeless tobacco: Never   Substance Use Topics    Alcohol use: No     Alcohol/week: 0.0 standard drinks of alcohol        Vitals:    24 1112   BP: 126/66   Pulse: 74   Resp: 18   Weight: 80 kg (176 lb 6.4 oz)     Estimated body mass index is 36.87 kg/m² as calculated from the following:    Height as of 24: 1.473 m (4' 10\").    Weight as of this encounter: 80 kg (176 lb 6.4 oz).    Physical Exam  Constitutional:       General: She is not in acute distress.     Appearance: She is well-developed.   HENT:      Head: Normocephalic.   Eyes:      Conjunctiva/sclera: Conjunctivae normal.   Neck:      Thyroid: No thyromegaly.   Cardiovascular:      Rate and Rhythm: Normal rate and regular rhythm.      Heart sounds: Normal heart sounds. No murmur heard.  Pulmonary:      Effort: No respiratory distress.      Breath sounds: Normal breath sounds. No wheezing or rales.   Abdominal:      General: There is no distension.      Palpations: Abdomen is soft. There is no mass.      Tenderness: There

## 2024-03-23 NOTE — PATIENT INSTRUCTIONS
GENERAL OFFICE POLICIES        Telephone Calls: Messages will be answered within 1-2 business days, unless the provider is out of the office.  If it is urgent a covering provider will answer. (this does not include Medication refills).      MyChart:  We recommend all patients sign up for [employer].  Through this portal you can see your lab results, request refills, schedule appointments, pay your bill and send messages to the office.   [employer] messages will be answered within 1-2 business days unless the provider is out of the office.  For urgent matters, please call the office.    Appointments:  All appointments must be scheduled.  We ask all patients to schedule their next follow up appointment before they leave the office to make sure you will be able to be seen before you run out of medications.  24 hours' notice is required to cancel or reschedule an appointment to avoid being marked as a no show.  You may be dismissed from the practice after 3 no shows.      LATE for Appointment: If you are 15 or more minutes late for your appointment, you may be asked to reschedule.    MA/LAB APPTS: Must be scheduled, cannot accept walk in lab visits.  We only draw labs for patients established in our office.  We only do injections for medications ordered by our office.    Acute Sick Visits:  Nothing other than acute complaint will be addressed at this visit.    TRADITIONAL MEDICARE DOES NOT COVER PHYSICALS  MEDICARE WELLNESS VISITS: These are NOT physicals, but the free annual visit offered by Medicare to discuss wellness issues. Medication refills, checkups, etc. will not be addressed during this visit.    Medication Refills: Refills are handled electronically; please contact your pharmacy for refills even if current refills have been exhausted. If you are on a controlled medication, you will be referred to a specialist (pain specialist, psychiatry, etc).     Forms: There is a $35 fee to fill out FMLA/Disability paperwork,

## 2024-03-25 ENCOUNTER — APPOINTMENT (OUTPATIENT)
Dept: GENERAL RADIOLOGY | Age: 79
DRG: 322 | End: 2024-03-25
Payer: MEDICARE

## 2024-03-25 ENCOUNTER — HOSPITAL ENCOUNTER (INPATIENT)
Age: 79
LOS: 1 days | Discharge: HOME | DRG: 322 | End: 2024-03-28
Attending: STUDENT IN AN ORGANIZED HEALTH CARE EDUCATION/TRAINING PROGRAM | Admitting: STUDENT IN AN ORGANIZED HEALTH CARE EDUCATION/TRAINING PROGRAM
Payer: MEDICARE

## 2024-03-25 DIAGNOSIS — R07.9 CHEST PAIN, UNSPECIFIED TYPE: Primary | ICD-10-CM

## 2024-03-25 LAB
ALBUMIN SERPL-MCNC: 4.7 G/DL (ref 3.4–5)
ALBUMIN/GLOB SERPL: 1.7 {RATIO} (ref 1.1–2.2)
ALP SERPL-CCNC: 98 U/L (ref 40–129)
ALT SERPL-CCNC: 7 U/L (ref 10–40)
ANION GAP SERPL CALCULATED.3IONS-SCNC: 14 MMOL/L (ref 3–16)
AST SERPL-CCNC: 15 U/L (ref 15–37)
BASOPHILS # BLD: 0.1 K/UL (ref 0–0.2)
BASOPHILS NFR BLD: 0.7 %
BILIRUB SERPL-MCNC: 0.3 MG/DL (ref 0–1)
BUN SERPL-MCNC: 9 MG/DL (ref 7–20)
CALCIUM SERPL-MCNC: 10.2 MG/DL (ref 8.3–10.6)
CHLORIDE SERPL-SCNC: 103 MMOL/L (ref 99–110)
CO2 SERPL-SCNC: 25 MMOL/L (ref 21–32)
CREAT SERPL-MCNC: 0.6 MG/DL (ref 0.6–1.2)
DEPRECATED RDW RBC AUTO: 14.9 % (ref 12.4–15.4)
EOSINOPHIL # BLD: 0.1 K/UL (ref 0–0.6)
EOSINOPHIL NFR BLD: 1.2 %
FLUAV RNA UPPER RESP QL NAA+PROBE: NEGATIVE
FLUBV AG NPH QL: NEGATIVE
GFR SERPLBLD CREATININE-BSD FMLA CKD-EPI: >90 ML/MIN/{1.73_M2}
GLUCOSE SERPL-MCNC: 131 MG/DL (ref 70–99)
HCT VFR BLD AUTO: 33.4 % (ref 36–48)
HGB BLD-MCNC: 10.6 G/DL (ref 12–16)
LIPASE SERPL-CCNC: 23 U/L (ref 13–60)
LYMPHOCYTES # BLD: 2.1 K/UL (ref 1–5.1)
LYMPHOCYTES NFR BLD: 20.6 %
MCH RBC QN AUTO: 24.9 PG (ref 26–34)
MCHC RBC AUTO-ENTMCNC: 31.8 G/DL (ref 31–36)
MCV RBC AUTO: 78.6 FL (ref 80–100)
MONOCYTES # BLD: 0.8 K/UL (ref 0–1.3)
MONOCYTES NFR BLD: 8.1 %
NEUTROPHILS # BLD: 7.1 K/UL (ref 1.7–7.7)
NEUTROPHILS NFR BLD: 69.4 %
NT-PROBNP SERPL-MCNC: 114 PG/ML (ref 0–449)
PLATELET # BLD AUTO: 509 K/UL (ref 135–450)
PMV BLD AUTO: 7.3 FL (ref 5–10.5)
POTASSIUM SERPL-SCNC: 4.2 MMOL/L (ref 3.5–5.1)
PROT SERPL-MCNC: 7.4 G/DL (ref 6.4–8.2)
RBC # BLD AUTO: 4.26 M/UL (ref 4–5.2)
SARS-COV-2 RDRP RESP QL NAA+PROBE: NOT DETECTED
SODIUM SERPL-SCNC: 142 MMOL/L (ref 136–145)
TROPONIN, HIGH SENSITIVITY: 10 NG/L (ref 0–14)
TROPONIN, HIGH SENSITIVITY: 11 NG/L (ref 0–14)
WBC # BLD AUTO: 10.2 K/UL (ref 4–11)

## 2024-03-25 PROCEDURE — 83880 ASSAY OF NATRIURETIC PEPTIDE: CPT

## 2024-03-25 PROCEDURE — 93005 ELECTROCARDIOGRAM TRACING: CPT | Performed by: STUDENT IN AN ORGANIZED HEALTH CARE EDUCATION/TRAINING PROGRAM

## 2024-03-25 PROCEDURE — 96374 THER/PROPH/DIAG INJ IV PUSH: CPT

## 2024-03-25 PROCEDURE — 85025 COMPLETE CBC W/AUTO DIFF WBC: CPT

## 2024-03-25 PROCEDURE — 99285 EMERGENCY DEPT VISIT HI MDM: CPT

## 2024-03-25 PROCEDURE — 87635 SARS-COV-2 COVID-19 AMP PRB: CPT

## 2024-03-25 PROCEDURE — 80053 COMPREHEN METABOLIC PANEL: CPT

## 2024-03-25 PROCEDURE — 6370000000 HC RX 637 (ALT 250 FOR IP): Performed by: STUDENT IN AN ORGANIZED HEALTH CARE EDUCATION/TRAINING PROGRAM

## 2024-03-25 PROCEDURE — 84484 ASSAY OF TROPONIN QUANT: CPT

## 2024-03-25 PROCEDURE — 71046 X-RAY EXAM CHEST 2 VIEWS: CPT

## 2024-03-25 PROCEDURE — 87804 INFLUENZA ASSAY W/OPTIC: CPT

## 2024-03-25 PROCEDURE — G0378 HOSPITAL OBSERVATION PER HR: HCPCS

## 2024-03-25 PROCEDURE — 83690 ASSAY OF LIPASE: CPT

## 2024-03-25 PROCEDURE — 6360000002 HC RX W HCPCS: Performed by: STUDENT IN AN ORGANIZED HEALTH CARE EDUCATION/TRAINING PROGRAM

## 2024-03-25 RX ORDER — MORPHINE SULFATE 2 MG/ML
1 INJECTION, SOLUTION INTRAMUSCULAR; INTRAVENOUS
Status: DISCONTINUED | OUTPATIENT
Start: 2024-03-25 | End: 2024-03-28 | Stop reason: HOSPADM

## 2024-03-25 RX ORDER — MORPHINE SULFATE 2 MG/ML
2 INJECTION, SOLUTION INTRAMUSCULAR; INTRAVENOUS
Status: DISCONTINUED | OUTPATIENT
Start: 2024-03-25 | End: 2024-03-26

## 2024-03-25 RX ORDER — CARBIDOPA/LEVODOPA 25MG-250MG
2 TABLET ORAL ONCE
Status: COMPLETED | OUTPATIENT
Start: 2024-03-25 | End: 2024-03-25

## 2024-03-25 RX ORDER — LORAZEPAM 0.5 MG/1
0.5 TABLET ORAL 2 TIMES DAILY PRN
COMMUNITY

## 2024-03-25 RX ORDER — ORPHENADRINE CITRATE 30 MG/ML
60 INJECTION INTRAMUSCULAR; INTRAVENOUS ONCE
Status: COMPLETED | OUTPATIENT
Start: 2024-03-25 | End: 2024-03-25

## 2024-03-25 RX ADMIN — ORPHENADRINE CITRATE 60 MG: 60 INJECTION INTRAMUSCULAR; INTRAVENOUS at 20:45

## 2024-03-25 RX ADMIN — CARBIDOPA AND LEVODOPA 2 TABLET: 25; 250 TABLET ORAL at 20:46

## 2024-03-25 ASSESSMENT — HEART SCORE: ECG: NON-SPECIFC REPOLARIZATION DISTURBANCE/LBTB/PM

## 2024-03-25 ASSESSMENT — PAIN - FUNCTIONAL ASSESSMENT: PAIN_FUNCTIONAL_ASSESSMENT: NONE - DENIES PAIN
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
## 2024-03-25 NOTE — ED TRIAGE NOTES
Pt with SOB x15 min with chest tightness.  States had an episode last PM of being dizzy.  Denies LOC or falling.  PCP advised to come to ED for evaluation. Denies cardiac hx.

## 2024-03-26 LAB
ANION GAP SERPL CALCULATED.3IONS-SCNC: 10 MMOL/L (ref 3–16)
BUN SERPL-MCNC: 11 MG/DL (ref 7–20)
CALCIUM SERPL-MCNC: 9.8 MG/DL (ref 8.3–10.6)
CHLORIDE SERPL-SCNC: 105 MMOL/L (ref 99–110)
CO2 SERPL-SCNC: 26 MMOL/L (ref 21–32)
CREAT SERPL-MCNC: 0.6 MG/DL (ref 0.6–1.2)
DEPRECATED RDW RBC AUTO: 14.6 % (ref 12.4–15.4)
DEPRECATED RDW RBC AUTO: 15.2 % (ref 12.4–15.4)
EKG ATRIAL RATE: 78 BPM
EKG DIAGNOSIS: NORMAL
EKG P AXIS: 61 DEGREES
EKG P-R INTERVAL: 200 MS
EKG Q-T INTERVAL: 382 MS
EKG QRS DURATION: 86 MS
EKG QTC CALCULATION (BAZETT): 435 MS
EKG R AXIS: -28 DEGREES
EKG T AXIS: 76 DEGREES
EKG VENTRICULAR RATE: 78 BPM
GFR SERPLBLD CREATININE-BSD FMLA CKD-EPI: >90 ML/MIN/{1.73_M2}
GLUCOSE SERPL-MCNC: 106 MG/DL (ref 70–99)
HCT VFR BLD AUTO: 28.3 % (ref 36–48)
HCT VFR BLD AUTO: 29.6 % (ref 36–48)
HGB BLD-MCNC: 9.3 G/DL (ref 12–16)
HGB BLD-MCNC: 9.4 G/DL (ref 12–16)
MCH RBC QN AUTO: 24.7 PG (ref 26–34)
MCH RBC QN AUTO: 25.6 PG (ref 26–34)
MCHC RBC AUTO-ENTMCNC: 31.8 G/DL (ref 31–36)
MCHC RBC AUTO-ENTMCNC: 32.8 G/DL (ref 31–36)
MCV RBC AUTO: 77.8 FL (ref 80–100)
MCV RBC AUTO: 77.8 FL (ref 80–100)
PLATELET # BLD AUTO: 369 K/UL (ref 135–450)
PLATELET # BLD AUTO: 388 K/UL (ref 135–450)
PMV BLD AUTO: 7.3 FL (ref 5–10.5)
PMV BLD AUTO: 7.3 FL (ref 5–10.5)
POTASSIUM SERPL-SCNC: 4.3 MMOL/L (ref 3.5–5.1)
RBC # BLD AUTO: 3.63 M/UL (ref 4–5.2)
RBC # BLD AUTO: 3.8 M/UL (ref 4–5.2)
SODIUM SERPL-SCNC: 141 MMOL/L (ref 136–145)
WBC # BLD AUTO: 8.1 K/UL (ref 4–11)
WBC # BLD AUTO: 8.7 K/UL (ref 4–11)

## 2024-03-26 PROCEDURE — A9502 TC99M TETROFOSMIN: HCPCS | Performed by: HOSPITALIST

## 2024-03-26 PROCEDURE — 78452 HT MUSCLE IMAGE SPECT MULT: CPT

## 2024-03-26 PROCEDURE — 99223 1ST HOSP IP/OBS HIGH 75: CPT | Performed by: INTERNAL MEDICINE

## 2024-03-26 PROCEDURE — 6360000002 HC RX W HCPCS: Performed by: HOSPITALIST

## 2024-03-26 PROCEDURE — G0378 HOSPITAL OBSERVATION PER HR: HCPCS

## 2024-03-26 PROCEDURE — 3430000000 HC RX DIAGNOSTIC RADIOPHARMACEUTICAL: Performed by: HOSPITALIST

## 2024-03-26 PROCEDURE — 85027 COMPLETE CBC AUTOMATED: CPT

## 2024-03-26 PROCEDURE — 6360000002 HC RX W HCPCS: Performed by: NURSE PRACTITIONER

## 2024-03-26 PROCEDURE — 93010 ELECTROCARDIOGRAM REPORT: CPT | Performed by: INTERNAL MEDICINE

## 2024-03-26 PROCEDURE — 6370000000 HC RX 637 (ALT 250 FOR IP): Performed by: HOSPITALIST

## 2024-03-26 PROCEDURE — 96375 TX/PRO/DX INJ NEW DRUG ADDON: CPT

## 2024-03-26 PROCEDURE — 2580000003 HC RX 258: Performed by: HOSPITALIST

## 2024-03-26 PROCEDURE — 80048 BASIC METABOLIC PNL TOTAL CA: CPT

## 2024-03-26 PROCEDURE — 94760 N-INVAS EAR/PLS OXIMETRY 1: CPT

## 2024-03-26 PROCEDURE — 2580000003 HC RX 258: Performed by: INTERNAL MEDICINE

## 2024-03-26 PROCEDURE — 36415 COLL VENOUS BLD VENIPUNCTURE: CPT

## 2024-03-26 PROCEDURE — 6370000000 HC RX 637 (ALT 250 FOR IP): Performed by: STUDENT IN AN ORGANIZED HEALTH CARE EDUCATION/TRAINING PROGRAM

## 2024-03-26 PROCEDURE — 93017 CV STRESS TEST TRACING ONLY: CPT

## 2024-03-26 RX ORDER — POTASSIUM CHLORIDE 7.45 MG/ML
10 INJECTION INTRAVENOUS PRN
Status: DISCONTINUED | OUTPATIENT
Start: 2024-03-26 | End: 2024-03-28 | Stop reason: HOSPADM

## 2024-03-26 RX ORDER — SODIUM CHLORIDE 9 MG/ML
INJECTION, SOLUTION INTRAVENOUS PRN
Status: DISCONTINUED | OUTPATIENT
Start: 2024-03-26 | End: 2024-03-28 | Stop reason: HOSPADM

## 2024-03-26 RX ORDER — SODIUM CHLORIDE 0.9 % (FLUSH) 0.9 %
5-40 SYRINGE (ML) INJECTION PRN
Status: DISCONTINUED | OUTPATIENT
Start: 2024-03-26 | End: 2024-03-28 | Stop reason: HOSPADM

## 2024-03-26 RX ORDER — POLYETHYLENE GLYCOL 3350 17 G/17G
17 POWDER, FOR SOLUTION ORAL DAILY PRN
Status: DISCONTINUED | OUTPATIENT
Start: 2024-03-26 | End: 2024-03-28 | Stop reason: HOSPADM

## 2024-03-26 RX ORDER — GABAPENTIN 300 MG/1
600 CAPSULE ORAL 2 TIMES DAILY
Status: DISCONTINUED | OUTPATIENT
Start: 2024-03-26 | End: 2024-03-26

## 2024-03-26 RX ORDER — LOSARTAN POTASSIUM 25 MG/1
25 TABLET ORAL DAILY
Status: DISCONTINUED | OUTPATIENT
Start: 2024-03-26 | End: 2024-03-28 | Stop reason: HOSPADM

## 2024-03-26 RX ORDER — ATORVASTATIN CALCIUM 80 MG/1
80 TABLET, FILM COATED ORAL NIGHTLY
Status: DISCONTINUED | OUTPATIENT
Start: 2024-03-26 | End: 2024-03-26

## 2024-03-26 RX ORDER — POTASSIUM CHLORIDE 20 MEQ/1
40 TABLET, EXTENDED RELEASE ORAL PRN
Status: DISCONTINUED | OUTPATIENT
Start: 2024-03-26 | End: 2024-03-28 | Stop reason: HOSPADM

## 2024-03-26 RX ORDER — ESCITALOPRAM OXALATE 10 MG/1
10 TABLET ORAL NIGHTLY
Status: DISCONTINUED | OUTPATIENT
Start: 2024-03-26 | End: 2024-03-28 | Stop reason: HOSPADM

## 2024-03-26 RX ORDER — ONDANSETRON 4 MG/1
4 TABLET, ORALLY DISINTEGRATING ORAL EVERY 8 HOURS PRN
Status: DISCONTINUED | OUTPATIENT
Start: 2024-03-26 | End: 2024-03-28 | Stop reason: HOSPADM

## 2024-03-26 RX ORDER — PANTOPRAZOLE SODIUM 40 MG/1
40 TABLET, DELAYED RELEASE ORAL
Status: DISCONTINUED | OUTPATIENT
Start: 2024-03-26 | End: 2024-03-28 | Stop reason: HOSPADM

## 2024-03-26 RX ORDER — SODIUM CHLORIDE 0.9 % (FLUSH) 0.9 %
5-40 SYRINGE (ML) INJECTION EVERY 12 HOURS SCHEDULED
Status: DISCONTINUED | OUTPATIENT
Start: 2024-03-26 | End: 2024-03-28 | Stop reason: HOSPADM

## 2024-03-26 RX ORDER — ASPIRIN 81 MG/1
81 TABLET, CHEWABLE ORAL DAILY
Status: DISCONTINUED | OUTPATIENT
Start: 2024-03-26 | End: 2024-03-27 | Stop reason: SDUPTHER

## 2024-03-26 RX ORDER — MAGNESIUM SULFATE IN WATER 40 MG/ML
2000 INJECTION, SOLUTION INTRAVENOUS PRN
Status: DISCONTINUED | OUTPATIENT
Start: 2024-03-26 | End: 2024-03-28 | Stop reason: HOSPADM

## 2024-03-26 RX ORDER — ONDANSETRON 2 MG/ML
4 INJECTION INTRAMUSCULAR; INTRAVENOUS EVERY 6 HOURS PRN
Status: DISCONTINUED | OUTPATIENT
Start: 2024-03-26 | End: 2024-03-28 | Stop reason: HOSPADM

## 2024-03-26 RX ORDER — BENZTROPINE MESYLATE 1 MG/ML
1 INJECTION INTRAMUSCULAR; INTRAVENOUS ONCE
Status: COMPLETED | OUTPATIENT
Start: 2024-03-26 | End: 2024-03-26

## 2024-03-26 RX ORDER — ATORVASTATIN CALCIUM 40 MG/1
40 TABLET, FILM COATED ORAL NIGHTLY
Status: DISCONTINUED | OUTPATIENT
Start: 2024-03-26 | End: 2024-03-28 | Stop reason: HOSPADM

## 2024-03-26 RX ORDER — ACETAMINOPHEN 650 MG/1
650 SUPPOSITORY RECTAL EVERY 6 HOURS PRN
Status: DISCONTINUED | OUTPATIENT
Start: 2024-03-26 | End: 2024-03-28 | Stop reason: HOSPADM

## 2024-03-26 RX ORDER — SODIUM CHLORIDE 9 MG/ML
INJECTION, SOLUTION INTRAVENOUS CONTINUOUS
Status: DISCONTINUED | OUTPATIENT
Start: 2024-03-26 | End: 2024-03-26

## 2024-03-26 RX ORDER — LORAZEPAM 2 MG/ML
0.5 INJECTION INTRAMUSCULAR ONCE
Status: COMPLETED | OUTPATIENT
Start: 2024-03-26 | End: 2024-03-26

## 2024-03-26 RX ORDER — REGADENOSON 0.08 MG/ML
0.4 INJECTION, SOLUTION INTRAVENOUS
Status: COMPLETED | OUTPATIENT
Start: 2024-03-26 | End: 2024-03-26

## 2024-03-26 RX ORDER — ACETAMINOPHEN 325 MG/1
650 TABLET ORAL EVERY 6 HOURS PRN
Status: DISCONTINUED | OUTPATIENT
Start: 2024-03-26 | End: 2024-03-28 | Stop reason: HOSPADM

## 2024-03-26 RX ORDER — CARBIDOPA/LEVODOPA 25MG-250MG
2 TABLET ORAL 4 TIMES DAILY
Status: DISCONTINUED | OUTPATIENT
Start: 2024-03-26 | End: 2024-03-28 | Stop reason: HOSPADM

## 2024-03-26 RX ORDER — GABAPENTIN 300 MG/1
600 CAPSULE ORAL 2 TIMES DAILY
Status: DISCONTINUED | OUTPATIENT
Start: 2024-03-26 | End: 2024-03-28 | Stop reason: HOSPADM

## 2024-03-26 RX ORDER — LORAZEPAM 0.5 MG/1
0.5 TABLET ORAL 2 TIMES DAILY PRN
Status: DISCONTINUED | OUTPATIENT
Start: 2024-03-26 | End: 2024-03-28 | Stop reason: HOSPADM

## 2024-03-26 RX ORDER — AMLODIPINE BESYLATE 5 MG/1
5 TABLET ORAL DAILY
Status: DISCONTINUED | OUTPATIENT
Start: 2024-03-26 | End: 2024-03-28 | Stop reason: HOSPADM

## 2024-03-26 RX ADMIN — AMLODIPINE BESYLATE 5 MG: 5 TABLET ORAL at 11:38

## 2024-03-26 RX ADMIN — CARBIDOPA AND LEVODOPA 2 TABLET: 25; 250 TABLET ORAL at 18:26

## 2024-03-26 RX ADMIN — SODIUM CHLORIDE: 9 INJECTION, SOLUTION INTRAVENOUS at 06:03

## 2024-03-26 RX ADMIN — LORAZEPAM 0.5 MG: 2 INJECTION INTRAMUSCULAR; INTRAVENOUS at 20:39

## 2024-03-26 RX ADMIN — REGADENOSON 0.4 MG: 0.08 INJECTION, SOLUTION INTRAVENOUS at 09:57

## 2024-03-26 RX ADMIN — PANTOPRAZOLE SODIUM 40 MG: 40 TABLET, DELAYED RELEASE ORAL at 11:37

## 2024-03-26 RX ADMIN — CARBIDOPA AND LEVODOPA 2 TABLET: 25; 250 TABLET ORAL at 16:03

## 2024-03-26 RX ADMIN — ASPIRIN 81 MG: 81 TABLET, CHEWABLE ORAL at 11:39

## 2024-03-26 RX ADMIN — Medication 10 ML: at 22:51

## 2024-03-26 RX ADMIN — GABAPENTIN 600 MG: 300 CAPSULE ORAL at 17:17

## 2024-03-26 RX ADMIN — Medication 10 ML: at 12:01

## 2024-03-26 RX ADMIN — CARBIDOPA AND LEVODOPA 2 TABLET: 25; 250 TABLET ORAL at 02:56

## 2024-03-26 RX ADMIN — ESCITALOPRAM OXALATE 10 MG: 10 TABLET ORAL at 20:28

## 2024-03-26 RX ADMIN — ATORVASTATIN CALCIUM 40 MG: 40 TABLET, FILM COATED ORAL at 20:27

## 2024-03-26 RX ADMIN — BENZTROPINE MESYLATE 1 MG: 1 INJECTION INTRAMUSCULAR; INTRAVENOUS at 22:49

## 2024-03-26 RX ADMIN — Medication 10 ML: at 20:30

## 2024-03-26 RX ADMIN — CARBIDOPA AND LEVODOPA 2 TABLET: 25; 250 TABLET ORAL at 20:27

## 2024-03-26 RX ADMIN — LOSARTAN POTASSIUM 25 MG: 25 TABLET, FILM COATED ORAL at 11:38

## 2024-03-26 RX ADMIN — Medication 5000 UNITS: at 11:39

## 2024-03-26 RX ADMIN — GABAPENTIN 600 MG: 300 CAPSULE ORAL at 20:28

## 2024-03-26 RX ADMIN — TETROFOSMIN 30 MILLICURIE: 1.38 INJECTION, POWDER, LYOPHILIZED, FOR SOLUTION INTRAVENOUS at 10:00

## 2024-03-26 RX ADMIN — CARBIDOPA AND LEVODOPA 2 TABLET: 25; 250 TABLET ORAL at 11:59

## 2024-03-26 RX ADMIN — LORAZEPAM 0.5 MG: 0.5 TABLET ORAL at 18:24

## 2024-03-26 RX ADMIN — TETROFOSMIN 10 MILLICURIE: 1.38 INJECTION, POWDER, LYOPHILIZED, FOR SOLUTION INTRAVENOUS at 07:44

## 2024-03-26 NOTE — PROGRESS NOTES
Patient called this RN into room to report she was having an episode of dyskenesia. Upon entering room patient was noted to be writhing about in the bed with arms, legs and head moving. Gave PRN ativan and dose of sinemet. Will continue to monitor as meds take affect. (When asked how often patient has these episodes, she replied \"sometimes every day and sometimes it's months between episodes.\")

## 2024-03-26 NOTE — PROGRESS NOTES
ED SBAR report provider to MAGY Cordero. Patient to be transported to Room 5114 via stretcher by ED tech.Patient transported with bedside cardiac monitor. IV site clean, dry, and intact. Updated patient on plan of care.

## 2024-03-26 NOTE — PROGRESS NOTES
Admitted to 5114 from ED with intermittent chest pain.Transferred to bed with CGA. Is weak sylvia little unsteady. Non skid socks placed and safety protocol started with bed alarm on,side rails x 2, call light in reach, and necessities at bedside. Oriented to room, safety protocol, POC, and unit routine. Aware of stress test and only water at this time. Placed on tele and verified SR with [facility]. No further questions at this time.Catalina Alexander RN

## 2024-03-26 NOTE — ED NOTES
ED SBAR report provider to MAGY Min. Patient to be transported to Room 5273 via stretcher by ED tech.Patient transported with bedside cardiac monitor. IV site clean, dry, and intact. Updated patient on plan of care.

## 2024-03-26 NOTE — PROGRESS NOTES
V2.0    [facility] Progress Note      Name:  Lucille Lilly /Age/Sex: 1945  (79 y.o. female)   MRN & CSN:  [id number] & [id number] Encounter Date/Time: 3/26/2024 8:57 AM EDT   Location:  [id number]/5114-01 PCP: Miguel Angel Moses MD     Attending:Hernan Martínez MD       Hospital Day: 2      HPI :   Chief Complaint: chest pain     Lucille Lilly is a 79 y.o. female who presents with  pmh of hypertension and movement disorder; and diabetes who presents with chest pain.  She describes the chest pain as a dull aching.  The pain has been going on for multiple months.  However on the day of presentation her pain increased.     Subjective:   Chest pain had resolved.   Patient denies any new complaints.   Review of Systems:      Pertinent positives and negatives discussed in HPI    Objective:     Intake/Output Summary (Last 24 hours) at 3/26/2024 1431  Last data filed at 3/26/2024 0522  Gross per 24 hour   Intake 280 ml   Output --   Net 280 ml      Vitals:   Vitals:    24 0444 24 0715 24 0921 24 1153   BP: (!) 131/52 (!) 145/69  (!) 147/69   Pulse: 86 78 85 82   Resp: 20 25  20   Temp: 98.2 °F (36.8 °C) 98.5 °F (36.9 °C)  98.3 °F (36.8 °C)   TempSrc: Oral Oral  Oral   SpO2: 98% 95% 95% 95%   Weight: 78.8 kg (173 lb 11.6 oz)      Height: 1.473 m (4' 10\")            Physical Exam:      Physical Exam Performed:    BP (!) 147/69   Pulse 82   Temp 98.3 °F (36.8 °C) (Oral)   Resp 20   Ht 1.473 m (4' 10\")   Wt 78.8 kg (173 lb 11.6 oz)   SpO2 95%   BMI 36.31 kg/m²     General appearance: pleasant female patient, overweight.   HEENT: Pupils equal, round, and reactive to light. Conjunctivae/corneas clear.  Neck: Supple, with full range of motion. No jugular venous distention. Trachea midline.  Respiratory:  Normal respiratory effort. Clear to auscultation, bilaterally without Rales/Wheezes/Rhonchi.  Cardiovascular: Regular rate and rhythm with normal S1/S2 without murmurs, rubs or  iterative reconstruction, and/or weight based adjustment of the mA/kV was utilized to reduce the radiation dose to as low as reasonably achievable. COMPARISON: 09/11/2022 HISTORY: ORDERING SYSTEM PROVIDED HISTORY: abd pain, diarrhea TECHNOLOGIST PROVIDED HISTORY: Reason for exam:->abd pain, diarrhea Additional Contrast?->None Decision Support Exception - unselect if not a suspected or confirmed emergency medical condition->Emergency Medical Condition (MA) Reason for Exam: abd pain, diarrhea FINDINGS: Lower Chest: Coarse interstitial findings in the lung bases are again demonstrated compatible with fibrotic lung process.  Overall this appears similar compared to the prior exam. Organs: The liver, pancreas, spleen, kidneys, and adrenals reveal no acute findings. No inflammatory change identified in the gallbladder fossa. GI/Bowel: Liquid stool is present throughout the colon.  The sigmoid colon takes an unusual course towards the right abdomen and there is mild swirling of the mesentery noted without twisting to suggest volvulus.  Loops of colon are mildly distended with gas.  No significant small bowel distension is appreciated.  No wall thickening or inflammatory change.  No pneumatosis or portal venous gas. Pelvis: Mild diffuse bladder wall thickening. Peritoneum/Retroperitoneum: No free air or free fluid.  The aorta is normal in caliber.  The visceral branches are patent. Calcified atheromatous plaque is present.  No lymphadenopathy. Bones/Soft Tissues: Small fat containing umbilical hernia.  Severe compression deformity of L2 is again noted.     1.  Findings compatible diarrheal process.  Mild colonic distension is noted with mild mesenteric swirling but no evidence for volvulus.  This may be due to a partial obstructing process due to an underlying internal hernia.  No significant small bowel distension. 2.  Additional chronic and benign findings, as described.       CBC:   Recent Labs     03/25/24  2952

## 2024-03-26 NOTE — PROGRESS NOTES
Pharmacy Medication Reconciliation Note     List of medications Lucille Lilly is currently taking is complete.    Source of information:   1. Conversation with patient at bedside  2. EMR    Notes regarding home medications:   1. Patient reports taking morning medications today PTA in the ED  2. Adjusted Sinemet dosing schedule based on patient report   3. Added Ativan PRN to list     Patient denies taking any OTC or herbal medications other than those listed    Ed Arnold, Pharmacy Intern  3/25/2024  10:00 PM

## 2024-03-26 NOTE — PROGRESS NOTES
4 Eyes Skin Assessment     NAME:  Lucille Lilly  YOB: 1945  MEDICAL RECORD NUMBER:  [id number]    The patient is being assessed for  Admission    I agree that at least one RN has performed a thorough Head to Toe Skin Assessment on the patient. ALL assessment sites listed below have been assessed.      Areas assessed by both nurses:    Head, Face, Ears, Shoulders, Back, Chest, Arms, Elbows, Hands, Sacrum. Buttock, Coccyx, Ischium, Legs. Feet and Heels, and Under Medical Devices         Does the Patient have a Wound? No noted wound(s)       Adam Prevention initiated by RN: No  Wound Care Orders initiated by RN: No    Pressure Injury (Stage 3,4, Unstageable, DTI, NWPT, and Complex wounds) if present, place Wound referral order by RN under : No    New Ostomies, if present place, Ostomy referral order under : No     Nurse 1 eSignature: Electronically signed by Catalina Alexander RN on 3/26/24 at 5:32 AM EDT    **SHARE this note so that the co-signing nurse can place an eSignature**    Nurse 2 eSignature: Electronically signed by Juan F Alaniz RN on 3/26/24 at 6:04 AM EDT

## 2024-03-26 NOTE — H&P
V2.0  History and Physical      Name:  Lucille Lilly /Age/Sex: 1945  (79 y.o. female)   MRN & CSN:  [id number] & [id number] Encounter Date/Time: 3/25/2024 9:39 PM EDT   Location:  015/A-15 PCP: Miguel Angel Moses MD       Hospital Day: 1    Assessment and Plan:   Lucille Lilly is a 79 y.o. female with a pmh of movement and hypertension who presents with a progressively worsening chest pain.        Plan:  Troponin x 2 is negative.  Pain control with as needed morphine.  Daily aspirin and high intensity statin ordered.  Keep patient n.p.o.  Consult cardiology with high heart score.  Lexiscan  stress test.  Advanced care planning was discussed with patient for a total of 16 minutes.  Full code, DNR CC, DNR CCA, power of  and living will were discussed.  Patient elected to be a full code.    Disposition:   Current Living situation: Home  Expected Disposition: Unclear at this point  Estimated D/C: 2 to 2 days    Diet Diet NPO   DVT Prophylaxis [] Lovenox, []  Heparin, [] SCDs, [] Ambulation,  [] Eliquis, [] Xarelto, [] Coumadin   Code Full   Surrogate Decision Maker/ POA      Personally reviewed Lab Studies and Imaging     Discussed management of the case with ED provider who recommended admission    EKG interpreted personally and results sinus rhythm    Imaging that was interpreted personally includes chest x-ray; results; non acute          History from:     patient    History of Present Illness:     Chief Complaint: Chest pain  Lucille Lilly is a 79 y.o. female with pmh of hypertension and movement disorder; and diabetes who presents with chest pain.  She describes the chest pain as a dull aching.  The pain has been going on for multiple months.  However on the day of presentation her pain increased.     Review of Systems:        Pertinent positives and negatives discussed in HPI     Objective:   No intake or output data in the 24 hours ending 245   Vitals:   Vitals:    24  [Hydrocodone-Acetaminophen] Anxiety     Hyperactivity, nausea     Fam HX: family history includes COPD in her brother; Cancer in her father and mother; Heart Attack in her brother and sister; Heart Disease in her brother, father, mother, and sister; Heart Failure in her brother, father, mother, and sister; Hypertension in her brother, father, mother, and sister; Spondylitis in her sister; Stroke in her brother.  Soc HX:   Social History     Socioeconomic History    Marital status:      Spouse name: None    Number of children: None    Years of education: None    Highest education level: None   Tobacco Use    Smoking status: Former     Current packs/day: 0.00     Average packs/day: 2.0 packs/day for 36.0 years (72.0 ttl pk-yrs)     Types: Cigarettes     Start date: 1963     Quit date: 1999     Years since quittin.9     Passive exposure: Past    Smokeless tobacco: Never   Vaping Use    Vaping Use: Never used   Substance and Sexual Activity    Alcohol use: No     Alcohol/week: 0.0 standard drinks of alcohol    Drug use: Never    Sexual activity: Not Currently     Social Determinants of Health     Financial Resource Strain: Low Risk  (3/22/2024)    Overall Financial Resource Strain (CARDIA)     Difficulty of Paying Living Expenses: Not hard at all   Food Insecurity: No Food Insecurity (3/22/2024)    Hunger Vital Sign     Worried About Running Out of Food in the Last Year: Never true     Ran Out of Food in the Last Year: Never true   Transportation Needs: Unknown (3/22/2024)    PRAPARE - Transportation     Lack of Transportation (Non-Medical): No   Physical Activity: Insufficiently Active (2023)    Exercise Vital Sign     Days of Exercise per Week: 4 days     Minutes of Exercise per Session: 30 min   Stress: No Stress Concern Present (2023)    [employer] - Occupational Stress Questionnaire     Feeling of Stress : Only a little   Social Connections: Moderately
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
## 2024-03-26 NOTE — ED NOTES
Asked Dr Feldman if patient could be downgraded to MedSurg. Dr See states that patient needs a stress test and all patients who need a stress test are to go to PCU. Dr See states that any questions, please PerfectServe him.

## 2024-03-27 ENCOUNTER — APPOINTMENT (OUTPATIENT)
Dept: CARDIAC CATH/INVASIVE PROCEDURES | Age: 79
DRG: 322 | End: 2024-03-27
Payer: MEDICARE

## 2024-03-27 LAB
GLUCOSE BLD-MCNC: 117 MG/DL (ref 70–99)
PERFORMED ON: ABNORMAL
POC ACT LR: 237 SEC
POC ACT LR: 289 SEC

## 2024-03-27 PROCEDURE — 6370000000 HC RX 637 (ALT 250 FOR IP): Performed by: HOSPITALIST

## 2024-03-27 PROCEDURE — 4A023N7 MEASUREMENT OF CARDIAC SAMPLING AND PRESSURE, LEFT HEART, PERCUTANEOUS APPROACH: ICD-10-PCS | Performed by: STUDENT IN AN ORGANIZED HEALTH CARE EDUCATION/TRAINING PROGRAM

## 2024-03-27 PROCEDURE — B2111ZZ FLUOROSCOPY OF MULTIPLE CORONARY ARTERIES USING LOW OSMOLAR CONTRAST: ICD-10-PCS | Performed by: STUDENT IN AN ORGANIZED HEALTH CARE EDUCATION/TRAINING PROGRAM

## 2024-03-27 PROCEDURE — C1769 GUIDE WIRE: HCPCS | Performed by: STUDENT IN AN ORGANIZED HEALTH CARE EDUCATION/TRAINING PROGRAM

## 2024-03-27 PROCEDURE — 93458 L HRT ARTERY/VENTRICLE ANGIO: CPT

## 2024-03-27 PROCEDURE — 92978 ENDOLUMINL IVUS OCT C 1ST: CPT | Performed by: STUDENT IN AN ORGANIZED HEALTH CARE EDUCATION/TRAINING PROGRAM

## 2024-03-27 PROCEDURE — 85347 COAGULATION TIME ACTIVATED: CPT

## 2024-03-27 PROCEDURE — 2580000003 HC RX 258

## 2024-03-27 PROCEDURE — C1761 HC CATH TRANSLUM INTRAVASCULAR LITHOTRIPSY CORONARY: HCPCS | Performed by: STUDENT IN AN ORGANIZED HEALTH CARE EDUCATION/TRAINING PROGRAM

## 2024-03-27 PROCEDURE — 6360000002 HC RX W HCPCS: Performed by: STUDENT IN AN ORGANIZED HEALTH CARE EDUCATION/TRAINING PROGRAM

## 2024-03-27 PROCEDURE — B2151ZZ FLUOROSCOPY OF LEFT HEART USING LOW OSMOLAR CONTRAST: ICD-10-PCS | Performed by: STUDENT IN AN ORGANIZED HEALTH CARE EDUCATION/TRAINING PROGRAM

## 2024-03-27 PROCEDURE — 6370000000 HC RX 637 (ALT 250 FOR IP)

## 2024-03-27 PROCEDURE — C1874 STENT, COATED/COV W/DEL SYS: HCPCS | Performed by: STUDENT IN AN ORGANIZED HEALTH CARE EDUCATION/TRAINING PROGRAM

## 2024-03-27 PROCEDURE — 93005 ELECTROCARDIOGRAM TRACING: CPT | Performed by: STUDENT IN AN ORGANIZED HEALTH CARE EDUCATION/TRAINING PROGRAM

## 2024-03-27 PROCEDURE — 99233 SBSQ HOSP IP/OBS HIGH 50: CPT | Performed by: STUDENT IN AN ORGANIZED HEALTH CARE EDUCATION/TRAINING PROGRAM

## 2024-03-27 PROCEDURE — 1200000000 HC SEMI PRIVATE

## 2024-03-27 PROCEDURE — 99153 MOD SED SAME PHYS/QHP EA: CPT

## 2024-03-27 PROCEDURE — 6360000002 HC RX W HCPCS: Performed by: NURSE PRACTITIONER

## 2024-03-27 PROCEDURE — 92978 ENDOLUMINL IVUS OCT C 1ST: CPT

## 2024-03-27 PROCEDURE — 2580000003 HC RX 258: Performed by: HOSPITALIST

## 2024-03-27 PROCEDURE — 6360000002 HC RX W HCPCS

## 2024-03-27 PROCEDURE — 2580000003 HC RX 258: Performed by: INTERNAL MEDICINE

## 2024-03-27 PROCEDURE — 6370000000 HC RX 637 (ALT 250 FOR IP): Performed by: STUDENT IN AN ORGANIZED HEALTH CARE EDUCATION/TRAINING PROGRAM

## 2024-03-27 PROCEDURE — 2709999900 HC NON-CHARGEABLE SUPPLY: Performed by: STUDENT IN AN ORGANIZED HEALTH CARE EDUCATION/TRAINING PROGRAM

## 2024-03-27 PROCEDURE — 93458 L HRT ARTERY/VENTRICLE ANGIO: CPT | Performed by: STUDENT IN AN ORGANIZED HEALTH CARE EDUCATION/TRAINING PROGRAM

## 2024-03-27 PROCEDURE — 2500000003 HC RX 250 WO HCPCS

## 2024-03-27 PROCEDURE — 99152 MOD SED SAME PHYS/QHP 5/>YRS: CPT | Performed by: STUDENT IN AN ORGANIZED HEALTH CARE EDUCATION/TRAINING PROGRAM

## 2024-03-27 PROCEDURE — C1753 CATH, INTRAVAS ULTRASOUND: HCPCS | Performed by: STUDENT IN AN ORGANIZED HEALTH CARE EDUCATION/TRAINING PROGRAM

## 2024-03-27 PROCEDURE — 99152 MOD SED SAME PHYS/QHP 5/>YRS: CPT

## 2024-03-27 PROCEDURE — 2580000003 HC RX 258: Performed by: STUDENT IN AN ORGANIZED HEALTH CARE EDUCATION/TRAINING PROGRAM

## 2024-03-27 PROCEDURE — 02F03ZZ FRAGMENTATION IN CORONARY ARTERY, ONE ARTERY, PERCUTANEOUS APPROACH: ICD-10-PCS | Performed by: STUDENT IN AN ORGANIZED HEALTH CARE EDUCATION/TRAINING PROGRAM

## 2024-03-27 PROCEDURE — 94760 N-INVAS EAR/PLS OXIMETRY 1: CPT

## 2024-03-27 PROCEDURE — 027034Z DILATION OF CORONARY ARTERY, ONE ARTERY WITH DRUG-ELUTING INTRALUMINAL DEVICE, PERCUTANEOUS APPROACH: ICD-10-PCS | Performed by: STUDENT IN AN ORGANIZED HEALTH CARE EDUCATION/TRAINING PROGRAM

## 2024-03-27 PROCEDURE — C1887 CATHETER, GUIDING: HCPCS | Performed by: STUDENT IN AN ORGANIZED HEALTH CARE EDUCATION/TRAINING PROGRAM

## 2024-03-27 PROCEDURE — 92928 PRQ TCAT PLMT NTRAC ST 1 LES: CPT | Performed by: STUDENT IN AN ORGANIZED HEALTH CARE EDUCATION/TRAINING PROGRAM

## 2024-03-27 PROCEDURE — C1894 INTRO/SHEATH, NON-LASER: HCPCS | Performed by: STUDENT IN AN ORGANIZED HEALTH CARE EDUCATION/TRAINING PROGRAM

## 2024-03-27 PROCEDURE — 92972 PERQ TRLUML CORONRY LITHOTRP: CPT

## 2024-03-27 PROCEDURE — 6360000004 HC RX CONTRAST MEDICATION: Performed by: STUDENT IN AN ORGANIZED HEALTH CARE EDUCATION/TRAINING PROGRAM

## 2024-03-27 PROCEDURE — C1725 CATH, TRANSLUMIN NON-LASER: HCPCS | Performed by: STUDENT IN AN ORGANIZED HEALTH CARE EDUCATION/TRAINING PROGRAM

## 2024-03-27 PROCEDURE — C9600 PERC DRUG-EL COR STENT SING: HCPCS

## 2024-03-27 PROCEDURE — 92972 PERQ TRLUML CORONRY LITHOTRP: CPT | Performed by: STUDENT IN AN ORGANIZED HEALTH CARE EDUCATION/TRAINING PROGRAM

## 2024-03-27 RX ORDER — ASPIRIN 81 MG/1
243 TABLET, CHEWABLE ORAL DAILY
Status: DISCONTINUED | OUTPATIENT
Start: 2024-03-27 | End: 2024-03-27

## 2024-03-27 RX ORDER — SODIUM CHLORIDE 0.9 % (FLUSH) 0.9 %
5-40 SYRINGE (ML) INJECTION EVERY 12 HOURS SCHEDULED
Status: DISCONTINUED | OUTPATIENT
Start: 2024-03-27 | End: 2024-03-28 | Stop reason: HOSPADM

## 2024-03-27 RX ORDER — SODIUM CHLORIDE 0.9 % (FLUSH) 0.9 %
5-40 SYRINGE (ML) INJECTION PRN
Status: DISCONTINUED | OUTPATIENT
Start: 2024-03-27 | End: 2024-03-28 | Stop reason: HOSPADM

## 2024-03-27 RX ORDER — SODIUM CHLORIDE 9 MG/ML
INJECTION, SOLUTION INTRAVENOUS PRN
Status: DISCONTINUED | OUTPATIENT
Start: 2024-03-27 | End: 2024-03-28 | Stop reason: HOSPADM

## 2024-03-27 RX ORDER — ASPIRIN 81 MG/1
81 TABLET, CHEWABLE ORAL DAILY
Status: DISCONTINUED | OUTPATIENT
Start: 2024-03-28 | End: 2024-03-28 | Stop reason: HOSPADM

## 2024-03-27 RX ORDER — ACETAMINOPHEN 325 MG/1
650 TABLET ORAL EVERY 4 HOURS PRN
Status: DISCONTINUED | OUTPATIENT
Start: 2024-03-27 | End: 2024-03-27 | Stop reason: SDUPTHER

## 2024-03-27 RX ORDER — LORAZEPAM 2 MG/ML
0.5 INJECTION INTRAMUSCULAR ONCE
Status: COMPLETED | OUTPATIENT
Start: 2024-03-27 | End: 2024-03-27

## 2024-03-27 RX ORDER — CLOPIDOGREL BISULFATE 75 MG/1
75 TABLET ORAL DAILY
Status: DISCONTINUED | OUTPATIENT
Start: 2024-03-28 | End: 2024-03-28 | Stop reason: HOSPADM

## 2024-03-27 RX ORDER — BENZTROPINE MESYLATE 1 MG/ML
1 INJECTION INTRAMUSCULAR; INTRAVENOUS ONCE
Status: COMPLETED | OUTPATIENT
Start: 2024-03-27 | End: 2024-03-27

## 2024-03-27 RX ADMIN — CARBIDOPA AND LEVODOPA 2 TABLET: 25; 250 TABLET ORAL at 08:15

## 2024-03-27 RX ADMIN — CARBIDOPA AND LEVODOPA 2 TABLET: 25; 250 TABLET ORAL at 20:55

## 2024-03-27 RX ADMIN — Medication 10 ML: at 12:12

## 2024-03-27 RX ADMIN — ASPIRIN 243 MG: 81 TABLET, CHEWABLE ORAL at 08:34

## 2024-03-27 RX ADMIN — GABAPENTIN 600 MG: 300 CAPSULE ORAL at 17:10

## 2024-03-27 RX ADMIN — LOSARTAN POTASSIUM 25 MG: 25 TABLET, FILM COATED ORAL at 12:11

## 2024-03-27 RX ADMIN — GABAPENTIN 600 MG: 300 CAPSULE ORAL at 20:55

## 2024-03-27 RX ADMIN — Medication 10 ML: at 20:56

## 2024-03-27 RX ADMIN — CARBIDOPA AND LEVODOPA 2 TABLET: 25; 250 TABLET ORAL at 12:11

## 2024-03-27 RX ADMIN — BENZTROPINE MESYLATE 1 MG: 1 INJECTION INTRAMUSCULAR; INTRAVENOUS at 22:42

## 2024-03-27 RX ADMIN — ATORVASTATIN CALCIUM 40 MG: 40 TABLET, FILM COATED ORAL at 20:55

## 2024-03-27 RX ADMIN — ASPIRIN 81 MG: 81 TABLET, CHEWABLE ORAL at 08:15

## 2024-03-27 RX ADMIN — Medication 10 ML: at 20:55

## 2024-03-27 RX ADMIN — Medication 5000 UNITS: at 12:11

## 2024-03-27 RX ADMIN — ESCITALOPRAM OXALATE 10 MG: 10 TABLET ORAL at 20:55

## 2024-03-27 RX ADMIN — CARBIDOPA AND LEVODOPA 2 TABLET: 25; 250 TABLET ORAL at 17:10

## 2024-03-27 RX ADMIN — IOPAMIDOL 100 ML: 755 INJECTION, SOLUTION INTRAVENOUS at 10:10

## 2024-03-27 RX ADMIN — AMLODIPINE BESYLATE 5 MG: 5 TABLET ORAL at 12:11

## 2024-03-27 RX ADMIN — LORAZEPAM 0.5 MG: 2 INJECTION INTRAMUSCULAR; INTRAVENOUS at 13:56

## 2024-03-27 NOTE — PROGRESS NOTES
Medicated with HS meds and IV dose of Ativan as ordered. Dyskinesia episode continues. Has had to be readjusted in bed by staff multiple times for safety. Does remain alert and carrying on conversation though sometimes rambling off topic. Notified Jess GARZON NP.Catalina Alexander RN

## 2024-03-27 NOTE — PRE SEDATION
Brief Pre-Op Note/Sedation Assessment      Lucille Lilly  1945  [id number]  3:22 PM    Planned Procedure: Cardiac Catheterization Procedure  Post Procedure Plan: Return to same level of care  Consent: I have discussed with the patient and/or the patient representative the indication, alternatives, and the possible risks and/or complications of the planned procedure and the anesthesia methods. The patient and/or patient representative appear to understand and agree to proceed.    Chief Complaint:   Chest Pain/Pressure  Anginal Equivalent  Dyspnea on Exertion  Dyspnea    Indications for Cath Procedure:  Presentation:  Worsening Angina  2.  Anginal Classification within 2 weeks:  CCS IV - Inability to perform any activity without angina or angina at rest, i.e., severe limitation  3.  Angina Symptoms Assessment:  Typical Chest Pain  4.  Heart Failure Class within last 2 weeks:  No symptoms  5.  Cardiovascular Instability:  No    Prior Ischemic Workup/Eval:  Pre-Procedural Medications: Yes: Aspirin, Beta Blockers, and STATIN  2.   Stress Test Completed?  Yes:  Stress or Imaging Studies Performed (within ANY time period):   Type:  Stress Nuclear  Results:  Positive:  Myocardial Perfusion Defects (Nuclear) Extent of Ischemia:  High Risk (>3% annual death or MI)    Does Patient need surgery?  Cath Valve Surgery:  No    Pre-Procedure Medical History:  Vital Signs:  Blood Pressure (Abnormal) 146/55   Pulse 94   Temperature 98.1 °F (36.7 °C) (Oral)   Respiration 15   Height 1.473 m (4' 10\")   Weight 77.1 kg (170 lb)   Oxygen Saturation 95%   Body Mass Index 35.53 kg/m²     Allergies:    Allergies   Allergen Reactions    Meloxicam      Heart rate drops very low    Quinine Derivatives      Fever, chills, vomiting, diarrhea, dizziness    Cymbalta [Duloxetine Hcl]      PATIENT WAS TOLD NOT TKE DUE TO POSSIBLE BLEEDING    Codeine Nausea Only     DIZZINESS-PATIENT ABLE TO TAKE TYLENOL WITH CODEINE    Vicodin  air    Sedation/Anesthesia Plan:  Guard the patient's safety and welfare.  Minimize physical discomfort and pain.  Minimize negative psychological responses to treatment by providing sedation and analgesia and maximize the potential amnesia.  Patient to meet pre-procedure discharge plan.    Medication Planned:  midazolam intravenously and fentanyl intravenously    Patient is an appropriate candidate for plan of sedation:   yes      Electronically signed by Henry Ruvalcaba MD on 3/27/2024 at 3:22 PM

## 2024-03-27 NOTE — PLAN OF CARE
Problem: Discharge Planning  Goal: Discharge to home or other facility with appropriate resources  3/27/2024 0045 by Catalina Alexander, RN  Outcome: Progressing  Flowsheets (Taken 3/27/2024 0045)  Discharge to home or other facility with appropriate resources:   Arrange for needed discharge resources and transportation as appropriate   Identify discharge learning needs (meds, wound care, etc)   Identify barriers to discharge with patient and caregiver   Refer to discharge planning if patient needs post-hospital services based on physician order or complex needs related to functional status, cognitive ability or social support system     Problem: Safety - Adult  Goal: Free from fall injury  3/27/2024 0045 by Catalina Alexander, RN  Outcome: Progressing  Flowsheets (Taken 3/27/2024 0045)  Free From Fall Injury: Instruct family/caregiver on patient safety     Problem: Skin/Tissue Integrity  Goal: Absence of new skin breakdown  Outcome: Progressing

## 2024-03-27 NOTE — CARE COORDINATION
Case Management Assessment  Initial Evaluation    Date/Time of Evaluation: 3/27/2024 2:41 PM  Assessment Completed by: BREE Nobles    If patient is discharged prior to next notation, then this note serves as note for discharge by case management.    Patient Name: Lucille Lilly                   YOB: 1945  Diagnosis: Chest pain [R07.9]  Chest pain, unspecified type [R07.9]                   Date / Time: 3/25/2024  5:29 PM    Patient Admission Status: Inpatient   Readmission Risk (Low < 19, Mod (19-27), High > 27): Readmission Risk Score: 11.6    Current PCP: Miguel Angel Moses MD  PCP verified by CM? (P) Yes    Chart Reviewed: Yes      History Provided by: (P) Patient  Patient Orientation: (P) Alert and Oriented    Patient Cognition: (P) Alert    Hospitalization in the last 30 days (Readmission):  No    If yes, Readmission Assessment in CM Navigator will be completed.    Advance Directives:      Code Status: Full Code   Patient's Primary Decision Maker is: (P) Legal Next of Kin    Primary Decision Maker: Grover Lilly - Spouse - [phone]    Secondary Decision Maker: Celia Oconnell  Child - [phone]    Secondary Decision Maker: Robbie Lilly  Child - [phone]    Discharge Planning:    Patient lives with: (P) Spouse/Significant Other Type of Home: (P) House  Primary Care Giver: (P) Self  Patient Support Systems include: (P) Spouse/Significant Other, Children, Family Members   Current Financial resources: (P) Medicare  Current community resources: (P) None  Current services prior to admission: (P) C-pap            Current DME:              Type of Home Care services:  (P) None    ADLS  Prior functional level: (P) Independent in ADLs/IADLs  Current functional level: (P) Independent in ADLs/IADLs    PT AM-PAC:   /24  OT AM-PAC:   /24    Family can provide assistance at DC: (P) Yes  Would you like Case Management to discuss the discharge plan with any other family members/significant

## 2024-03-27 NOTE — PROGRESS NOTES
Patient's bed alarm went off and this RN entered room to find patient sitting on a chair in the room saying \"I have to go to the bathroom.\" Patient had pulled off tegaderm over cath site on right arm. Patient was hyper verbal and talking about random things but was oriented to place and time. Patient also showing signs of dyskinesia with arms and legs flailing. This RN returned patient to bed with the help of two other staff. Bandaid was placed over insertion site and arm board placed. Dr López notified. Suspects reaction to ativan. Order placed for tele cam.

## 2024-03-27 NOTE — PROGRESS NOTES
Cath insertion site on patient's right wrist remains soft and dressing is clean, dry & intact. Vitals remain stable. Patient denies pain. Will continue monitoring.

## 2024-03-27 NOTE — PROGRESS NOTES
Continues to rest quietly in bed without symptoms of dyskinesia after Cogentin given as ordered. Does wake up with impulsiveness and asks questions about what happened. Has been redirectable. Side rails remain padded for safety.Catalina Alexander RN

## 2024-03-27 NOTE — PROGRESS NOTES
Patient's TR band was taken off at 1400. Upon removal, a hematoma was noted above where the band had been. Pressure was held at insertion site and area above was massaged until tissue was softened. Gauze and tegaderm were placed. Site remains clean, dry and intact.     Vitals have remained stable this shift. Patient has had episodes of dyskinesia throughout the day that were treated with ativan and sinemet.

## 2024-03-27 NOTE — PROGRESS NOTES
Cardiovascular Consultation     Attending Physician: Hernan Martínez MD    PATIENT: Lucille Lilly  : 1945  MRN: [id number]    Reason for Consultation:   Chief Complaint   Patient presents with    Shortness of Breath     Pt with SOB x15 min with chest tightness.  States had an episode last PM of being dizzy.  Denies LOC or falling.  PCP advised to come to ED for evaluation. Denies cardiac hx.        INTERVAL HISTORY:   Doing well s/p PCI of the RCA with DESx1    Medical History:      Diagnosis Date    Anxiety     Arthritis     Asthma     Benign tumor lacrimal gland     removed    Burn 2023    Scaled upper chest    Chronic back pain     Chronic diarrhea     Dyskinesia     Fibromyalgia     Greater trochanteric bursitis of left hip     H/O migraine     Hyperlipidemia     Hypertension     Iron deficiency anemia     DOYLE     Uses CPAP    Pseudophakia     RLS (restless legs syndrome)     SEVERE    Type II or unspecified type diabetes mellitus without mention of complication, not stated as uncontrolled     UTI (urinary tract infection)     Vitamin D deficiency     Wears glasses        Surgical History:      Procedure Laterality Date    APPENDECTOMY      BACK INJECTION Bilateral 2022    BILATERAL SACROILIAC JOINT INJECTION performed by Chanel Orta MD at [facility] ENDOSCOPY    BACK INJECTION Bilateral 2022    BILATERAL SACROILIAC JOINT INJECTION performed by Chanel Orta MD at [facility] ENDOSCOPY    BACK INJECTION Bilateral 2023    BILATERAL SACROILIAC JOINT INJECTION performed by Chanel Orta MD at [facility] ENDOSCOPY    BACK INJECTION Bilateral 2023    BILATERAL SACROILIAC JOINT INJECTION performed by Chanel Orta MD at [facility] ENDOSCOPY    BACK INJECTION Bilateral 2023    BILATERAL SACROILIAC JOINT INJECTION performed by Chanel Orta MD at [facility] ENDOSCOPY    BREAST SURGERY Left     fatty tumor removal      a little   Social Connections: Moderately Isolated (2/13/2023)    Social Connection and Isolation Panel [NHANES]     Frequency of Communication with Friends and Family: More than three times a week     Frequency of Social Gatherings with Friends and Family: More than three times a week     Attends Synagogue Services: Never     Active Member of Clubs or Organizations: No     Attends Club or Organization Meetings: Never     Marital Status:    Intimate Partner Violence: Not At Risk (2/13/2023)    Humiliation, Afraid, Rape, and Kick questionnaire     Fear of Current or Ex-Partner: No     Emotionally Abused: No     Physically Abused: No     Sexually Abused: No   Housing Stability: Low Risk  (3/26/2024)    Housing Stability Vital Sign     Unable to Pay for Housing in the Last Year: No     Number of Places Lived in the Last Year: 1     Unstable Housing in the Last Year: No        Family History:  No evidence for sudden cardiac death or premature CAD.      Problem Relation Age of Onset    Heart Disease Mother     Cancer Mother         multiple myeloma    Heart Failure Mother     Hypertension Mother     Heart Disease Father     Cancer Father         head and neck    Heart Failure Father     Hypertension Father     Heart Disease Sister     Spondylitis Sister     Heart Attack Sister     Heart Failure Sister     Hypertension Sister     Heart Disease Brother     Heart Attack Brother     Heart Failure Brother     Hypertension Brother     Stroke Brother     COPD Brother        Medications:  sodium chloride flush 0.9 % injection 5-40 mL, 2 times per day  sodium chloride flush 0.9 % injection 5-40 mL, PRN  0.9 % sodium chloride infusion, PRN  [START ON 3/28/2024] clopidogrel (PLAVIX) tablet 75 mg, Daily  [START ON 3/28/2024] aspirin chewable tablet 81 mg, Daily  amLODIPine (NORVASC) tablet 5 mg, Daily  atorvastatin (LIPITOR) tablet 40 mg, Nightly  carbidopa-levodopa (SINEMET)  MG per tablet 2 tablet, 4x Daily  vitamin D  No

## 2024-03-27 NOTE — PROGRESS NOTES
Dyskinesia  episode started again. Sitting on side of bed with family. Assisted back to bed. Instructed to stay in bed for night and pure wick applied and explained. Discussed need to keep safe. Padded rails. Await orders.Catalina Alexander RN

## 2024-03-27 NOTE — FLOWSHEET NOTE
! Mg Cogentin IV given per order. Is now resting quietly in bed with eyes closed, CPAP on, and no signs of dyskinesia noted. Call light in reach, bed alarm on, rails remain padded.Continue to monitor.Catalina Alexander RN

## 2024-03-27 NOTE — PROCEDURES
[facility]  Procedure Note    The risks, benefits, and details of the procedure were explained to the patient.  The patient verbalized understanding and wanted to proceed.  Informed written consent was obtained.    INDICATION:  Pre-Procedure Diagnosis:  Worsening Angina, Abnormal stress test    Post-Procedure Diagnosis: same    PROCEDURES PERFORMED:   [facility]  Coronary angiogram  PCI  IVUS   ADAMARIS    PROCEDURE TECHNIQUE:  Local anesthetic was given and access was obtained in the right Radial Artery using a micropuncture technique and a (5/6) Fr Slender Terumo Sheath was placed without difficulty. Catheters were advanced over a 0.35 wire under fluoroscopic guidance  Left coronary angiography was done using a 5 Fr Be L 4.0 diagnostic catheter.  Right coronary angiography was done using a 5 Fr Be R4 diagnostic catheter.  LVEDP obtained. Interventional details below.  At the end of the procedure a TR band device was used for hemostasis.      CONTRAST:  Total of 100 cc.    COMPLICATIONS:  None.  At the end of the procedure a TR band device was used for hemostasis.      EBL: < 20 cc    Moderate Sedation:  Start time: 0916  Stop time: 1004  0.5 mg versed   25 mcg fentanyl   An independent trained observer pushed medications at my direction. We monitored the patient's level of consciousness and vital signs/physiologic status throughout the procedure duration (see start and start times above).       HEMODYNAMICS:  Aortic pressure was 134/82;  LVEDP 10.  There was no gradient between the left ventricle and aorta.      ANGIOGRAM/CORONARY ARTERIOGRAM:       The left main coronary artery is angiographically free of disease.    The left anterior descending artery has a proximal 30% stenosis.   D1 is angiographically free of disease    The left circumflex artery is angiographically free of disease.   OM1 is angiographically free of disease    The right coronary artery is dominant vessel with an 80% calcified

## 2024-03-27 NOTE — ACP (ADVANCE CARE PLANNING)
Advance Care Planning     Advance Care Planning Activator (Inpatient)  Conversation Note      Date of ACP Conversation: 3/27/2024     Conversation Conducted with: Patient with Decision Making Capacity    ACP Activator: BREE Nobles      Health Care Decision Maker:     Current Designated Health Care Decision Maker:     Primary Decision Maker: Grover Lilly E - Spouse - [phone]    Secondary Decision Maker: Celia Oconnell - Child - [phone]    Secondary Decision Maker: Robbie Lilly - Child - [phone]  Today we documented Decision Maker(s) consistent with ACP documents on file.    Care Preferences    Ventilation:  \"If you were in your present state of health and suddenly became very ill and were unable to breathe on your own, what would your preference be about the use of a ventilator (breathing machine) if it were available to you?\"      Would the patient desire the use of ventilator (breathing machine)?: yes    \"If your health worsens and it becomes clear that your chance of recovery is unlikely, what would your preference be about the use of a ventilator (breathing machine) if it were available to you?\"     Would the patient desire the use of ventilator (breathing machine)?: No      Resuscitation  \"CPR works best to restart the heart when there is a sudden event, like a heart attack, in someone who is otherwise healthy. Unfortunately, CPR does not typically restart the heart for people who have serious health conditions or who are very sick.\"    \"In the event your heart stopped as a result of an underlying serious health condition, would you want attempts to be made to restart your heart (answer \"yes\" for attempt to resuscitate) or would you prefer a natural death (answer \"no\" for do not attempt to resuscitate)?\" yes       [] Yes   [x] No   Educated Patient / Decision Maker regarding differences between Advance Directives and portable DNR orders.    Length of ACP Conversation in minutes:

## 2024-03-27 NOTE — PROGRESS NOTES
V2.0    [facility] Progress Note      Name:  Lucille Lilly /Age/Sex: 1945  (79 y.o. female)   MRN & CSN:  [id number] & [id number] Encounter Date/Time: 3/27/2024 8:57 AM EDT   Location:  [id number] PCP: Miguel Angel Moses MD     Attending:Hernan Martínez MD       Hospital Day: 3      HPI :   Chief Complaint: chest pain     Lucille Lilly is a 79 y.o. female who presents with  pmh of hypertension and movement disorder; and diabetes who presents with chest pain.  She describes the chest pain as a dull aching.  The pain has been going on for multiple months.  However on the day of presentation her pain increased.     Subjective:   Seen post angio.   Dyskinesia noted, baseline.   Review of Systems:      Pertinent positives and negatives discussed in HPI    Objective:     Intake/Output Summary (Last 24 hours) at 3/27/2024 1542  Last data filed at 3/27/2024 0600  Gross per 24 hour   Intake 200 ml   Output 800 ml   Net -600 ml        Vitals:   Vitals:    24 1206 24 1238 24 1345 24 1517   BP: (!) 174/83 (!) 164/68 136/66 (!) 146/55   Pulse: 59 73 68 94   Resp: 16 18  15   Temp:  97.5 °F (36.4 °C)  98.1 °F (36.7 °C)   TempSrc:  Oral  Oral   SpO2: 98% 98%  95%   Weight:       Height:             Physical Exam:      Physical Exam Performed:    BP (!) 146/55   Pulse 94   Temp 98.1 °F (36.7 °C) (Oral)   Resp 15   Ht 1.473 m (4' 10\")   Wt 77.1 kg (170 lb)   SpO2 95%   BMI 35.53 kg/m²     General appearance: pleasant female patient, overweight.   HEENT: Pupils equal, round, and reactive to light. Conjunctivae/corneas clear.  Neck: Supple, with full range of motion. No jugular venous distention. Trachea midline.  Respiratory:  Normal respiratory effort. Clear to auscultation, bilaterally without Rales/Wheezes/Rhonchi.  Cardiovascular: Regular rate and rhythm with normal S1/S2 without murmurs, rubs or gallops.  Abdomen: Soft, non-tender, non-distended with normal bowel

## 2024-03-27 NOTE — PROGRESS NOTES
PRE-PROCEDURE    DATE: 3/27/2024 ARRIVAL TO CATH LAB: 8:14 AM    ADMIT SOURCE: Inpatient    ID & ALLERGY BAND: On    CONSENT: Yes    NPO SINCE: Midnight    LABS: see epic results  PREGNANCY TEST: n/a      PULSES:  Right DP Present with Doppler  Right PT Present with Doppler  Left DP Present with Doppler  Left PT Present with Doppler    IV SITE : Patent in R thumb.  with fluids infusing at 75 8:14 AM     SURGERIES PLANNED: No    BLEEDING PROBLEMS: No      COMPLIANCE: Yes    LAST DOSE (if applicable):  ASA: 3/27/24  P2Y12 (Plavix, Effient, Brilinta): n/a  Anticoagulants (Coumadin, Xarelto, Eliquis): n/a  Other Blood Thinners: n/a      OTHER MEDICATIONS TAKEN AT HOME:        PATIENT HISTORY    CHIEF COMPLAINT: Chest Pain    EKG:  Yes    Pre CATH Rhythm: Normal Sinus Rhythm    STRESS TEST PREFORMED:  Yes FINDINGS:  Stress Nuclear: Date:  3/26/24  Result:  Positive: Intermediate     EF: 82    CARDIAC CTA PREFORMED:  No    AGATSTON CORONARY CALCIUM SCORE:   Assessed: No    ECHO: DATE:  No     EF: N/A    PRIOR DIAGNOSTIC PROCEDURE yes, years ago    HYPERTENSION: Yes    DYSLIPIDEMIA: Yes    FAMILY HX OF CAD: Yes    PRIOR MI: No    PRIOR PCI: No    PRIOR CABG: No    CEREBRALVASCULAR DX: No    PERIPHERAL ARTERIAL DISEASE: No    CHRONIC LUNG DISEASE: No    TOBACCO: Former.   Quit Date: 1999    DIABETIC: Yes, Oral Treatment    CIALIS/VIAGRA IN PAST 24 HOURS: No    CARDIAC ARREST: No    DIALYSIS: No    FRAILTY SCORE: 6 MODERATELY FRAIL (need help with all outside activities and housekeeping, often have problems with stairs, bathing, dressing)    CHEST PAIN SYMPTOM ASSESSMENT:Asymptomatic    CARDIOVASCULAR INSTABILITY: NO    Groins shaved

## 2024-03-27 NOTE — CONSULTS
[facility]  CARDIOPULMONARY PHASE I CONSULT        NAME:  Lucille Lilly  MEDICAL RECORD NUMBER:  [id number]  AGE: 79 y.o.   GENDER: female  : 1945  TODAY'S DATE:  3/27/2024    Subjective:     VISIT TYPE:  Cardiac rehab phase1     ADMITTING PHYSICIAN:  Hernan Martínez MD     PAST MEDICAL HISTORY        Diagnosis Date    Anxiety     Arthritis     Asthma     Benign tumor lacrimal gland     removed    Burn 2023    Scaled upper chest    Chronic back pain     Chronic diarrhea     Dyskinesia     Fibromyalgia     Greater trochanteric bursitis of left hip     H/O migraine     Hyperlipidemia     Hypertension     Iron deficiency anemia     DOYLE     Uses CPAP    Pseudophakia     RLS (restless legs syndrome)     SEVERE    Type II or unspecified type diabetes mellitus without mention of complication, not stated as uncontrolled     UTI (urinary tract infection)     Vitamin D deficiency     Wears glasses        SOCIAL HISTORY    Social History     Tobacco Use    Smoking status: Former     Current packs/day: 0.00     Average packs/day: 2.0 packs/day for 36.0 years (72.0 ttl pk-yrs)     Types: Cigarettes     Start date: 1963     Quit date: 1999     Years since quittin.9     Passive exposure: Past    Smokeless tobacco: Never   Vaping Use    Vaping Use: Never used   Substance Use Topics    Alcohol use: No     Alcohol/week: 0.0 standard drinks of alcohol    Drug use: Never       ALLERGIES    Allergies   Allergen Reactions    Meloxicam      Heart rate drops very low    Quinine Derivatives      Fever, chills, vomiting, diarrhea, dizziness    Cymbalta [Duloxetine Hcl]      PATIENT WAS TOLD NOT TKE DUE TO POSSIBLE BLEEDING    Codeine Nausea Only     DIZZINESS-PATIENT ABLE TO TAKE TYLENOL WITH CODEINE    Vicodin [Hydrocodone-Acetaminophen] Anxiety     Hyperactivity, nausea       MEDICATIONS  Scheduled Meds:   sodium chloride flush  5-40 mL IntraVENous 2 times per day    [START ON 3/28/2024]

## 2024-03-28 VITALS
HEART RATE: 70 BPM | BODY MASS INDEX: 34.66 KG/M2 | TEMPERATURE: 98.7 F | RESPIRATION RATE: 14 BRPM | WEIGHT: 165.12 LBS | OXYGEN SATURATION: 97 % | DIASTOLIC BLOOD PRESSURE: 76 MMHG | HEIGHT: 58 IN | SYSTOLIC BLOOD PRESSURE: 133 MMHG

## 2024-03-28 LAB
ANION GAP SERPL CALCULATED.3IONS-SCNC: 11 MMOL/L (ref 3–16)
BASOPHILS # BLD: 0.1 K/UL (ref 0–0.2)
BASOPHILS NFR BLD: 0.8 %
BUN SERPL-MCNC: 9 MG/DL (ref 7–20)
CALCIUM SERPL-MCNC: 9.5 MG/DL (ref 8.3–10.6)
CHLORIDE SERPL-SCNC: 105 MMOL/L (ref 99–110)
CO2 SERPL-SCNC: 26 MMOL/L (ref 21–32)
CREAT SERPL-MCNC: <0.5 MG/DL (ref 0.6–1.2)
DEPRECATED RDW RBC AUTO: 15.1 % (ref 12.4–15.4)
EKG ATRIAL RATE: 61 BPM
EKG DIAGNOSIS: NORMAL
EKG P AXIS: 86 DEGREES
EKG P-R INTERVAL: 194 MS
EKG Q-T INTERVAL: 420 MS
EKG QRS DURATION: 86 MS
EKG QTC CALCULATION (BAZETT): 422 MS
EKG R AXIS: -31 DEGREES
EKG T AXIS: 61 DEGREES
EKG VENTRICULAR RATE: 61 BPM
EOSINOPHIL # BLD: 0.1 K/UL (ref 0–0.6)
EOSINOPHIL NFR BLD: 1.3 %
GFR SERPLBLD CREATININE-BSD FMLA CKD-EPI: >90 ML/MIN/{1.73_M2}
GLUCOSE SERPL-MCNC: 125 MG/DL (ref 70–99)
HCT VFR BLD AUTO: 33.6 % (ref 36–48)
HGB BLD-MCNC: 10.7 G/DL (ref 12–16)
LYMPHOCYTES # BLD: 1.4 K/UL (ref 1–5.1)
LYMPHOCYTES NFR BLD: 13.3 %
MCH RBC QN AUTO: 25 PG (ref 26–34)
MCHC RBC AUTO-ENTMCNC: 31.9 G/DL (ref 31–36)
MCV RBC AUTO: 78.3 FL (ref 80–100)
MONOCYTES # BLD: 1.1 K/UL (ref 0–1.3)
MONOCYTES NFR BLD: 10.5 %
NEUTROPHILS # BLD: 7.9 K/UL (ref 1.7–7.7)
NEUTROPHILS NFR BLD: 74.1 %
PLATELET # BLD AUTO: 396 K/UL (ref 135–450)
PMV BLD AUTO: 7.3 FL (ref 5–10.5)
POTASSIUM SERPL-SCNC: 3.9 MMOL/L (ref 3.5–5.1)
RBC # BLD AUTO: 4.28 M/UL (ref 4–5.2)
SODIUM SERPL-SCNC: 142 MMOL/L (ref 136–145)
WBC # BLD AUTO: 10.7 K/UL (ref 4–11)

## 2024-03-28 PROCEDURE — 36415 COLL VENOUS BLD VENIPUNCTURE: CPT

## 2024-03-28 PROCEDURE — 2580000003 HC RX 258: Performed by: STUDENT IN AN ORGANIZED HEALTH CARE EDUCATION/TRAINING PROGRAM

## 2024-03-28 PROCEDURE — 6360000002 HC RX W HCPCS: Performed by: NURSE PRACTITIONER

## 2024-03-28 PROCEDURE — 6370000000 HC RX 637 (ALT 250 FOR IP): Performed by: HOSPITALIST

## 2024-03-28 PROCEDURE — 6370000000 HC RX 637 (ALT 250 FOR IP): Performed by: STUDENT IN AN ORGANIZED HEALTH CARE EDUCATION/TRAINING PROGRAM

## 2024-03-28 PROCEDURE — 2580000003 HC RX 258

## 2024-03-28 PROCEDURE — 93010 ELECTROCARDIOGRAM REPORT: CPT | Performed by: INTERNAL MEDICINE

## 2024-03-28 PROCEDURE — 80048 BASIC METABOLIC PNL TOTAL CA: CPT

## 2024-03-28 PROCEDURE — 85025 COMPLETE CBC W/AUTO DIFF WBC: CPT

## 2024-03-28 RX ORDER — ASPIRIN 81 MG/1
81 TABLET, CHEWABLE ORAL DAILY
Qty: 30 TABLET | Refills: 3 | Status: SHIPPED | OUTPATIENT
Start: 2024-03-29

## 2024-03-28 RX ORDER — CLOPIDOGREL BISULFATE 75 MG/1
75 TABLET ORAL DAILY
Qty: 30 TABLET | Refills: 3 | Status: SHIPPED | OUTPATIENT
Start: 2024-03-29

## 2024-03-28 RX ORDER — CARVEDILOL 3.12 MG/1
3.12 TABLET ORAL 2 TIMES DAILY
Qty: 180 TABLET | Refills: 0 | Status: SHIPPED | OUTPATIENT
Start: 2024-03-28

## 2024-03-28 RX ORDER — ZIPRASIDONE MESYLATE 20 MG/ML
20 INJECTION, POWDER, LYOPHILIZED, FOR SOLUTION INTRAMUSCULAR ONCE
Status: COMPLETED | OUTPATIENT
Start: 2024-03-28 | End: 2024-03-28

## 2024-03-28 RX ORDER — WATER 10 ML/10ML
INJECTION INTRAMUSCULAR; INTRAVENOUS; SUBCUTANEOUS
Status: COMPLETED
Start: 2024-03-28 | End: 2024-03-28

## 2024-03-28 RX ADMIN — LOSARTAN POTASSIUM 25 MG: 25 TABLET, FILM COATED ORAL at 09:56

## 2024-03-28 RX ADMIN — ZIPRASIDONE MESYLATE 20 MG: 20 INJECTION, POWDER, LYOPHILIZED, FOR SOLUTION INTRAMUSCULAR at 00:13

## 2024-03-28 RX ADMIN — Medication 10 ML: at 09:59

## 2024-03-28 RX ADMIN — Medication 5000 UNITS: at 09:56

## 2024-03-28 RX ADMIN — CLOPIDOGREL BISULFATE 75 MG: 75 TABLET ORAL at 09:56

## 2024-03-28 RX ADMIN — ASPIRIN 81 MG: 81 TABLET, CHEWABLE ORAL at 09:56

## 2024-03-28 RX ADMIN — AMLODIPINE BESYLATE 5 MG: 5 TABLET ORAL at 09:56

## 2024-03-28 RX ADMIN — PANTOPRAZOLE SODIUM 40 MG: 40 TABLET, DELAYED RELEASE ORAL at 05:48

## 2024-03-28 RX ADMIN — CARBIDOPA AND LEVODOPA 2 TABLET: 25; 250 TABLET ORAL at 09:58

## 2024-03-28 RX ADMIN — WATER 10 ML: 1 INJECTION INTRAMUSCULAR; INTRAVENOUS; SUBCUTANEOUS at 00:13

## 2024-03-28 NOTE — PLAN OF CARE
Problem: Discharge Planning  Goal: Discharge to home or other facility with appropriate resources  Outcome: Adequate for Discharge     Problem: Safety - Adult  Goal: Free from fall injury  Outcome: Adequate for Discharge     Problem: Skin/Tissue Integrity  Goal: Absence of new skin breakdown  Description: 1.  Monitor for areas of redness and/or skin breakdown  2.  Assess vascular access sites hourly  3.  Every 4-6 hours minimum:  Change oxygen saturation probe site  4.  Every 4-6 hours:  If on nasal continuous positive airway pressure, respiratory therapy assess nares and determine need for appliance change or resting period.  Outcome: Adequate for Discharge

## 2024-03-28 NOTE — CARE COORDINATION
Case Management Discharge Note          Date / Time of Note: 3/28/2024 12:08 PM                  Patient Name: Lucille Lilly   YOB: 1945  Diagnosis: Chest pain [R07.9]  Chest pain, unspecified type [R07.9]   Date / Time: 3/25/2024  5:29 PM    Financial:  Payor: MEDICARE / Plan: MEDICARE PART A AND B / Product Type: *No Product type* /      Pharmacy:    [id number] - [place], [place] - [address] - P [phone] - F [phone]  [address]  [facility] [address]  Phone: [phone] Fax: [phone]    [facility] - [place], [place] - [address] - P [phone] - F [phone]  [address]  [facility] [address]  [facility] [address]  Phone: [phone] Fax: [phone]      Assistance purchasing medications?: Potential Assistance Purchasing Medications: No  Assistance provided by Case Management: None at this time    DISCHARGE Disposition: Home- No Services Needed      Transportation:  Transportation PLAN for discharge: family   Mode of Transport: Private Car  Time of Transport: When ready    Transport form completed: Not Indicated    IMM Completed:   Yes, Case management has presented and reviewed IMM letter #2.   IMM Letter given to Patient/Family/Significant other/Guardian/POA/by:: Patient was upgraded to inpatient status. Initial IMM Provided to patient at bedside by BREE Nobles. Education provided to patient, patient reported no questions and verbalized understanding. Patient aware of 4 hours allotted time to determine if they choose to pursue Medicare appeal process. Patient signed document, copy placed on chart and copy given to patient.   IMM Letter date given:: 03/27/24  IMM Letter time given:: 1410.   Patient and/or family/POA verbalized understanding of their medicare rights and appeal process if needed. Patient and/or family/POA has signed, initialed and placed the date and time on IMM letter #2 on the the

## 2024-03-28 NOTE — DISCHARGE SUMMARY
Hospital Medicine Discharge Summary    Patient ID: Lucillelulu Lilly      Patient's PCP: Miguel Angel Moses MD    Admit Date: 3/25/2024     Discharge Date:   03/28/24      Admitting Provider: Hernan Martínez MD     Discharge Provider: Hernan Martínez MD     Discharge Diagnoses:       Active Hospital Problems    Diagnosis     Chest pain [R07.9]        The patient was seen and examined on day of discharge and this discharge summary is in conjunction with any daily progress note from day of discharge.    Hospital Course:      79 y.o. female who presents with  pmh of hypertension and movement disorder; and diabetes who presents with chest pain.  She describes the chest pain as a dull aching.  The pain has been going on for multiple months.  However on the day of presentation her pain increased.       Chest pain.   Concerning chest pain, trop negative, seen by cardiology.   Stress test done 3/26, with moderate-sized moderate to severe intensity reversible defect of inferior wall concerning for ischemia.  Underwent angiogram 3/27, PCI with YVETTE to RCA.  Continue on aspirin and Plavix for 12 months  Follow up with cardiology.      Hypertension.  Continue losartan, amlodipine     Hyperlipidemia.  Continue atorvastatin     Neuropathy.  Continue home medications    Physical Exam Performed:     /76   Pulse 70   Temp 98.7 °F (37.1 °C) (Rectal)   Resp 14   Ht 1.473 m (4' 10\")   Wt 74.9 kg (165 lb 2 oz)   SpO2 97%   BMI 34.51 kg/m²       General appearance:  pleasant. Dyskinesia  resolved this morning.   HEENT:  Normal cephalic, atraumatic without obvious deformity. Pupils equal, round, and reactive to light.  Extra ocular muscles intact. Conjunctivae/corneas clear.  Neck: Supple, with full range of motion. No jugular venous distention. Trachea midline.  Respiratory:  Normal respiratory effort. Clear to auscultation, bilaterally without Rales/Wheezes/Rhonchi.  Cardiovascular:  Regular rate and rhythm with normal  1 capsule by mouth daily  Qty: 90 capsule, Refills: 3             Time Spent on discharge: 35 in the examination, evaluation, counseling and review of medications and discharge plan.      Signed:    Hernan Martínez MD   3/28/2024      Thank you Miguel Angel Moses MD for the opportunity to be involved in this patient's care. If you have any questions or concerns, please feel free to contact me at [phone].    Comment: Please note this report has been produced using speech recognition software and may contain errors related to that system including errors in grammar, punctuation, and spelling, as well as words and phrases that may be inappropriate. If there are any questions or concerns, please feel free to contact the dictating provider for clarification.

## 2024-03-28 NOTE — PROGRESS NOTES
Patient remains asleep, with cpap on,arousable with prodding. Vitals are stable. (Received geodon and cogentin overnight for agitation from dyskinesia.) Notified Dr López. Unable to give morning medications due to drowsiness. Will re-attempt. Requested that echo be done at bedside. Will continue monitoring.

## 2024-03-28 NOTE — PLAN OF CARE
Problem: Discharge Planning  Goal: Discharge to home or other facility with appropriate resources  3/27/2024 2024 by Sonia Flores, RN  Outcome: Progressing     Problem: Safety - Adult  Goal: Free from fall injury  3/27/2024 2024 by Sonia Flores, RN  Outcome: Progressing     Problem: Skin/Tissue Integrity  Goal: Absence of new skin breakdown  Description: 1.  Monitor for areas of redness and/or skin breakdown  2.  Assess vascular access sites hourly  3.  Every 4-6 hours minimum:  Change oxygen saturation probe site  4.  Every 4-6 hours:  If on nasal continuous positive airway pressure, respiratory therapy assess nares and determine need for appliance change or resting period.  3/27/2024 2024 by Sonia Flores, RN  Outcome: Progressing

## 2024-03-28 NOTE — PROGRESS NOTES
Patient was able to be wakened. She is alert and oriented, took all medications and ate breakfast. Notified Dr López.

## 2024-03-29 ENCOUNTER — TELEPHONE (OUTPATIENT)
Dept: PRIMARY CARE CLINIC | Age: 79
End: 2024-03-29

## 2024-03-29 NOTE — TELEPHONE ENCOUNTER
Care Transitions Initial Follow Up Call    Outreach made within 2 business days of discharge: Yes    Patient: Lucille Lilly Patient : 1945   MRN: [id number]  Reason for Admission: There are no discharge diagnoses documented for the most recent discharge.  Discharge Date: 3/28/24       Spoke with: Lucille    Discharge department/facility: [facility] Interactive Patient Contact:  Was patient able to fill all prescriptions: Yes  Was patient instructed to bring all medications to the follow-up visit: Yes  Is patient taking all medications as directed in the discharge summary? Yes  Does patient understand their discharge instructions: Yes  Does patient have questions or concerns that need addressed prior to 7-14 day follow up office visit: no    Scheduled appointment with PCP within 7-14 days    Follow Up  Future Appointments   Date Time Provider Department Center   2024  3:00 PM Miguel Angel Moses MD [address]  [place]   2024  8:45 AM Henry Ruvalcaba MD [facility]   2024  1:00 PM Henry Duong DO [facility]   2024 11:15 AM Miguel Angel Moses MD [address] [facility]       Joan Arnold MA

## 2024-03-30 ENCOUNTER — HOSPITAL ENCOUNTER (INPATIENT)
Age: 79
LOS: 2 days | Discharge: HOME HEALTH | DRG: 312 | End: 2024-04-02
Attending: EMERGENCY MEDICINE | Admitting: INTERNAL MEDICINE
Payer: MEDICARE

## 2024-03-30 ENCOUNTER — APPOINTMENT (OUTPATIENT)
Dept: GENERAL RADIOLOGY | Age: 79
DRG: 312 | End: 2024-03-30
Payer: MEDICARE

## 2024-03-30 ENCOUNTER — APPOINTMENT (OUTPATIENT)
Dept: CT IMAGING | Age: 79
DRG: 312 | End: 2024-03-30
Payer: MEDICARE

## 2024-03-30 DIAGNOSIS — R55 SYNCOPE AND COLLAPSE: Primary | ICD-10-CM

## 2024-03-30 DIAGNOSIS — R79.89 ELEVATED TROPONIN: ICD-10-CM

## 2024-03-30 DIAGNOSIS — I95.9 HYPOTENSION, UNSPECIFIED HYPOTENSION TYPE: ICD-10-CM

## 2024-03-30 LAB
ALBUMIN SERPL-MCNC: 4 G/DL (ref 3.4–5)
ALBUMIN/GLOB SERPL: 1.9 {RATIO} (ref 1.1–2.2)
ALP SERPL-CCNC: 71 U/L (ref 40–129)
ALT SERPL-CCNC: <5 U/L (ref 10–40)
ANION GAP SERPL CALCULATED.3IONS-SCNC: 12 MMOL/L (ref 3–16)
AST SERPL-CCNC: 17 U/L (ref 15–37)
BASOPHILS # BLD: 0 K/UL (ref 0–0.2)
BASOPHILS NFR BLD: 0.7 %
BILIRUB SERPL-MCNC: 0.4 MG/DL (ref 0–1)
BUN SERPL-MCNC: 16 MG/DL (ref 7–20)
CALCIUM SERPL-MCNC: 9.5 MG/DL (ref 8.3–10.6)
CHLORIDE SERPL-SCNC: 99 MMOL/L (ref 99–110)
CO2 SERPL-SCNC: 26 MMOL/L (ref 21–32)
CREAT SERPL-MCNC: 0.7 MG/DL (ref 0.6–1.2)
DEPRECATED RDW RBC AUTO: 15.4 % (ref 12.4–15.4)
EOSINOPHIL # BLD: 0.2 K/UL (ref 0–0.6)
EOSINOPHIL NFR BLD: 2.9 %
FLUAV RNA UPPER RESP QL NAA+PROBE: NEGATIVE
FLUBV AG NPH QL: NEGATIVE
GFR SERPLBLD CREATININE-BSD FMLA CKD-EPI: 89 ML/MIN/{1.73_M2}
GLUCOSE SERPL-MCNC: 156 MG/DL (ref 70–99)
HCT VFR BLD AUTO: 28.9 % (ref 36–48)
HGB BLD-MCNC: 9 G/DL (ref 12–16)
LYMPHOCYTES # BLD: 1.2 K/UL (ref 1–5.1)
LYMPHOCYTES NFR BLD: 18.4 %
MAGNESIUM SERPL-MCNC: 1.4 MG/DL (ref 1.8–2.4)
MCH RBC QN AUTO: 24.1 PG (ref 26–34)
MCHC RBC AUTO-ENTMCNC: 31 G/DL (ref 31–36)
MCV RBC AUTO: 78 FL (ref 80–100)
MONOCYTES # BLD: 0.8 K/UL (ref 0–1.3)
MONOCYTES NFR BLD: 12.3 %
NEUTROPHILS # BLD: 4.3 K/UL (ref 1.7–7.7)
NEUTROPHILS NFR BLD: 65.7 %
NT-PROBNP SERPL-MCNC: 294 PG/ML (ref 0–449)
PLATELET # BLD AUTO: 390 K/UL (ref 135–450)
PMV BLD AUTO: 7.4 FL (ref 5–10.5)
POTASSIUM SERPL-SCNC: 3.5 MMOL/L (ref 3.5–5.1)
PROT SERPL-MCNC: 6.1 G/DL (ref 6.4–8.2)
RBC # BLD AUTO: 3.71 M/UL (ref 4–5.2)
SARS-COV-2 RDRP RESP QL NAA+PROBE: NOT DETECTED
SODIUM SERPL-SCNC: 137 MMOL/L (ref 136–145)
TROPONIN, HIGH SENSITIVITY: 23 NG/L (ref 0–14)
TROPONIN, HIGH SENSITIVITY: 27 NG/L (ref 0–14)
WBC # BLD AUTO: 6.6 K/UL (ref 4–11)

## 2024-03-30 PROCEDURE — 96361 HYDRATE IV INFUSION ADD-ON: CPT

## 2024-03-30 PROCEDURE — 83880 ASSAY OF NATRIURETIC PEPTIDE: CPT

## 2024-03-30 PROCEDURE — G0378 HOSPITAL OBSERVATION PER HR: HCPCS

## 2024-03-30 PROCEDURE — 70450 CT HEAD/BRAIN W/O DYE: CPT

## 2024-03-30 PROCEDURE — 87635 SARS-COV-2 COVID-19 AMP PRB: CPT

## 2024-03-30 PROCEDURE — 71045 X-RAY EXAM CHEST 1 VIEW: CPT

## 2024-03-30 PROCEDURE — 99285 EMERGENCY DEPT VISIT HI MDM: CPT

## 2024-03-30 PROCEDURE — 6370000000 HC RX 637 (ALT 250 FOR IP): Performed by: NURSE PRACTITIONER

## 2024-03-30 PROCEDURE — 83735 ASSAY OF MAGNESIUM: CPT

## 2024-03-30 PROCEDURE — 87804 INFLUENZA ASSAY W/OPTIC: CPT

## 2024-03-30 PROCEDURE — 96372 THER/PROPH/DIAG INJ SC/IM: CPT

## 2024-03-30 PROCEDURE — 36415 COLL VENOUS BLD VENIPUNCTURE: CPT

## 2024-03-30 PROCEDURE — P9047 ALBUMIN (HUMAN), 25%, 50ML: HCPCS | Performed by: NURSE PRACTITIONER

## 2024-03-30 PROCEDURE — 80053 COMPREHEN METABOLIC PANEL: CPT

## 2024-03-30 PROCEDURE — 2580000003 HC RX 258: Performed by: PHYSICIAN ASSISTANT

## 2024-03-30 PROCEDURE — 96365 THER/PROPH/DIAG IV INF INIT: CPT

## 2024-03-30 PROCEDURE — 85025 COMPLETE CBC W/AUTO DIFF WBC: CPT

## 2024-03-30 PROCEDURE — 6360000002 HC RX W HCPCS: Performed by: NURSE PRACTITIONER

## 2024-03-30 PROCEDURE — 6370000000 HC RX 637 (ALT 250 FOR IP): Performed by: INTERNAL MEDICINE

## 2024-03-30 PROCEDURE — 6360000002 HC RX W HCPCS: Performed by: INTERNAL MEDICINE

## 2024-03-30 PROCEDURE — 2580000003 HC RX 258: Performed by: INTERNAL MEDICINE

## 2024-03-30 PROCEDURE — 93005 ELECTROCARDIOGRAM TRACING: CPT | Performed by: PHYSICIAN ASSISTANT

## 2024-03-30 PROCEDURE — 96360 HYDRATION IV INFUSION INIT: CPT

## 2024-03-30 PROCEDURE — 84484 ASSAY OF TROPONIN QUANT: CPT

## 2024-03-30 PROCEDURE — 72125 CT NECK SPINE W/O DYE: CPT

## 2024-03-30 RX ORDER — ONDANSETRON 2 MG/ML
4 INJECTION INTRAMUSCULAR; INTRAVENOUS EVERY 6 HOURS PRN
Status: DISCONTINUED | OUTPATIENT
Start: 2024-03-30 | End: 2024-04-02 | Stop reason: HOSPADM

## 2024-03-30 RX ORDER — SODIUM CHLORIDE 0.9 % (FLUSH) 0.9 %
5-40 SYRINGE (ML) INJECTION EVERY 12 HOURS SCHEDULED
Status: DISCONTINUED | OUTPATIENT
Start: 2024-03-30 | End: 2024-04-02 | Stop reason: HOSPADM

## 2024-03-30 RX ORDER — POTASSIUM CHLORIDE 20 MEQ/1
40 TABLET, EXTENDED RELEASE ORAL PRN
Status: DISCONTINUED | OUTPATIENT
Start: 2024-03-30 | End: 2024-04-02 | Stop reason: HOSPADM

## 2024-03-30 RX ORDER — CARBIDOPA/LEVODOPA 25MG-250MG
2 TABLET ORAL 4 TIMES DAILY
Status: DISCONTINUED | OUTPATIENT
Start: 2024-03-30 | End: 2024-04-01

## 2024-03-30 RX ORDER — CLOPIDOGREL BISULFATE 75 MG/1
75 TABLET ORAL DAILY
Status: DISCONTINUED | OUTPATIENT
Start: 2024-03-31 | End: 2024-04-02 | Stop reason: HOSPADM

## 2024-03-30 RX ORDER — SODIUM CHLORIDE 9 MG/ML
INJECTION, SOLUTION INTRAVENOUS PRN
Status: DISCONTINUED | OUTPATIENT
Start: 2024-03-30 | End: 2024-04-02 | Stop reason: HOSPADM

## 2024-03-30 RX ORDER — ALBUMIN (HUMAN) 12.5 G/50ML
25 SOLUTION INTRAVENOUS ONCE
Status: COMPLETED | OUTPATIENT
Start: 2024-03-30 | End: 2024-03-31

## 2024-03-30 RX ORDER — 0.9 % SODIUM CHLORIDE 0.9 %
500 INTRAVENOUS SOLUTION INTRAVENOUS ONCE
Status: COMPLETED | OUTPATIENT
Start: 2024-03-30 | End: 2024-03-30

## 2024-03-30 RX ORDER — ONDANSETRON 4 MG/1
4 TABLET, ORALLY DISINTEGRATING ORAL EVERY 8 HOURS PRN
Status: DISCONTINUED | OUTPATIENT
Start: 2024-03-30 | End: 2024-04-02 | Stop reason: HOSPADM

## 2024-03-30 RX ORDER — ACETAMINOPHEN 650 MG/1
650 SUPPOSITORY RECTAL EVERY 6 HOURS PRN
Status: DISCONTINUED | OUTPATIENT
Start: 2024-03-30 | End: 2024-04-02 | Stop reason: HOSPADM

## 2024-03-30 RX ORDER — GABAPENTIN 300 MG/1
600 CAPSULE ORAL 2 TIMES DAILY
Status: DISCONTINUED | OUTPATIENT
Start: 2024-03-30 | End: 2024-04-02 | Stop reason: HOSPADM

## 2024-03-30 RX ORDER — MAGNESIUM SULFATE IN WATER 40 MG/ML
2000 INJECTION, SOLUTION INTRAVENOUS PRN
Status: DISCONTINUED | OUTPATIENT
Start: 2024-03-30 | End: 2024-04-02 | Stop reason: HOSPADM

## 2024-03-30 RX ORDER — POLYETHYLENE GLYCOL 3350 17 G/17G
17 POWDER, FOR SOLUTION ORAL DAILY PRN
Status: DISCONTINUED | OUTPATIENT
Start: 2024-03-30 | End: 2024-04-02 | Stop reason: HOSPADM

## 2024-03-30 RX ORDER — ASPIRIN 81 MG/1
81 TABLET, CHEWABLE ORAL DAILY
Status: DISCONTINUED | OUTPATIENT
Start: 2024-03-31 | End: 2024-04-02 | Stop reason: HOSPADM

## 2024-03-30 RX ORDER — ACETAMINOPHEN 325 MG/1
650 TABLET ORAL EVERY 6 HOURS PRN
Status: DISCONTINUED | OUTPATIENT
Start: 2024-03-30 | End: 2024-04-02 | Stop reason: HOSPADM

## 2024-03-30 RX ORDER — ATORVASTATIN CALCIUM 40 MG/1
40 TABLET, FILM COATED ORAL NIGHTLY
Status: DISCONTINUED | OUTPATIENT
Start: 2024-03-30 | End: 2024-04-02 | Stop reason: HOSPADM

## 2024-03-30 RX ORDER — SODIUM CHLORIDE 0.9 % (FLUSH) 0.9 %
5-40 SYRINGE (ML) INJECTION PRN
Status: DISCONTINUED | OUTPATIENT
Start: 2024-03-30 | End: 2024-04-02 | Stop reason: HOSPADM

## 2024-03-30 RX ORDER — SODIUM CHLORIDE 9 MG/ML
INJECTION, SOLUTION INTRAVENOUS CONTINUOUS
Status: DISCONTINUED | OUTPATIENT
Start: 2024-03-30 | End: 2024-03-31

## 2024-03-30 RX ORDER — POTASSIUM CHLORIDE 7.45 MG/ML
10 INJECTION INTRAVENOUS PRN
Status: DISCONTINUED | OUTPATIENT
Start: 2024-03-30 | End: 2024-04-02 | Stop reason: HOSPADM

## 2024-03-30 RX ORDER — 0.9 % SODIUM CHLORIDE 0.9 %
1000 INTRAVENOUS SOLUTION INTRAVENOUS ONCE
Status: COMPLETED | OUTPATIENT
Start: 2024-03-30 | End: 2024-03-30

## 2024-03-30 RX ORDER — MIDODRINE HYDROCHLORIDE 10 MG/1
10 TABLET ORAL ONCE
Status: COMPLETED | OUTPATIENT
Start: 2024-03-30 | End: 2024-03-30

## 2024-03-30 RX ORDER — ESCITALOPRAM OXALATE 10 MG/1
10 TABLET ORAL NIGHTLY
Status: DISCONTINUED | OUTPATIENT
Start: 2024-03-30 | End: 2024-04-02 | Stop reason: HOSPADM

## 2024-03-30 RX ORDER — ENOXAPARIN SODIUM 100 MG/ML
40 INJECTION SUBCUTANEOUS
Status: DISCONTINUED | OUTPATIENT
Start: 2024-03-30 | End: 2024-03-31

## 2024-03-30 RX ADMIN — SODIUM CHLORIDE 500 ML: 9 INJECTION, SOLUTION INTRAVENOUS at 14:44

## 2024-03-30 RX ADMIN — MIDODRINE HYDROCHLORIDE 10 MG: 10 TABLET ORAL at 22:54

## 2024-03-30 RX ADMIN — ATORVASTATIN CALCIUM 40 MG: 40 TABLET, FILM COATED ORAL at 20:43

## 2024-03-30 RX ADMIN — GABAPENTIN 600 MG: 300 CAPSULE ORAL at 20:43

## 2024-03-30 RX ADMIN — SODIUM CHLORIDE 1000 ML: 9 INJECTION, SOLUTION INTRAVENOUS at 12:54

## 2024-03-30 RX ADMIN — SODIUM CHLORIDE: 9 INJECTION, SOLUTION INTRAVENOUS at 18:32

## 2024-03-30 RX ADMIN — ALBUMIN (HUMAN) 25 G: 0.25 INJECTION, SOLUTION INTRAVENOUS at 23:02

## 2024-03-30 RX ADMIN — CARBIDOPA AND LEVODOPA 2 TABLET: 25; 250 TABLET ORAL at 20:44

## 2024-03-30 RX ADMIN — ENOXAPARIN SODIUM 40 MG: 100 INJECTION SUBCUTANEOUS at 20:42

## 2024-03-30 RX ADMIN — ESCITALOPRAM OXALATE 10 MG: 10 TABLET ORAL at 20:43

## 2024-03-30 RX ADMIN — CARBIDOPA AND LEVODOPA 2 TABLET: 25; 250 TABLET ORAL at 18:33

## 2024-03-30 ASSESSMENT — PAIN DESCRIPTION - DESCRIPTORS: DESCRIPTORS: SORE

## 2024-03-30 ASSESSMENT — PAIN DESCRIPTION - LOCATION: LOCATION: SHOULDER

## 2024-03-30 ASSESSMENT — PAIN DESCRIPTION - ORIENTATION: ORIENTATION: RIGHT

## 2024-03-30 ASSESSMENT — PAIN SCALES - GENERAL: PAINLEVEL_OUTOF10: 6

## 2024-03-30 ASSESSMENT — PAIN - FUNCTIONAL ASSESSMENT: PAIN_FUNCTIONAL_ASSESSMENT: 0-10

## 2024-03-30 NOTE — H&P
History and Physical      Name:  Lucille Lilly /Age/Sex: 1945  (79 y.o. female)   MRN & CSN:  [id number] & [id number] Admission Date/Time: 3/30/2024 12:17 PM   Location:   PCP: Miguel Angel Moses MD       Hospital Day: 1    Assessment and Plan:   Lucille Lilly is a 79 y.o.  female  who presents with Syncope and collapse    Assessment and plan:     Syncope  Likely from hypotension due to medication  EKG with no acute ST-T wave changes  Hold antihypertensives for now  Continue IV fluids.    Coronary Artery Disease:   Status post left heart catheterization with PCI to right coronary artery  Continue dual antiplatelet therapy, high intensity statin.    Hypomagnesemia, replace accordingly.    Hypertension: Hold hypertensives.    Hyperlipidemia: continue Lipitor.    Chronic Conditions: continue home medication as ordered    Diet No diet orders on file   DVT Prophylaxis [x] Lovenox, []  Heparin, [] SCDs, [] Warfarin  [] NOAC     GI Prophylaxis [] PPI,  [] H2 Blocker,  [] Carafate,  [x] Diet/Tube Feeds   Code Status Prior     History of Present Illness:     Chief Complaint: Syncope and collapse  Lucille Lilly is a 79 y.o.  female, with past medical history significant for hyperlipidemia, hypertension, diabetes, presented to the hospital due to loss of consciousness.  The patient was recently admitted to the hospital on 2024 due to chest pain.  Stress test was positive for moderate-sized moderate to severe intensity reversible defect of the inferior wall.  She underwent left heart catheterization with PCI to right coronary artery.    Since this morning, she has intermittent episodes of loss consciousness.  She feels dizzy and lightheaded.  She denied vomiting or diarrhea.  She denied chest pain or shortness of breath.  She denies dysuria or frequency.  She denies fever or chills.    At the ED, blood pressure was 80/45.    Workup was significant for magnesium 1.4.  Hemoglobin 9.   HISTORY: Reason for exam:->cp Reason for Exam: Shortness of Breath FINDINGS: The lungs are without acute focal process.  There is no effusion or pneumothorax. The cardiomediastinal silhouette is stable. The osseous structures are stable.     No acute process.            Electronically signed by Chiki Castelan MD on 3/30/2024 at 3:18 PM

## 2024-03-30 NOTE — ED PROVIDER NOTES
[facility] EMERGENCY DEPARTMENT  EMERGENCY DEPARTMENT ENCOUNTER        Pt Name: Lucille Lilly  MRN: [id number]  Birthdate 1945  Date of evaluation: 3/30/2024  Provider: Nehal García PA-C  PCP: Miguel Angel Moses MD  Note Started: 12:43 PM EDT 3/30/24       I have seen and evaluated this patient with my supervising physician Chiki Castelan MD.      CHIEF COMPLAINT       Chief Complaint   Patient presents with    Loss of Consciousness     Intermittent loss of consciousness. Stent placed this week. EMS reports diaphoretic upon arrival, unable to get SPO2 read.       HISTORY OF PRESENT ILLNESS: 1 or more Elements             Lucille Lilly is a 79 y.o. female who presents to the emergency department with complaint of intermittent episodes of LOC noted occurred since earlier this morning.  Her family also states that she had a mechanical fall yesterday evening when she was getting out of her chair.  Patient states that she got her feet tangled in the blankets and fell and hit her head.  This morning she does not remember much.  She just remembers bringing her here.  She does have a slight cough, but denies any congestion, sore throat, chest pain.  She has chronic abdominal pain and low back pain which she states is not new.  She has blind in her right eye.     Nursing Notes were all reviewed and agreed with or any disagreements were addressed in the HPI.    REVIEW OF SYSTEMS :      Review of Systems   All other systems reviewed and are negative.      Positives and Pertinent negatives as per HPI.     SURGICAL HISTORY     Past Surgical History:   Procedure Laterality Date    APPENDECTOMY      BACK INJECTION Bilateral 05/27/2022    BILATERAL SACROILIAC JOINT INJECTION performed by Chanel Orta MD at [facility] ENDOSCOPY    BACK INJECTION Bilateral 11/11/2022    BILATERAL SACROILIAC JOINT INJECTION performed by Chanel Orta MD at [facility] ENDOSCOPY    BACK INJECTION  2023    BILATERAL SACROILIAC JOINT INJECTION performed by Chanel Orta MD at [facility] ENDOSCOPY    BACK INJECTION Bilateral 2023    BILATERAL SACROILIAC JOINT INJECTION performed by Chanel Orta MD at [facility] ENDOSCOPY    BACK INJECTION Bilateral 2023    BILATERAL SACROILIAC JOINT INJECTION performed by Chanel Orta MD at [facility] ENDOSCOPY    BREAST SURGERY Left     fatty tumor removal     SECTION      x2    CHOLECYSTECTOMY  2007    COLONOSCOPY      COLONOSCOPY N/A 2023    COLONOSCOPY POLYPECTOMY SNARE performed by Robbie Rodriguez MD at [facility]    HC INJECTION PROCEDURE FOR SACROILIAC JOINT Right 10/02/2019    RIGHT SACROILIAC JOINT INJECTION WITH FLUOROSCOPY performed by Tee Martino MD at [facility]    HYSTERECTOMY, TOTAL ABDOMINAL (CERVIX REMOVED)      NASAL SEPTUM SURGERY      NERVE BLOCK Right 2022    RIGHT SCIATIC NERVE BLOCK (DEFINE) performed by Chanel Orta MD at [facility] ENDOSCOPY    NERVE BLOCK Left 2022    LEFT SCIATIC NERVE BLOCK performed by Chanel Orta MD at [facility] ENDOSCOPY    NERVE BLOCK Right 11/3/2023    RIGHT SCIATIC NERVE BLOCK performed by Chanel Orta MD at [facility] ENDOSCOPY    NERVE BLOCK Left 2023    LEFT SCIATIC NERVE BLOCK performed by Chanel Orta MD at [facility] ENDOSCOPY    NERVE BLOCK Right 2024    RIIGHT SCIATIC NERVE BLOCK performed by Chanel Orta MD at [facility] ENDOSCOPY    NERVE BLOCK Left 2024    LEFT SCIATIC NERVE BLOCK performed by Chanel Orta MD at [facility] ENDOSCOPY    OTHER SURGICAL HISTORY      fatty tumors on arms excised    PAIN MANAGEMENT PROCEDURE Right 2022    LUMBAR EPIDURAL STEROID INJECTION - RIGHT L3-4 performed by Chanel Orta MD at [facility] ENDOSCOPY    PAIN MANAGEMENT PROCEDURE Bilateral 2022    BILATERAL TWO LEVEL LUMBAR MEDIAL BRANCH BLOCKS AT L4-5 AND L5-S1 performed by

## 2024-03-30 NOTE — FLOWSHEET NOTE
4 Eyes Skin Assessment     NAME:  Lucille Lilly  YOB: 1945  MEDICAL RECORD NUMBER:  [id number]    The patient is being assessed for  Admission    I agree that at least one RN has performed a thorough Head to Toe Skin Assessment on the patient. ALL assessment sites listed below have been assessed.      Areas assessed by both nurses:    Head, Face, Ears, Shoulders, Back, Chest, Arms, Elbows, Hands, Sacrum. Buttock, Coccyx, Ischium, Legs. Feet and Heels, Under Medical Devices , and Other          Does the Patient have a Wound? 2 abrasions to L knee. Pt states that she fell at home.        Adam Prevention initiated by RN: Yes  Wound Care Orders initiated by RN: No    Pressure Injury (Stage 3,4, Unstageable, DTI, NWPT, and Complex wounds) if present, place Wound referral order by RN under : No    New Ostomies, if present place, Ostomy referral order under : No     Nurse 1 eSignature: Electronically signed by Martha Singh RN on 3/30/24 at 7:00 PM EDT    **SHARE this note so that the co-signing nurse can place an eSignature**    Nurse 2 eSignature: Electronically signed by Sharon Lai RN on 3/30/24 at 11:23 PM EDT]

## 2024-03-30 NOTE — ED TRIAGE NOTES
Pt arrived from home via EMS c/o intermittent loss of consciousness starting at 0700. Pt in sleepy but answers questions appropriately. Pt states she took Tylenol 3 and ativan last night. Pt is hypotensive at 80/45 upon arrival on 4 liters NC per EMS, SPO2 100. Other VS stable, afebrile. Skin is warm and dry. Pt had a cardiac stent placed this past week.

## 2024-03-30 NOTE — ED PROVIDER NOTES
Patient seen and examined discussed with MLP agree with plan.  Briefly this is a 79-year-old  female who was discharged yesterday from this hospital after cardiac intervention for NSTEMI.  Today she feels dizzy lightheaded and she is hypotensive.  Denies any fevers chills but did have a significant change in her medications.  She is awake and alert her EKG is normal and nonischemic.  She will certainly require admission for further workup and evaluation.     Keith Guy MD  03/30/24 8138

## 2024-03-30 NOTE — FLOWSHEET NOTE
1800 Pt arrived from ED.   1806 Received report from Dennis in ED.     Pt alert and oriented x4. Pt oriented to unit and room. Pt verbalized understanding of plan of care to include IV hydration. Water at bedside. Pt denies needs, encouraged call light use.

## 2024-03-31 LAB
ANION GAP SERPL CALCULATED.3IONS-SCNC: 7 MMOL/L (ref 3–16)
BASOPHILS # BLD: 0 K/UL (ref 0–0.2)
BASOPHILS NFR BLD: 0.6 %
BUN SERPL-MCNC: 12 MG/DL (ref 7–20)
CALCIUM SERPL-MCNC: 9 MG/DL (ref 8.3–10.6)
CHLORIDE SERPL-SCNC: 107 MMOL/L (ref 99–110)
CO2 SERPL-SCNC: 26 MMOL/L (ref 21–32)
CREAT SERPL-MCNC: <0.5 MG/DL (ref 0.6–1.2)
DEPRECATED RDW RBC AUTO: 15.2 % (ref 12.4–15.4)
EKG ATRIAL RATE: 61 BPM
EKG DIAGNOSIS: NORMAL
EKG P AXIS: 70 DEGREES
EKG P-R INTERVAL: 188 MS
EKG Q-T INTERVAL: 450 MS
EKG QRS DURATION: 86 MS
EKG QTC CALCULATION (BAZETT): 453 MS
EKG R AXIS: -28 DEGREES
EKG T AXIS: 66 DEGREES
EKG VENTRICULAR RATE: 61 BPM
EOSINOPHIL # BLD: 0.2 K/UL (ref 0–0.6)
EOSINOPHIL NFR BLD: 2.4 %
FERRITIN SERPL IA-MCNC: 13.2 NG/ML (ref 15–150)
GFR SERPLBLD CREATININE-BSD FMLA CKD-EPI: >90 ML/MIN/{1.73_M2}
GLUCOSE SERPL-MCNC: 118 MG/DL (ref 70–99)
HCT VFR BLD AUTO: 24.1 % (ref 36–48)
HGB BLD-MCNC: 8 G/DL (ref 12–16)
IRON SATN MFR SERPL: 5 % (ref 15–50)
IRON SERPL-MCNC: 16 UG/DL (ref 37–145)
LYMPHOCYTES # BLD: 1.6 K/UL (ref 1–5.1)
LYMPHOCYTES NFR BLD: 21 %
MCH RBC QN AUTO: 25.4 PG (ref 26–34)
MCHC RBC AUTO-ENTMCNC: 33.1 G/DL (ref 31–36)
MCV RBC AUTO: 76.8 FL (ref 80–100)
MONOCYTES # BLD: 1.1 K/UL (ref 0–1.3)
MONOCYTES NFR BLD: 15.1 %
NEUTROPHILS # BLD: 4.6 K/UL (ref 1.7–7.7)
NEUTROPHILS NFR BLD: 60.9 %
PLATELET # BLD AUTO: 310 K/UL (ref 135–450)
PMV BLD AUTO: 7.4 FL (ref 5–10.5)
POTASSIUM SERPL-SCNC: 3.8 MMOL/L (ref 3.5–5.1)
RBC # BLD AUTO: 3.13 M/UL (ref 4–5.2)
SODIUM SERPL-SCNC: 140 MMOL/L (ref 136–145)
TIBC SERPL-MCNC: 322 UG/DL (ref 260–445)
TROPONIN, HIGH SENSITIVITY: 18 NG/L (ref 0–14)
WBC # BLD AUTO: 7.6 K/UL (ref 4–11)

## 2024-03-31 PROCEDURE — 85025 COMPLETE CBC W/AUTO DIFF WBC: CPT

## 2024-03-31 PROCEDURE — 2580000003 HC RX 258: Performed by: INTERNAL MEDICINE

## 2024-03-31 PROCEDURE — 6370000000 HC RX 637 (ALT 250 FOR IP): Performed by: INTERNAL MEDICINE

## 2024-03-31 PROCEDURE — 96366 THER/PROPH/DIAG IV INF ADDON: CPT

## 2024-03-31 PROCEDURE — 93010 ELECTROCARDIOGRAM REPORT: CPT | Performed by: INTERNAL MEDICINE

## 2024-03-31 PROCEDURE — 83540 ASSAY OF IRON: CPT

## 2024-03-31 PROCEDURE — 96375 TX/PRO/DX INJ NEW DRUG ADDON: CPT

## 2024-03-31 PROCEDURE — 93005 ELECTROCARDIOGRAM TRACING: CPT | Performed by: INTERNAL MEDICINE

## 2024-03-31 PROCEDURE — 6360000002 HC RX W HCPCS: Performed by: INTERNAL MEDICINE

## 2024-03-31 PROCEDURE — 82728 ASSAY OF FERRITIN: CPT

## 2024-03-31 PROCEDURE — 99223 1ST HOSP IP/OBS HIGH 75: CPT | Performed by: INTERNAL MEDICINE

## 2024-03-31 PROCEDURE — 6370000000 HC RX 637 (ALT 250 FOR IP): Performed by: NURSE PRACTITIONER

## 2024-03-31 PROCEDURE — 80048 BASIC METABOLIC PNL TOTAL CA: CPT

## 2024-03-31 PROCEDURE — 1200000000 HC SEMI PRIVATE

## 2024-03-31 PROCEDURE — 84484 ASSAY OF TROPONIN QUANT: CPT

## 2024-03-31 PROCEDURE — 36415 COLL VENOUS BLD VENIPUNCTURE: CPT

## 2024-03-31 PROCEDURE — 83550 IRON BINDING TEST: CPT

## 2024-03-31 PROCEDURE — 96367 TX/PROPH/DG ADDL SEQ IV INF: CPT

## 2024-03-31 PROCEDURE — 96361 HYDRATE IV INFUSION ADD-ON: CPT

## 2024-03-31 RX ORDER — NITROGLYCERIN 0.4 MG/1
0.4 TABLET SUBLINGUAL EVERY 5 MIN PRN
Status: DISCONTINUED | OUTPATIENT
Start: 2024-03-31 | End: 2024-04-02 | Stop reason: HOSPADM

## 2024-03-31 RX ORDER — LORAZEPAM 0.5 MG/1
0.5 TABLET ORAL EVERY 6 HOURS PRN
Status: DISCONTINUED | OUTPATIENT
Start: 2024-03-31 | End: 2024-04-02 | Stop reason: HOSPADM

## 2024-03-31 RX ORDER — LORAZEPAM 2 MG/ML
0.5 INJECTION INTRAMUSCULAR ONCE
Status: COMPLETED | OUTPATIENT
Start: 2024-03-31 | End: 2024-03-31

## 2024-03-31 RX ORDER — CARVEDILOL 3.12 MG/1
3.12 TABLET ORAL 2 TIMES DAILY WITH MEALS
Status: DISCONTINUED | OUTPATIENT
Start: 2024-03-31 | End: 2024-04-02 | Stop reason: HOSPADM

## 2024-03-31 RX ADMIN — MAGNESIUM SULFATE HEPTAHYDRATE 2000 MG: 40 INJECTION, SOLUTION INTRAVENOUS at 01:02

## 2024-03-31 RX ADMIN — LORAZEPAM 0.5 MG: 2 INJECTION INTRAMUSCULAR; INTRAVENOUS at 14:06

## 2024-03-31 RX ADMIN — GABAPENTIN 600 MG: 300 CAPSULE ORAL at 08:51

## 2024-03-31 RX ADMIN — LORAZEPAM 0.5 MG: 0.5 TABLET ORAL at 13:34

## 2024-03-31 RX ADMIN — CARBIDOPA AND LEVODOPA 2 TABLET: 25; 250 TABLET ORAL at 16:58

## 2024-03-31 RX ADMIN — ASPIRIN 81 MG: 81 TABLET, CHEWABLE ORAL at 08:51

## 2024-03-31 RX ADMIN — CARBIDOPA AND LEVODOPA 2 TABLET: 25; 250 TABLET ORAL at 12:16

## 2024-03-31 RX ADMIN — IRON SUCROSE 200 MG: 20 INJECTION, SOLUTION INTRAVENOUS at 12:17

## 2024-03-31 RX ADMIN — CARBIDOPA AND LEVODOPA 2 TABLET: 25; 250 TABLET ORAL at 20:30

## 2024-03-31 RX ADMIN — Medication 10 ML: at 20:32

## 2024-03-31 RX ADMIN — CARBIDOPA AND LEVODOPA 2 TABLET: 25; 250 TABLET ORAL at 08:51

## 2024-03-31 RX ADMIN — ATORVASTATIN CALCIUM 40 MG: 40 TABLET, FILM COATED ORAL at 20:30

## 2024-03-31 RX ADMIN — NITROGLYCERIN 0.4 MG: 0.4 TABLET, ORALLY DISINTEGRATING SUBLINGUAL at 20:30

## 2024-03-31 RX ADMIN — NITROGLYCERIN 0.4 MG: 0.4 TABLET, ORALLY DISINTEGRATING SUBLINGUAL at 20:45

## 2024-03-31 RX ADMIN — LORAZEPAM 0.5 MG: 0.5 TABLET ORAL at 23:18

## 2024-03-31 RX ADMIN — CLOPIDOGREL BISULFATE 75 MG: 75 TABLET ORAL at 08:51

## 2024-03-31 RX ADMIN — ESCITALOPRAM OXALATE 10 MG: 10 TABLET ORAL at 20:30

## 2024-03-31 RX ADMIN — GABAPENTIN 600 MG: 300 CAPSULE ORAL at 20:30

## 2024-03-31 RX ADMIN — Medication 10 ML: at 08:51

## 2024-03-31 RX ADMIN — CARVEDILOL 3.12 MG: 3.12 TABLET, FILM COATED ORAL at 16:58

## 2024-03-31 ASSESSMENT — PAIN SCALES - GENERAL: PAINLEVEL_OUTOF10: 0

## 2024-03-31 NOTE — PROGRESS NOTES
Patient currently sitting calmly on EOB eating dinner. No dyskinesia noted. Urine continues to be yellow and clear.  Denies needs at this time. Call light in reach.    Electronically signed by Sruthi Vora RN on 3/31/2024 at 6:30 PM

## 2024-03-31 NOTE — PROGRESS NOTES
Patient noted to be extremely restless r/t dyskinesia with rls.  Patient unable to sit still and continuously changing positions in the bed. Prn ativan po given and one time dose of iv ativan given per md order. Medications appear ineffective at this time.  When asked what helps patient when dyskinesia acts up at home, patient stated \"visits from my family.\"  Daughters were present at bedside at time restlessness began, but have since left. Call light in reach.  Bed alarm on.  Plan of care continues.

## 2024-03-31 NOTE — PROGRESS NOTES
Hypotension likely overmedication with avapro    Anemia      CAD with recent stenting of ostial RCA      Rec- d/c avapro  Would not give lovenox due to anemia-on plavix and aspirin  Evaluate for iron deficiency anemia

## 2024-03-31 NOTE — PROGRESS NOTES
Patient unable to keep heart monitor leads on at this time d/t increased restlessness/dyskinesia.      Electronically signed by Sruthi Vora RN on 3/31/2024 at 4:35 PM

## 2024-03-31 NOTE — PLAN OF CARE
Problem: Safety - Adult  Goal: Free from fall injury  3/30/2024 2314 by Yani Mujica  Outcome: Progressing  3/30/2024 1859 by Martha Singh, RN  Outcome: Progressing     Problem: Discharge Planning  Goal: Discharge to home or other facility with appropriate resources  3/30/2024 2314 by Yani Mujica  Outcome: Progressing  3/30/2024 1859 by Martha Singh, RN  Outcome: Progressing     Problem: Pain  Goal: Verbalizes/displays adequate comfort level or baseline comfort level  3/30/2024 2314 by Yani Mujica  Outcome: Progressing  3/30/2024 1859 by Martha Singh, RN  Outcome: Progressing

## 2024-03-31 NOTE — FLOWSHEET NOTE
03/31/24 1539   Treatment Team Notification   Reason for Communication Evaluate  (Increased restlessness/dyskinesia unrelieved by ativan.  Brown, cloudy urine.)   Name of Team Member Notified Daphne   Treatment Team Role Attending Provider   Method of Communication Secure Message   Response Waiting for response   Notification Time 1536     Electronically signed by Sruthi Vora RN on 3/31/2024 at 3:54 PM      Assisted patient to bathroom to void.  Urine noted to be pale yellow and clear at this time.      Electronically signed by Sruthi Vora RN on 3/31/2024 at 5:12 PM

## 2024-03-31 NOTE — PLAN OF CARE
Problem: Safety - Adult  Goal: Free from fall injury  Outcome: Progressing     Problem: Discharge Planning  Goal: Discharge to home or other facility with appropriate resources  Outcome: Progressing     Problem: Pain  Goal: Verbalizes/displays adequate comfort level or baseline comfort level  Outcome: Progressing     Problem: ABCDS Injury Assessment  Goal: Absence of physical injury  Outcome: Progressing

## 2024-03-31 NOTE — PROGRESS NOTES
Hospitalist Progress Note  3/31/2024 10:18 AM  Subjective:   Admit Date: 3/30/2024  PCP: Miguel Angel Moses MD  Status: Observation   Interval History: Hospital Day: 2, admitted with syncope, likely from medication induced hypotension.  Intermittent episodes of loss of consciousness.  ECG unremarkable.  Volume expansion with IV crystalloid.  Hypotension (88/42) treated with midodrine.  Medical co-morbidities include CAD s/p PCI to RCA on dual antiplatelet therapy with aspirin and clopidogrel with high intensity statin therapy.     Hospitalized (3/25 - 3/28) with angina and positive stress test resulting in LCH (3/27)  PCI with YVETTE to RCA.  Discharged on aspirin, clopidogrel, and carvedilol.  Amlodipine was discontinued due to hypotension.     Diet: regular  Left forearm peripheral IV (3/30, day #2)    Medications:   Sodium chloride at 75 ml/hr  aspirin  81 mg Oral Daily   atorvastatin  40 mg Oral Nightly   carbidopa-levodopa  2 tablet Oral 4x Daily   clopidogrel  75 mg Oral Daily   escitalopram  10 mg Oral Nightly   gabapentin  600 mg Oral BID   enoxaparin  40 mg SubCUTAneous QHS     Recent Labs     03/30/24  1238 03/31/24  0634   WBC 6.6 7.6   HGB 9.0* 8.0*    310   MCV 78.0* 76.8*     Recent Labs     03/30/24  1238 03/31/24  0634    140   K 3.5 3.8   CL 99 107   CO2 26 26   BUN 16 12   CREATININE 0.7 <0.5*   GLUCOSE 156* 118*     Recent Labs     03/30/24  1238   AST 17   ALT <5*   BILITOT 0.4   ALKPHOS 71     hsTnT (3/25) 10 /11, (3/30) 27 / 23 ng/L  Fe / TIBC (3/31) 16 / 322 = 5% iron saturation  proBNP (3/30) 294 pg/mL  Magnesium (3/30) 1.40 mg/dL  Influenza A/B (3/30) negative  SARS-CoV-2 NAAT (3/30) not detected    Noncontrast CT head (3/30) No acute intracranial abnormality.     CT cervical spine (3/30) No acute abnormality of the cervical spine.     Portable CXR (3/30) No acute process.     Cardiac Stress Test Nuclear Imaging (3/25) Abnormal study. There is a moderate sized, moderate to severe

## 2024-03-31 NOTE — PROGRESS NOTES
Patient reported chest pain at 1855 stating \"I feel like I'm having a heart attack.\"  Stat EKG obtained that noted first degree av block. No st elevation noted. /61 (90), pulse 80.  MD notified.     Electronically signed by Sruthi Vora RN on 3/31/2024 at 7:39 PM

## 2024-04-01 ENCOUNTER — APPOINTMENT (OUTPATIENT)
Dept: GENERAL RADIOLOGY | Age: 79
DRG: 312 | End: 2024-04-01
Payer: MEDICARE

## 2024-04-01 LAB
ALBUMIN SERPL-MCNC: 3.8 G/DL (ref 3.4–5)
ANION GAP SERPL CALCULATED.3IONS-SCNC: 10 MMOL/L (ref 3–16)
BASOPHILS # BLD: 0.1 K/UL (ref 0–0.2)
BASOPHILS NFR BLD: 0.8 %
BUN SERPL-MCNC: 8 MG/DL (ref 7–20)
CALCIUM SERPL-MCNC: 9.8 MG/DL (ref 8.3–10.6)
CHLORIDE SERPL-SCNC: 107 MMOL/L (ref 99–110)
CO2 SERPL-SCNC: 25 MMOL/L (ref 21–32)
CREAT SERPL-MCNC: <0.5 MG/DL (ref 0.6–1.2)
DEPRECATED RDW RBC AUTO: 15.1 % (ref 12.4–15.4)
EKG ATRIAL RATE: 84 BPM
EKG DIAGNOSIS: NORMAL
EKG P AXIS: 76 DEGREES
EKG P-R INTERVAL: 210 MS
EKG Q-T INTERVAL: 372 MS
EKG QRS DURATION: 82 MS
EKG QTC CALCULATION (BAZETT): 439 MS
EKG R AXIS: -23 DEGREES
EKG T AXIS: 76 DEGREES
EKG VENTRICULAR RATE: 84 BPM
EOSINOPHIL # BLD: 0.3 K/UL (ref 0–0.6)
EOSINOPHIL NFR BLD: 2.8 %
GFR SERPLBLD CREATININE-BSD FMLA CKD-EPI: >90 ML/MIN/{1.73_M2}
GLUCOSE BLD-MCNC: 132 MG/DL (ref 70–99)
GLUCOSE SERPL-MCNC: 128 MG/DL (ref 70–99)
HCT VFR BLD AUTO: 27.3 % (ref 36–48)
HGB BLD-MCNC: 8.5 G/DL (ref 12–16)
LYMPHOCYTES # BLD: 1.3 K/UL (ref 1–5.1)
LYMPHOCYTES NFR BLD: 13.1 %
MAGNESIUM SERPL-MCNC: 1.8 MG/DL (ref 1.8–2.4)
MCH RBC QN AUTO: 24.4 PG (ref 26–34)
MCHC RBC AUTO-ENTMCNC: 31 G/DL (ref 31–36)
MCV RBC AUTO: 78.6 FL (ref 80–100)
MONOCYTES # BLD: 1.5 K/UL (ref 0–1.3)
MONOCYTES NFR BLD: 15 %
NEUTROPHILS # BLD: 6.8 K/UL (ref 1.7–7.7)
NEUTROPHILS NFR BLD: 68.3 %
NT-PROBNP SERPL-MCNC: 821 PG/ML (ref 0–449)
PERFORMED ON: ABNORMAL
PHOSPHATE SERPL-MCNC: 2.9 MG/DL (ref 2.5–4.9)
PLATELET # BLD AUTO: 338 K/UL (ref 135–450)
PLATELET BLD QL SMEAR: ADEQUATE
PMV BLD AUTO: 8 FL (ref 5–10.5)
POTASSIUM SERPL-SCNC: 4.1 MMOL/L (ref 3.5–5.1)
RBC # BLD AUTO: 3.48 M/UL (ref 4–5.2)
REPORT: NORMAL
RESP PATH DNA+RNA PNL NPH NAA+NON-PROBE: NORMAL
SLIDE REVIEW: ABNORMAL
SODIUM SERPL-SCNC: 142 MMOL/L (ref 136–145)
WBC # BLD AUTO: 10 K/UL (ref 4–11)

## 2024-04-01 PROCEDURE — 0202U NFCT DS 22 TRGT SARS-COV-2: CPT

## 2024-04-01 PROCEDURE — 94761 N-INVAS EAR/PLS OXIMETRY MLT: CPT

## 2024-04-01 PROCEDURE — 80069 RENAL FUNCTION PANEL: CPT

## 2024-04-01 PROCEDURE — 6370000000 HC RX 637 (ALT 250 FOR IP): Performed by: NURSE PRACTITIONER

## 2024-04-01 PROCEDURE — 36415 COLL VENOUS BLD VENIPUNCTURE: CPT

## 2024-04-01 PROCEDURE — 6360000002 HC RX W HCPCS: Performed by: INTERNAL MEDICINE

## 2024-04-01 PROCEDURE — 6370000000 HC RX 637 (ALT 250 FOR IP): Performed by: INTERNAL MEDICINE

## 2024-04-01 PROCEDURE — 1200000000 HC SEMI PRIVATE

## 2024-04-01 PROCEDURE — 93010 ELECTROCARDIOGRAM REPORT: CPT | Performed by: INTERNAL MEDICINE

## 2024-04-01 PROCEDURE — 94640 AIRWAY INHALATION TREATMENT: CPT

## 2024-04-01 PROCEDURE — 2700000000 HC OXYGEN THERAPY PER DAY

## 2024-04-01 PROCEDURE — 85025 COMPLETE CBC W/AUTO DIFF WBC: CPT

## 2024-04-01 PROCEDURE — 2580000003 HC RX 258: Performed by: INTERNAL MEDICINE

## 2024-04-01 PROCEDURE — 83735 ASSAY OF MAGNESIUM: CPT

## 2024-04-01 PROCEDURE — 71045 X-RAY EXAM CHEST 1 VIEW: CPT

## 2024-04-01 PROCEDURE — 83880 ASSAY OF NATRIURETIC PEPTIDE: CPT

## 2024-04-01 RX ORDER — LANOLIN ALCOHOL/MO/W.PET/CERES
6 CREAM (GRAM) TOPICAL NIGHTLY PRN
Status: DISCONTINUED | OUTPATIENT
Start: 2024-04-01 | End: 2024-04-02 | Stop reason: HOSPADM

## 2024-04-01 RX ORDER — ROPINIROLE 1 MG/1
1 TABLET, FILM COATED ORAL ONCE
Status: COMPLETED | OUTPATIENT
Start: 2024-04-01 | End: 2024-04-01

## 2024-04-01 RX ORDER — IPRATROPIUM BROMIDE AND ALBUTEROL SULFATE 2.5; .5 MG/3ML; MG/3ML
1 SOLUTION RESPIRATORY (INHALATION) EVERY 6 HOURS PRN
Status: DISCONTINUED | OUTPATIENT
Start: 2024-04-01 | End: 2024-04-02 | Stop reason: HOSPADM

## 2024-04-01 RX ORDER — CARBIDOPA/LEVODOPA 25MG-250MG
1 TABLET ORAL
Status: DISCONTINUED | OUTPATIENT
Start: 2024-04-01 | End: 2024-04-02 | Stop reason: HOSPADM

## 2024-04-01 RX ORDER — FUROSEMIDE 10 MG/ML
20 INJECTION INTRAMUSCULAR; INTRAVENOUS ONCE
Status: COMPLETED | OUTPATIENT
Start: 2024-04-01 | End: 2024-04-01

## 2024-04-01 RX ORDER — LORAZEPAM 0.5 MG/1
0.5 TABLET ORAL ONCE
Status: COMPLETED | OUTPATIENT
Start: 2024-04-01 | End: 2024-04-01

## 2024-04-01 RX ADMIN — CARBIDOPA AND LEVODOPA 2 TABLET: 25; 250 TABLET ORAL at 09:52

## 2024-04-01 RX ADMIN — CARBIDOPA AND LEVODOPA 1 TABLET: 25; 250 TABLET ORAL at 19:50

## 2024-04-01 RX ADMIN — CARVEDILOL 3.12 MG: 3.12 TABLET, FILM COATED ORAL at 17:33

## 2024-04-01 RX ADMIN — CARBIDOPA AND LEVODOPA 1 TABLET: 25; 250 TABLET ORAL at 17:21

## 2024-04-01 RX ADMIN — Medication 10 ML: at 21:54

## 2024-04-01 RX ADMIN — CARBIDOPA AND LEVODOPA 2 TABLET: 25; 250 TABLET ORAL at 13:13

## 2024-04-01 RX ADMIN — ESCITALOPRAM OXALATE 10 MG: 10 TABLET ORAL at 21:42

## 2024-04-01 RX ADMIN — LORAZEPAM 0.5 MG: 0.5 TABLET ORAL at 13:13

## 2024-04-01 RX ADMIN — LORAZEPAM 0.5 MG: 0.5 TABLET ORAL at 22:57

## 2024-04-01 RX ADMIN — LORAZEPAM 0.5 MG: 0.5 TABLET ORAL at 21:42

## 2024-04-01 RX ADMIN — ATORVASTATIN CALCIUM 40 MG: 40 TABLET, FILM COATED ORAL at 21:42

## 2024-04-01 RX ADMIN — ACETAMINOPHEN 325MG 650 MG: 325 TABLET ORAL at 17:36

## 2024-04-01 RX ADMIN — ASPIRIN 81 MG: 81 TABLET, CHEWABLE ORAL at 09:52

## 2024-04-01 RX ADMIN — ACETAMINOPHEN 325MG 650 MG: 325 TABLET ORAL at 11:21

## 2024-04-01 RX ADMIN — FUROSEMIDE 20 MG: 10 INJECTION, SOLUTION INTRAMUSCULAR; INTRAVENOUS at 15:18

## 2024-04-01 RX ADMIN — GABAPENTIN 600 MG: 300 CAPSULE ORAL at 21:42

## 2024-04-01 RX ADMIN — CARBIDOPA AND LEVODOPA 1 TABLET: 25; 250 TABLET ORAL at 22:57

## 2024-04-01 RX ADMIN — POLYETHYLENE GLYCOL 3350 17 G: 17 POWDER, FOR SOLUTION ORAL at 09:59

## 2024-04-01 RX ADMIN — IPRATROPIUM BROMIDE AND ALBUTEROL SULFATE 1 DOSE: 2.5; .5 SOLUTION RESPIRATORY (INHALATION) at 14:04

## 2024-04-01 RX ADMIN — Medication 6 MG: at 02:58

## 2024-04-01 RX ADMIN — CARVEDILOL 3.12 MG: 3.12 TABLET, FILM COATED ORAL at 09:52

## 2024-04-01 RX ADMIN — GABAPENTIN 600 MG: 300 CAPSULE ORAL at 09:52

## 2024-04-01 RX ADMIN — CARBIDOPA AND LEVODOPA 1 TABLET: 25; 250 TABLET ORAL at 21:42

## 2024-04-01 RX ADMIN — ROPINIROLE HYDROCHLORIDE 1 MG: 1 TABLET, FILM COATED ORAL at 02:58

## 2024-04-01 RX ADMIN — Medication 10 ML: at 09:54

## 2024-04-01 RX ADMIN — IRON SUCROSE 200 MG: 20 INJECTION, SOLUTION INTRAVENOUS at 10:15

## 2024-04-01 RX ADMIN — CLOPIDOGREL BISULFATE 75 MG: 75 TABLET ORAL at 09:52

## 2024-04-01 RX ADMIN — Medication 6 MG: at 22:23

## 2024-04-01 RX ADMIN — CARBIDOPA AND LEVODOPA 1 TABLET: 25; 250 TABLET ORAL at 15:23

## 2024-04-01 ASSESSMENT — PAIN - FUNCTIONAL ASSESSMENT
PAIN_FUNCTIONAL_ASSESSMENT: PREVENTS OR INTERFERES SOME ACTIVE ACTIVITIES AND ADLS
PAIN_FUNCTIONAL_ASSESSMENT: ACTIVITIES ARE NOT PREVENTED

## 2024-04-01 ASSESSMENT — PAIN DESCRIPTION - DESCRIPTORS
DESCRIPTORS: ACHING
DESCRIPTORS: ACHING

## 2024-04-01 ASSESSMENT — PAIN DESCRIPTION - ORIENTATION
ORIENTATION: RIGHT;LEFT;MID;LOWER;UPPER
ORIENTATION: MID;LOWER

## 2024-04-01 ASSESSMENT — PAIN SCALES - GENERAL
PAINLEVEL_OUTOF10: 5
PAINLEVEL_OUTOF10: 0
PAINLEVEL_OUTOF10: 4
PAINLEVEL_OUTOF10: 1

## 2024-04-01 ASSESSMENT — PAIN DESCRIPTION - LOCATION: LOCATION: BACK

## 2024-04-01 NOTE — PROGRESS NOTES
BEDSIDE eval: CP: Patient described the chest pain across the chest and affecting both shoulders.  Patient is actively eating potato chips.  Nurse administered a single sublingual nitroglycerin bringing the patient's pain level down from 8-> 2 /10.  Patient does not appear in to be in distress.  Chest is clear and abdomen is soft and nontender.  Skin is warm and dry.  No complaint of nausea.    Vital signs reviewed and have been stable.  Around 3:00 today she had a blood pressure of 188/94.  Latter part of the evening blood pressure did improve.    Hospital day 1-> admitted for syncope and hypotension.  EKG was without any acute ST changes.  Antihypertensives were held.  Patient does have known coronary artery disease status post left heart cath with PCI to the right coronary artery and she is on dual antiplatelet therapy with high intensity statin.  There have been electrolyte derangements identified and repleted.    There is a stat EKG per protocol ordered when the patient had the onset of her pain around : As per my review it is sinus rhythm with a first-degree block rate 84.  LA interval 210, QRS 82, .  Prior EKG from  around 1230 in the day she was in a normal sinus rhythm with a ventricular rate of 61.    Troponin yesterday: 2723-> today, this evenin.    Plan:   No new orders, will continue to monitor.  The patient certainly is at risk for unstable angina however given the fact that she is actively eating potato chips and has no complaints at this time I have advised nursing to continue monitor and to inform if there is any acute change.

## 2024-04-01 NOTE — PROGRESS NOTES
Pt with dyskinesia, asking for ativan, given per prn order.  Pt and spouse also asking for her sinemet dosing to be changed to home regimen. Perfect serve sent to Dr. Buchanan.   3838 New order to adjust times/dosing to home regimen

## 2024-04-01 NOTE — PROGRESS NOTES
POST FALL MANAGEMENT    Lucille Lilly  MEDICAL RECORD NUMBER:  [id number]  AGE: 79 y.o.   GENDER: female  : 1945  TODAYS DATE:  2024    Details     Fall Occurred: Yes    Was the Fall Witnessed:  Yes       Brief Review of Event:pt found sitting on side of bed, PCA witnessed pt slide from bed to the floor        Who found the patient: Stacy Mendenhall PCA      Where was the patient at the time of the fall: bed      Patient Comments:        Date Fall Occurred:  2024 .       Time Fall Occurred: 7:10p.m.     Assessment     Post Fall Head to Toe Assessment Completed: Yes    Post Fall Predictive Analytic Score Reviewed: Yes     Post Fall Vitals Completed: Yes    Post Fall Neuro Checks Completed: Yes    Injury Occurred(if yes, describe injury):  no           Did the Patient Experience:(Check Jeff all that apply)  N/A  [] Patient hit head  [] Loss of consciousness  [] Change in mental status following the fall  [] Patient is on an anticoagulant medication      CT Performed:  no    Follow-up     Persons Notified of Fall:  (Provide names of persons notified)   [x] Physician: Raphael Mejia NP  [] JAMES:  [] Nursing Supervisior:  [] Manager:  [] Pharmacist:  [x] Family: Grover, pt spouse  [x] Other: Charge nurse Vidhi      Electronically signed by Nida Gallo RN 2024 at 7:22 PM

## 2024-04-01 NOTE — ACP (ADVANCE CARE PLANNING)
Advance Care Planning     Advance Care Planning Activator (Inpatient)  Conversation Note      Date of ACP Conversation: 4/1/2024     Conversation Conducted with: Patient with Decision Making Capacity    ACP Activator: Sharon Roberts RN    Health Care Decision Maker:     Current Designated Health Care Decision Maker:     Primary Decision Maker: Grover Lilly E - Spouse - [phone]    Secondary Decision Maker: Celia Oconnell - Child - [phone]    Supplemental (Other) Decision Maker: Robbie Lilly - Child - [phone]    Care Preferences    Ventilation:  \"If you were in your present state of health and suddenly became very ill and were unable to breathe on your own, what would your preference be about the use of a ventilator (breathing machine) if it were available to you?\"      Would the patient desire the use of ventilator (breathing machine)?: yes    \"If your health worsens and it becomes clear that your chance of recovery is unlikely, what would your preference be about the use of a ventilator (breathing machine) if it were available to you?\"     Would the patient desire the use of ventilator (breathing machine)?: No      Resuscitation  \"CPR works best to restart the heart when there is a sudden event, like a heart attack, in someone who is otherwise healthy. Unfortunately, CPR does not typically restart the heart for people who have serious health conditions or who are very sick.\"    \"In the event your heart stopped as a result of an underlying serious health condition, would you want attempts to be made to restart your heart (answer \"yes\" for attempt to resuscitate) or would you prefer a natural death (answer \"no\" for do not attempt to resuscitate)?\" yes       [] Yes   [x] No   Educated Patient / Decision Maker regarding differences between Advance Directives and portable DNR orders.    Length of ACP Conversation in minutes:  5 minutes    Conversation Outcomes:  ACP discussion completed. Reviewed prior  ACP, confirmed no changes.    Electronically signed by Sharon Roberts RN Case Management on 4/1/2024 at 3:31 PM

## 2024-04-01 NOTE — PLAN OF CARE
Problem: Safety - Adult  Goal: Free from fall injury  4/1/2024 1930 by Nida Gallo, RN  Outcome: Progressing  4/1/2024 1840 by Nida Gallo, RN  Outcome: Progressing

## 2024-04-01 NOTE — CARE COORDINATION
04/01/24 1527   Readmission Assessment   Number of Days since last admission? 1-7 days   Previous Disposition Home with Family   Who is being Interviewed Patient   What was the patient's/caregiver's perception as to why they think they needed to return back to the hospital? Other (Comment)  (syncope and collapse)   Did you visit your Primary Care Physician after you left the hospital, before you returned this time? No   Why weren't you able to visit your PCP? Did not have an appointment   Did you see a specialist, such as Cardiac, Pulmonary, Orthopedic Physician, etc. after you left the hospital? No   Who advised the patient to return to the hospital? Other (Comment)  (family)   Does the patient report anything that got in the way of taking their medications? No   In our efforts to provide the best possible care to you and others like you, can you think of anything that we could have done to help you after you left the hospital the first time, so that you might not have needed to return so soon? Other (Comment)  (Patient says \"no.\")

## 2024-04-01 NOTE — PROGRESS NOTES
Pt with audible exp wheezes, states she takes nebulizers at home. SPO2 93% RA. Perfect serve sent to Dr. Buchanan, new order for duonebs q6 hours prn. Respiratory therapist notified of need for treatment.

## 2024-04-01 NOTE — PROGRESS NOTES
Pt found sitting on side of bed, witnessed pt slide to floor by ORION Kumar. Pt denies any new pain, mentation unchanged. Perfect serve sent to Mayi Mejia NP, no new orders. Pt spouse Grover notified. Pt in bed with all rails up per her request due to her dyskinesia. Tele sitter ordered, camera not available at this tie, pt placed on waiting list. Bed alarm on and call light in reach

## 2024-04-01 NOTE — CARE COORDINATION
Case Management Assessment  Initial Evaluation    Date/Time of Evaluation: 4/1/2024 3:24 PM  Assessment Completed by: Sharon Roberts RN    If patient is discharged prior to next notation, then this note serves as note for discharge by case management.    Patient Name: Lucille Lilly                   YOB: 1945  Diagnosis: Syncope and collapse [R55]  Elevated troponin [R79.89]  Hypotension, unspecified hypotension type [I95.9]                   Date / Time: 3/30/2024 12:17 PM    Patient Admission Status: Inpatient   Readmission Risk (Low < 19, Mod (19-27), High > 27): Readmission Risk Score: 15.7    Current PCP: Miguel Angel Moses MD  PCP verified by CM? Yes    Chart Reviewed: Yes      History Provided by: Patient  Patient Orientation: Alert and Oriented    Patient Cognition: Alert    Hospitalization in the last 30 days (Readmission):  Yes    If yes, Readmission Assessment in  Navigator will be completed.    Advance Directives:      Code Status: Full Code   Patient's Primary Decision Maker is: Legal Next of Kin    Primary Decision Maker: Grover Lilly - Spouse - [phone]    Secondary Decision Maker: Celia Oconnell - Child - [phone]    Secondary Decision Maker: Robbie Lilly - Child - [phone]    Discharge Planning:    Patient lives with: Spouse/Significant Other Type of Home: House  Primary Care Giver: Self  Patient Support Systems include: Spouse/Significant Other, Family Members   Current Financial resources: Medicare  Current community resources: None  Current services prior to admission: Durable Medical Equipment, C-pap            Current DME: Walker, Shower Chair, Glucometer, Cpap, Other (Comment) (has a Rollator (4 wheeled walker with seat))            Type of Home Care services:  None    ADLS  Prior functional level: Independent in ADLs/IADLs  Current functional level: Assistance with the following:, Mobility, Bathing, Toileting, Dressing    PT AM-PAC:   /24  OT AM-PAC:

## 2024-04-01 NOTE — PROGRESS NOTES
V2.0  [facility] Daily Progress Note      Name:  Lucille Lilly /Age/Sex: 1945  (79 y.o. female)   MRN & CSN:  [id number] & [id number] Encounter Date/Time: 2024 1:35 PM EDT    Location:  [id number]-2388/5266-01 PCP: Miguel Angel Moses MD       Hospital Day: 3    Assessment and Plan:   Lucille Lilly is a 79 y.o. female with medical history significant for CAD s/p PCI to RCA, parkinsonism who was admitted with syncope likely from hypotension from overmedication.  Reports intermittent episode of loss of consciousness.    Assessment/Plan :   Syncope and collapse with intermittent episodes of loss of consciousness.  Attributed to medication induced hypotension.  ECG unremarkable.  Carvedilol and irbesartan held on admission.  She did receive IV fluid with crystalloid.  Check orthostatic every shift.  Seen by cardiology and recommended to discontinue Avapro.  Hypotension (88/42) treated with midodrine.  Now blood pressure is slightly elevated.    Primary hypertension: Avapro was discontinued but carvedilol was restarted as recommended by cardiology.  CAD s/p recent PCI with YVETTE to RCA (3/27) on dual antiplatelet therapy with aspirin and clopidogrel with high intensity statin therapy.   Elevated hsTnT compared to previous admission but stable.   Iron deficiency microcytic anemia (iron saturation 5%).  Hgb decreased from 10.7 (3/28) to 8.0 (3/31) with IV volume expansion.  Replace with iron sucrose (Venofer) 200 mg IV daily for three days.   Hypomagnesemia:  Replace with IV magnesium sulfate to goal Mg > 2.0 mg/dL.   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   Dyskinesia with restless legs syndrome.  Will switch dose of carbidopa-levodopa as she was taking at home.  Also takes lorazepam 0.5 mg PRN up to three per day.  Follows with Dr. Davidson at [facility].   Possible fluid overload.  Check proBNP, chest x-ray and wean off oxygen as tolerated.  Generalized weakness.  Consult PT OT.  DVT prophylaxis:Lovenox

## 2024-04-01 NOTE — PROGRESS NOTES
Pt dyskinesia severe at this time, pt continually flailing arms and legs, pulling off tele, rolling back and forth in bed. PRN ativan not yet available. Dr. Buchanan notified. New order for neurology consult.

## 2024-04-01 NOTE — CONSULTS
[facility]          [address] [place] [address]                              CONSULTATION      PATIENT NAME: ELPIDIO WHARTON               : 1945  MED REC NO: [id number]                      ROOM: [place] [address]  ACCOUNT NO: [id number]                       ADMIT DATE: 2024  PROVIDER: Ozzy Ma MD      CONSULT DATE:  2024    REASON FOR ADMISSION AND CONSULTATION:  Please evaluate patient with hypotension and syncopal episode at home.    HISTORY OF PRESENT ILLNESS:  The patient is a pleasant 79-year-old lady.  She has history of hypertension, hyperlipidemia, and diabetes.  She presented to our practice with initial consultation by Dr. Schneider last week with recurring chest discomfort.  This led to a nuclear stress test, which showed inferior ischemia.  She then was referred to Dr. Ruvalcaba for cardiac catheterization.  She was found to have a normal left ventricular ejection fraction, normal left ventricular end-diastolic pressure of 10, 30% left anterior descending stenosis, 80% ostial right coronary artery stenosis, and a 4.0 x 22 Baljit drug-eluting stent was placed to treat the ostial right coronary artery stenosis.  The patient had significant anemia after the procedure, but seemed to then improve and was discharged home.    PAST MEDICAL HISTORY:  Notable for chronic anxiety, generalized arthritis, history of asthma, some chronic back pain, fibromyalgia, hyperlipidemia, hypertension, iron-deficiency anemia, obstructive sleep apnea, restless legs syndrome, type 2 diabetes mellitus, urinary tract infection, vitamin D deficiency.    PAST SURGICAL HISTORY:  Includes appendectomy, back injections over the years.  She had  section in the past, cholecystectomy, hysterectomy, tonsillectomy and adenoidectomy in the past.    SOCIAL HISTORY:  She is , lives with her .  She was a cigarette smoker in the past, quit approximately 20

## 2024-04-02 VITALS
DIASTOLIC BLOOD PRESSURE: 69 MMHG | RESPIRATION RATE: 17 BRPM | HEART RATE: 76 BPM | HEIGHT: 58 IN | TEMPERATURE: 97.5 F | WEIGHT: 178.13 LBS | OXYGEN SATURATION: 95 % | BODY MASS INDEX: 37.39 KG/M2 | SYSTOLIC BLOOD PRESSURE: 157 MMHG

## 2024-04-02 LAB
ANION GAP SERPL CALCULATED.3IONS-SCNC: 10 MMOL/L (ref 3–16)
BASE EXCESS BLDV CALC-SCNC: 8.3 MMOL/L
BASOPHILS # BLD: 0.1 K/UL (ref 0–0.2)
BASOPHILS NFR BLD: 0.8 %
BUN SERPL-MCNC: 6 MG/DL (ref 7–20)
CALCIUM SERPL-MCNC: 9.8 MG/DL (ref 8.3–10.6)
CHLORIDE SERPL-SCNC: 102 MMOL/L (ref 99–110)
CO2 BLDV-SCNC: 35 MMOL/L
CO2 SERPL-SCNC: 29 MMOL/L (ref 21–32)
COHGB MFR BLDV: 1.7 %
CREAT SERPL-MCNC: <0.5 MG/DL (ref 0.6–1.2)
DEPRECATED RDW RBC AUTO: 15.2 % (ref 12.4–15.4)
EOSINOPHIL # BLD: 0.2 K/UL (ref 0–0.6)
EOSINOPHIL NFR BLD: 1.8 %
GFR SERPLBLD CREATININE-BSD FMLA CKD-EPI: >90 ML/MIN/{1.73_M2}
GLUCOSE SERPL-MCNC: 101 MG/DL (ref 70–99)
HCO3 BLDV-SCNC: 34 MMOL/L (ref 23–29)
HCT VFR BLD AUTO: 28.7 % (ref 36–48)
HGB BLD-MCNC: 9.2 G/DL (ref 12–16)
LYMPHOCYTES # BLD: 1.4 K/UL (ref 1–5.1)
LYMPHOCYTES NFR BLD: 12.5 %
MCH RBC QN AUTO: 24.9 PG (ref 26–34)
MCHC RBC AUTO-ENTMCNC: 32.1 G/DL (ref 31–36)
MCV RBC AUTO: 77.7 FL (ref 80–100)
METHGB MFR BLDV: 1.1 %
MONOCYTES # BLD: 1.5 K/UL (ref 0–1.3)
MONOCYTES NFR BLD: 13.1 %
NEUTROPHILS # BLD: 8 K/UL (ref 1.7–7.7)
NEUTROPHILS NFR BLD: 71.8 %
O2 THERAPY: ABNORMAL
PCO2 BLDV: 51.5 MMHG (ref 40–50)
PH BLDV: 7.42 [PH] (ref 7.35–7.45)
PLATELET # BLD AUTO: 340 K/UL (ref 135–450)
PMV BLD AUTO: 7.3 FL (ref 5–10.5)
PO2 BLDV: 37 MMHG
POTASSIUM SERPL-SCNC: 3.8 MMOL/L (ref 3.5–5.1)
RBC # BLD AUTO: 3.69 M/UL (ref 4–5.2)
SAO2 % BLDV: 68 %
SODIUM SERPL-SCNC: 141 MMOL/L (ref 136–145)
WBC # BLD AUTO: 11.2 K/UL (ref 4–11)

## 2024-04-02 PROCEDURE — 97165 OT EVAL LOW COMPLEX 30 MIN: CPT

## 2024-04-02 PROCEDURE — 82803 BLOOD GASES ANY COMBINATION: CPT

## 2024-04-02 PROCEDURE — 80048 BASIC METABOLIC PNL TOTAL CA: CPT

## 2024-04-02 PROCEDURE — 94660 CPAP INITIATION&MGMT: CPT

## 2024-04-02 PROCEDURE — 6370000000 HC RX 637 (ALT 250 FOR IP): Performed by: INTERNAL MEDICINE

## 2024-04-02 PROCEDURE — 97162 PT EVAL MOD COMPLEX 30 MIN: CPT

## 2024-04-02 PROCEDURE — 36415 COLL VENOUS BLD VENIPUNCTURE: CPT

## 2024-04-02 PROCEDURE — 97535 SELF CARE MNGMENT TRAINING: CPT

## 2024-04-02 PROCEDURE — 97530 THERAPEUTIC ACTIVITIES: CPT

## 2024-04-02 PROCEDURE — 5A09357 ASSISTANCE WITH RESPIRATORY VENTILATION, LESS THAN 24 CONSECUTIVE HOURS, CONTINUOUS POSITIVE AIRWAY PRESSURE: ICD-10-PCS | Performed by: INTERNAL MEDICINE

## 2024-04-02 PROCEDURE — 6360000002 HC RX W HCPCS: Performed by: INTERNAL MEDICINE

## 2024-04-02 PROCEDURE — 85025 COMPLETE CBC W/AUTO DIFF WBC: CPT

## 2024-04-02 RX ORDER — FERROUS SULFATE 325(65) MG
325 TABLET ORAL 2 TIMES DAILY
Qty: 60 TABLET | Refills: 0 | Status: SHIPPED | OUTPATIENT
Start: 2024-04-02 | End: 2024-04-05

## 2024-04-02 RX ADMIN — GABAPENTIN 600 MG: 300 CAPSULE ORAL at 08:49

## 2024-04-02 RX ADMIN — CARBIDOPA AND LEVODOPA 1 TABLET: 25; 250 TABLET ORAL at 12:27

## 2024-04-02 RX ADMIN — CLOPIDOGREL BISULFATE 75 MG: 75 TABLET ORAL at 08:50

## 2024-04-02 RX ADMIN — CARBIDOPA AND LEVODOPA 1 TABLET: 25; 250 TABLET ORAL at 09:47

## 2024-04-02 RX ADMIN — IRON SUCROSE 200 MG: 20 INJECTION, SOLUTION INTRAVENOUS at 08:50

## 2024-04-02 RX ADMIN — ASPIRIN 81 MG: 81 TABLET, CHEWABLE ORAL at 08:50

## 2024-04-02 RX ADMIN — CARVEDILOL 3.12 MG: 3.12 TABLET, FILM COATED ORAL at 08:49

## 2024-04-02 RX ADMIN — CARBIDOPA AND LEVODOPA 1 TABLET: 25; 250 TABLET ORAL at 14:52

## 2024-04-02 NOTE — PROGRESS NOTES
Pt's Rn did not want myself to wake pt up at this time to place on home bipap/cpap unit. Will continue to monitor

## 2024-04-02 NOTE — PROGRESS NOTES
Tried placing pt's Home bipap/cpap machine on tp on room air. Spo2 did decrease to low 80's. Placed 4l/m oxygen bleed in on machine. V60 not a good option due to pt not being able to take mask off. Will continue to monitor

## 2024-04-02 NOTE — PROGRESS NOTES
Leandro TITUS with neurology states there is nothing left to do that she must stay on her sinemet schedule that she follows at home.

## 2024-04-02 NOTE — PROGRESS NOTES
Patient very restless and kept tossing back and forth on her bed. Vital signs checked, WNL. Blood sugar checked= 132 mg/dl. Patient's daughter updated on patient's status. NP Martha made aware, additional orders for Ativan , carried out. Waiting for Tele cam still. Charge RN aware. All fall precautions implemented. All needs attended. Safety ensured. Electronically signed by Jacquelyn Stone RN on 4/1/2024 at 11:06 PM

## 2024-04-02 NOTE — CONSULTS
Neurology Consult Note  Reason for Consult: dyskinesia    Chief complaint: fell    Dr Jones, Aaliyah SALGADO MD asked me to see Lucille Lilly in consultation for evaluation of dyskinesia    History of Present Illness:  Lucille Lilly is a 79 y.o. female who presents with altered consciousness.    I obtained my information via discussion w/ the patient and her family at the bedside, supplemented by chart review.      The patient tells me that she tripped and fell about a week and a half ago.  She injured her R shoulder.  She did follow up w/ her PCP.      On the 25th of March she came back to the ED for chest pain/tightness, dizziness, and shortness of breath.  She ended up going to the cath lab and underwent PCI/stent of the RCA.  She was discharged on the 28th.      The patient has a vague recollection of what occurred prior her coming back to the hospital on 3/30.  He says that she was sitting down and just started becoming less and less responsive.  She was sort of slumped over.  He was having a hard time keeping her awake.  There was no shaking or convulsive behavior.  No tongue biting.  No incontinence.  EMS was called and she was transported to the ED in order to be evaluated.      CT head was w/out any acute findings.  BP was as low as 76/36 when she arrived.      She started having an episode of abnormal movements yesterday for which we were asked to evaluate.  She was given multiple doses of lorazepam throughout the day, melatonin at night, and requip overnight.      Family suggested that her Sinemet was not being administered as prescribed outpatient.  She had been receiving 2 tabs 4x per day though she normally takes 1 tab 7x per day.  Her normal regimen was resumed yesterday.     Currently she is lying in bed w/ her family at the bedside.  No abnormal movements at noted.  She is sleepy.     She follows w/ Dr. Christine at [facility].  Her movements have not been fully characterized though there have been  Mother     Heart Disease Father     Cancer Father         head and neck    Heart Failure Father     Hypertension Father     Heart Disease Sister     Spondylitis Sister     Heart Attack Sister     Heart Failure Sister     Hypertension Sister     Heart Disease Brother     Heart Attack Brother     Heart Failure Brother     Hypertension Brother     Stroke Brother     COPD Brother      Social History     Tobacco Use   Smoking Status Former    Current packs/day: 0.00    Average packs/day: 2.0 packs/day for 36.0 years (72.0 ttl pk-yrs)    Types: Cigarettes    Start date: 1963    Quit date: 1999    Years since quittin.9    Passive exposure: Past   Smokeless Tobacco Never     Social History     Substance and Sexual Activity   Drug Use Never     Social History     Substance and Sexual Activity   Alcohol Use No    Alcohol/week: 0.0 standard drinks of alcohol     ROS  Constitutional- No weight loss or fevers  Eyes- No diplopia. No photophobia.  Ears/nose/throat- No dysphagia. No Dysarthria  Cardiovascular- No palpitations. No chest pain  Respiratory- No dyspnea. No Cough  Gastrointestinal- No Abdominal pain. No Vomiting.  Genitourinary- No incontinence. No urinary retention  Musculoskeletal- No myalgia. No arthralgia  Skin- No rash. No easy bruising.  Psychiatric- No depression. No anxiety  Endocrine- No diabetes. No thyroid issues.  Hematologic- No bleeding difficulty. No fatigue  Neurologic- No weakness. No Headache.    Exam  Blood pressure (!) 126/59, pulse 82, temperature 97.8 °F (36.6 °C), temperature source Oral, resp. rate 20, height 1.473 m (4' 10\"), weight 80.8 kg (178 lb 2.1 oz), SpO2 98 %, not currently breastfeeding.  Constitutional    Vital signs: BP, HR, and RR reviewed   General drowsy, no distress, well-nourished  Eyes: unable to visualize the fundi  Psychiatric: no psychotic behavior noted.  Neurologic  Mental status:   orientation to person, place, time.    General fund of knowledge grossly

## 2024-04-02 NOTE — PROGRESS NOTES
Pt is currently on 2l/m. Pt is very restless in bed and kicking legs up. Unable to place on home bipap/cpap machine at this time

## 2024-04-02 NOTE — PROGRESS NOTES
education needed                 AM-PAC - ADL  AM-PAC Daily Activity - Inpatient   How much help is needed for putting on and taking off regular lower body clothing?: A Lot  How much help is needed for bathing (which includes washing, rinsing, drying)?: A Little  How much help is needed for toileting (which includes using toilet, bedpan, or urinal)?: A Little  How much help is needed for putting on and taking off regular upper body clothing?: A Little  How much help is needed for taking care of personal grooming?: A Little  How much help for eating meals?: None  AM-[facility] Inpatient Daily Activity Raw Score: 18  AM-PAC Inpatient ADL T-Scale Score : 38.66  ADL Inpatient CMS 0-100% Score: 46.65  ADL Inpatient CMS G-Code Modifier : CK    Tinneti Score       Goals  Short Term Goals  Time Frame for Short Term Goals: prior to D/C  Short Term Goal 1: complete functional mobility and transfers with supervision  Short Term Goal 2: complete bathing and dressing with supervision  Short Term Goal 3: complete toileting with supervision  Short Term Goal 4: complete grooming in stance at sink with supervision  Long Term Goals  Time Frame for Long Term Goals : STG=LTG  Patient Goals   Patient goals : return to PLOF       Therapy Time   Individual Concurrent Group Co-treatment   Time In 0955         Time Out 1025         Minutes 30         Timed Code Treatment Minutes: 15 Minutes (15 minute eval)       ERNESTINA Flood/L

## 2024-04-02 NOTE — PROGRESS NOTES
Handoff report given to Cheryl BHATTI. All questions answered. Electronically signed by Jacquelyn Stone RN on 4/2/2024 at 6:55 AM

## 2024-04-02 NOTE — PROGRESS NOTES
Shift assessment done. Patient still very restless and unable to lie still. This RN stayed with patient for an hour for safety reasons. All night time medications given and patient tolerated well. All fall precautions implemented. All needs attended. Electronically signed by Jacquelyn Stone RN on 4/1/2024 at 11:38 PM

## 2024-04-02 NOTE — PLAN OF CARE
Problem: Safety - Adult  Goal: Free from fall injury  4/1/2024 2336 by Jacquelyn Stone RN  Outcome: Progressing  4/1/2024 1930 by Nida Gallo RN  Outcome: Progressing  4/1/2024 1840 by Nida Gallo RN  Outcome: Progressing     Problem: Pain  Goal: Verbalizes/displays adequate comfort level or baseline comfort level  4/1/2024 2336 by Jacquelyn Stone RN  Outcome: Progressing  4/1/2024 1840 by Nida Gallo RN  Outcome: Progressing     Problem: ABCDS Injury Assessment  Goal: Absence of physical injury  4/1/2024 2336 by Jacquelyn Stone RN  Outcome: Progressing  4/1/2024 1840 by Nida Gallo RN  Outcome: Progressing  Flowsheets (Taken 4/1/2024 1838)  Absence of Physical Injury: Implement safety measures based on patient assessment     Problem: Skin/Tissue Integrity  Goal: Absence of new skin breakdown  Description: 1.  Monitor for areas of redness and/or skin breakdown  2.  Assess vascular access sites hourly  3.  Every 4-6 hours minimum:  Change oxygen saturation probe site  4.  Every 4-6 hours:  If on nasal continuous positive airway pressure, respiratory therapy assess nares and determine need for appliance change or resting period.  Outcome: Progressing     Problem: Discharge Planning  Goal: Discharge to home or other facility with appropriate resources  4/1/2024 2336 by Jacquelyn Stone RN  Outcome: Progressing  4/1/2024 1840 by Nida Gallo RN  Outcome: Progressing

## 2024-04-02 NOTE — PROGRESS NOTES
Physical Therapy  Facility/Department: [address] ORTHOPEDICS  Physical Therapy Initial Assessment - COTX    Name: Lucille Lilly  : 1945  MRN: [id number]  Date of Service: 2024    Discharge Recommendations:  Patient would benefit from continued therapy after discharge, 24 hour supervision or assist, Home with Home health PT   PT Equipment Recommendations  Equipment Needed: No      Patient Diagnosis(es): The primary encounter diagnosis was Syncope and collapse. Diagnoses of Hypotension, unspecified hypotension type and Elevated troponin were also pertinent to this visit.  Past Medical History:  has a past medical history of Anxiety, Arthritis, Asthma, Benign tumor lacrimal gland, Burn, Chronic back pain, Chronic diarrhea, Dyskinesia, Fibromyalgia, Greater trochanteric bursitis of left hip, H/O migraine, Hyperlipidemia, Hypertension, Iron deficiency anemia, DOYLE, Pseudophakia, RLS (restless legs syndrome), Type II or unspecified type diabetes mellitus without mention of complication, not stated as uncontrolled, UTI (urinary tract infection), Vitamin D deficiency, and Wears glasses.  Past Surgical History:  has a past surgical history that includes Tonsillectomy and adenoidectomy; Breast surgery (Left); Nasal septum surgery; Cholecystectomy (2007); other surgical history; Injection Procedure For Sacroiliac Joint (Right, 10/02/2019); Pain management procedure (Right, 2022); Back Injection (Bilateral, 2022); Pain management procedure (Bilateral, 2022); Pain management procedure (Right, 2022); Nerve Block (Right, 2022); Nerve Block (Left, 2022); Back Injection (Bilateral, 2022); Appendectomy;  section; Colonoscopy; Back Injection (Bilateral, 2023); Upper gastrointestinal endoscopy (N/A, 2023); Hysterectomy, total abdominal; Back Injection (Bilateral, 2023); Colonoscopy (N/A, 2023); Pain management procedure (Right, 10/20/2023); Nerve  Medium Complexity Evaluation    Henry Mccallum, PT   Electronically signed by Henry Mccallum, PT [id number] on 4/2/2024 at 10:31 AM

## 2024-04-02 NOTE — PROGRESS NOTES
Data- discharge order received, pt verbalized agreement to discharge, needs for Home Health Care with [facility]   for OT/PT/VN  , ADELE reviewed and signed by MD, to be completed by RN.    Action- AVS prepared, discharge instructions prepared and given to pt. , medication information packet given r/t NEW or CHANGED prescriptions, pt verbalized understanding further self-review.   D/C instruction summary: Diet- low sodium , Activity- up with assistance , follow up with Primary Care Physician Miguel Angel Moses -[phone] appointment April 5 2024 , immunizations reviewed and up to date , medications prescriptions to be filled at [facility] . Inpatient treatment reviewed . Contact information provided to above agencies used.    Response- Case Management/ reported faxing completed ADELE and AVS to needed HHC/DME services stated above.   Pt belongings gathered, IV removed, pt dressed with assistance . Disposition is home with HHC/DME as stated above, transported with PCA , taken to lobby via w/c with PCA  picked her up at the front door., no complications.

## 2024-04-02 NOTE — CARE COORDINATION
DISCHARGE SUMMARY     DATE OF DISCHARGE: 4/2/24    DISCHARGE DESTINATION: home w/     HOME CARE: Yes    Agency Name: [facility]  Discharging to Facility/ Agency   Name:  [facility]    Address: [address] [place] [address]  Phone: [phone]  Fax: [phone]     Notified: RN, Family, and Facility/Agency  TRANSPORTATION: Private Car    NEW DME ORDERED: no    Electronically signed by Huong Limon on 4/2/2024 at 4:31 PM  #[phone]

## 2024-04-02 NOTE — CARE COORDINATION
Call and spoke w/ patient's  Grover #[phone]  We discussed home care and he is agreeable - he prefers [facility]- referral to [facility]   She will follow and pull orders at discharge.  Electronically signed by Huong Limon on 4/2/2024 at 1:17 PM  #[phone]

## 2024-04-02 NOTE — DISCHARGE INSTR - COC
Continuity of Care Form    Patient Name: Lucille Lilly   :  1945  MRN:  [id number]    Admit date:  3/30/2024  Discharge date:  2024     Code Status Order: Full Code   Advance Directives:     Admitting Physician:  Tyrese Serna MD  PCP: Miguel Angel Moses MD    Discharging Nurse: Paulette Hodge RN   Discharging Hospital Unit/Room#: N3K-3880/3124-01  Discharging Unit Phone Number: [phone]    Emergency Contact:   Extended Emergency Contact Information  Primary Emergency Contact: Grover Lilly  Address: [address] [place] [address]  Home Phone: [phone]  Mobile Phone: [phone]  Relation: Spouse   needed? No  Secondary Emergency Contact: Celia Oconnell  Address: [facility] [address]           [address]  Home Phone: [phone]  Mobile Phone: [phone]  Relation: Child    Past Surgical History:  Past Surgical History:   Procedure Laterality Date    APPENDECTOMY      BACK INJECTION Bilateral 2022    BILATERAL SACROILIAC JOINT INJECTION performed by Chanel Orta MD at [facility] ENDOSCOPY    BACK INJECTION Bilateral 2022    BILATERAL SACROILIAC JOINT INJECTION performed by Chanel Orta MD at [facility] ENDOSCOPY    BACK INJECTION Bilateral 2023    BILATERAL SACROILIAC JOINT INJECTION performed by Chanel Orta MD at [facility] ENDOSCOPY    BACK INJECTION Bilateral 2023    BILATERAL SACROILIAC JOINT INJECTION performed by Chanel Orta MD at [facility] ENDOSCOPY    BACK INJECTION Bilateral 2023    BILATERAL SACROILIAC JOINT INJECTION performed by Chnael Orta MD at [facility] ENDOSCOPY    BREAST SURGERY Left     fatty tumor removal     SECTION      x2    CHOLECYSTECTOMY  2007    COLONOSCOPY      COLONOSCOPY N/A 2023    COLONOSCOPY POLYPECTOMY SNARE performed by Robbie Rodriguez MD at [facility] ENDOSCOPY      L/min per nasal cannula.  Ventilator:    - No ventilator support    Rehab Therapies: Physical Therapy and Occupational Therapy  Weight Bearing Status/Restrictions: No weight bearing restrictions  Other Medical Equipment (for information only, NOT a DME order):  walker  Other Treatments: none     Patient's personal belongings (please select all that are sent with patient):  Shandra Woods    RN SIGNATURE:  Electronically signed by Paulette Hodge RN on 4/2/24 at 11:51 AM EDT    CASE MANAGEMENT/SOCIAL WORK SECTION    Inpatient Status Date: 03/30/2024     Readmission Risk Assessment Score:  Readmission Risk              Risk of Unplanned Readmission:  21           Discharging to Facility/ Agency   Discharging to Facility/ Agency   Name:  [facility]    Address: [address] [place] [address], [address] [place] [address]  Phone: [phone]  Fax: [phone]       / signature: Electronically signed by Huong Limon on 4/2/24 at 4:08 PM EDT    PHYSICIAN SECTION    Prognosis: Fair    Condition at Discharge: Stable    Rehab Potential (if transferring to Rehab): Fair    Recommended Labs or Other Treatments After Discharge: NA    Physician Certification: I certify the above information and transfer of Lucille Lilly  is necessary for the continuing treatment of the diagnosis listed and that she requires Home Care for less 30 days.     Update Admission H&P: No change in H&P    PHYSICIAN SIGNATURE:  Electronically signed by Aaliyah Jones MD on 4/2/24 at 12:51 PM EDT

## 2024-04-03 ENCOUNTER — CARE COORDINATION (OUTPATIENT)
Dept: CASE MANAGEMENT | Age: 79
End: 2024-04-03

## 2024-04-03 DIAGNOSIS — R55 SYNCOPE AND COLLAPSE: Primary | ICD-10-CM

## 2024-04-03 PROCEDURE — 1111F DSCHRG MED/CURRENT MED MERGE: CPT | Performed by: FAMILY MEDICINE

## 2024-04-03 NOTE — TELEPHONE ENCOUNTER
Patient daughter called requesting order for a bedside rail. Pt has dyskinesia and keeps falling out of bed.      Last appt 3/22  Next appt 4/5        [facility]  [address] [place] [address]  Phone: [phone]

## 2024-04-03 NOTE — CARE COORDINATION
Care Transitions Initial Follow Up Call    Call within 2 business days of discharge: Yes    Patient Current Location:  Home: [address] [place] [id number]    Care Transition Nurse contacted the spouse/partner by telephone to perform post hospital discharge assessment. Verified name and  with spouse/partner as identifiers. Provided introduction to self, and explanation of the Care Transition Nurse role.     Patient: Lucille Lilly Patient : 1945   MRN: [id number]  Reason for Admission: syncope, hypotension   Discharge Date: 24 RARS: Readmission Risk Score: 18      Last Discharge Facility       Date Complaint Diagnosis Description Type Department Provider    3/30/24 Loss of Consciousness Syncope and collapse ... ED to Hosp-Admission (Discharged) (ADMITTED) [facility] 3W Aaliyah Jones MD; Saud Guy...            Was this an external facility discharge? No Discharge Facility: [facility]    Challenges to be reviewed by the provider   Additional needs identified to be addressed with provider: No  none               Method of communication with provider: none.    Spoke with spouse Grover who is HIPAA verified. Grover states they had an awful night. States pt fell out of bed multiple times due to dyskinesia. States he feels it is worse post hospitalization vs before hospitalization. We discussed the hospital not giving pt sinemet like her home dose, we discussed PRN ativan. Spouse verbalized understanding. Pt is experiencing some memory issues - spouse states this is not new but worse. Discussed hospital delirium due to two hospital stays back to back. Also discussed bringing this up to PCP. Pt has no issues with nausea or vomiting but appetite noted to be decreased. Denies bowel or bladder issues. States she is using a walker to get around but sometimes she is getting around without DME. Denies lightheadedness or dizziness. Pt discharged with [facility]. Pt to follow up with PCP 4/5 for HFU and cards later in

## 2024-04-03 NOTE — TELEPHONE ENCOUNTER
Medication:   Requested Prescriptions     Pending Prescriptions Disp Refills    Misc. Devices (EXTENDABLE BEDSIDE RAIL) MISC       Sig: by Does not apply route        Last Filled:      Patient Phone Number: [phone] (home)     Last appt: 3/22/2024   Next appt: 4/5/2024    Last OARRS:       7/8/2022     3:18 PM   RX Monitoring   Periodic Controlled Substance Monitoring No signs of potential drug abuse or diversion identified.

## 2024-04-05 ENCOUNTER — OFFICE VISIT (OUTPATIENT)
Dept: PRIMARY CARE CLINIC | Age: 79
End: 2024-04-05

## 2024-04-05 VITALS
BODY MASS INDEX: 36.83 KG/M2 | WEIGHT: 176.2 LBS | OXYGEN SATURATION: 98 % | DIASTOLIC BLOOD PRESSURE: 78 MMHG | SYSTOLIC BLOOD PRESSURE: 155 MMHG | HEART RATE: 69 BPM

## 2024-04-05 DIAGNOSIS — I10 ESSENTIAL HYPERTENSION: ICD-10-CM

## 2024-04-05 DIAGNOSIS — F41.8 MIXED ANXIETY AND DEPRESSIVE DISORDER: ICD-10-CM

## 2024-04-05 DIAGNOSIS — I25.10 CAD S/P PERCUTANEOUS CORONARY ANGIOPLASTY: Primary | ICD-10-CM

## 2024-04-05 DIAGNOSIS — E78.2 MIXED HYPERLIPIDEMIA: ICD-10-CM

## 2024-04-05 DIAGNOSIS — Z98.61 CAD S/P PERCUTANEOUS CORONARY ANGIOPLASTY: Primary | ICD-10-CM

## 2024-04-05 DIAGNOSIS — G24.9 DYSKINESIA: ICD-10-CM

## 2024-04-05 DIAGNOSIS — R55 SYNCOPE AND COLLAPSE: ICD-10-CM

## 2024-04-05 DIAGNOSIS — J45.20 MILD INTERMITTENT ASTHMA WITHOUT COMPLICATION: ICD-10-CM

## 2024-04-05 DIAGNOSIS — G25.81 RESTLESS LEGS SYNDROME (RLS): ICD-10-CM
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
## 2024-04-06 RX ORDER — CARBIDOPA/LEVODOPA 25MG-250MG
2 TABLET ORAL 4 TIMES DAILY
Qty: 270 TABLET | Refills: 3
Start: 2024-04-06

## 2024-04-09 ENCOUNTER — CARE COORDINATION (OUTPATIENT)
Dept: CASE MANAGEMENT | Age: 79
End: 2024-04-09

## 2024-04-09 NOTE — CARE COORDINATION
Care Transitions Follow Up Call    Patient Current Location:  Home: [address] [place] [id number]    Care Transition Nurse contacted the spouse/partner by telephone to follow up after admission.  Verified name and  with spouse/partner as identifiers.    Patient: Lucille Lilly  Patient : 1945   MRN: [id number]  Reason for Admission: syncope, hypotension  Discharge Date: 24 RARS: Readmission Risk Score: 18      Needs to be reviewed by the provider   Additional needs identified to be addressed with provider: No  none             Method of communication with provider: none.    Spoke with Grover who states pt is doing \"much better.\" States she is back to her regular neuro meds and this has significantly improved her movement disorder. States her mentation is back to baseline as well. Denies any further falls, dizziness or lightheadedness. States she is eating and drinking without issues. Denies diarrhea or constipation. Reviewed AVS from PCP appt . Pt has stopped iron tablets. Denies any other needs at present. Will continue to follow.       Follow Up  Future Appointments   Date Time Provider Department Center   2024  8:45 AM Henry Ruvalcaba MD [facility]   2024  1:00 PM Henry Duong DO [facility]   2024 11:15 AM Miguel Angel Moses MD [address] [facility]     External follow up appointment(s): n/a    Care Transition Nurse reviewed red flags with spouse/partner and discussed any barriers to care and/or understanding of plan of care after discharge. Discussed appropriate site of care based on symptoms and resources available to patient including: PCP  Specialist. The spouse/partner agrees to contact the PCP office for questions related to their healthcare.      Care Transitions Subsequent and Final Call    Subsequent and Final Calls  Do you have any ongoing symptoms?: No  Have your medications changed?: Yes  Patient Reports: Iron tablet discontinued  Do
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
## 2024-04-16 ENCOUNTER — CARE COORDINATION (OUTPATIENT)
Dept: CASE MANAGEMENT | Age: 79
End: 2024-04-16

## 2024-04-16 NOTE — CARE COORDINATION
[facility] Care Transitions Follow Up Call    2024    Patient: Lucille Lilly  Patient : 1945   MRN: [id number]  Reason for Admission: Syncope and hypotention - due to meds   Discharge Date: 24 RARS: Readmission Risk Score: 18         Spoke with: maria elena    [facility] attempted outreach for care transition follow up call. Left HIPPA compliant message and contact information for call back.     Care Transitions Subsequent and Final Call    Subsequent and Final Calls  Are you currently active with any services?: Home Health  Care Transitions Interventions  Other Interventions:             Follow Up  Future Appointments   Date Time Provider Department Center   2024  8:45 AM Henry Ruvalcaba MD [facility]   2024  1:00 PM Henry Duong DO [facility]   2024 11:15 AM Miguel Angel Moses MD [address] [facility] Santiago Goodson LPN

## 2024-04-17 ENCOUNTER — CARE COORDINATION (OUTPATIENT)
Dept: CARE COORDINATION | Age: 79
End: 2024-04-17

## 2024-04-17 NOTE — CARE COORDINATION
instructions, medical action plan, and red flags with patient and discussed any barriers to care and/or understanding of plan of care after discharge. Discussed appropriate site of care based on symptoms and resources available to patient including: PCP  Specialist  When to call 911. The patient agrees to contact the PCP office for questions related to their healthcare.   Patients top risk factors for readmission: medical condition-   Interventions to address risk factors: Obtained and reviewed discharge summary and/or continuity of care documents and Assessment and support for treatment adherence and medication management-      Care Transitions Subsequent and Final Call    Subsequent and Final Calls  Care Transitions Interventions  Other Interventions:             CTN provided contact information for future needs. Plan for follow-up call in 5-7 days based on severity of symptoms and risk factors.  Plan for next call: symptom management-   self management-     Nichol Waters

## 2024-04-22 ENCOUNTER — OFFICE VISIT (OUTPATIENT)
Dept: CARDIOLOGY CLINIC | Age: 79
End: 2024-04-22
Payer: MEDICARE

## 2024-04-22 VITALS
OXYGEN SATURATION: 97 % | DIASTOLIC BLOOD PRESSURE: 62 MMHG | WEIGHT: 177 LBS | SYSTOLIC BLOOD PRESSURE: 102 MMHG | BODY MASS INDEX: 37.16 KG/M2 | HEART RATE: 67 BPM | HEIGHT: 58 IN

## 2024-04-22 DIAGNOSIS — I10 ESSENTIAL HYPERTENSION: ICD-10-CM

## 2024-04-22 DIAGNOSIS — I25.10 CORONARY ARTERY DISEASE INVOLVING NATIVE CORONARY ARTERY OF NATIVE HEART WITHOUT ANGINA PECTORIS: Primary | ICD-10-CM

## 2024-04-22 PROCEDURE — G8427 DOCREV CUR MEDS BY ELIG CLIN: HCPCS | Performed by: STUDENT IN AN ORGANIZED HEALTH CARE EDUCATION/TRAINING PROGRAM

## 2024-04-22 PROCEDURE — G8417 CALC BMI ABV UP PARAM F/U: HCPCS | Performed by: STUDENT IN AN ORGANIZED HEALTH CARE EDUCATION/TRAINING PROGRAM

## 2024-04-22 PROCEDURE — 1111F DSCHRG MED/CURRENT MED MERGE: CPT | Performed by: STUDENT IN AN ORGANIZED HEALTH CARE EDUCATION/TRAINING PROGRAM

## 2024-04-22 PROCEDURE — 1123F ACP DISCUSS/DSCN MKR DOCD: CPT | Performed by: STUDENT IN AN ORGANIZED HEALTH CARE EDUCATION/TRAINING PROGRAM

## 2024-04-22 PROCEDURE — G8399 PT W/DXA RESULTS DOCUMENT: HCPCS | Performed by: STUDENT IN AN ORGANIZED HEALTH CARE EDUCATION/TRAINING PROGRAM

## 2024-04-22 PROCEDURE — 3078F DIAST BP <80 MM HG: CPT | Performed by: STUDENT IN AN ORGANIZED HEALTH CARE EDUCATION/TRAINING PROGRAM

## 2024-04-22 PROCEDURE — 93000 ELECTROCARDIOGRAM COMPLETE: CPT | Performed by: STUDENT IN AN ORGANIZED HEALTH CARE EDUCATION/TRAINING PROGRAM

## 2024-04-22 PROCEDURE — 1036F TOBACCO NON-USER: CPT | Performed by: STUDENT IN AN ORGANIZED HEALTH CARE EDUCATION/TRAINING PROGRAM

## 2024-04-22 PROCEDURE — 1090F PRES/ABSN URINE INCON ASSESS: CPT | Performed by: STUDENT IN AN ORGANIZED HEALTH CARE EDUCATION/TRAINING PROGRAM

## 2024-04-22 PROCEDURE — 3074F SYST BP LT 130 MM HG: CPT | Performed by: STUDENT IN AN ORGANIZED HEALTH CARE EDUCATION/TRAINING PROGRAM

## 2024-04-22 PROCEDURE — 99214 OFFICE O/P EST MOD 30 MIN: CPT | Performed by: STUDENT IN AN ORGANIZED HEALTH CARE EDUCATION/TRAINING PROGRAM

## 2024-04-22 NOTE — PATIENT INSTRUCTIONS
Echo    Start Cardiac Rehab     Cannot hold Plavix for any Procedure until the 3 months.     Change positions slowly to prevent dizziness     Call for chest pain or any new symptoms

## 2024-04-22 NOTE — PROGRESS NOTES
Physician Progress Note      PATIENT:               ELPIDIO WHARTON  CSN #:                  [id number]  :                       1945  ADMIT DATE:       3/30/2024 12:17 PM  DISCH DATE:        2024 5:02 PM  RESPONDING  PROVIDER #:        Aaliyah Jones MD          QUERY TEXT:    Patient admitted with syncope.  Per cardiology consult note on , syncope   suspected to be related to hypotension due to medications.  \"Marked iron   deficiency anemia\" also noted.  Treated with IV fluid boluses, albumin,   Venofer, and Midodrine.  If possible, please document in progress notes and   discharge summary after study the etiology of the syncope:    The medical record reflects the following:  Risk Factors: hypotension, iron deficiency anemia  Clinical Indicators: BP 80/45 on arrival  Treatment: cardiology consult, albumin, IV fluid boluses, Venofer, and   Midodrine  Options provided:  -- Syncope possibly due to both hypotension secondary to medications and iron   deficiency anemia  -- Syncope due to hypotension secondary to medication only  -- Other - I will add my own diagnosis  -- Disagree - Not applicable / Not valid  -- Disagree - Clinically unable to determine / Unknown  -- Refer to Clinical Documentation Reviewer    PROVIDER RESPONSE TEXT:    Syncope possibly due to both hypotension secondary to medications and iron   deficiency anemia.    Query created by: Gilda Clay on 2024 11:11 AM      Electronically signed by:  Aaliyah Jones MD 2024 11:15 AM

## 2024-04-24 ENCOUNTER — CARE COORDINATION (OUTPATIENT)
Dept: CARE COORDINATION | Age: 79
End: 2024-04-24

## 2024-04-24 NOTE — CARE COORDINATION
Care Transitions Follow Up Call    Patient: Lucille Lilly  Patient : 1945   MRN: [id number]  Reason for Admission: syncope and collapse  Discharge Date: 24 RARS: Readmission Risk Score: 18    Attempted to reach pt for follow up call. Left message requesting call back.    Follow Up  Future Appointments   Date Time Provider Department Center   2024  1:00 PM Henry Duong DO [facility]   2024 11:15 AM Miguel Angel Moses MD [address] [facility] [place] [facility]   2024  8:30 AM SCHEDULE, [facility] ECHO ROOM 2 [facility]   10/28/2024  8:30 AM Henry Ruvalcaba MD [facility]

## 2024-04-28 ENCOUNTER — PATIENT MESSAGE (OUTPATIENT)
Dept: PRIMARY CARE CLINIC | Age: 79
End: 2024-04-28

## 2024-04-29 ENCOUNTER — CARE COORDINATION (OUTPATIENT)
Dept: CASE MANAGEMENT | Age: 79
End: 2024-04-29

## 2024-04-29 DIAGNOSIS — G89.4 CHRONIC PAIN SYNDROME: ICD-10-CM

## 2024-04-29 RX ORDER — ACETAMINOPHEN AND CODEINE PHOSPHATE 300; 30 MG/1; MG/1
1 TABLET ORAL EVERY 8 HOURS PRN
Qty: 90 TABLET | Refills: 0 | Status: SHIPPED | OUTPATIENT
Start: 2024-04-29 | End: 2024-05-29

## 2024-04-29 NOTE — CARE COORDINATION
[facility] Transitions Follow Up Call    2024    Patient: Lucille Lilly  Patient : 1945   MRN: [id number]  Reason for Admission: Syncope and hypotention - due to meds   Discharge Date: 24 RARS: Readmission Risk Score: 18         Spoke with: Lucille Lilly who reported that she was doing good. Patient denied cp, sob, cough, dizziness, headache, n/v, diarrhea, abdominal pains, fever, or chills. Patient report that appetite and fluid intake is good and denied any problems with bowel or bladder. Patient reported that she is taking all medications as ordered. Patient denied any other needs at this time. Patient instructed to continue to monitor s/s, reporting any that may present to MD immediately for early intervention.  Patient is agreeable for this to be our last outreach. Program closed.    Care Transitions Subsequent and Final Call    Subsequent and Final Calls  Do you have any ongoing symptoms?: No  Have your medications changed?: No  Do you have any questions related to your medications?: No  Do you currently have any active services?: No  Are you currently active with any services?: Home Health  Do you have any needs or concerns that I can assist you with?: No  Identified Barriers: None  Care Transitions Interventions  No Identified Needs  Other Interventions:             Follow Up  Future Appointments   Date Time Provider Department Center   2024  1:00 PM Henry Duong DO [facility]   2024 11:15 AM Miguel Angel Moses MD [address] [facility] [place] [facility]   2024  8:30 AM SCHEDULE, [facility]   10/28/2024  8:30 AM Henry Ruvalcaba MD [facility]       Jemima Goodson LPN

## 2024-04-29 NOTE — TELEPHONE ENCOUNTER
From: Lucille Lilly  To: Dr. Miguel Angel Moses  Sent: 2024 5:46 PM EDT  Subject: Tylenol 3    Hi Doc/Yousif,  I need a refill for my Tylenol 3. Sorry so late, but Marcell's mother  and things just went up ended. Thanks, Lucille

## 2024-05-13 ENCOUNTER — OFFICE VISIT (OUTPATIENT)
Dept: PULMONOLOGY | Age: 79
End: 2024-05-13
Payer: MEDICARE

## 2024-05-13 VITALS
SYSTOLIC BLOOD PRESSURE: 120 MMHG | RESPIRATION RATE: 18 BRPM | DIASTOLIC BLOOD PRESSURE: 80 MMHG | HEIGHT: 58 IN | WEIGHT: 173 LBS | OXYGEN SATURATION: 98 % | TEMPERATURE: 97 F | BODY MASS INDEX: 36.31 KG/M2 | HEART RATE: 64 BPM

## 2024-05-13 DIAGNOSIS — G47.33 OSA ON CPAP: Primary | ICD-10-CM

## 2024-05-13 DIAGNOSIS — J45.20 MILD INTERMITTENT ASTHMA WITHOUT COMPLICATION: ICD-10-CM

## 2024-05-13 PROCEDURE — 1090F PRES/ABSN URINE INCON ASSESS: CPT | Performed by: INTERNAL MEDICINE

## 2024-05-13 PROCEDURE — G8399 PT W/DXA RESULTS DOCUMENT: HCPCS | Performed by: INTERNAL MEDICINE

## 2024-05-13 PROCEDURE — 3074F SYST BP LT 130 MM HG: CPT | Performed by: INTERNAL MEDICINE

## 2024-05-13 PROCEDURE — 3079F DIAST BP 80-89 MM HG: CPT | Performed by: INTERNAL MEDICINE

## 2024-05-13 PROCEDURE — G8417 CALC BMI ABV UP PARAM F/U: HCPCS | Performed by: INTERNAL MEDICINE

## 2024-05-13 PROCEDURE — 99213 OFFICE O/P EST LOW 20 MIN: CPT | Performed by: INTERNAL MEDICINE

## 2024-05-13 PROCEDURE — 1123F ACP DISCUSS/DSCN MKR DOCD: CPT | Performed by: INTERNAL MEDICINE

## 2024-05-13 PROCEDURE — 1036F TOBACCO NON-USER: CPT | Performed by: INTERNAL MEDICINE

## 2024-05-13 PROCEDURE — G8427 DOCREV CUR MEDS BY ELIG CLIN: HCPCS | Performed by: INTERNAL MEDICINE

## 2024-05-13 ASSESSMENT — SLEEP AND FATIGUE QUESTIONNAIRES
HOW LIKELY ARE YOU TO NOD OFF OR FALL ASLEEP IN A CAR, WHILE STOPPED FOR A FEW MINUTES IN TRAFFIC: SLIGHT CHANCE OF DOZING
HOW LIKELY ARE YOU TO NOD OFF OR FALL ASLEEP WHEN YOU ARE A PASSENGER IN A CAR FOR AN HOUR WITHOUT A BREAK: SLIGHT CHANCE OF DOZING
HOW LIKELY ARE YOU TO NOD OFF OR FALL ASLEEP WHILE SITTING INACTIVE IN A PUBLIC PLACE: SLIGHT CHANCE OF DOZING
HOW LIKELY ARE YOU TO NOD OFF OR FALL ASLEEP WHILE SITTING QUIETLY AFTER LUNCH WITHOUT ALCOHOL: SLIGHT CHANCE OF DOZING
HOW LIKELY ARE YOU TO NOD OFF OR FALL ASLEEP WHILE LYING DOWN TO REST IN THE AFTERNOON WHEN CIRCUMSTANCES PERMIT: SLIGHT CHANCE OF DOZING
HOW LIKELY ARE YOU TO NOD OFF OR FALL ASLEEP WHILE SITTING AND READING: SLIGHT CHANCE OF DOZING
ESS TOTAL SCORE: 8
HOW LIKELY ARE YOU TO NOD OFF OR FALL ASLEEP WHILE SITTING AND TALKING TO SOMEONE: SLIGHT CHANCE OF DOZING
HOW LIKELY ARE YOU TO NOD OFF OR FALL ASLEEP WHILE WATCHING TV: SLIGHT CHANCE OF DOZING

## 2024-05-13 NOTE — PROGRESS NOTES
Chief complaint  This is a 79 y.o. year old female  who comes to see me with a chief complaint of   Chief Complaint   Patient presents with    Sleep Apnea    .    HPI  Follow up on DOYLE.      Machine:  Patient is using a APAP machine.  Average usage is 9 hrs 27 min 00 sec.  She is meeting 4 or more hours per night 92% of the time.  Average AHI is 2.7.  Patient admits to good usage    She had two recent admissions in March.  The second admission required heart cath and stent.  She is doing better and recovering    Never really uses albuterol           5/13/2024    12:54 PM 11/7/2023     8:59 AM 10/11/2022    11:12 AM 10/8/2021    12:40 PM 1/13/2021     2:21 PM 8/19/2019     2:10 PM 2/25/2019     8:56 AM   Sleep Medicine   Sitting and reading 1 1 1 1 2 1 1   Watching TV 1 1 1 0 2 1 1   Sitting, inactive in a public place (e.g. a theatre or a meeting) 1 0 0 0 2 1 1   As a passenger in a car for an hour without a break 1 1 1 0 2 2 1   Lying down to rest in the afternoon when circumstances permit 1 1 0 0 1 3 2   Sitting and talking to someone 1 0 0 0 0 1 0   Sitting quietly after a lunch without alcohol 1 0 0 0 1 1 0   In a car, while stopped for a few minutes in traffic 1 0 0 0 0 0 0   [place] Sleepiness Score 8 4 3 1 10 10 6         Current Outpatient Medications   Medication Sig Dispense Refill    acetaminophen-codeine (TYLENOL/CODEINE #3) 300-30 MG per tablet Take 1 tablet by mouth every 8 hours as needed for Pain for up to 30 days. Max Daily Amount: 3 tablets 90 tablet 0    carbidopa-levodopa (SINEMET)  MG per tablet Take 2 tablets by mouth 4 times daily Pt takes 1 tablet @ 9,12,1500,1700, 1900, 2100, 2300 (Patient taking differently: Take 1 tablet by mouth 4 times daily Pt takes 1 tablet @ 9,12,1500,1700, 1900, 2100, 2300) 270 tablet 3    Misc. Devices (EXTENDABLE BEDSIDE RAIL) MISC 1 Act by Does not apply route daily (with breakfast) 2 each 0    aspirin 81 MG chewable tablet Take 1 tablet by mouth

## 2024-05-14 RX ORDER — GABAPENTIN 600 MG/1
600 TABLET ORAL 2 TIMES DAILY
Qty: 180 TABLET | Refills: 1 | Status: SHIPPED | OUTPATIENT
Start: 2024-05-14 | End: 2024-11-10

## 2024-05-16 ENCOUNTER — HOSPITAL ENCOUNTER (OUTPATIENT)
Dept: CARDIAC REHAB | Age: 79
Setting detail: THERAPIES SERIES
Discharge: HOME | End: 2024-05-16
Payer: MEDICARE

## 2024-05-16 PROCEDURE — 93798 PHYS/QHP OP CAR RHAB W/ECG: CPT

## 2024-05-20 ENCOUNTER — TELEPHONE (OUTPATIENT)
Dept: PRIMARY CARE CLINIC | Age: 79
End: 2024-05-20

## 2024-05-20 ENCOUNTER — APPOINTMENT (OUTPATIENT)
Dept: CARDIAC REHAB | Age: 79
End: 2024-05-20
Payer: MEDICARE

## 2024-05-20 NOTE — TELEPHONE ENCOUNTER
Called and spoke with patient.      She informed me her brother, \"Bj\", has passed away.      He was a patient of Dr Moses for many years, until recently.     Offered my condolences, informed Dr Moses.

## 2024-05-22 ENCOUNTER — OFFICE VISIT (OUTPATIENT)
Dept: PRIMARY CARE CLINIC | Age: 79
End: 2024-05-22

## 2024-05-22 VITALS
OXYGEN SATURATION: 96 % | HEART RATE: 70 BPM | DIASTOLIC BLOOD PRESSURE: 60 MMHG | SYSTOLIC BLOOD PRESSURE: 131 MMHG | RESPIRATION RATE: 18 BRPM

## 2024-05-22 DIAGNOSIS — R22.1 NECK SWELLING: Primary | ICD-10-CM
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
## 2024-05-22 DIAGNOSIS — M25.511 ACUTE PAIN OF RIGHT SHOULDER: ICD-10-CM

## 2024-05-22 RX ORDER — NAPROXEN 500 MG/1
500 TABLET ORAL 2 TIMES DAILY WITH MEALS
Qty: 30 TABLET | Refills: 0 | Status: SHIPPED | OUTPATIENT
Start: 2024-05-22

## 2024-05-22 RX ORDER — AMOXICILLIN 875 MG/1
875 TABLET, COATED ORAL 2 TIMES DAILY
Qty: 20 TABLET | Refills: 0 | Status: SHIPPED | OUTPATIENT
Start: 2024-05-22 | End: 2024-06-01

## 2024-05-22 ASSESSMENT — ENCOUNTER SYMPTOMS
DIARRHEA: 0
CONSTIPATION: 0
SHORTNESS OF BREATH: 0
ABDOMINAL PAIN: 0
TROUBLE SWALLOWING: 0
BLOOD IN STOOL: 0

## 2024-05-22 NOTE — PROGRESS NOTES
2024     Lucille Lilly (:  1945) is a 79 y.o. female, here for evaluation of the following medical concerns:    Shoulder Pain   Pertinent negatives include no fever.   Fall  Pertinent negatives include no abdominal pain, fever or hematuria.     Patient is 79 years old female presented to the office for checkup.  She complained of swollen left side of the neck since yesterday.  There is some redness associated with the swelling.  She denies toothache earache or trouble swallowing food she has no fever and chills denies chest pain or shortness of breath denies bowel or urinary disturbance.    Review of Systems   Constitutional:  Negative for activity change, appetite change, chills and fever.   HENT:  Negative for dental problem, ear pain and trouble swallowing.    Eyes:  Negative for visual disturbance.   Respiratory:  Negative for shortness of breath.    Cardiovascular:  Negative for chest pain and leg swelling.   Gastrointestinal:  Negative for abdominal pain, blood in stool, constipation and diarrhea.   Genitourinary:  Negative for difficulty urinating, frequency, hematuria, menstrual problem and urgency.   Neurological:  Negative for dizziness and syncope.   Psychiatric/Behavioral:  Negative for behavioral problems.        Prior to Visit Medications    Medication Sig Taking? Authorizing Provider   amoxicillin (AMOXIL) 875 MG tablet Take 1 tablet by mouth 2 times daily for 10 days Yes Miguel Angel Moses MD   naproxen (NAPROSYN) 500 MG tablet Take 1 tablet by mouth 2 times daily (with meals) Take with food. Yes Miguel Angel Moses MD   gabapentin (NEURONTIN) 600 MG tablet Take 1 tablet by mouth 2 times daily for 180 days.  Miguel Angel Moses MD   acetaminophen-codeine (TYLENOL/CODEINE #3) 300-30 MG per tablet Take 1 tablet by mouth every 8 hours as needed for Pain for up to 30 days. Max Daily Amount: 3 tablets  Miguel Angel Moses MD   carbidopa-levodopa (SINEMET)  MG per tablet Take 2 tablets by

## 2024-05-29 ENCOUNTER — HOSPITAL ENCOUNTER (OUTPATIENT)
Dept: CARDIAC REHAB | Age: 79
Setting detail: THERAPIES SERIES
Discharge: HOME | End: 2024-05-29
Payer: MEDICARE

## 2024-05-29 PROCEDURE — 93798 PHYS/QHP OP CAR RHAB W/ECG: CPT

## 2024-05-31 ENCOUNTER — HOSPITAL ENCOUNTER (OUTPATIENT)
Dept: CARDIAC REHAB | Age: 79
Setting detail: THERAPIES SERIES
Discharge: HOME | End: 2024-05-31
Payer: MEDICARE

## 2024-05-31 PROCEDURE — 93798 PHYS/QHP OP CAR RHAB W/ECG: CPT

## 2024-06-01 ENCOUNTER — PATIENT MESSAGE (OUTPATIENT)
Dept: PRIMARY CARE CLINIC | Age: 79
End: 2024-06-01

## 2024-06-03 ENCOUNTER — APPOINTMENT (OUTPATIENT)
Dept: CARDIAC REHAB | Age: 79
End: 2024-06-03
Payer: MEDICARE

## 2024-06-03 NOTE — TELEPHONE ENCOUNTER
From: Lucille Lilly  To: Dr. Miguel Angel Moses  Sent: 6/1/2024 11:10 AM EDT  Subject: Appt. cancellation    Cancel my appt. for 6/4/24/. I am doing much better.    Thanks Lucille

## 2024-06-05 ENCOUNTER — HOSPITAL ENCOUNTER (OUTPATIENT)
Dept: CARDIAC REHAB | Age: 79
Setting detail: THERAPIES SERIES
Discharge: HOME | End: 2024-06-05
Payer: MEDICARE

## 2024-06-05 PROCEDURE — 93798 PHYS/QHP OP CAR RHAB W/ECG: CPT

## 2024-06-07 ENCOUNTER — HOSPITAL ENCOUNTER (OUTPATIENT)
Dept: CARDIAC REHAB | Age: 79
Setting detail: THERAPIES SERIES
Discharge: HOME | End: 2024-06-07
Payer: MEDICARE

## 2024-06-07 PROCEDURE — 93798 PHYS/QHP OP CAR RHAB W/ECG: CPT

## 2024-06-10 ENCOUNTER — PATIENT MESSAGE (OUTPATIENT)
Dept: PRIMARY CARE CLINIC | Age: 79
End: 2024-06-10

## 2024-06-10 ENCOUNTER — HOSPITAL ENCOUNTER (OUTPATIENT)
Dept: CARDIAC REHAB | Age: 79
Setting detail: THERAPIES SERIES
Discharge: HOME | End: 2024-06-10
Payer: MEDICARE

## 2024-06-10 PROCEDURE — 93798 PHYS/QHP OP CAR RHAB W/ECG: CPT

## 2024-06-11 DIAGNOSIS — G89.4 CHRONIC PAIN SYNDROME: ICD-10-CM

## 2024-06-11 RX ORDER — ACETAMINOPHEN AND CODEINE PHOSPHATE 300; 30 MG/1; MG/1
1 TABLET ORAL EVERY 8 HOURS PRN
Qty: 90 TABLET | Refills: 0 | Status: SHIPPED | OUTPATIENT
Start: 2024-06-11 | End: 2024-07-11

## 2024-06-11 NOTE — TELEPHONE ENCOUNTER
From: Lucille Lilly  To: Dr. Miguel Angel Moses  Sent: 6/10/2024 6:10 PM EDT  Subject: Tylenol 3    Hi Doctor/Yousif.   I need a refill for my Tylenol 3. I only have 3 left.    Thanks, Lucille

## 2024-06-12 ENCOUNTER — HOSPITAL ENCOUNTER (OUTPATIENT)
Dept: CARDIAC REHAB | Age: 79
Setting detail: THERAPIES SERIES
Discharge: HOME | End: 2024-06-12
Payer: MEDICARE

## 2024-06-12 PROCEDURE — 93798 PHYS/QHP OP CAR RHAB W/ECG: CPT

## 2024-06-14 ENCOUNTER — HOSPITAL ENCOUNTER (OUTPATIENT)
Dept: CARDIAC REHAB | Age: 79
Setting detail: THERAPIES SERIES
Discharge: HOME | End: 2024-06-14
Payer: MEDICARE

## 2024-06-14 PROCEDURE — 93798 PHYS/QHP OP CAR RHAB W/ECG: CPT

## 2024-06-17 ENCOUNTER — APPOINTMENT (OUTPATIENT)
Dept: CARDIAC REHAB | Age: 79
End: 2024-06-17
Payer: MEDICARE

## 2024-06-18 ENCOUNTER — PATIENT MESSAGE (OUTPATIENT)
Dept: PRIMARY CARE CLINIC | Age: 79
End: 2024-06-18

## 2024-06-18 NOTE — TELEPHONE ENCOUNTER
From: Lucille Lilly  To: Dr. Miguel Angel Moses  Sent: 6/18/2024 4:02 PM EDT  Subject: New falls/weakness    Hello,    I have recently been falling out of bed at night and have been too weak to get back up. My muscles in my arms and legs are weak. The last time I felt this way my magnesium was low. Does Doctor want me to go to the lab to be tested?    Thanks  Lucille

## 2024-06-19 ENCOUNTER — APPOINTMENT (OUTPATIENT)
Dept: CARDIAC REHAB | Age: 79
End: 2024-06-19
Payer: MEDICARE

## 2024-06-19 DIAGNOSIS — E87.6 LOW SERUM POTASSIUM: Primary | ICD-10-CM

## 2024-06-19 DIAGNOSIS — E87.6 LOW SERUM POTASSIUM: ICD-10-CM

## 2024-06-19 LAB — MAGNESIUM SERPL-MCNC: 1.4 MG/DL (ref 1.8–2.4)

## 2024-06-20 ENCOUNTER — APPOINTMENT (OUTPATIENT)
Dept: GENERAL RADIOLOGY | Age: 79
End: 2024-06-20
Payer: MEDICARE

## 2024-06-20 ENCOUNTER — HOSPITAL ENCOUNTER (OUTPATIENT)
Age: 79
Setting detail: OBSERVATION
Discharge: HOME | End: 2024-06-23
Attending: EMERGENCY MEDICINE | Admitting: INTERNAL MEDICINE
Payer: MEDICARE

## 2024-06-20 ENCOUNTER — APPOINTMENT (OUTPATIENT)
Dept: CT IMAGING | Age: 79
End: 2024-06-20
Payer: MEDICARE

## 2024-06-20 DIAGNOSIS — R07.9 CHEST PAIN, UNSPECIFIED TYPE: Primary | ICD-10-CM

## 2024-06-20 DIAGNOSIS — J81.0 ACUTE PULMONARY EDEMA (HCC): ICD-10-CM

## 2024-06-20 DIAGNOSIS — R79.89 ELEVATED D-DIMER: ICD-10-CM

## 2024-06-20 DIAGNOSIS — R09.02 HYPOXEMIA: ICD-10-CM

## 2024-06-20 DIAGNOSIS — Z71.89 GOALS OF CARE, COUNSELING/DISCUSSION: ICD-10-CM

## 2024-06-20 DIAGNOSIS — Z99.81 REQUIRES SUPPLEMENTAL OXYGEN: ICD-10-CM

## 2024-06-20 LAB
ANION GAP SERPL CALCULATED.3IONS-SCNC: 15 MMOL/L (ref 3–16)
BACTERIA URNS QL MICRO: NORMAL /HPF
BASE EXCESS BLDV CALC-SCNC: 0 MMOL/L
BASOPHILS # BLD: 0.1 K/UL (ref 0–0.2)
BASOPHILS NFR BLD: 1 %
BILIRUB UR QL STRIP.AUTO: NEGATIVE
BUN SERPL-MCNC: 12 MG/DL (ref 7–20)
CALCIUM SERPL-MCNC: 9.4 MG/DL (ref 8.3–10.6)
CHLORIDE SERPL-SCNC: 101 MMOL/L (ref 99–110)
CLARITY UR: CLEAR
CO2 BLDV-SCNC: 27 MMOL/L
CO2 SERPL-SCNC: 21 MMOL/L (ref 21–32)
COHGB MFR BLDV: 2.9 %
COLOR UR: YELLOW
CREAT SERPL-MCNC: 0.5 MG/DL (ref 0.6–1.2)
D-DIMER QUANTITATIVE: 1.98 UG/ML FEU (ref 0–0.6)
DEPRECATED RDW RBC AUTO: 17.3 % (ref 12.4–15.4)
EKG ATRIAL RATE: 108 BPM
EKG DIAGNOSIS: NORMAL
EKG P AXIS: 78 DEGREES
EKG P-R INTERVAL: 220 MS
EKG Q-T INTERVAL: 322 MS
EKG QRS DURATION: 82 MS
EKG QTC CALCULATION (BAZETT): 431 MS
EKG R AXIS: -7 DEGREES
EKG T AXIS: 96 DEGREES
EKG VENTRICULAR RATE: 108 BPM
EOSINOPHIL # BLD: 0.1 K/UL (ref 0–0.6)
EOSINOPHIL NFR BLD: 0.9 %
EPI CELLS #/AREA URNS AUTO: 2 /HPF (ref 0–5)
GFR SERPLBLD CREATININE-BSD FMLA CKD-EPI: >90 ML/MIN/{1.73_M2}
GLUCOSE BLD-MCNC: 131 MG/DL (ref 70–99)
GLUCOSE BLD-MCNC: 163 MG/DL (ref 70–99)
GLUCOSE BLD-MCNC: 166 MG/DL (ref 70–99)
GLUCOSE BLD-MCNC: 207 MG/DL (ref 70–99)
GLUCOSE SERPL-MCNC: 115 MG/DL (ref 70–99)
GLUCOSE UR STRIP.AUTO-MCNC: NEGATIVE MG/DL
HCO3 BLDV-SCNC: 26 MMOL/L (ref 23–29)
HCT VFR BLD AUTO: 26.9 % (ref 36–48)
HGB BLD-MCNC: 8.2 G/DL (ref 12–16)
HGB UR QL STRIP.AUTO: NEGATIVE
HYALINE CASTS #/AREA URNS AUTO: 0 /LPF (ref 0–8)
KETONES UR STRIP.AUTO-MCNC: 15 MG/DL
LACTATE BLDV-SCNC: 1.7 MMOL/L (ref 0.4–2)
LEUKOCYTE ESTERASE UR QL STRIP.AUTO: ABNORMAL
LYMPHOCYTES # BLD: 1.2 K/UL (ref 1–5.1)
LYMPHOCYTES NFR BLD: 9.7 %
MCH RBC QN AUTO: 21 PG (ref 26–34)
MCHC RBC AUTO-ENTMCNC: 30.5 G/DL (ref 31–36)
MCV RBC AUTO: 68.7 FL (ref 80–100)
METHGB MFR BLDV: 0.4 %
MONOCYTES # BLD: 0.8 K/UL (ref 0–1.3)
MONOCYTES NFR BLD: 6.5 %
NEUTROPHILS # BLD: 9.7 K/UL (ref 1.7–7.7)
NEUTROPHILS NFR BLD: 81.9 %
NITRITE UR QL STRIP.AUTO: NEGATIVE
NT-PROBNP SERPL-MCNC: 487 PG/ML (ref 0–449)
O2 THERAPY: NORMAL
PATH INTERP BLD-IMP: NO
PCO2 BLDV: 45.9 MMHG (ref 40–50)
PERFORMED ON: ABNORMAL
PH BLDV: 7.36 [PH] (ref 7.35–7.45)
PH UR STRIP.AUTO: 5.5 [PH] (ref 5–8)
PLATELET # BLD AUTO: 312 K/UL (ref 135–450)
PMV BLD AUTO: 7 FL (ref 5–10.5)
PO2 BLDV: 39 MMHG
POTASSIUM SERPL-SCNC: 4.2 MMOL/L (ref 3.5–5.1)
PROCALCITONIN SERPL IA-MCNC: 0.05 NG/ML (ref 0–0.15)
PROT UR STRIP.AUTO-MCNC: NEGATIVE MG/DL
RBC # BLD AUTO: 3.92 M/UL (ref 4–5.2)
RBC CLUMPS #/AREA URNS AUTO: 4 /HPF (ref 0–4)
SAO2 % BLDV: 68 %
SODIUM SERPL-SCNC: 137 MMOL/L (ref 136–145)
SP GR UR STRIP.AUTO: 1.02 (ref 1–1.03)
TROPONIN, HIGH SENSITIVITY: 10 NG/L (ref 0–14)
TROPONIN, HIGH SENSITIVITY: 11 NG/L (ref 0–14)
TROPONIN, HIGH SENSITIVITY: 13 NG/L (ref 0–14)
TROPONIN, HIGH SENSITIVITY: 9 NG/L (ref 0–14)
TROPONIN, HIGH SENSITIVITY: 9 NG/L (ref 0–14)
UA COMPLETE W REFLEX CULTURE PNL UR: ABNORMAL
UA DIPSTICK W REFLEX MICRO PNL UR: YES
URN SPEC COLLECT METH UR: ABNORMAL
UROBILINOGEN UR STRIP-ACNC: 0.2 E.U./DL
WBC # BLD AUTO: 11.8 K/UL (ref 4–11)
WBC #/AREA URNS AUTO: 1 /HPF (ref 0–5)

## 2024-06-20 PROCEDURE — 96372 THER/PROPH/DIAG INJ SC/IM: CPT

## 2024-06-20 PROCEDURE — 99223 1ST HOSP IP/OBS HIGH 75: CPT | Performed by: INTERNAL MEDICINE

## 2024-06-20 PROCEDURE — 85025 COMPLETE CBC W/AUTO DIFF WBC: CPT

## 2024-06-20 PROCEDURE — 2580000003 HC RX 258: Performed by: INTERNAL MEDICINE

## 2024-06-20 PROCEDURE — G0378 HOSPITAL OBSERVATION PER HR: HCPCS

## 2024-06-20 PROCEDURE — 6360000004 HC RX CONTRAST MEDICATION: Performed by: EMERGENCY MEDICINE

## 2024-06-20 PROCEDURE — 71045 X-RAY EXAM CHEST 1 VIEW: CPT

## 2024-06-20 PROCEDURE — 81001 URINALYSIS AUTO W/SCOPE: CPT

## 2024-06-20 PROCEDURE — 83605 ASSAY OF LACTIC ACID: CPT

## 2024-06-20 PROCEDURE — 85379 FIBRIN DEGRADATION QUANT: CPT

## 2024-06-20 PROCEDURE — 84484 ASSAY OF TROPONIN QUANT: CPT

## 2024-06-20 PROCEDURE — 93005 ELECTROCARDIOGRAM TRACING: CPT

## 2024-06-20 PROCEDURE — 84145 PROCALCITONIN (PCT): CPT

## 2024-06-20 PROCEDURE — 96376 TX/PRO/DX INJ SAME DRUG ADON: CPT

## 2024-06-20 PROCEDURE — 80048 BASIC METABOLIC PNL TOTAL CA: CPT

## 2024-06-20 PROCEDURE — 36415 COLL VENOUS BLD VENIPUNCTURE: CPT

## 2024-06-20 PROCEDURE — 6360000002 HC RX W HCPCS: Performed by: INTERNAL MEDICINE

## 2024-06-20 PROCEDURE — 96375 TX/PRO/DX INJ NEW DRUG ADDON: CPT

## 2024-06-20 PROCEDURE — 99285 EMERGENCY DEPT VISIT HI MDM: CPT

## 2024-06-20 PROCEDURE — 6370000000 HC RX 637 (ALT 250 FOR IP): Performed by: NURSE PRACTITIONER

## 2024-06-20 PROCEDURE — 96374 THER/PROPH/DIAG INJ IV PUSH: CPT

## 2024-06-20 PROCEDURE — 83036 HEMOGLOBIN GLYCOSYLATED A1C: CPT

## 2024-06-20 PROCEDURE — 6370000000 HC RX 637 (ALT 250 FOR IP): Performed by: INTERNAL MEDICINE

## 2024-06-20 PROCEDURE — 82803 BLOOD GASES ANY COMBINATION: CPT

## 2024-06-20 PROCEDURE — 71260 CT THORAX DX C+: CPT

## 2024-06-20 PROCEDURE — 6360000002 HC RX W HCPCS: Performed by: NURSE PRACTITIONER

## 2024-06-20 PROCEDURE — 83880 ASSAY OF NATRIURETIC PEPTIDE: CPT

## 2024-06-20 RX ORDER — POLYETHYLENE GLYCOL 3350 17 G/17G
17 POWDER, FOR SOLUTION ORAL DAILY PRN
Status: DISCONTINUED | OUTPATIENT
Start: 2024-06-20 | End: 2024-06-23 | Stop reason: HOSPADM

## 2024-06-20 RX ORDER — INSULIN LISPRO 100 [IU]/ML
0-4 INJECTION, SOLUTION INTRAVENOUS; SUBCUTANEOUS NIGHTLY
Status: DISCONTINUED | OUTPATIENT
Start: 2024-06-20 | End: 2024-06-23 | Stop reason: HOSPADM

## 2024-06-20 RX ORDER — CARBIDOPA/LEVODOPA 25MG-250MG
1 TABLET ORAL 4 TIMES DAILY
Status: CANCELLED | OUTPATIENT
Start: 2024-06-20

## 2024-06-20 RX ORDER — SODIUM CHLORIDE 0.9 % (FLUSH) 0.9 %
5-40 SYRINGE (ML) INJECTION PRN
Status: DISCONTINUED | OUTPATIENT
Start: 2024-06-20 | End: 2024-06-23 | Stop reason: HOSPADM

## 2024-06-20 RX ORDER — INSULIN LISPRO 100 [IU]/ML
0-4 INJECTION, SOLUTION INTRAVENOUS; SUBCUTANEOUS
Status: DISCONTINUED | OUTPATIENT
Start: 2024-06-21 | End: 2024-06-23 | Stop reason: HOSPADM

## 2024-06-20 RX ORDER — ONDANSETRON 4 MG/1
4 TABLET, ORALLY DISINTEGRATING ORAL EVERY 8 HOURS PRN
Status: DISCONTINUED | OUTPATIENT
Start: 2024-06-20 | End: 2024-06-23 | Stop reason: HOSPADM

## 2024-06-20 RX ORDER — POTASSIUM CHLORIDE 7.45 MG/ML
10 INJECTION INTRAVENOUS PRN
Status: DISCONTINUED | OUTPATIENT
Start: 2024-06-20 | End: 2024-06-23 | Stop reason: HOSPADM

## 2024-06-20 RX ORDER — ENOXAPARIN SODIUM 100 MG/ML
40 INJECTION SUBCUTANEOUS NIGHTLY
Status: DISCONTINUED | OUTPATIENT
Start: 2024-06-20 | End: 2024-06-23 | Stop reason: HOSPADM

## 2024-06-20 RX ORDER — ESCITALOPRAM OXALATE 10 MG/1
10 TABLET ORAL NIGHTLY
Status: DISCONTINUED | OUTPATIENT
Start: 2024-06-20 | End: 2024-06-23 | Stop reason: HOSPADM

## 2024-06-20 RX ORDER — ONDANSETRON 2 MG/ML
4 INJECTION INTRAMUSCULAR; INTRAVENOUS EVERY 6 HOURS PRN
Status: DISCONTINUED | OUTPATIENT
Start: 2024-06-20 | End: 2024-06-23 | Stop reason: HOSPADM

## 2024-06-20 RX ORDER — LORAZEPAM 2 MG/ML
1 INJECTION INTRAMUSCULAR ONCE
Status: COMPLETED | OUTPATIENT
Start: 2024-06-20 | End: 2024-06-20

## 2024-06-20 RX ORDER — ASPIRIN 81 MG/1
324 TABLET, CHEWABLE ORAL ONCE
Status: COMPLETED | OUTPATIENT
Start: 2024-06-20 | End: 2024-06-20

## 2024-06-20 RX ORDER — ASPIRIN 81 MG/1
81 TABLET, CHEWABLE ORAL DAILY
Status: DISCONTINUED | OUTPATIENT
Start: 2024-06-21 | End: 2024-06-23 | Stop reason: HOSPADM

## 2024-06-20 RX ORDER — SODIUM CHLORIDE 9 MG/ML
INJECTION, SOLUTION INTRAVENOUS PRN
Status: DISCONTINUED | OUTPATIENT
Start: 2024-06-20 | End: 2024-06-23 | Stop reason: HOSPADM

## 2024-06-20 RX ORDER — CLOPIDOGREL BISULFATE 75 MG/1
75 TABLET ORAL DAILY
Status: DISCONTINUED | OUTPATIENT
Start: 2024-06-20 | End: 2024-06-23 | Stop reason: HOSPADM

## 2024-06-20 RX ORDER — FUROSEMIDE 10 MG/ML
20 INJECTION INTRAMUSCULAR; INTRAVENOUS 2 TIMES DAILY
Status: DISCONTINUED | OUTPATIENT
Start: 2024-06-20 | End: 2024-06-21

## 2024-06-20 RX ORDER — CARVEDILOL 3.12 MG/1
3.12 TABLET ORAL 2 TIMES DAILY
Status: DISCONTINUED | OUTPATIENT
Start: 2024-06-20 | End: 2024-06-23 | Stop reason: HOSPADM

## 2024-06-20 RX ORDER — GABAPENTIN 300 MG/1
600 CAPSULE ORAL 2 TIMES DAILY
Status: DISCONTINUED | OUTPATIENT
Start: 2024-06-20 | End: 2024-06-23 | Stop reason: HOSPADM

## 2024-06-20 RX ORDER — DEXTROSE MONOHYDRATE 100 MG/ML
INJECTION, SOLUTION INTRAVENOUS CONTINUOUS PRN
Status: DISCONTINUED | OUTPATIENT
Start: 2024-06-20 | End: 2024-06-23 | Stop reason: HOSPADM

## 2024-06-20 RX ORDER — MAGNESIUM SULFATE IN WATER 40 MG/ML
2000 INJECTION, SOLUTION INTRAVENOUS PRN
Status: DISCONTINUED | OUTPATIENT
Start: 2024-06-20 | End: 2024-06-23 | Stop reason: HOSPADM

## 2024-06-20 RX ORDER — GLUCAGON 1 MG/ML
1 KIT INJECTION PRN
Status: DISCONTINUED | OUTPATIENT
Start: 2024-06-20 | End: 2024-06-23 | Stop reason: HOSPADM

## 2024-06-20 RX ORDER — ATORVASTATIN CALCIUM 40 MG/1
40 TABLET, FILM COATED ORAL NIGHTLY
Status: DISCONTINUED | OUTPATIENT
Start: 2024-06-20 | End: 2024-06-23 | Stop reason: HOSPADM

## 2024-06-20 RX ORDER — LORAZEPAM 0.5 MG/1
0.5 TABLET ORAL 2 TIMES DAILY PRN
Status: DISCONTINUED | OUTPATIENT
Start: 2024-06-20 | End: 2024-06-23 | Stop reason: HOSPADM

## 2024-06-20 RX ORDER — POTASSIUM CHLORIDE 20 MEQ/1
40 TABLET, EXTENDED RELEASE ORAL PRN
Status: DISCONTINUED | OUTPATIENT
Start: 2024-06-20 | End: 2024-06-23 | Stop reason: HOSPADM

## 2024-06-20 RX ORDER — SODIUM CHLORIDE 0.9 % (FLUSH) 0.9 %
5-40 SYRINGE (ML) INJECTION EVERY 12 HOURS SCHEDULED
Status: DISCONTINUED | OUTPATIENT
Start: 2024-06-20 | End: 2024-06-23 | Stop reason: HOSPADM

## 2024-06-20 RX ORDER — NITROGLYCERIN 0.4 MG/1
0.4 TABLET SUBLINGUAL EVERY 5 MIN PRN
Status: DISCONTINUED | OUTPATIENT
Start: 2024-06-20 | End: 2024-06-23 | Stop reason: HOSPADM

## 2024-06-20 RX ORDER — FUROSEMIDE 10 MG/ML
40 INJECTION INTRAMUSCULAR; INTRAVENOUS ONCE
Status: COMPLETED | OUTPATIENT
Start: 2024-06-20 | End: 2024-06-20

## 2024-06-20 RX ADMIN — GABAPENTIN 600 MG: 300 CAPSULE ORAL at 21:13

## 2024-06-20 RX ADMIN — LORAZEPAM 1 MG: 2 INJECTION INTRAMUSCULAR; INTRAVENOUS at 08:29

## 2024-06-20 RX ADMIN — IOPAMIDOL 75 ML: 755 INJECTION, SOLUTION INTRAVENOUS at 08:36

## 2024-06-20 RX ADMIN — ENOXAPARIN SODIUM 40 MG: 100 INJECTION SUBCUTANEOUS at 21:13

## 2024-06-20 RX ADMIN — CLOPIDOGREL BISULFATE 75 MG: 75 TABLET ORAL at 15:40

## 2024-06-20 RX ADMIN — FUROSEMIDE 20 MG: 10 INJECTION, SOLUTION INTRAMUSCULAR; INTRAVENOUS at 16:41

## 2024-06-20 RX ADMIN — CARBIDOPA AND LEVODOPA 2.5 TABLET: 25; 100 TABLET ORAL at 22:40

## 2024-06-20 RX ADMIN — FUROSEMIDE 40 MG: 10 INJECTION, SOLUTION INTRAMUSCULAR; INTRAVENOUS at 09:56

## 2024-06-20 RX ADMIN — GABAPENTIN 600 MG: 300 CAPSULE ORAL at 15:40

## 2024-06-20 RX ADMIN — CARBIDOPA AND LEVODOPA 2.5 TABLET: 25; 100 TABLET ORAL at 16:39

## 2024-06-20 RX ADMIN — ATORVASTATIN CALCIUM 40 MG: 40 TABLET, FILM COATED ORAL at 21:13

## 2024-06-20 RX ADMIN — CARVEDILOL 3.12 MG: 3.12 TABLET, FILM COATED ORAL at 15:40

## 2024-06-20 RX ADMIN — ASPIRIN 324 MG: 81 TABLET, CHEWABLE ORAL at 06:47

## 2024-06-20 RX ADMIN — NITROGLYCERIN 0.4 MG: 0.4 TABLET, ORALLY DISINTEGRATING SUBLINGUAL at 06:48

## 2024-06-20 RX ADMIN — SODIUM CHLORIDE, PRESERVATIVE FREE 10 ML: 5 INJECTION INTRAVENOUS at 21:16

## 2024-06-20 RX ADMIN — ESCITALOPRAM OXALATE 10 MG: 10 TABLET ORAL at 21:13

## 2024-06-20 RX ADMIN — Medication 5000 UNITS: at 15:40

## 2024-06-20 RX ADMIN — CARBIDOPA AND LEVODOPA 2.5 TABLET: 25; 100 TABLET ORAL at 21:11

## 2024-06-20 RX ADMIN — CARBIDOPA AND LEVODOPA 2.5 TABLET: 25; 100 TABLET ORAL at 18:25

## 2024-06-20 RX ADMIN — CARVEDILOL 3.12 MG: 3.12 TABLET, FILM COATED ORAL at 21:13

## 2024-06-20 ASSESSMENT — HEART SCORE: ECG: NON-SPECIFC REPOLARIZATION DISTURBANCE/LBTB/PM

## 2024-06-20 ASSESSMENT — PAIN SCALES - GENERAL
PAINLEVEL_OUTOF10: 6
PAINLEVEL_OUTOF10: 3
PAINLEVEL_OUTOF10: 6
PAINLEVEL_OUTOF10: 0

## 2024-06-20 ASSESSMENT — PAIN DESCRIPTION - LOCATION
LOCATION: CHEST;ARM
LOCATION: CHEST
LOCATION: CHEST

## 2024-06-20 ASSESSMENT — PAIN DESCRIPTION - ORIENTATION: ORIENTATION: RIGHT

## 2024-06-20 ASSESSMENT — PAIN DESCRIPTION - PAIN TYPE: TYPE: ACUTE PAIN

## 2024-06-20 ASSESSMENT — PAIN DESCRIPTION - FREQUENCY: FREQUENCY: CONTINUOUS

## 2024-06-20 ASSESSMENT — PAIN - FUNCTIONAL ASSESSMENT: PAIN_FUNCTIONAL_ASSESSMENT: 0-10

## 2024-06-20 NOTE — ED PROVIDER NOTES
EKG Interpretation #1    Interpreted by emergency department physician  I did not participate in the care of this patient.  I only interpreted the EKG.  Time performed: 0622  Time read: 0626    Rhythm: Sinus rhythm  Ventricular Rate: 108  QRS Axis: -7  Ectopy: None  Conduction: sinus rhythm with first-degree AV block  ST Segments: normal  T Waves: Unchanged from 3/31/2024  Q Waves: None noted    Other findings: Motion artifact but EKG is readable    Compared to EKG on: 3/31/2024 and appears unchanged    Clinical Impression: Sinus rhythm with first-degree AV block with T wave abnormalities unchanged from her previous EKG on 3/31/2024.  There is motion artifact but EKG is readable.    DO Tomi BARKER Charles K, DO  06/20/24 0670    
[facility].               (Please note that portions of this note were completed with a voice recognition program.  Efforts were made to edit the dictations but occasionally words are mis-transcribed.)    ZACKERY Saunders CNP (electronically signed)           Man Valle APRN - CNP  06/21/24 0639

## 2024-06-20 NOTE — PROGRESS NOTES
Perfect serve sent to Omari JOHNSON due to patients daughter requesting a neurology consult due to patients worsening dyskinesia. This RN let her know that neurology would only be added if the following attending added that order. Patients daughter is aware that cardiology is currently on treatment team.   Awaiting response.  Electronically signed by PATRICIA VILLASENOR RN on 6/20/2024 at 2:58 PM

## 2024-06-20 NOTE — PROGRESS NOTES
4 Eyes Skin Assessment     NAME:  Lucille Lilly  YOB: 1945  MEDICAL RECORD NUMBER:  [id number]    The patient is being assessed for  Admission    I agree that at least one RN has performed a thorough Head to Toe Skin Assessment on the patient. ALL assessment sites listed below have been assessed.      Areas assessed by both nurses:    Head, Face, Ears, Shoulders, Back, Chest, Arms, Elbows, Hands, Sacrum. Buttock, Coccyx, Ischium, Legs. Feet and Heels, and Under Medical Devices         Does the Patient have a Wound? No noted wound(s)       Adam Prevention initiated by RN: Yes  Wound Care Orders initiated by RN: No    Pressure Injury (Stage 3,4, Unstageable, DTI, NWPT, and Complex wounds) if present, place Wound referral order by RN under : No    New Ostomies, if present place, Ostomy referral order under : No     Nurse 1 eSignature: Electronically signed by PATRICIA VILLASENOR RN on 6/20/24 at 2:25 PM EDT    **SHARE this note so that the co-signing nurse can place an eSignature**    Nurse 2 eSignature: Electronically signed by Vidhi Dasilva RN on 6/20/24 at 2:49 PM EDT

## 2024-06-20 NOTE — ED NOTES
Report called to   Adilson     RN   To Room   3130  Cardiac monitor on during transfer  Pt's pain level   denies  VSS, Afebrile   IV site is clean, dry and intact, Normal saline locked

## 2024-06-20 NOTE — PROGRESS NOTES
Patient up to bathroom using walker x2 when she began to get dizzy. Patient also became very diaphoretic and flushed at this time. Vitals at this time included hr:63, bp 93/58 and o2 92% on 2L. Patient remained responsive during this occurrence and denied any chest pain. Charge RN in room to assist with getting patient back in bed with stedy. Patient back in bed and aaox4 but is acting more lethargic..   Omari JOHNSON aware and instructed this RN to retake vitals in 30 minute and keep hm updated.   Electronically signed by PATRICIA VILLASENOR RN on 6/20/2024 at 5:28 PM

## 2024-06-20 NOTE — ED NOTES
Blood and urine sent at this time, Pt resting in bed at this time, laying in a supine position with head of bed elevated . Call light remains in reach instructed pt how to use, and encouraged pt to call if needed assistance, no distress noted. RR even and unlabored, skin warm and dry. No needs at this time. Will continue to monitor closely.  Pt's daughter is at bedside

## 2024-06-20 NOTE — ED NOTES
Pharmacy Medication Reconciliation Note     List of medications patient is currently taking is complete.    Source of information:   EMR/disp records  patient    Notes regarding home medications:   Reports Dr. Christine is trying to titrate down Sinemet which is where the new dose came from for 2.5 tabs of the  seven times per day.   Reports she is still taking metformin despite refills suggesting she would be out.       Tima Domingo, PharmD  6/20/2024  1:20 PM

## 2024-06-20 NOTE — PROGRESS NOTES
Attempted to call Aurelia JOHNSON office but did not get an answer. Will try again later.  Electronically signed by PATRICIA VILLASENOR RN on 6/20/2024 at 2:28 PM

## 2024-06-20 NOTE — PLAN OF CARE
Problem: Discharge Planning  Goal: Discharge to home or other facility with appropriate resources  Outcome: Progressing  Flowsheets (Taken 6/20/2024 1416)  Discharge to home or other facility with appropriate resources:   Identify barriers to discharge with patient and caregiver   Arrange for needed discharge resources and transportation as appropriate     Problem: Pain  Goal: Verbalizes/displays adequate comfort level or baseline comfort level  Outcome: Progressing     Problem: Safety - Adult  Goal: Free from fall injury  Outcome: Progressing     Problem: Skin/Tissue Integrity  Goal: Absence of new skin breakdown  Description: 1.  Monitor for areas of redness and/or skin breakdown  2.  Assess vascular access sites hourly  3.  Every 4-6 hours minimum:  Change oxygen saturation probe site  4.  Every 4-6 hours:  If on nasal continuous positive airway pressure, respiratory therapy assess nares and determine need for appliance change or resting period.  Outcome: Progressing   Electronically signed by PATRICIA VILLASENOR RN on 6/20/2024 at 2:36 PM

## 2024-06-20 NOTE — H&P
There is a more focal bandlike opacity   posteriorly in the right upper lobe.  Bandlike morphology favors atelectasis   or scarring rather than pneumonia      Scattered 4 mm noncalcified pulmonary nodules      RECOMMENDATIONS:   Multiple pulmonary nodules. Most significant: Left solid pulmonary nodule   measuring 4 mm. Per Fleischner Society Guidelines, no routine follow-up   imaging is recommended.      These guidelines do not apply to immunocompromised patients and patients with   cancer. Follow up in patients with significant comorbidities as clinically   warranted. For lung cancer screening, adhere to Lung-RADS guidelines.   Reference: Radiology. 2017; 284(1):228-43.            XR CHEST PORTABLE   Final Result   Bandlike opacities in the right upper lobe, increased compared to prior.   Bandlike morphology favors atelectasis over pneumonia..             Consults:    IP CONSULT TO HOSPITALIST  IP CONSULT TO CARDIOLOGY    ASSESSMENT:    Active Hospital Problems    Diagnosis Date Noted    Chest pain [R07.9] 03/25/2024         PLAN:    Chest pain, likely musculoskeletal, patient had left heart cath recently, doubt any cardiac origin, troponin negative, EKG noted, placed in observation, cardiology consulted for further recommendations continue DAPT patient claimed being compliant  Hypoxia mild started on oxygen added Lasix patient was supposed to follow-up as outpatient and get an echo will keep it as outpatient for now  Essential hypertension continue p.o. medications  Diabetes mellitus sliding scale  Goals of care DNR CCA  DVT Prophylaxis: Lovenox  Diet: No diet orders on file  Code Status: Prior        Dispo -observation       Alverto Salcido MD    Thank you Miguel Angel Moses MD for the opportunity to be involved in this patient's care. If you have any questions or concerns please feel free to contact me at [phone].    Comment: Please note this report has been produced using speech recognition software

## 2024-06-20 NOTE — PROGRESS NOTES
Omari JOHNSON updated on patients condition. Patient is up now in bed and stated she feels fine now. Patient still denies any chest pain still. Vitals include temp: 98.4, HR 58, BP: 151/96, rr: 12, O2: 99% on 2L.   Electronically signed by PATRICIA VILLASENOR RN on 6/20/2024 at 6:32 PM

## 2024-06-20 NOTE — PROGRESS NOTES
Patient up to unit and aaox4. Patient denies any chest pain. Head to toe assessment competed with changes documented. 4 eyes completed. Preventative mepilex applied to sacrum. Patient hooked up to external catheter at the moment. Patient uses walker baseline but stated she feels to weak to get up right now and had to void now. Patient stated she has not gotten up since arriving to the ED last night. This RN will make pt/ot aware.   Fall precautions in place.    Electronically signed by PATRICIA VILLASENOR RN on 6/20/2024 at 2:14 PM

## 2024-06-21 ENCOUNTER — HOSPITAL ENCOUNTER (OUTPATIENT)
Dept: CARDIAC REHAB | Age: 79
Setting detail: THERAPIES SERIES
End: 2024-06-21
Payer: MEDICARE

## 2024-06-21 ENCOUNTER — APPOINTMENT (OUTPATIENT)
Age: 79
End: 2024-06-21
Attending: INTERNAL MEDICINE
Payer: MEDICARE

## 2024-06-21 LAB
ALBUMIN SERPL-MCNC: 4 G/DL (ref 3.4–5)
ALBUMIN/GLOB SERPL: 1.7 {RATIO} (ref 1.1–2.2)
ALP SERPL-CCNC: 66 U/L (ref 40–129)
ALT SERPL-CCNC: <5 U/L (ref 10–40)
ANION GAP SERPL CALCULATED.3IONS-SCNC: 15 MMOL/L (ref 3–16)
AST SERPL-CCNC: 15 U/L (ref 15–37)
BILIRUB SERPL-MCNC: 0.4 MG/DL (ref 0–1)
BUN SERPL-MCNC: 17 MG/DL (ref 7–20)
CALCIUM SERPL-MCNC: 9.4 MG/DL (ref 8.3–10.6)
CHLORIDE SERPL-SCNC: 99 MMOL/L (ref 99–110)
CO2 SERPL-SCNC: 26 MMOL/L (ref 21–32)
CREAT SERPL-MCNC: <0.5 MG/DL (ref 0.6–1.2)
DEPRECATED RDW RBC AUTO: 17.6 % (ref 12.4–15.4)
ECHO AO ROOT DIAM: 2.9 CM
ECHO AO ROOT INDEX: 1.75 CM/M2
ECHO AV PEAK GRADIENT: 8 MMHG
ECHO AV PEAK VELOCITY: 1.4 M/S
ECHO AV VELOCITY RATIO: 0.79
ECHO BSA: 1.73 M2
ECHO LA AREA 2C: 13.8 CM2
ECHO LA AREA 4C: 13.3 CM2
ECHO LA DIAMETER INDEX: 1.99 CM/M2
ECHO LA DIAMETER: 3.3 CM
ECHO LA MAJOR AXIS: 4.9 CM
ECHO LA MINOR AXIS: 4.9 CM
ECHO LA TO AORTIC ROOT RATIO: 1.14
ECHO LA VOL BP: 29 ML (ref 22–52)
ECHO LA VOL MOD A2C: 31 ML (ref 22–52)
ECHO LA VOL MOD A4C: 28 ML (ref 22–52)
ECHO LA VOL/BSA BIPLANE: 17 ML/M2 (ref 16–34)
ECHO LA VOLUME INDEX MOD A2C: 19 ML/M2 (ref 16–34)
ECHO LA VOLUME INDEX MOD A4C: 17 ML/M2 (ref 16–34)
ECHO LV E' LATERAL VELOCITY: 5 CM/S
ECHO LV E' SEPTAL VELOCITY: 5 CM/S
ECHO LV FRACTIONAL SHORTENING: 24 % (ref 28–44)
ECHO LV INTERNAL DIMENSION DIASTOLE INDEX: 2.47 CM/M2
ECHO LV INTERNAL DIMENSION DIASTOLIC: 4.1 CM (ref 3.9–5.3)
ECHO LV INTERNAL DIMENSION SYSTOLIC INDEX: 1.87 CM/M2
ECHO LV INTERNAL DIMENSION SYSTOLIC: 3.1 CM
ECHO LV IVSD: 1 CM (ref 0.6–0.9)
ECHO LV MASS 2D: 132.1 G (ref 67–162)
ECHO LV MASS INDEX 2D: 79.6 G/M2 (ref 43–95)
ECHO LV POSTERIOR WALL DIASTOLIC: 1 CM (ref 0.6–0.9)
ECHO LV RELATIVE WALL THICKNESS RATIO: 0.49
ECHO LVOT PEAK GRADIENT: 5 MMHG
ECHO LVOT PEAK VELOCITY: 1.1 M/S
ECHO MV A VELOCITY: 0.8 M/S
ECHO MV E DECELERATION TIME (DT): 241 MS
ECHO MV E VELOCITY: 0.59 M/S
ECHO MV E/A RATIO: 0.74
ECHO MV E/E' LATERAL: 11.8
ECHO MV E/E' RATIO (AVERAGED): 11.8
ECHO MV E/E' SEPTAL: 11.8
ECHO PV MAX VELOCITY: 1.8 M/S
ECHO PV PEAK GRADIENT: 13 MMHG
ECHO RA AREA 4C: 8.9 CM2
ECHO RA END SYSTOLIC VOLUME APICAL 4 CHAMBER INDEX BSA: 9 ML/M2
ECHO RA VOLUME: 15 ML
ECHO RV FREE WALL PEAK S': 9 CM/S
ECHO RV INTERNAL DIMENSION: 2.3 CM
ECHO RV TAPSE: 2.2 CM (ref 1.7–?)
EST. AVERAGE GLUCOSE BLD GHB EST-MCNC: 122.6 MG/DL
GFR SERPLBLD CREATININE-BSD FMLA CKD-EPI: >90 ML/MIN/{1.73_M2}
GLUCOSE BLD-MCNC: 104 MG/DL (ref 70–99)
GLUCOSE BLD-MCNC: 116 MG/DL (ref 70–99)
GLUCOSE BLD-MCNC: 132 MG/DL (ref 70–99)
GLUCOSE BLD-MCNC: 181 MG/DL (ref 70–99)
GLUCOSE BLD-MCNC: 198 MG/DL (ref 70–99)
GLUCOSE SERPL-MCNC: 93 MG/DL (ref 70–99)
HBA1C MFR BLD: 5.9 %
HCT VFR BLD AUTO: 29.6 % (ref 36–48)
HGB BLD-MCNC: 8.9 G/DL (ref 12–16)
MAGNESIUM SERPL-MCNC: 1.7 MG/DL (ref 1.8–2.4)
MCH RBC QN AUTO: 20.5 PG (ref 26–34)
MCHC RBC AUTO-ENTMCNC: 30.1 G/DL (ref 31–36)
MCV RBC AUTO: 68.1 FL (ref 80–100)
PATH INTERP BLD-IMP: NO
PERFORMED ON: ABNORMAL
PLATELET # BLD AUTO: 338 K/UL (ref 135–450)
PMV BLD AUTO: 7.4 FL (ref 5–10.5)
POTASSIUM SERPL-SCNC: 3.5 MMOL/L (ref 3.5–5.1)
PROT SERPL-MCNC: 6.4 G/DL (ref 6.4–8.2)
RBC # BLD AUTO: 4.34 M/UL (ref 4–5.2)
SODIUM SERPL-SCNC: 140 MMOL/L (ref 136–145)
WBC # BLD AUTO: 6.7 K/UL (ref 4–11)

## 2024-06-21 PROCEDURE — 83735 ASSAY OF MAGNESIUM: CPT

## 2024-06-21 PROCEDURE — 2580000003 HC RX 258: Performed by: INTERNAL MEDICINE

## 2024-06-21 PROCEDURE — G0378 HOSPITAL OBSERVATION PER HR: HCPCS

## 2024-06-21 PROCEDURE — 96376 TX/PRO/DX INJ SAME DRUG ADON: CPT

## 2024-06-21 PROCEDURE — 96366 THER/PROPH/DIAG IV INF ADDON: CPT

## 2024-06-21 PROCEDURE — 6360000002 HC RX W HCPCS: Performed by: INTERNAL MEDICINE

## 2024-06-21 PROCEDURE — 80053 COMPREHEN METABOLIC PANEL: CPT

## 2024-06-21 PROCEDURE — 97530 THERAPEUTIC ACTIVITIES: CPT

## 2024-06-21 PROCEDURE — 6370000000 HC RX 637 (ALT 250 FOR IP): Performed by: INTERNAL MEDICINE

## 2024-06-21 PROCEDURE — 97535 SELF CARE MNGMENT TRAINING: CPT

## 2024-06-21 PROCEDURE — 2700000000 HC OXYGEN THERAPY PER DAY

## 2024-06-21 PROCEDURE — 96365 THER/PROPH/DIAG IV INF INIT: CPT

## 2024-06-21 PROCEDURE — 93306 TTE W/DOPPLER COMPLETE: CPT | Performed by: INTERNAL MEDICINE

## 2024-06-21 PROCEDURE — 85027 COMPLETE CBC AUTOMATED: CPT

## 2024-06-21 PROCEDURE — 94760 N-INVAS EAR/PLS OXIMETRY 1: CPT

## 2024-06-21 PROCEDURE — 36415 COLL VENOUS BLD VENIPUNCTURE: CPT

## 2024-06-21 PROCEDURE — 96372 THER/PROPH/DIAG INJ SC/IM: CPT

## 2024-06-21 PROCEDURE — 97166 OT EVAL MOD COMPLEX 45 MIN: CPT

## 2024-06-21 PROCEDURE — 6360000004 HC RX CONTRAST MEDICATION: Performed by: INTERNAL MEDICINE

## 2024-06-21 PROCEDURE — 97162 PT EVAL MOD COMPLEX 30 MIN: CPT

## 2024-06-21 PROCEDURE — C8929 TTE W OR WO FOL WCON,DOPPLER: HCPCS

## 2024-06-21 RX ORDER — MAGNESIUM SULFATE 1 G/100ML
1000 INJECTION INTRAVENOUS ONCE
Status: COMPLETED | OUTPATIENT
Start: 2024-06-21 | End: 2024-06-21

## 2024-06-21 RX ADMIN — CARBIDOPA AND LEVODOPA 2.5 TABLET: 25; 100 TABLET ORAL at 18:37

## 2024-06-21 RX ADMIN — SODIUM CHLORIDE, PRESERVATIVE FREE 10 ML: 5 INJECTION INTRAVENOUS at 11:00

## 2024-06-21 RX ADMIN — ESCITALOPRAM OXALATE 10 MG: 10 TABLET ORAL at 20:35

## 2024-06-21 RX ADMIN — ATORVASTATIN CALCIUM 40 MG: 40 TABLET, FILM COATED ORAL at 20:36

## 2024-06-21 RX ADMIN — CARVEDILOL 3.12 MG: 3.12 TABLET, FILM COATED ORAL at 11:00

## 2024-06-21 RX ADMIN — MAGNESIUM SULFATE HEPTAHYDRATE 1000 MG: 1 INJECTION, SOLUTION INTRAVENOUS at 11:26

## 2024-06-21 RX ADMIN — ENOXAPARIN SODIUM 40 MG: 100 INJECTION SUBCUTANEOUS at 20:36

## 2024-06-21 RX ADMIN — GABAPENTIN 600 MG: 300 CAPSULE ORAL at 11:00

## 2024-06-21 RX ADMIN — CARBIDOPA AND LEVODOPA 2.5 TABLET: 25; 100 TABLET ORAL at 13:32

## 2024-06-21 RX ADMIN — FUROSEMIDE 20 MG: 10 INJECTION, SOLUTION INTRAMUSCULAR; INTRAVENOUS at 18:00

## 2024-06-21 RX ADMIN — CARBIDOPA AND LEVODOPA 2.5 TABLET: 25; 100 TABLET ORAL at 10:00

## 2024-06-21 RX ADMIN — SODIUM CHLORIDE, PRESERVATIVE FREE 10 ML: 5 INJECTION INTRAVENOUS at 20:42

## 2024-06-21 RX ADMIN — PERFLUTREN 1.5 ML: 6.52 INJECTION, SUSPENSION INTRAVENOUS at 14:11

## 2024-06-21 RX ADMIN — CARBIDOPA AND LEVODOPA 2.5 TABLET: 25; 100 TABLET ORAL at 11:00

## 2024-06-21 RX ADMIN — GABAPENTIN 600 MG: 300 CAPSULE ORAL at 20:36

## 2024-06-21 RX ADMIN — Medication 5000 UNITS: at 11:00

## 2024-06-21 RX ADMIN — CLOPIDOGREL BISULFATE 75 MG: 75 TABLET ORAL at 11:00

## 2024-06-21 RX ADMIN — CARVEDILOL 3.12 MG: 3.12 TABLET, FILM COATED ORAL at 20:36

## 2024-06-21 RX ADMIN — ASPIRIN 81 MG: 81 TABLET, CHEWABLE ORAL at 13:32

## 2024-06-21 RX ADMIN — CARBIDOPA AND LEVODOPA 2.5 TABLET: 25; 100 TABLET ORAL at 18:00

## 2024-06-21 RX ADMIN — LORAZEPAM 0.5 MG: 0.5 TABLET ORAL at 22:05

## 2024-06-21 RX ADMIN — CARBIDOPA AND LEVODOPA 2.5 TABLET: 25; 100 TABLET ORAL at 23:49

## 2024-06-21 RX ADMIN — CARBIDOPA AND LEVODOPA 2.5 TABLET: 25; 100 TABLET ORAL at 20:35

## 2024-06-21 RX ADMIN — FUROSEMIDE 20 MG: 10 INJECTION, SOLUTION INTRAMUSCULAR; INTRAVENOUS at 11:00

## 2024-06-21 ASSESSMENT — PAIN SCALES - GENERAL: PAINLEVEL_OUTOF10: 1

## 2024-06-21 ASSESSMENT — PAIN DESCRIPTION - PAIN TYPE: TYPE: ACUTE PAIN

## 2024-06-21 ASSESSMENT — PAIN DESCRIPTION - FREQUENCY: FREQUENCY: CONTINUOUS
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
## 2024-06-21 NOTE — PROGRESS NOTES
Occupational Therapy  Facility/Department: [address] [facility]TH[facility]EDICS  Occupational Therapy Initial Assessment    Name: Lucille Lilly  : 1945  MRN: [id number]  Date of Service: 2024    Discharge Recommendations:  Patient would benefit from continued therapy after discharge, 2-3 sessions per week, Home with assist PRN, Continue to assess pending progress        Lucille Lilly scored a 19/24 on the AM-PAC ADL Inpatient form. Current research shows that an AM-PAC score of 18 or greater is typically associated with a discharge to the patient's home setting. Based on the patient's AM-PAC score, and their current ADL deficits, it is recommended that the patient have 2-3 sessions per week of Occupational Therapy at d/c to increase the patient's independence.  At this time, this patient demonstrates the endurance and safety to discharge home with assist PRN and a follow up treatment frequency of 2-3x/wk.   Please see assessment section for further patient specific details.    If patient discharges prior to next session this note will serve as a discharge summary.  Please see below for the latest assessment towards goals.      Patient Diagnosis(es): The primary encounter diagnosis was Chest pain, unspecified type. Diagnoses of Goals of care, counseling/discussion, Elevated d-dimer, Requires supplemental oxygen, Hypoxemia, and Acute pulmonary edema (HCC) were also pertinent to this visit.  Past Medical History:  has a past medical history of Anxiety, Arthritis, Asthma, Benign tumor lacrimal gland, Burn, Chronic back pain, Chronic diarrhea, Dyskinesia, Fibromyalgia, Greater trochanteric bursitis of left hip, H/O migraine, Hyperlipidemia, Hypertension, Iron deficiency anemia, DOYLE, Pseudophakia, RLS (restless legs syndrome), Type II or unspecified type diabetes mellitus without mention of complication, not stated as uncontrolled, UTI (urinary tract infection), Vitamin D deficiency, and Wears glasses.  Past Surgical

## 2024-06-21 NOTE — PLAN OF CARE
Problem: Discharge Planning  Goal: Discharge to home or other facility with appropriate resources  6/21/2024 0822 by Andrea Aldridge RN  Outcome: Progressing  Flowsheets (Taken 6/20/2024 2122 by Ann Marie Talamantes RN)  Discharge to home or other facility with appropriate resources: Identify barriers to discharge with patient and caregiver  6/20/2024 2338 by Ann Marie Talamantes RN  Outcome: Progressing  Flowsheets (Taken 6/20/2024 2122)  Discharge to home or other facility with appropriate resources: Identify barriers to discharge with patient and caregiver     Problem: Pain  Goal: Verbalizes/displays adequate comfort level or baseline comfort level  6/21/2024 0822 by Andrea Aldridge RN  Outcome: Progressing  Flowsheets (Taken 6/20/2024 2338 by Ann Marie Talamantes RN)  Verbalizes/displays adequate comfort level or baseline comfort level: Encourage patient to monitor pain and request assistance  6/20/2024 2338 by Ann Marie Talamantes RN  Outcome: Progressing  Flowsheets (Taken 6/20/2024 2338)  Verbalizes/displays adequate comfort level or baseline comfort level: Encourage patient to monitor pain and request assistance     Problem: Safety - Adult  Goal: Free from fall injury  6/21/2024 0822 by Andrea Aldridge RN  Outcome: Progressing  Flowsheets (Taken 6/20/2024 2339 by Ann Marie Talamantes RN)  Free From Fall Injury: Instruct family/caregiver on patient safety  6/20/2024 2338 by Ann Marie Talamantes RN  Outcome: Progressing  Flowsheets (Taken 6/20/2024 2338)  Free From Fall Injury: Instruct family/caregiver on patient safety     Problem: Skin/Tissue Integrity  Goal: Absence of new skin breakdown  Description: 1.  Monitor for areas of redness and/or skin breakdown  2.  Assess vascular access sites hourly  3.  Every 4-6 hours minimum:  Change oxygen saturation probe site  4.  Every 4-6 hours:  If on nasal continuous positive airway pressure, respiratory therapy assess nares and determine need for appliance change or resting

## 2024-06-21 NOTE — PLAN OF CARE
Problem: Discharge Planning  Goal: Discharge to home or other facility with appropriate resources  6/20/2024 2338 by Ann Marie Talamantes RN  Outcome: Progressing  Flowsheets (Taken 6/20/2024 2122)  Discharge to home or other facility with appropriate resources: Identify barriers to discharge with patient and caregiver  6/20/2024 1436 by Adilson Lindsey RN  Outcome: Progressing  Flowsheets (Taken 6/20/2024 1416)  Discharge to home or other facility with appropriate resources:   Identify barriers to discharge with patient and caregiver   Arrange for needed discharge resources and transportation as appropriate     Problem: Pain  Goal: Verbalizes/displays adequate comfort level or baseline comfort level  6/20/2024 2338 by Ann Marie Talamantes RN  Outcome: Progressing  Flowsheets (Taken 6/20/2024 2338)  Verbalizes/displays adequate comfort level or baseline comfort level: Encourage patient to monitor pain and request assistance  6/20/2024 1436 by Adilson Lindsey RN  Outcome: Progressing     Problem: Safety - Adult  Goal: Free from fall injury  6/20/2024 2338 by Ann Marie Talamantes RN  Outcome: Progressing  Flowsheets (Taken 6/20/2024 2338)  Free From Fall Injury: Instruct family/caregiver on patient safety  6/20/2024 1436 by Adilson Lindsey RN  Outcome: Progressing     Problem: Skin/Tissue Integrity  Goal: Absence of new skin breakdown  Description: 1.  Monitor for areas of redness and/or skin breakdown  2.  Assess vascular access sites hourly  3.  Every 4-6 hours minimum:  Change oxygen saturation probe site  4.  Every 4-6 hours:  If on nasal continuous positive airway pressure, respiratory therapy assess nares and determine need for appliance change or resting period.  6/20/2024 2338 by Ann Marie Talamantes RN  Outcome: Progressing  6/20/2024 1436 by Adilson Lindsey RN  Outcome: Progressing

## 2024-06-21 NOTE — PROGRESS NOTES
V2.[place]    [facility] [place]      Name:  Lucille Lilly /Age/Sex: 1945  (79 y.o. female)   MRN & CSN:  [id number] & [id number] Encounter Date/Time: 2024 9:31 AM EDT   Location:  [id number]/3130-01 PCP: Miguel Angel Moses MD     Attending:Alverto Salcido MD       Hospital Day: 2    Assessment and Recommendations   Luiclle Lilly is a 79 y.o. female who presents with Chest pain      Plan:     Chest pain, likely musculoskeletal, patient had left heart cath recently, doubt any cardiac origin, troponin negative, EKG noted, cardiology consulted discussed with cardiology echo done and noted  Hypoxia mild started on oxygen added improved  Essential hypertension continue p.o. medications patient had orthostatic hypotension hold Lasix, this is still could be due to her neuro disease.  Autonomic neuropathy playing a role.  Diabetes mellitus sliding scale  History of adventitious movement and movement disorder following up with movement disorder specialist, neurology consulted follow-up as outpatient        Diet Diet NPO   DVT Prophylaxis [] Lovenox, []  Heparin, [] SCDs, [] Ambulation,  [] Eliquis, [] Xarelto  [] Coumadin   Code Status DNR-CCA             Personally reviewed Lab Studies and Imaging     Discussed management of the case with cardiology  Telemetry strip reviewed by myself no ST elevation    Drugs that require monitoring for toxicity include Lasix and the method of monitoring was creatinine    Medical Decision Making:  The following items were considered in medical decision making:  Discussion of patient care with other providers  Reviewed clinical lab tests  Reviewed radiology tests  Reviewed other diagnostic tests/interventions  Independent review of radiologic images  Microbiology cultures and other micro tests reviewed      Subjective:     Chief Complaint: Dizziness chest pain    Lucille Lilly is a 79 y.o. female who presents with dizziness chest pain cardiology consulted no more chest pain no

## 2024-06-21 NOTE — PROGRESS NOTES
Physical Therapy    Facility/Department: [address] ORTHOPEDICS    Physical Therapy Initial Assessment    Name: Lucille Lilly    : 1945    MRN: [id number]    Date of Service: 2024    Assessment / Discharge Recommendations:    -able to mobilize from bed to stedy and obtain orthostatic blood pressures   120/70   HR 73  supine  112/65   HR 73  sitting    90/59   HR 73  standing    - no symptoms of lightheadedness    -anticipate discharge to home when medically ready   -she relates multiple \"slides\" out of bed - not related to purposefully trying to get up from her bed   -history of restless leg syndrome  -at minimum would benefit from placing a bed rail to improve safety     -recommend short duration of Home PT OT to help improve safety in home     Lucille Lilly scored a 19/24 on the AM-PAC short mobility form.     Current research shows that an AM-PAC score of 18 or greater is typically associated with a discharge to the patient's home setting.         Patient Diagnosis(es): The primary encounter diagnosis was Chest pain, unspecified type. Diagnoses of Goals of care, counseling/discussion, Elevated d-dimer, Requires supplemental oxygen, Hypoxemia, and Acute pulmonary edema (HCC) were also pertinent to this visit.  Past Medical History:  has a past medical history of Anxiety, Arthritis, Asthma, Benign tumor lacrimal gland, Burn, Chronic back pain, Chronic diarrhea, Dyskinesia, Fibromyalgia, Greater trochanteric bursitis of left hip, H/O migraine, Hyperlipidemia, Hypertension, Iron deficiency anemia, DOYLE, Pseudophakia, RLS (restless legs syndrome), Type II or unspecified type diabetes mellitus without mention of complication, not stated as uncontrolled, UTI (urinary tract infection), Vitamin D deficiency, and Wears glasses.  Past Surgical History:  has a past surgical history that includes Tonsillectomy and adenoidectomy; Breast surgery (Left); Nasal septum surgery; Cholecystectomy (2007); other

## 2024-06-21 NOTE — PROGRESS NOTES
Patient was a&ox4, awake, and was up in chair for a little while after working with PT/OT. Throughout day patient got up and transferred with stedy to bathroom and patient became very weak and had syncope episode. Patient became sweaty and dizzy. Patient voiced she could feel when episode was going to happen. Patient took medications whole today with thin liquid, however, was NPO at start of shift. Patient also had echo today. Patient IV flushed and locked. Patient VSS, and blood glucose was WNL. Patient denied pain and n/v at the moment. Patient needs assessed and addressed. No needs mentioned. Call light and bedside table within reach. Will continue to monitor and reassess.

## 2024-06-21 NOTE — PROGRESS NOTES
Patient resting in bed, alert and oriented x4. VSS. Patient tolerated nighttime medications well. Patient voices no needs or concerns at this time. NP Jess De Santiago notified of blood sugar of 207 and standard orders were placed (see MAR) indicating no need for insulin at this time. Bedside table, call light, and telephone are all within reach. Fall precautions are in place.     Electronically signed by Ann Marie Talamantes RN on 6/21/2024 at 4:35 AM

## 2024-06-22 LAB
GLUCOSE BLD-MCNC: 132 MG/DL (ref 70–99)
GLUCOSE BLD-MCNC: 135 MG/DL (ref 70–99)
GLUCOSE BLD-MCNC: 152 MG/DL (ref 70–99)
GLUCOSE BLD-MCNC: 166 MG/DL (ref 70–99)
PERFORMED ON: ABNORMAL

## 2024-06-22 PROCEDURE — 2580000003 HC RX 258: Performed by: INTERNAL MEDICINE

## 2024-06-22 PROCEDURE — G0378 HOSPITAL OBSERVATION PER HR: HCPCS

## 2024-06-22 PROCEDURE — 97530 THERAPEUTIC ACTIVITIES: CPT

## 2024-06-22 PROCEDURE — 6370000000 HC RX 637 (ALT 250 FOR IP): Performed by: NURSE PRACTITIONER

## 2024-06-22 PROCEDURE — 6370000000 HC RX 637 (ALT 250 FOR IP): Performed by: INTERNAL MEDICINE

## 2024-06-22 PROCEDURE — 6360000002 HC RX W HCPCS: Performed by: INTERNAL MEDICINE

## 2024-06-22 PROCEDURE — 94760 N-INVAS EAR/PLS OXIMETRY 1: CPT

## 2024-06-22 PROCEDURE — 96372 THER/PROPH/DIAG INJ SC/IM: CPT

## 2024-06-22 RX ORDER — TRAMADOL HYDROCHLORIDE 50 MG/1
50 TABLET ORAL ONCE
Status: DISCONTINUED | OUTPATIENT
Start: 2024-06-22 | End: 2024-06-22

## 2024-06-22 RX ORDER — ACETAMINOPHEN 325 MG/1
650 TABLET ORAL EVERY 4 HOURS PRN
Status: DISCONTINUED | OUTPATIENT
Start: 2024-06-22 | End: 2024-06-23 | Stop reason: HOSPADM

## 2024-06-22 RX ADMIN — ACETAMINOPHEN 325MG 650 MG: 325 TABLET ORAL at 01:41

## 2024-06-22 RX ADMIN — GABAPENTIN 600 MG: 300 CAPSULE ORAL at 09:53

## 2024-06-22 RX ADMIN — GABAPENTIN 600 MG: 300 CAPSULE ORAL at 21:18

## 2024-06-22 RX ADMIN — CARVEDILOL 3.12 MG: 3.12 TABLET, FILM COATED ORAL at 21:22

## 2024-06-22 RX ADMIN — ATORVASTATIN CALCIUM 40 MG: 40 TABLET, FILM COATED ORAL at 21:18

## 2024-06-22 RX ADMIN — CARBIDOPA AND LEVODOPA 2.5 TABLET: 25; 100 TABLET ORAL at 17:29

## 2024-06-22 RX ADMIN — SODIUM CHLORIDE, PRESERVATIVE FREE 10 ML: 5 INJECTION INTRAVENOUS at 21:23

## 2024-06-22 RX ADMIN — CARBIDOPA AND LEVODOPA 2.5 TABLET: 25; 100 TABLET ORAL at 09:53

## 2024-06-22 RX ADMIN — Medication 5000 UNITS: at 09:53

## 2024-06-22 RX ADMIN — CARBIDOPA AND LEVODOPA 2.5 TABLET: 25; 100 TABLET ORAL at 15:06

## 2024-06-22 RX ADMIN — CARBIDOPA AND LEVODOPA 2.5 TABLET: 25; 100 TABLET ORAL at 22:54

## 2024-06-22 RX ADMIN — CARVEDILOL 3.12 MG: 3.12 TABLET, FILM COATED ORAL at 09:53

## 2024-06-22 RX ADMIN — ENOXAPARIN SODIUM 40 MG: 100 INJECTION SUBCUTANEOUS at 21:17

## 2024-06-22 RX ADMIN — ASPIRIN 81 MG: 81 TABLET, CHEWABLE ORAL at 09:53

## 2024-06-22 RX ADMIN — CARBIDOPA AND LEVODOPA 2.5 TABLET: 25; 100 TABLET ORAL at 12:22

## 2024-06-22 RX ADMIN — CLOPIDOGREL BISULFATE 75 MG: 75 TABLET ORAL at 09:53

## 2024-06-22 RX ADMIN — CARBIDOPA AND LEVODOPA 2.5 TABLET: 25; 100 TABLET ORAL at 21:18

## 2024-06-22 RX ADMIN — ESCITALOPRAM OXALATE 10 MG: 10 TABLET ORAL at 21:18

## 2024-06-22 RX ADMIN — SODIUM CHLORIDE, PRESERVATIVE FREE 10 ML: 5 INJECTION INTRAVENOUS at 12:07

## 2024-06-22 RX ADMIN — CARBIDOPA AND LEVODOPA 2.5 TABLET: 25; 100 TABLET ORAL at 19:36

## 2024-06-22 ASSESSMENT — PAIN DESCRIPTION - ORIENTATION: ORIENTATION: LEFT;RIGHT

## 2024-06-22 ASSESSMENT — PAIN DESCRIPTION - LOCATION: LOCATION: LEG

## 2024-06-22 ASSESSMENT — PAIN SCALES - GENERAL
PAINLEVEL_OUTOF10: 6
PAINLEVEL_OUTOF10: 6

## 2024-06-22 ASSESSMENT — PAIN - FUNCTIONAL ASSESSMENT: PAIN_FUNCTIONAL_ASSESSMENT: ACTIVITIES ARE NOT PREVENTED

## 2024-06-22 ASSESSMENT — PAIN SCALES - WONG BAKER: WONGBAKER_NUMERICALRESPONSE: NO HURT

## 2024-06-22 ASSESSMENT — PAIN DESCRIPTION - DESCRIPTORS: DESCRIPTORS: ACHING

## 2024-06-22 NOTE — PROGRESS NOTES
Patient is alert & oriented x4, maxi move assist, 2/4 bed rails up, bed in lowest position, fall precautions in place, call light within reach. Morning medications given without complications.    Orthostatic BP is as follows this morning:  Lying /79 P 84  Sitting /79 P 82  Standing /99 P 82      Electronically signed by Annalee Ng RN on 6/22/2024 at 10:11 AM

## 2024-06-22 NOTE — PLAN OF CARE
Problem: Discharge Planning  Goal: Discharge to home or other facility with appropriate resources  6/22/2024 1036 by Annalee Ng RN  Outcome: Progressing  Flowsheets (Taken 6/22/2024 1005)  Discharge to home or other facility with appropriate resources: Identify barriers to discharge with patient and caregiver     Problem: Pain  Goal: Verbalizes/displays adequate comfort level or baseline comfort level  Outcome: Progressing     Problem: Safety - Adult  Goal: Free from fall injury  Outcome: Progressing     Problem: Skin/Tissue Integrity  Goal: Absence of new skin breakdown  Description: 1.  Monitor for areas of redness and/or skin breakdown  2.  Assess vascular access sites hourly  3.  Every 4-6 hours minimum:  Change oxygen saturation probe site  4.  Every 4-6 hours:  If on nasal continuous positive airway pressure, respiratory therapy assess nares and determine need for appliance change or resting period.  Outcome: Progressing     Problem: Chronic Conditions and Co-morbidities  Goal: Patient's chronic conditions and co-morbidity symptoms are monitored and maintained or improved  Outcome: Progressing  Flowsheets (Taken 6/22/2024 1005)  Care Plan - Patient's Chronic Conditions and Co-Morbidity Symptoms are Monitored and Maintained or Improved: Monitor and assess patient's chronic conditions and comorbid symptoms for stability, deterioration, or improvement

## 2024-06-22 NOTE — PROGRESS NOTES
Physical Therapy      Lucille [facility]  6/22/2024    -chart reviewed  -to room to patient resting in bed   -states she had a rough night   -had another episode of lightheadedness   -recommended she attempt up in the stedy to get directly to recliner to see how her postural tone does   -she is agreeable to this later this afternoon with assist of nursing    (Nursing informed and agreeable)  -anticipate she will be here Monday and will attempt up to walker with chair follow to assess safety using walker for room distances   -she is eager to be up but is apprehensive about the lightheadedness-  urged her to have staff use the sana stedy for getting up so she can better challenge her postural tone safely     Electronically signed by MICHAEL PIERCE, PT on 6/22/2024 at 1:05 PM

## 2024-06-22 NOTE — PROGRESS NOTES
Pt able to get up to toilet this shift and up to chair using stedy x 1 assist. Pt did not have any syncopal episodes and denied any dizziness with getting up. Patient A&O.  Call light within reach, able to make needs known, fall precautions in place.    Electronically signed by Annalee Ng RN on 6/22/2024 at 7:01 PM

## 2024-06-22 NOTE — PROGRESS NOTES
V2.0    [facility] Progress Note      Name:  Lucille Lilly /Age/Sex: 1945  (79 y.o. female)   MRN & CSN:  [id number] & [id number] Encounter Date/Time: 2024 10:30 AM EDT   Location:  [id number] PCP: Miguel Angel Moses MD     Attending:Alverto Salcido MD       Hospital Day: 3    Assessment and Recommendations   Lucille Lilly is a 79 y.o. female who presents with Chest pain      Plan:   Chest pain, likely musculoskeletal, patient had left heart cath recently, doubt any cardiac origin, troponin negative, EKG noted, cardiology consulted discussed with cardiology echo done and noted no further workup as an inpatient  Hypoxia mild started on oxygen added improved.  Discussed with nursing at bedside  Essential hypertension continue p.o. medications patient had orthostatic hypotension hold Lasix, this is still could be due to her neuro disease.  Autonomic neuropathy playing a role.  Discussed with nursing, overall orthostatics improved  Diabetes mellitus sliding scale  History of adventitious movement and movement disorder following up with movement disorder specialist, neurology consulted follow-up as outpatient  Generalized weakness, fluctuating will need to work further with PT OT during this admission and likely will need support at home discussed with nursing        Diet ADULT DIET; Regular; 4 carb choices (60 gm/meal); No Caffeine   DVT Prophylaxis [] Lovenox, []  Heparin, [] SCDs, [] Ambulation,  [] Eliquis, [] Xarelto  [] Coumadin   Code Status DNR-CCA             Personally reviewed Lab Studies and Imaging         Medical Decision Making:  The following items were considered in medical decision making:  Discussion of patient care with other providers  Reviewed clinical lab tests  Reviewed radiology tests  Reviewed other diagnostic tests/interventions  Independent review of radiologic images  Microbiology cultures and other micro tests reviewed      Subjective:     Chief Complaint: Chest pain

## 2024-06-22 NOTE — DISCHARGE INSTR - COC
recurrent episode, moderate (HCC) F33.1    Opioid dependence with current use ([facility]) F11.20    Chest pain R07.9    Syncope and collapse R55       Isolation/Infection:   Isolation            No Isolation          Patient Infection Status       None to display                     Nurse Assessment:  Last Vital Signs: BP 99/68   Pulse 62   Temp 98.2 °F (36.8 °C) (Oral)   Resp 16   Ht 1.448 m (4' 9\")   Wt 74.8 kg (164 lb 14.5 oz)   SpO2 91%   BMI 35.68 kg/m²     Last documented pain score (0-10 scale): Pain Level: 6  Last Weight:   Wt Readings from Last 1 Encounters:   06/22/24 74.8 kg (164 lb 14.5 oz)     Mental Status:  {IP PT MENTAL STATUS:20030}    IV Access:  { ADELE IV ACCESS:712977707}    Nursing Mobility/ADLs:  Walking   {P DME ADLs:832965592}  Transfer  {P DME ADLs:989712438}  Bathing  {P DME ADLs:009432379}  Dressing  {P DME ADLs:592614755}  Toileting  {P DME ADLs:701666300}  Feeding  {P DME ADLs:809102281}  Med Admin  {[facility] DME ADLs:902062714}  Med Delivery   { ADELE MED Delivery:985858521}    Wound Care Documentation and Therapy:        Elimination:  Continence:   Bowel: {YES / NO:19727}  Bladder: {YES / NO:19727}  Urinary Catheter: {Urinary Catheter:116242753}   Colostomy/Ileostomy/Ileal Conduit: {YES / NO:19727}       Date of Last BM: ***    Intake/Output Summary (Last 24 hours) at 6/22/2024 1733  Last data filed at 6/22/2024 0755  Gross per 24 hour   Intake 375 ml   Output 300 ml   Net 75 ml     I/O last 3 completed shifts:  In: 600 [P.O.:600]  Out: 300 [Urine:300]    Safety Concerns:     { ADELE Safety Concerns:154737860}    Impairments/Disabilities:      { ADELE Impairments/Disabilities:277491570}    Nutrition Therapy:  Current Nutrition Therapy:   { ADELE Diet List:455923076}    Routes of Feeding: {[facility] DME Other Feedings:461555657}  Liquids: {Slp liquid thickness:64017}  Daily Fluid Restriction: {CHP DME Yes amt example:397666596}  Last Modified Barium Swallow with Video (Video

## 2024-06-22 NOTE — PROGRESS NOTES
[facility]   Cardiology Hospital Progress Note     Date: 6/22/2024  Admit Date: 6/20/2024     Reason for consultation:     Chief Complaint   Patient presents with    Chest Pain     Pt called EMS for chest pain and worsening shortness of breath x several days. Much worse yesterday. Pt declined aspirin and zofran from EMS. Chest pain radiates down right arm. Rates 6/10. Pt states she has shortness of breath when she has dyskinesia attacks.        History of Present Illness: History obtained from patient and medical record.     Lucille Lilly is a 79 y.o. female patient with a recent cardiac history of PCI-ostial RCA following correlating perfusion defect on myocardial SPECT in March 2024. Lucille had noticed central chest pain at the time to be different from GERD as well as chronic pain with fibromyalgia. Lucille was seen in Dr. Ruvalcaba's office in CV follow up in April 2024 and was asymptomatic and tolerating medications.  Unfortunately, since that time she has lost a mother in law, brother, and aunt, describing the period of their passing as overwhelming. She felt generally tired and weak. She notes that she is concerned about iron and magnesium levels.   Three days ago she started to experience chest pain that would come and go. She came in when she thought it was like pain prior to PCI. She reports improvement when she gets more quality sleep after Ativan here. No N, V. No palpitations.Denies shortness of breath.   (per consult note)    Interval Hx: Today, she is resting in bed. Complaints of being tired. Had bad episode of Dyskinesia last night.     Patient seen and examined. Clinical notes reviewed. Telemetry reviewed.  No new complaints today. No major events overnight.   Denies having chest pain, palpitations, shortness of breath, orthopnea/PND, cough, or dizziness at the time of this visit.      Past Medical History:  Past Medical History:   Diagnosis Date    Anxiety     Arthritis     Asthma

## 2024-06-23 VITALS
HEIGHT: 57 IN | SYSTOLIC BLOOD PRESSURE: 105 MMHG | TEMPERATURE: 98.1 F | WEIGHT: 165.57 LBS | BODY MASS INDEX: 35.72 KG/M2 | OXYGEN SATURATION: 97 % | DIASTOLIC BLOOD PRESSURE: 58 MMHG | RESPIRATION RATE: 17 BRPM | HEART RATE: 65 BPM

## 2024-06-23 LAB
GLUCOSE BLD-MCNC: 142 MG/DL (ref 70–99)
GLUCOSE BLD-MCNC: 167 MG/DL (ref 70–99)
PERFORMED ON: ABNORMAL
PERFORMED ON: ABNORMAL

## 2024-06-23 PROCEDURE — 97530 THERAPEUTIC ACTIVITIES: CPT

## 2024-06-23 PROCEDURE — 97116 GAIT TRAINING THERAPY: CPT

## 2024-06-23 PROCEDURE — 6370000000 HC RX 637 (ALT 250 FOR IP): Performed by: INTERNAL MEDICINE

## 2024-06-23 PROCEDURE — 94761 N-INVAS EAR/PLS OXIMETRY MLT: CPT

## 2024-06-23 PROCEDURE — 2700000000 HC OXYGEN THERAPY PER DAY

## 2024-06-23 PROCEDURE — G0378 HOSPITAL OBSERVATION PER HR: HCPCS

## 2024-06-23 PROCEDURE — 2580000003 HC RX 258: Performed by: INTERNAL MEDICINE

## 2024-06-23 RX ADMIN — ASPIRIN 81 MG: 81 TABLET, CHEWABLE ORAL at 10:18

## 2024-06-23 RX ADMIN — CARBIDOPA AND LEVODOPA 2.5 TABLET: 25; 100 TABLET ORAL at 10:18

## 2024-06-23 RX ADMIN — CARVEDILOL 3.12 MG: 3.12 TABLET, FILM COATED ORAL at 10:19

## 2024-06-23 RX ADMIN — GABAPENTIN 600 MG: 300 CAPSULE ORAL at 10:19

## 2024-06-23 RX ADMIN — CARBIDOPA AND LEVODOPA 2.5 TABLET: 25; 100 TABLET ORAL at 12:16

## 2024-06-23 RX ADMIN — SODIUM CHLORIDE, PRESERVATIVE FREE 10 ML: 5 INJECTION INTRAVENOUS at 10:19

## 2024-06-23 RX ADMIN — Medication 5000 UNITS: at 10:19

## 2024-06-23 RX ADMIN — LORAZEPAM 0.5 MG: 0.5 TABLET ORAL at 02:24

## 2024-06-23 RX ADMIN — CLOPIDOGREL BISULFATE 75 MG: 75 TABLET ORAL at 10:19

## 2024-06-23 NOTE — PLAN OF CARE
Problem: Discharge Planning  Goal: Discharge to home or other facility with appropriate resources  Outcome: Progressing  Flowsheets (Taken 6/22/2024 2000)  Discharge to home or other facility with appropriate resources: Identify barriers to discharge with patient and caregiver     Problem: Pain  Goal: Verbalizes/displays adequate comfort level or baseline comfort level  Outcome: Progressing  Flowsheets (Taken 6/22/2024 1930)  Verbalizes/displays adequate comfort level or baseline comfort level: Encourage patient to monitor pain and request assistance     Problem: Safety - Adult  Goal: Free from fall injury  Outcome: Progressing     Problem: Skin/Tissue Integrity  Goal: Absence of new skin breakdown  Outcome: Progressing     Problem: ABCDS Injury Assessment  Goal: Absence of physical injury  Outcome: Progressing

## 2024-06-23 NOTE — DISCHARGE SUMMARY
Hospital Medicine Discharge Summary    Patient ID: Lucillelulu Lilly      Patient's PCP: Miguel Angel Moses MD    Admit Date: 6/20/2024     Discharge Date:   06/23/2024    Admitting Provider: Alverto Salcido MD     Discharge Provider: Alverto Salcido MD     Discharge Diagnoses:       Active Hospital Problems    Diagnosis     Chest pain [R07.9]        The patient was seen and examined on day of discharge and this discharge summary is in conjunction with any daily progress note from day of discharge.    Hospital Course:     From HPI:\"  79 y.o. female who presented to [facility] with chest pain, apparently patient having this pain for some time now, intermittent, up to 5 out of 10 intensity nonradiating, worse with cough or deep breath, not associated with diaphoresis nausea vomiting or palpitation.  To note that patient had recent left heart cath.  On presentation EKG nonspecific and troponin negative, patient placed in observation for further management and treatment discussed with ED provider agree with plan as outlined below \"    Chest pain, likely musculoskeletal, patient had left heart cath recently, doubt any cardiac origin, troponin negative, EKG noted, cardiology consulted discussed with cardiology echo done and noted no further workup as an inpatient.  Discussed with her  as the day of discharge  Hypoxia mild started on oxygen added improved.  Discussed with nursing at bedside.  Essential hypertension continue p.o. medications patient had orthostatic hypotension hold Lasix, this is still could be due to her neuro disease.  Autonomic neuropathy playing a role.  Discussed with nursing, overall orthostatics improved.  Diabetes mellitus, home regimen  History of adventitious movement and movement disorder following up with movement disorder specialist, neurology consulted follow-up as outpatient  Generalized weakness, fluctuating will need to work further with PT OT during this admission and will

## 2024-06-23 NOTE — PROGRESS NOTES
Physical Therapy  Facility/Department: [address] ORTHOPEDICS  Daily Treatment Note  NAME: Lucille Lilly  : 1945  MRN: [id number]    Date of Service: 2024    Discharge Recommendations:  Home with Home health PT   PT Equipment Recommendations  Other: Reports access to Walker.  Lucille Lilly scored a 20/24 on the AM-PAC short mobility form. Current research shows that an AM-PAC score of 18 or greater is typically associated with a discharge to the patient's home setting. Based on the patient's AM-PAC score and their current functional mobility deficits, it is recommended that the patient have 2-3 sessions per week of Physical Therapy at d/c to increase the patient's independence.  At this time, this patient demonstrates the endurance and safety to discharge home with Home PT Evaluation  and a follow up treatment frequency of 2-3x/wk.  Please see assessment section for further patient specific details.    If patient discharges prior to next session this note will serve as a discharge summary.  Please see below for the latest assessment towards goals.       Patient Diagnosis(es): The primary encounter diagnosis was Chest pain, unspecified type. Diagnoses of Goals of care, counseling/discussion, Elevated d-dimer, Requires supplemental oxygen, Hypoxemia, and Acute pulmonary edema (HCC) were also pertinent to this visit.    Assessment   Assessment: Pt tanya transfers/amb within hospitat room setting with Walker SBA.  Gait slow/abit guarded although no specific LOB.  Pt denies any dizziness/lighthead or pain at this time.  Reports dtr lives nearby/supportive (does not work; checks on pt \"about every day\").  At this time, recommend d/c home.  Would recommend Home PT.  Activity Tolerance: Patient tolerated treatment well  Other: Reports access to Walker.     Plan    Physical Therapy Plan  General Plan: 2-3 times per week  Current Treatment Recommendations: Therapeutic activities;Strengthening;Functional mobility

## 2024-06-23 NOTE — PROGRESS NOTES
[facility]   Cardiology Hospital Progress Note     Date: 6/23/2024  Admit Date: 6/20/2024     Reason for consultation:     Chief Complaint   Patient presents with    Chest Pain     Pt called EMS for chest pain and worsening shortness of breath x several days. Much worse yesterday. Pt declined aspirin and zofran from EMS. Chest pain radiates down right arm. Rates 6/10. Pt states she has shortness of breath when she has dyskinesia attacks.        History of Present Illness: History obtained from patient and medical record.     Lucille Lilly is a 79 y.o. female patient with a recent cardiac history of PCI-ostial RCA following correlating perfusion defect on myocardial SPECT in March 2024. Lucille had noticed central chest pain at the time to be different from GERD as well as chronic pain with fibromyalgia. Lucille was seen in Dr. Ruvalcaba's office in CV follow up in April 2024 and was asymptomatic and tolerating medications.  Unfortunately, since that time she has lost a mother in law, brother, and aunt, describing the period of their passing as overwhelming. She felt generally tired and weak. She notes that she is concerned about iron and magnesium levels.   Three days ago she started to experience chest pain that would come and go. She came in when she thought it was like pain prior to PCI. She reports improvement when she gets more quality sleep after Ativan here. No N, V. No palpitations.Denies shortness of breath.   (per consult note)    Interval Hx: Today, she is up the in the chair.  Tolerating breakfast.  More alert today.  No complaints of chest pain shortness of breath dizziness or lightheadedness.  States she is feeling good today.      Patient seen and examined. Clinical notes reviewed. Telemetry reviewed.  No new complaints today. No major events overnight.   Denies having chest pain, palpitations, shortness of breath, orthopnea/PND, cough, or dizziness at the time of this visit.      Past Medical

## 2024-06-23 NOTE — PROGRESS NOTES
Patient is alert & oriented x4, x1 assist with stedy,  2/4 bed rails up, bed in lowest position, fall precautions in place, call light within reach. Morning medications given without complications. Pt's  called and asked about patient's status and plan of care, I updated patient's  on this from a nursing perspective. I also asked if patient would like updates from the doctor and he said yes. I gave Dr. Salcido the patient's 's phone number and he states he will call him with updates.     Plan for patient to work with physical therapy today as dizziness/syncopal episodes have no been present yesterday or today. Call placed to PT regarding this.    Electronically signed by Annalee Ng RN on 6/23/2024 at 10:59 AM

## 2024-06-23 NOTE — PROGRESS NOTES
Patient alert and oriented x4, discharged to home with hospice with documented belongings. IV removed with no complications. Transported out of \[facility]\"" by wheelchair. Reviewed discharge and follow up appointments. Patient verbalized understanding.        Electronically signed by Annalee Ng RN on 6/23/2024 at 1:51 PM

## 2024-06-23 NOTE — PLAN OF CARE
Problem: Discharge Planning  Goal: Discharge to home or other facility with appropriate resources  6/23/2024 1109 by Annalee gN RN  Outcome: Progressing  Flowsheets (Taken 6/23/2024 1010)  Discharge to home or other facility with appropriate resources: Identify barriers to discharge with patient and caregiver     Problem: Pain  Goal: Verbalizes/displays adequate comfort level or baseline comfort level  6/23/2024 1109 by Annalee Ng RN  Outcome: Progressing    Problem: Skin/Tissue Integrity  Goal: Absence of new skin breakdown  Description: 1.  Monitor for areas of redness and/or skin breakdown  2.  Assess vascular access sites hourly  3.  Every 4-6 hours minimum:  Change oxygen saturation probe site  4.  Every 4-6 hours:  If on nasal continuous positive airway pressure, respiratory therapy assess nares and determine need for appliance change or resting period.  6/23/2024 1109 by Annalee Ng RN  Outcome: Progressing     Problem: Chronic Conditions and Co-morbidities  Goal: Patient's chronic conditions and co-morbidity symptoms are monitored and maintained or improved  6/23/2024 1109 by Annalee Ng RN  Outcome: Progressing  Flowsheets (Taken 6/23/2024 1010)  Care Plan - Patient's Chronic Conditions and Co-Morbidity Symptoms are Monitored and Maintained or Improved: Monitor and assess patient's chronic conditions and comorbid symptoms for stability, deterioration, or improvement     Problem: ABCDS Injury Assessment  Goal: Absence of physical injury  6/23/2024 1109 by Annalee Ng RN  Outcome: Progressing

## 2024-06-24 ENCOUNTER — TELEPHONE (OUTPATIENT)
Dept: PRIMARY CARE CLINIC | Age: 79
End: 2024-06-24

## 2024-06-24 ENCOUNTER — APPOINTMENT (OUTPATIENT)
Dept: CARDIAC REHAB | Age: 79
End: 2024-06-24
Payer: MEDICARE

## 2024-06-24 ENCOUNTER — CARE COORDINATION (OUTPATIENT)
Dept: CASE MANAGEMENT | Age: 79
End: 2024-06-24

## 2024-06-24 DIAGNOSIS — R07.9 CHEST PAIN, UNSPECIFIED TYPE: Primary | ICD-10-CM

## 2024-06-24 PROCEDURE — 1111F DSCHRG MED/CURRENT MED MERGE: CPT

## 2024-06-24 NOTE — CARE COORDINATION
PM Jessie Haney RN Cardiac Rehabilitation [phone]    8/2/2024 8:30 AM (Arrive by 8:15 AM) [facility] ECHO 2 Cardiology [phone]    8/2/2024 1:00 PM Jessie Haney RN Cardiac Rehabilitation [phone]    8/5/2024 1:00 PM Jessie Haney RN Cardiac Rehabilitation [phone]    8/7/2024 1:00 PM Jessie Haney RN Cardiac Rehabilitation [phone]    8/9/2024 1:00 PM Jessie Haney RN Cardiac Rehabilitation [phone]    8/12/2024 1:00 PM Jessie Haney RN Cardiac Rehabilitation [phone]    10/28/2024 8:30 AM Henry Ruvalcaba MD Cardiology [phone]    11/25/2024 1:00 PM Henry Duong  Pulmonology [phone]            Care Transition Nurse provided contact information.  Plan for follow-up call in 6-10 days based on severity of symptoms and risk factors.  Plan for next call: symptom management-F/U with PCP visit, any further dykinenesia? Any further chest pain.      Paulette Larry RN

## 2024-06-24 NOTE — TELEPHONE ENCOUNTER
Care Transitions Initial Follow Up Call    Outreach made within 2 business days of discharge: Yes    Patient: Lucille Lilly Patient : 1945   MRN: [id number]  Reason for Admission: There are no discharge diagnoses documented for the most recent discharge.  Discharge Date: 24       Spoke with: Grover    Discharge department/facility: [facility] Patient Contact:  Was patient able to fill all prescriptions: Yes  Was patient instructed to bring all medications to the follow-up visit: Yes  Is patient taking all medications as directed in the discharge summary? Yes  Does patient understand their discharge instructions: Yes  Does patient have questions or concerns that need addressed prior to 7-14 day follow up office visit: no    Scheduled appointment with PCP within 7-14 days    Follow Up  Future Appointments   Date Time Provider Department Center   2024  1:00 PM Jessie Haney RN [facility]   2024  1:00 PM Jessie Haney RN [facility]   2024  1:00 PM Jessie Haney RN [facility]   2024  3:00 PM Miguel Angel Moses MD [address] [facility] [place] [facility]   2024  1:00 PM Jessie Haney RN [facility]   7/3/2024  1:00 PM Jessie Haney RN [facility]   2024  1:00 PM Jessie Haney RN [facility]   2024  1:00 PM Jessie Haney RN [facility]   7/10/2024  1:00 PM Jessie Haney RN [facility]   2024  1:00 PM Jessie Haney RN [facility]   7/15/2024  1:00 PM Jessie Haney RN [facility]   2024  1:00 PM Jessie Haney RN [facility]   2024  1:00 PM Jessie Haney RN [facility]   2024  1:00 PM Jessie Haney RN [facility]   2024  1:00 PM Jessie Haney RN [facility]   2024  1:00 PM Jessie Haney RN [facility]

## 2024-06-26 ENCOUNTER — APPOINTMENT (OUTPATIENT)
Dept: CARDIAC REHAB | Age: 79
End: 2024-06-26
Payer: MEDICARE

## 2024-06-28 ENCOUNTER — OFFICE VISIT (OUTPATIENT)
Dept: PRIMARY CARE CLINIC | Age: 79
End: 2024-06-28

## 2024-06-28 ENCOUNTER — APPOINTMENT (OUTPATIENT)
Dept: CARDIAC REHAB | Age: 79
End: 2024-06-28
Payer: MEDICARE

## 2024-06-28 VITALS
BODY MASS INDEX: 36.57 KG/M2 | OXYGEN SATURATION: 98 % | DIASTOLIC BLOOD PRESSURE: 71 MMHG | HEART RATE: 64 BPM | SYSTOLIC BLOOD PRESSURE: 137 MMHG | WEIGHT: 169 LBS | RESPIRATION RATE: 18 BRPM

## 2024-06-28 DIAGNOSIS — G25.81 RESTLESS LEGS SYNDROME (RLS): ICD-10-CM

## 2024-06-28 DIAGNOSIS — M25.511 CHRONIC RIGHT SHOULDER PAIN: ICD-10-CM

## 2024-06-28 DIAGNOSIS — G89.29 CHRONIC RIGHT SHOULDER PAIN: ICD-10-CM

## 2024-06-28 DIAGNOSIS — F41.8 MIXED ANXIETY AND DEPRESSIVE DISORDER: ICD-10-CM

## 2024-06-28 DIAGNOSIS — R07.89 CHEST PAIN, MUSCULOSKELETAL: Primary | ICD-10-CM

## 2024-06-28 DIAGNOSIS — Z09 HOSPITAL DISCHARGE FOLLOW-UP: ICD-10-CM

## 2024-06-28 DIAGNOSIS — I10 ESSENTIAL HYPERTENSION: ICD-10-CM

## 2024-06-28 ASSESSMENT — ENCOUNTER SYMPTOMS
CONSTIPATION: 0
SHORTNESS OF BREATH: 0
ABDOMINAL PAIN: 0
DIARRHEA: 0
BLOOD IN STOOL: 0

## 2024-06-29 NOTE — PROGRESS NOTES
Post-Discharge Transitional Care  Follow Up      Lucille Lilly   YOB: 1945    Date of Office Visit:  6/28/2024  Date of Hospital Admission: 6/20/24  Date of Hospital Discharge: 6/23/24  Risk of hospital readmission (high >=14%. Medium >=10%) :Readmission Risk Score: 18      Care management risk score Rising risk (score 2-5) and Complex Care (Scores >=6): No Risk Score On File     Non face to face  following discharge, date last encounter closed (first attempt may have been earlier): 06/24/2024    Call initiated 2 business days of discharge: Yes    ASSESSMENT/PLAN:   Chest pain, musculoskeletal  -resolved    Chronic right shoulder pain  -Likely musculoskeletal.  May take Tylenol as needed.  He also take Tylenol 3 for her chronic back pain.    Restless legs syndrome (RLS)  -Also with dyskinesia.  She goes to [facility] Dr. Christine who is reported might be retiring soon.  Patient advised to continue Sinemet every 3 hours as previously ordered.    Essential hypertension  -Controlled.  Continue carvedilol 3.125 twice daily.    Mixed anxiety and depressive disorder  -Stable.  Continue Lexapro 10 mg daily and Ativan 0.5 mg 3 times a day as needed.    Medical Decision Making: high complexity  No follow-ups on file.           Subjective:   HPI:  Follow up of Hospital problems/diagnosis(es): Patient is 79 years old female medical history significant for diabetes, hypertension, anxiety, asthma, restless leg syndrome and dyskinesia, last March 25, 2024 he was at the [facility] due to chest pain, stress test the next day showed reversible defect of inferior wall concerning for ischemia, underwent angiogram March 27 and found to have significant stenosis of right coronary artery for which she underwent PCI and discharged home on aspirin and Plavix.  She came back to the emergency room March 30, 2024 due to syncope likely due to hypotension from medication she improved with medication 
Provider, MD Tash   escitalopram (LEXAPRO) 10 MG tablet Take 1 tablet by mouth at bedtime  Miguel Angel Moses MD   pantoprazole (PROTONIX) 40 MG tablet TAKE 1 TABLET BY MOUTH DAILY  Miguel Angel Moses MD   metFORMIN (GLUCOPHAGE) 1000 MG tablet Take 1 tablet by mouth with breakfast and with evening meal  Miguel Angel Moses MD   Cholecalciferol (VITAMIN D3) 5000 units CAPS Take 1 capsule by mouth daily  Miguel Angel Moses MD        Social History     Tobacco Use    Smoking status: Former     Current packs/day: 0.00     Average packs/day: 2.0 packs/day for 36.0 years (72.0 ttl pk-yrs)     Types: Cigarettes     Start date: 1963     Quit date: 1999     Years since quittin.2     Passive exposure: Past    Smokeless tobacco: Never   Substance Use Topics    Alcohol use: No     Alcohol/week: 0.0 standard drinks of alcohol        Vitals:    24 1516   BP: 137/71   Pulse: 64   Resp: 18   SpO2: 98%   Weight: 76.7 kg (169 lb)     Estimated body mass index is 36.57 kg/m² as calculated from the following:    Height as of 24: 1.448 m (4' 9\").    Weight as of this encounter: 76.7 kg (169 lb).    Physical Exam  Constitutional:       General: She is not in acute distress.     Appearance: She is well-developed.   HENT:      Head: Normocephalic.   Eyes:      Conjunctiva/sclera: Conjunctivae normal.   Neck:      Thyroid: No thyromegaly.   Cardiovascular:      Rate and Rhythm: Normal rate and regular rhythm.      Heart sounds: Normal heart sounds. No murmur heard.  Pulmonary:      Effort: No respiratory distress.      Breath sounds: Normal breath sounds. No wheezing or rales.   Abdominal:      General: There is no distension.      Palpations: Abdomen is soft. There is no mass.      Tenderness: There is no guarding or rebound.   Musculoskeletal:         General: Normal range of motion.      Cervical back: Neck supple.   Skin:     General: Skin is warm.      Findings: No rash.   Neurological:      Mental Status:

## 2024-07-02 ENCOUNTER — CARE COORDINATION (OUTPATIENT)
Dept: CASE MANAGEMENT | Age: 79
End: 2024-07-02

## 2024-07-02 NOTE — CARE COORDINATION
Care Transitions Note    Follow Up Call     Patient Current Location:  Home: [address] [place] [id number]    LPN Care Coordinator contacted the patient by telephone. Verified name and  as identifiers.    Additional needs identified to be addressed with provider   No needs identified                 Method of communication with provider: none.    Care Summary Note: LPN CC spoke with patient. States she is doing well. Denies CP, SOB, N/V/D, palpitations, HA. Reports fatigue & some dizziness upon standing. States dyskinesia & strength are fairly poor-no change. Not currently cleared to participate in cardiac rehab. Has BP cuff, but does not routinely monitor BP. BP was 116 this AM after eating (forgot to check before eating). States typically fasting BG is 71-80s. Appetite good. Denies problems with bowels or bladder. Has a walker, bath chair & spray nozzle in tub, non skid mats/pads, and a bed rail. Working with neuro on Sinemet changes for dyskinesia sx. Denies needs.   Vidhi Oro, [facility]  Care Transitions  [phone]    Plan of care updates since last contact:  Review of patient management of conditions/medications:         Advance Care Planning:   Does patient have an Advance Directive: reviewed and current.    Medication Review:  Medications changed since last call, reviewed today.     Remote Patient Monitoring:  Offered patient enrollment in the Remote Patient Monitoring (RPM) program for in-home monitoring: Yes, but did not enroll at this time: considering .    Assessments:  Care Transitions Subsequent and Final Call    Subsequent and Final Calls  Do you have any ongoing symptoms?: No  Have your medications changed?: No  Do you have any questions related to your medications?: No  Do you currently have any active services?: No  Are you currently active with any services?: Home Health  Do you have any needs or concerns that I can assist you with?: No  Identified Barriers: None  Care Transitions

## 2024-07-11 ENCOUNTER — CARE COORDINATION (OUTPATIENT)
Dept: CASE MANAGEMENT | Age: 79
End: 2024-07-11

## 2024-07-11 NOTE — CARE COORDINATION
PM Jessie Haney, [facility] [phone] 7/19/2024 1:00 PM Jessie Haney, RN Cardiac Rehabilitation [phone] 7/22/2024 1:00 PM Jessie Haney, RN Cardiac Rehabilitation [phone] 7/24/2024 1:00 PM Jessie Haney RN Cardiac Rehabilitation [phone] 7/26/2024 1:00 PM Jessie Haney, [facility] [phone] 7/29/2024 1:00 PM Jessie Haney, [facility] [phone] 7/31/2024 1:00 PM Jessie Haney, [facility] [phone] 8/2/2024 8:30 AM (Arrive by 8:15 AM) [facility] ECHO 2 Cardiology [phone] 8/2/2024 1:00 PM Jessie Haney RN Cardiac Rehabilitation [phone] 8/5/2024 1:00 PM Jessie Haney RN Cardiac Rehabilitation [phone] 8/7/2024 1:00 PM Jessie Haney, [facility] [phone] 8/9/2024 1:00 PM Jessie Haney, [facility] [phone] 8/12/2024 1:00 PM Jessie Haney, [facility] [phone] 8/28/2024 11:00 AM Miguel Angel Moses MD Primary Care [phone]    10/28/2024 8:30 AM Henry Ruvalcaba MD Cardiology [phone]    11/25/2024 1:00 PM Henry Duong DO Pulmonology [phone]            N Care Coordinator provided contact information.  Plan for follow-up call in 6-10 days based on severity of symptoms and risk factors.  Plan for next call: symptom management-fatigue, dizziness  self management-       Nathaly Higuera LPN

## 2024-07-15 ENCOUNTER — PATIENT MESSAGE (OUTPATIENT)
Dept: PRIMARY CARE CLINIC | Age: 79
End: 2024-07-15

## 2024-07-15 DIAGNOSIS — M25.511 ACUTE PAIN OF RIGHT SHOULDER: Primary | ICD-10-CM

## 2024-07-15 NOTE — TELEPHONE ENCOUNTER
From: Lucille Lilly  To: Dr. Miguel Angel Moses  Sent: 7/15/2024 11:26 AM EDT  Subject: Orthopedics  Referral    Dr. Moses/Yousif. I would like to get a referral to have my shoulder and arm looked at. It has not gotten any better. It's getting worse. I have tried heat, ice and every kind of cream imaginable. Thanks, Lucille [phone].

## 2024-07-18 ENCOUNTER — CARE COORDINATION (OUTPATIENT)
Dept: CASE MANAGEMENT | Age: 79
End: 2024-07-18

## 2024-07-18 NOTE — CARE COORDINATION
Care Transitions Note    Follow Up Call     Patient Current Location:  Home: [address] [place] [id number]    LPN Care Coordinator contacted the patient by telephone. Verified name and  as identifiers.    Additional needs identified to be addressed with provider   No needs identified                 Method of communication with provider: none.    Care Summary Note:   Spoke to patient. She stated she is doing ok. She just woke up. Denies sob, cough, fever, chills, palpitations, cp, nvd, weakness or dizziness. She is gaining strength. She has pain in her shoulder. She has an appointment with Dr. Alexander on Tuesday. Good appetite and fluid intake. BS yesterday 96. Hasn't checked today. No needs or concerns at this time.      Plan of care updates since last contact:  Review of patient management of conditions/medications:         Advance Care Planning:   Does patient have an Advance Directive: reviewed and current.    Medication Review:  No changes since last call.     Remote Patient Monitoring:  Offered patient enrollment in the Remote Patient Monitoring (RPM) program for in-home monitoring: Deferred at this time because patient just waking up; will discuss at next outreach.    Assessments:  Care Transitions Subsequent and Final Call    Subsequent and Final Calls  Do you have any ongoing symptoms?: No  Have your medications changed?: No  Do you have any questions related to your medications?: No  Do you currently have any active services?: No  Are you currently active with any services?: Home Health  Do you have any needs or concerns that I can assist you with?: No  Identified Barriers: None  Care Transitions Interventions  Other Interventions:              Follow Up Appointment:   Reviewed upcoming appointment(s).  Future Appointments         Provider Specialty Dept Phone    2024 1:00 PM Jessie Haney, [facility] [phone]    2024 1:00 PM Jessie Haney RN Cardiac

## 2024-07-22 ENCOUNTER — OFFICE VISIT (OUTPATIENT)
Dept: ORTHOPEDIC SURGERY | Age: 79
End: 2024-07-22
Payer: MEDICARE

## 2024-07-22 VITALS — BODY MASS INDEX: 34.85 KG/M2 | HEIGHT: 58 IN | WEIGHT: 166 LBS

## 2024-07-22 DIAGNOSIS — M75.81 ROTATOR CUFF TENDINITIS, RIGHT: Primary | ICD-10-CM

## 2024-07-22 PROCEDURE — 1123F ACP DISCUSS/DSCN MKR DOCD: CPT | Performed by: ORTHOPAEDIC SURGERY

## 2024-07-22 PROCEDURE — 20610 DRAIN/INJ JOINT/BURSA W/O US: CPT | Performed by: ORTHOPAEDIC SURGERY

## 2024-07-22 PROCEDURE — 99213 OFFICE O/P EST LOW 20 MIN: CPT | Performed by: ORTHOPAEDIC SURGERY

## 2024-07-22 PROCEDURE — 1036F TOBACCO NON-USER: CPT | Performed by: ORTHOPAEDIC SURGERY

## 2024-07-22 PROCEDURE — 1090F PRES/ABSN URINE INCON ASSESS: CPT | Performed by: ORTHOPAEDIC SURGERY

## 2024-07-22 PROCEDURE — G8417 CALC BMI ABV UP PARAM F/U: HCPCS | Performed by: ORTHOPAEDIC SURGERY

## 2024-07-22 PROCEDURE — G8427 DOCREV CUR MEDS BY ELIG CLIN: HCPCS | Performed by: ORTHOPAEDIC SURGERY

## 2024-07-22 PROCEDURE — G8399 PT W/DXA RESULTS DOCUMENT: HCPCS | Performed by: ORTHOPAEDIC SURGERY

## 2024-07-22 RX ORDER — TRIAMCINOLONE ACETONIDE 40 MG/ML
40 INJECTION, SUSPENSION INTRA-ARTICULAR; INTRAMUSCULAR ONCE
Status: COMPLETED | OUTPATIENT
Start: 2024-07-22 | End: 2024-07-22

## 2024-07-22 RX ORDER — BUPIVACAINE HYDROCHLORIDE 2.5 MG/ML
2 INJECTION, SOLUTION INFILTRATION; PERINEURAL ONCE
Status: COMPLETED | OUTPATIENT
Start: 2024-07-22 | End: 2024-07-22

## 2024-07-22 RX ADMIN — TRIAMCINOLONE ACETONIDE 40 MG: 40 INJECTION, SUSPENSION INTRA-ARTICULAR; INTRAMUSCULAR at 14:55

## 2024-07-22 RX ADMIN — BUPIVACAINE HYDROCHLORIDE 5 MG: 2.5 INJECTION, SOLUTION INFILTRATION; PERINEURAL at 14:54
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
## 2024-07-24 PROBLEM — R79.89 ELEVATED D-DIMER: Status: ACTIVE | Noted: 2024-07-24

## 2024-07-24 PROBLEM — J81.0 ACUTE PULMONARY EDEMA (HCC): Status: ACTIVE | Noted: 2024-07-24

## 2024-07-26 ENCOUNTER — PATIENT MESSAGE (OUTPATIENT)
Dept: PRIMARY CARE CLINIC | Age: 79
End: 2024-07-26

## 2024-07-26 ENCOUNTER — CARE COORDINATION (OUTPATIENT)
Dept: CASE MANAGEMENT | Age: 79
End: 2024-07-26

## 2024-07-26 NOTE — CARE COORDINATION
Care Transitions Note    Final Call     Attempted to reach patient for transitions of care follow up.  Unable to reach patient.      Outreach Attempts:   HIPAA compliant voicemail left for patient.   YOHOt message sent.     Patient graduated from the Care Transitions program on 7/26/2024.  Patient/family has the ability to self manage at this time..      Handoff:   Condition management for DM, HTN.     Care Summary Note:     Assessments:  Care Transitions Subsequent and Final Call    Subsequent and Final Calls  Are you currently active with any services?: Home Health  Care Transitions Interventions  Other Interventions:              Upcoming Appointments:    Future Appointments         Provider Specialty Dept Phone    7/26/2024 1:00 PM Jessie Haney, [facility] [phone]    7/29/2024 1:00 PM Jessie Haney, [facility] [phone]    7/31/2024 1:00 PM Jessie Haney, [facility] [phone]    8/2/2024 8:30 AM (Arrive by 8:15 AM) [facility] ECHO 2 Cardiology [phone]    8/2/2024 1:00 PM Jessie Haney, [facility] [phone] 8/5/2024 1:00 PM Jessie Haney, [facility] [phone]    8/7/2024 1:00 PM Jessie Haney, [facility] [phone]    8/9/2024 1:00 PM Jessie Haney, [facility] [phone]    8/12/2024 1:00 PM Jessie Haney, [facility] [phone]    8/28/2024 11:00 AM Miguel Angel Moses MD Primary Care [phone]    10/28/2024 8:30 AM Henry Ruvalcaba MD Cardiology [phone]    11/25/2024 1:00 PM Henry Duong DO Pulmonology [phone]            Nathaly Higuera LPN

## 2024-07-27 DIAGNOSIS — G89.4 CHRONIC PAIN SYNDROME: ICD-10-CM

## 2024-07-27 RX ORDER — ACETAMINOPHEN AND CODEINE PHOSPHATE 300; 30 MG/1; MG/1
1 TABLET ORAL EVERY 8 HOURS PRN
Qty: 90 TABLET | Refills: 0 | Status: SHIPPED | OUTPATIENT
Start: 2024-07-27 | End: 2024-08-26

## 2024-07-27 NOTE — TELEPHONE ENCOUNTER
From: Lucille Lilly  To: Dr. Miguel Angel Moses  Sent: 7/26/2024 5:33 PM EDT  Subject: Tylenol 3    Hi Doctor/Yousif,  I need a refill for my Tylenol 3. I have 3 left.     Thanks, Lucille

## 2024-07-31 ENCOUNTER — TELEPHONE (OUTPATIENT)
Dept: CARDIOLOGY CLINIC | Age: 79
End: 2024-07-31

## 2024-07-31 ENCOUNTER — CARE COORDINATION (OUTPATIENT)
Dept: PRIMARY CARE CLINIC | Age: 79
End: 2024-07-31

## 2024-07-31 ENCOUNTER — ENROLLMENT (OUTPATIENT)
Dept: CARE COORDINATION | Age: 79
End: 2024-07-31

## 2024-07-31 NOTE — TELEPHONE ENCOUNTER
Patient scheduled for an echo on 8/2. Lucille states that she had an echo while inpatient, 6/21 reviewed by Dr. Schneider.     Lucille is wanting to know if she needs to keep echo appt for Friday, 8/2?

## 2024-07-31 NOTE — CARE COORDINATION
Ambulatory Care Coordination Note     2024 12:32 PM     Patient Current Location:  Home: [address] [place] [id number]     This patient was received as a referral from Care Transition Nurse.    Patient contacted the patient by telephone. Verified name and  with patient as identifiers. Provided introduction to self, and explanation of the ACM role.   Patient accepted care management services at this time.          ACM: Angelique Solitario RN     Challenges to be reviewed by the provider   Additional needs identified to be addressed with provider No  none               Method of communication with provider: chart routing.    Care Summary Note:   Acm out reach call placed  to pt, spoke briefly introduced self and reason for call. Pt reports she is doing pretty good, denies any issues or concerns. Has lots of upsoming appointment keeping her pretty busy. Actually has appointment @ 1 pm today  Will follow up again at later date    Offered patient enrollment in the Remote Patient Monitoring (RPM) program for in-home monitoring: Deferred at this time because  ; will discuss at next outreach.     Assessments Completed:       2024    12:24 PM   Amb Fall Risk Assessment and TUG Test   Do you feel unsteady or are you worried about falling?  no   2 or more falls in past year? no   Fall with injury in past year? no     and   General Assessment    Do you have any symptoms that are causing concern?: No          Medications Reviewed:  Not completed during this call:      Advance Care Planning:   Not reviewed during this call     Care Planning:   Education Documentation  No documentation found.  Education Comments  No comments found.         PCP/Specialist follow up:   Future Appointments         Provider Specialty Dept Phone    2024  1:00 PM Jessie Haney RN Cardiac Rehabilitation [phone]    2024 8:30 AM (Arrive by 8:15 AM) [facility] ECHO 2 Cardiology [phone]    2024 1:00 PM Lyndon

## 2024-07-31 NOTE — TELEPHONE ENCOUNTER
Cardiac Rehab called stating that patient called them wanting to continue rehab.   They are wanting to make sure that is okay.   Next appt with Dr. Ruvalcaba is 10/28.       Please advise.     Callback: [phone]

## 2024-08-01 NOTE — TELEPHONE ENCOUNTER
Verbal Order Dr. Ruvalcaba cancel Echo and patient has permission to start back to cardiac rehab.    Called patient and she verbalized understanding.

## 2024-08-01 NOTE — TELEPHONE ENCOUNTER
Lucille calling back to see if she need to still to have the ECHO done on 8/2/24.  Please advise.    Call back number

## 2024-08-06 DIAGNOSIS — E78.2 MIXED HYPERLIPIDEMIA: ICD-10-CM

## 2024-08-08 ENCOUNTER — CARE COORDINATION (OUTPATIENT)
Dept: PRIMARY CARE CLINIC | Age: 79
End: 2024-08-08

## 2024-08-08 RX ORDER — ATORVASTATIN CALCIUM 40 MG/1
40 TABLET, FILM COATED ORAL NIGHTLY
Qty: 90 TABLET | Refills: 1 | Status: SHIPPED | OUTPATIENT
Start: 2024-08-08

## 2024-08-12 DIAGNOSIS — E78.2 MIXED HYPERLIPIDEMIA: ICD-10-CM

## 2024-08-12 RX ORDER — ATORVASTATIN CALCIUM 40 MG/1
40 TABLET, FILM COATED ORAL NIGHTLY
Qty: 90 TABLET | Refills: 1 | Status: SHIPPED | OUTPATIENT
Start: 2024-08-12

## 2024-08-24 ENCOUNTER — PATIENT MESSAGE (OUTPATIENT)
Dept: PRIMARY CARE CLINIC | Age: 79
End: 2024-08-24

## 2024-08-26 NOTE — TELEPHONE ENCOUNTER
Message sent to Dr Moses under Mr. Lilly's chart.     Will respond once Dr Moses sends me something.

## 2024-08-28 ENCOUNTER — OFFICE VISIT (OUTPATIENT)
Dept: PRIMARY CARE CLINIC | Age: 79
End: 2024-08-28

## 2024-08-28 VITALS
RESPIRATION RATE: 18 BRPM | BODY MASS INDEX: 34.9 KG/M2 | WEIGHT: 167 LBS | HEART RATE: 72 BPM | DIASTOLIC BLOOD PRESSURE: 67 MMHG | SYSTOLIC BLOOD PRESSURE: 139 MMHG

## 2024-08-28 DIAGNOSIS — F41.8 MIXED ANXIETY AND DEPRESSIVE DISORDER: ICD-10-CM

## 2024-08-28 DIAGNOSIS — E78.2 MIXED HYPERLIPIDEMIA: ICD-10-CM

## 2024-08-28 DIAGNOSIS — G47.33 OSA ON CPAP: ICD-10-CM

## 2024-08-28 DIAGNOSIS — J45.20 MILD INTERMITTENT ASTHMA WITHOUT COMPLICATION: ICD-10-CM

## 2024-08-28 DIAGNOSIS — G89.29 CHRONIC RIGHT SHOULDER PAIN: ICD-10-CM

## 2024-08-28 DIAGNOSIS — I25.10 CAD S/P PERCUTANEOUS CORONARY ANGIOPLASTY: ICD-10-CM

## 2024-08-28 DIAGNOSIS — I10 ESSENTIAL HYPERTENSION: Primary | ICD-10-CM

## 2024-08-28 DIAGNOSIS — G25.81 RESTLESS LEGS SYNDROME (RLS): ICD-10-CM

## 2024-08-28 DIAGNOSIS — Z98.61 CAD S/P PERCUTANEOUS CORONARY ANGIOPLASTY: ICD-10-CM

## 2024-08-28 DIAGNOSIS — G24.9 DYSKINESIA: ICD-10-CM

## 2024-08-28 DIAGNOSIS — M25.511 CHRONIC RIGHT SHOULDER PAIN: ICD-10-CM

## 2024-08-28 PROBLEM — R79.89 ELEVATED D-DIMER: Status: RESOLVED | Noted: 2024-07-24 | Resolved: 2024-08-28

## 2024-08-28 PROBLEM — R55 SYNCOPE AND COLLAPSE: Status: RESOLVED | Noted: 2024-03-30 | Resolved: 2024-08-28

## 2024-08-28 PROBLEM — R63.4 WEIGHT LOSS: Status: RESOLVED | Noted: 2023-06-29 | Resolved: 2024-08-28

## 2024-08-28 PROBLEM — J81.0 ACUTE PULMONARY EDEMA (HCC): Status: RESOLVED | Noted: 2024-07-24 | Resolved: 2024-08-28

## 2024-08-28 PROBLEM — T31.0 BURN (ANY DEGREE) INVOLVING LESS THAN 10% OF BODY SURFACE: Status: RESOLVED | Noted: 2023-06-09 | Resolved: 2024-08-28

## 2024-08-28 RX ORDER — FERROUS SULFATE 325(65) MG
325 TABLET ORAL
COMMUNITY

## 2024-08-28 ASSESSMENT — ENCOUNTER SYMPTOMS
BLOOD IN STOOL: 0
SHORTNESS OF BREATH: 0
DIARRHEA: 0
ABDOMINAL PAIN: 0
CONSTIPATION: 0

## 2024-08-28 NOTE — PROGRESS NOTES
2024     Lucille Lilly (:  1945) is a 79 y.o. female, here for evaluation of the following medical concerns:    HPI  Patient is 79 years old white female history significant for hypertension, hyperlipidemia, dyskinesia, restless leg syndrome, anxiety depression, asthma, sleep apnea, CAD status post PCI, chronic right shoulder pain.  She presented to the office for regular follow-up..  Right shoulder pain due to rotator cuff tendinitis improved after steroids injection.  His blood pressure is controlled.  He has hyperlipidemia reported to be compliant with his statin tolerating medication without side effect.  He has restless leg syndrome takes Sinemet every 2-3 hours and goes to neurologist at [facility].  Her anxiety is controlled with Lexapro and Ativan.  Her asthma is stable on albuterol inhaler.    Review of Systems   Constitutional:  Negative for activity change and appetite change.   Eyes:  Negative for visual disturbance.   Respiratory:  Negative for shortness of breath.    Cardiovascular:  Negative for chest pain and leg swelling.   Gastrointestinal:  Negative for abdominal pain, blood in stool, constipation and diarrhea.   Genitourinary:  Negative for difficulty urinating, frequency, hematuria, menstrual problem and urgency.   Neurological:  Negative for dizziness and syncope.   Psychiatric/Behavioral:  Negative for behavioral problems.        Prior to Visit Medications    Medication Sig Taking? Authorizing Provider   ferrous sulfate (IRON 325) 325 (65 Fe) MG tablet Take 1 tablet by mouth daily (with breakfast) Yes Tash Basurto MD   DOCUSATE SODIUM PO Take by mouth Yes Tash Basurto MD   atorvastatin (LIPITOR) 40 MG tablet Take 1 tablet by mouth at bedtime  Miguel Angel Moses MD   carbidopa-levodopa (SINEMET)  MG per tablet Take 2.5 tablets by mouth See Admin Instructions 7 times a day - 0900, 1200, 1500, 1700, 1900, 2100, 2300  Tash Basurto MD

## 2024-09-03 ENCOUNTER — PATIENT MESSAGE (OUTPATIENT)
Dept: PRIMARY CARE CLINIC | Age: 79
End: 2024-09-03

## 2024-09-03 RX ORDER — TERBINAFINE HYDROCHLORIDE 250 MG/1
250 TABLET ORAL DAILY
Qty: 90 TABLET | Refills: 0 | Status: SHIPPED | OUTPATIENT
Start: 2024-09-03 | End: 2024-12-02

## 2024-09-10 ENCOUNTER — CARE COORDINATION (OUTPATIENT)
Dept: PRIMARY CARE CLINIC | Age: 79
End: 2024-09-10

## 2024-09-10 SDOH — HEALTH STABILITY: PHYSICAL HEALTH: ON AVERAGE, HOW MANY MINUTES DO YOU ENGAGE IN EXERCISE AT THIS LEVEL?: 0 MIN

## 2024-09-10 ASSESSMENT — SOCIAL DETERMINANTS OF HEALTH (SDOH)
HOW OFTEN DO YOU ATTENT MEETINGS OF THE CLUB OR ORGANIZATION YOU BELONG TO?: NEVER
DO YOU BELONG TO ANY CLUBS OR ORGANIZATIONS SUCH AS CHURCH GROUPS UNIONS, FRATERNAL OR ATHLETIC GROUPS, OR SCHOOL GROUPS?: NO
HOW OFTEN DO YOU GET TOGETHER WITH FRIENDS OR RELATIVES?: ONCE A WEEK
IN A TYPICAL WEEK, HOW MANY TIMES DO YOU TALK ON THE PHONE WITH FAMILY, FRIENDS, OR NEIGHBORS?: ONCE A WEEK

## 2024-09-11 ENCOUNTER — PATIENT MESSAGE (OUTPATIENT)
Dept: PRIMARY CARE CLINIC | Age: 79
End: 2024-09-11

## 2024-09-11 DIAGNOSIS — G89.4 CHRONIC PAIN SYNDROME: ICD-10-CM

## 2024-09-11 RX ORDER — ACETAMINOPHEN AND CODEINE PHOSPHATE 300; 30 MG/1; MG/1
1 TABLET ORAL EVERY 8 HOURS PRN
Qty: 90 TABLET | Refills: 0 | Status: SHIPPED | OUTPATIENT
Start: 2024-09-11 | End: 2024-10-11

## 2024-09-18 DIAGNOSIS — F41.8 MIXED ANXIETY AND DEPRESSIVE DISORDER: ICD-10-CM

## 2024-09-19 RX ORDER — ESCITALOPRAM OXALATE 10 MG/1
10 TABLET ORAL NIGHTLY
Qty: 90 TABLET | Refills: 1 | Status: SHIPPED | OUTPATIENT
Start: 2024-09-19

## 2024-10-14 NOTE — DISCHARGE INSTRUCTIONS
[address]  Physician Orders   [facility] [place]  [address] [address], [place] [address]  Telephone: [phone]    NAME:  Augie Delarosa  YOB: 1945  MEDICAL RECORD NUMBER:  [id number]  DATE:  6/23/2023    Congratulations! You have completed your treatment. Return to your Primary Care Physician for all your health issues. Resume your ordinary activities as tolerated. Take your medications as prescribed by your primary care physician. Check your skin daily for cracks, bruises, sores, or dryness. Use a moisturizer as needed. Clean and dry your skin, using mild soap and warm water (not hot). Avoid alcohol and caffeine and do not smoke. Maintain a nutritious diet. Avoid pressure on your wound site. Keep your legs elevated above the level of the heart whenever possible. Continue to use wraps/stockings/compression as prescribed.     May leave open to air     Physician Signature:______________________    Date: ___________ Time:  ____________    Reche Crosser CNP           Electronically signed by : Yasmany Maxwell on 6/23/2023 at 10:07 AM Dr Kerr, please review and advise:  Interventional services is asking if Francoise may hold her Warfarin 5 days prior to liver biopsy. Procedure is not scheduled at this time. Francoise is eager to move forward with biopsy and would prefer not to bridge with lovenox. She states she felt \"it went well last time, without lovenox.\" Patient is referring to inpatient stay 9/30-10/3/24 where she held warfarin for lumbar puncture (per those notes \"Her warfarin was resumed at time of discharge, Lovenox bridge was deemed unnecessary.\"). Patient understands Anticoagulation office would provide aggressive Warfarin dose increases and require close INR follow up to ensure INR returns to goal as quickly as possible.      Veronica Browning, [facility]  Ila Fritz, REGI; Lam, N Np Im Nurse Msg Pool6 hours ago (8:55 AM)     Is it ok for Francoise to hold her Coumadin for 5 days prior to her liver biopsy?

## 2024-10-15 RX ORDER — PANTOPRAZOLE SODIUM 40 MG/1
40 TABLET, DELAYED RELEASE ORAL DAILY
Qty: 90 TABLET | Refills: 1 | Status: SHIPPED | OUTPATIENT
Start: 2024-10-15

## 2024-10-15 RX ORDER — GABAPENTIN 600 MG/1
600 TABLET ORAL 2 TIMES DAILY
Qty: 180 TABLET | Refills: 1 | Status: SHIPPED | OUTPATIENT
Start: 2024-10-15 | End: 2025-04-13

## 2024-10-22 DIAGNOSIS — G89.4 CHRONIC PAIN SYNDROME: ICD-10-CM

## 2024-10-22 RX ORDER — ACETAMINOPHEN AND CODEINE PHOSPHATE 300; 30 MG/1; MG/1
1 TABLET ORAL EVERY 8 HOURS PRN
Qty: 90 TABLET | Refills: 0 | Status: SHIPPED | OUTPATIENT
Start: 2024-10-22 | End: 2024-11-21
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
## 2024-10-28 ENCOUNTER — OFFICE VISIT (OUTPATIENT)
Dept: CARDIOLOGY CLINIC | Age: 79
End: 2024-10-28

## 2024-10-28 VITALS
SYSTOLIC BLOOD PRESSURE: 128 MMHG | OXYGEN SATURATION: 94 % | HEART RATE: 74 BPM | HEIGHT: 58 IN | RESPIRATION RATE: 15 BRPM | BODY MASS INDEX: 37.66 KG/M2 | WEIGHT: 179.4 LBS | DIASTOLIC BLOOD PRESSURE: 72 MMHG

## 2024-10-28 DIAGNOSIS — I20.0 UNSTABLE ANGINA PECTORIS (HCC): Primary | ICD-10-CM

## 2024-10-28 DIAGNOSIS — R09.89 OTHER SPECIFIED SYMPTOMS AND SIGNS INVOLVING THE CIRCULATORY AND RESPIRATORY SYSTEMS: ICD-10-CM

## 2024-10-28 NOTE — PATIENT INSTRUCTIONS
Call if dizziness becomes a safety issue    30 minutes of walking 3 times a week     Check cholesterol     Ok to resume back injections you may hold Plavix    You will be stopping Plavix March 2025    Ultrasound of arteries of both legs

## 2024-10-29 ENCOUNTER — CARE COORDINATION (OUTPATIENT)
Dept: PRIMARY CARE CLINIC | Age: 79
End: 2024-10-29

## 2024-11-25 ENCOUNTER — OFFICE VISIT (OUTPATIENT)
Dept: PULMONOLOGY | Age: 79
End: 2024-11-25
Payer: MEDICARE

## 2024-11-25 VITALS
TEMPERATURE: 97 F | RESPIRATION RATE: 18 BRPM | SYSTOLIC BLOOD PRESSURE: 134 MMHG | HEIGHT: 58 IN | BODY MASS INDEX: 37.5 KG/M2 | DIASTOLIC BLOOD PRESSURE: 60 MMHG | HEART RATE: 69 BPM | OXYGEN SATURATION: 96 %

## 2024-11-25 DIAGNOSIS — J45.20 MILD INTERMITTENT ASTHMA WITHOUT COMPLICATION: ICD-10-CM

## 2024-11-25 DIAGNOSIS — G47.33 OSA ON CPAP: Primary | ICD-10-CM

## 2024-11-25 PROCEDURE — 1123F ACP DISCUSS/DSCN MKR DOCD: CPT | Performed by: INTERNAL MEDICINE

## 2024-11-25 PROCEDURE — G8417 CALC BMI ABV UP PARAM F/U: HCPCS | Performed by: INTERNAL MEDICINE

## 2024-11-25 PROCEDURE — 3075F SYST BP GE 130 - 139MM HG: CPT | Performed by: INTERNAL MEDICINE

## 2024-11-25 PROCEDURE — 1036F TOBACCO NON-USER: CPT | Performed by: INTERNAL MEDICINE

## 2024-11-25 PROCEDURE — 1090F PRES/ABSN URINE INCON ASSESS: CPT | Performed by: INTERNAL MEDICINE

## 2024-11-25 PROCEDURE — G8484 FLU IMMUNIZE NO ADMIN: HCPCS | Performed by: INTERNAL MEDICINE

## 2024-11-25 PROCEDURE — G8399 PT W/DXA RESULTS DOCUMENT: HCPCS | Performed by: INTERNAL MEDICINE

## 2024-11-25 PROCEDURE — 3078F DIAST BP <80 MM HG: CPT | Performed by: INTERNAL MEDICINE

## 2024-11-25 PROCEDURE — 99214 OFFICE O/P EST MOD 30 MIN: CPT | Performed by: INTERNAL MEDICINE

## 2024-11-25 PROCEDURE — G8427 DOCREV CUR MEDS BY ELIG CLIN: HCPCS | Performed by: INTERNAL MEDICINE

## 2024-11-25 PROCEDURE — 1159F MED LIST DOCD IN RCRD: CPT | Performed by: INTERNAL MEDICINE

## 2024-11-25 NOTE — PROGRESS NOTES
Chief complaint  This is a 79 y.o. year old female  who comes to see me with a chief complaint of   Chief Complaint   Patient presents with    Sleep Apnea    .    HPI  Follow up on DOYLE.      Machine:  Patient is using a APAP machine.  Average usage is 9 hrs 11 min 00 sec.  She is meeting 4 or more hours per night 100% of the time.  Average AHI is 2.1.      Has not used albuterol in years           5/13/2024    12:54 PM 11/7/2023     8:59 AM 10/11/2022    11:12 AM 10/8/2021    12:40 PM 1/13/2021     2:21 PM 8/19/2019     2:10 PM 2/25/2019     8:56 AM   Sleep Medicine   Sitting and reading 1 1 1 1 2 1 1   Watching TV 1 1 1 0 2 1 1   Sitting, inactive in a public place (e.g. a theatre or a meeting) 1 0 0 0 2 1 1   As a passenger in a car for an hour without a break 1 1 1 0 2 2 1   Lying down to rest in the afternoon when circumstances permit 1 1 0 0 1 3 2   Sitting and talking to someone 1 0 0 0 0 1 0   Sitting quietly after a lunch without alcohol 1 0 0 0 1 1 0   In a car, while stopped for a few minutes in traffic 1 0 0 0 0 0 0   [place] Sleepiness Score 8 4 3 1 10 10 6         Current Outpatient Medications   Medication Sig Dispense Refill    gabapentin (NEURONTIN) 600 MG tablet Take 1 tablet by mouth 2 times daily for 180 days. 180 tablet 1    pantoprazole (PROTONIX) 40 MG tablet Take 1 tablet by mouth daily 90 tablet 1    escitalopram (LEXAPRO) 10 MG tablet Take 1 tablet by mouth at bedtime 90 tablet 1    terbinafine (LAMISIL) 250 MG tablet Take 1 tablet by mouth daily 90 tablet 0    ferrous sulfate (IRON 325) 325 (65 Fe) MG tablet Take 1 tablet by mouth daily (with breakfast)      DOCUSATE SODIUM PO Take by mouth      atorvastatin (LIPITOR) 40 MG tablet Take 1 tablet by mouth at bedtime 90 tablet 1    carbidopa-levodopa (SINEMET)  MG per tablet Take 2.5 tablets by mouth See Admin Instructions 1 tablet 7 times a day - 0900, 1200, 1500, 1700, 1900, 2100, 2300      Misc. Devices (EXTENDABLE BEDSIDE

## 2024-12-02 ENCOUNTER — OFFICE VISIT (OUTPATIENT)
Dept: PRIMARY CARE CLINIC | Age: 79
End: 2024-12-02

## 2024-12-02 VITALS
WEIGHT: 177.4 LBS | DIASTOLIC BLOOD PRESSURE: 66 MMHG | SYSTOLIC BLOOD PRESSURE: 138 MMHG | HEART RATE: 67 BPM | BODY MASS INDEX: 37.09 KG/M2 | OXYGEN SATURATION: 100 % | RESPIRATION RATE: 18 BRPM

## 2024-12-02 DIAGNOSIS — E11.9 TYPE 2 DIABETES MELLITUS WITHOUT COMPLICATION, WITHOUT LONG-TERM CURRENT USE OF INSULIN (HCC): ICD-10-CM

## 2024-12-02 DIAGNOSIS — I10 ESSENTIAL HYPERTENSION: Primary | ICD-10-CM

## 2024-12-02 DIAGNOSIS — E78.2 MIXED HYPERLIPIDEMIA: ICD-10-CM

## 2024-12-02 DIAGNOSIS — E55.9 VITAMIN D DEFICIENCY: ICD-10-CM

## 2024-12-02 DIAGNOSIS — G89.4 CHRONIC PAIN SYNDROME: ICD-10-CM

## 2024-12-02 RX ORDER — ACETAMINOPHEN AND CODEINE PHOSPHATE 300; 30 MG/1; MG/1
1 TABLET ORAL EVERY 8 HOURS PRN
Qty: 90 TABLET | Refills: 0 | Status: SHIPPED | OUTPATIENT
Start: 2024-12-02 | End: 2025-01-01

## 2024-12-02 ASSESSMENT — ENCOUNTER SYMPTOMS
CONSTIPATION: 0
SHORTNESS OF BREATH: 0
BLOOD IN STOOL: 0
ABDOMINAL PAIN: 0
DIARRHEA: 0

## 2024-12-03 NOTE — PROGRESS NOTES
2024     Lucille Lilly (:  1945) is a 79 y.o. female, here for evaluation of the following medical concerns:    HPI  Patient is 79 years old white female medical history significant for hypertension, hyperlipidemia, diabetes coronary artery disease status post PCI to right coronary artery 2024, anxiety depression, sleep apnea and asthma.  Patient presented to the office for follow-up and refill.  He has no particular complaint.  He denies headache nausea vomiting.  Denies chest pain or shortness of breath denies bowel or urinary disturbance    Review of Systems   Constitutional:  Negative for activity change and appetite change.   Eyes:  Negative for visual disturbance.   Respiratory:  Negative for shortness of breath.    Cardiovascular:  Negative for chest pain and leg swelling.   Gastrointestinal:  Negative for abdominal pain, blood in stool, constipation and diarrhea.   Genitourinary:  Negative for difficulty urinating, frequency, hematuria, menstrual problem and urgency.   Neurological:  Negative for dizziness and syncope.   Psychiatric/Behavioral:  Negative for behavioral problems.        Prior to Visit Medications    Medication Sig Taking? Authorizing Provider   acetaminophen-codeine (TYLENOL/CODEINE #3) 300-30 MG per tablet Take 1 tablet by mouth every 8 hours as needed for Pain for up to 30 days. Max Daily Amount: 3 tablets Yes Miguel Angel Moses MD   gabapentin (NEURONTIN) 600 MG tablet Take 1 tablet by mouth 2 times daily for 180 days.  Miguel Angel Moses MD   pantoprazole (PROTONIX) 40 MG tablet Take 1 tablet by mouth daily  Miguel Angel Moses MD   escitalopram (LEXAPRO) 10 MG tablet Take 1 tablet by mouth at bedtime  Miguel Angel Moses MD   terbinafine (LAMISIL) 250 MG tablet Take 1 tablet by mouth daily  Miguel Angel Moses MD   ferrous sulfate (IRON 325) 325 (65 Fe) MG tablet Take 1 tablet by mouth daily (with breakfast)  Provider, MD Tash   DOCUSATE SODIUM PO Take by mouth

## 2024-12-03 NOTE — TELEPHONE ENCOUNTER
From: Shukri Lilly  To: Dr. Penn Antonia: 5/25/2023 6:39 PM EDT  Subject: Tylenol #3    Dr. David Chaidez or David,    I need a refill on my Tylenol #3. I only have 3 left.     Thanks,  Melodie German (ZONEGRAN) 100 MG capsule Take 3 capsules by mouth nightly      [DISCONTINUED] lactulose (CHRONULAC) 10 GM/15ML solution TAKE 30 MLS BY MOUTH DAILY (WITH BREAKFAST) 473 mL 2    [DISCONTINUED] cetirizine (ZYRTEC) 1 MG/ML SOLN syrup Take 5 mLs by mouth daily (Patient not taking: Reported on 12/3/2024) 300 mL 2     No facility-administered encounter medications on file as of 12/3/2024.        Erik was seen today for follow-up.    Diagnoses and all orders for this visit:    Spastic quadriplegic cerebral palsy (HCC)    Non-refractory epilepsy (HCC)    Abnormal liver function tests    Anxiety    G tube feedings (HCC)    Mentally disabled         There are no Patient Instructions on file for this visit.           Amanda Beth MD   12/3/24

## 2024-12-05 ENCOUNTER — OFFICE VISIT (OUTPATIENT)
Dept: ORTHOPEDIC SURGERY | Age: 79
End: 2024-12-05
Payer: MEDICARE

## 2024-12-05 VITALS — HEIGHT: 58 IN | WEIGHT: 166 LBS | BODY MASS INDEX: 34.85 KG/M2

## 2024-12-05 DIAGNOSIS — M75.81 ROTATOR CUFF TENDINITIS, RIGHT: Primary | ICD-10-CM

## 2024-12-05 PROCEDURE — 1125F AMNT PAIN NOTED PAIN PRSNT: CPT | Performed by: PHYSICIAN ASSISTANT

## 2024-12-05 PROCEDURE — 1159F MED LIST DOCD IN RCRD: CPT | Performed by: PHYSICIAN ASSISTANT

## 2024-12-05 PROCEDURE — 1036F TOBACCO NON-USER: CPT | Performed by: PHYSICIAN ASSISTANT

## 2024-12-05 PROCEDURE — G8399 PT W/DXA RESULTS DOCUMENT: HCPCS | Performed by: PHYSICIAN ASSISTANT

## 2024-12-05 PROCEDURE — G8484 FLU IMMUNIZE NO ADMIN: HCPCS | Performed by: PHYSICIAN ASSISTANT

## 2024-12-05 PROCEDURE — 20610 DRAIN/INJ JOINT/BURSA W/O US: CPT | Performed by: PHYSICIAN ASSISTANT

## 2024-12-05 PROCEDURE — 1090F PRES/ABSN URINE INCON ASSESS: CPT | Performed by: PHYSICIAN ASSISTANT

## 2024-12-05 PROCEDURE — 99213 OFFICE O/P EST LOW 20 MIN: CPT | Performed by: PHYSICIAN ASSISTANT

## 2024-12-05 PROCEDURE — G8427 DOCREV CUR MEDS BY ELIG CLIN: HCPCS | Performed by: PHYSICIAN ASSISTANT

## 2024-12-05 PROCEDURE — G8417 CALC BMI ABV UP PARAM F/U: HCPCS | Performed by: PHYSICIAN ASSISTANT

## 2024-12-05 PROCEDURE — 1123F ACP DISCUSS/DSCN MKR DOCD: CPT | Performed by: PHYSICIAN ASSISTANT

## 2024-12-05 RX ORDER — BUPIVACAINE HYDROCHLORIDE 2.5 MG/ML
2 INJECTION, SOLUTION INFILTRATION; PERINEURAL ONCE
Status: COMPLETED | OUTPATIENT
Start: 2024-12-05 | End: 2024-12-05

## 2024-12-05 RX ORDER — TRIAMCINOLONE ACETONIDE 40 MG/ML
40 INJECTION, SUSPENSION INTRA-ARTICULAR; INTRAMUSCULAR ONCE
Status: COMPLETED | OUTPATIENT
Start: 2024-12-05 | End: 2024-12-05

## 2024-12-05 RX ADMIN — TRIAMCINOLONE ACETONIDE 40 MG: 40 INJECTION, SUSPENSION INTRA-ARTICULAR; INTRAMUSCULAR at 14:38

## 2024-12-05 RX ADMIN — BUPIVACAINE HYDROCHLORIDE 5 MG: 2.5 INJECTION, SOLUTION INFILTRATION; PERINEURAL at 14:38

## 2024-12-05 NOTE — PROGRESS NOTES
This dictation was done with [employer] dictation and may contain mechanical errors related to translation.  Height 1.473 m (4' 10\"), weight 75.3 kg (166 lb), not currently breastfeeding.    This is a pleasant, well-developed patient in no acute distress. They are alert and oriented ×3. They have had continued pain in their Right shoulder that's aggravated with overhead activities or sleeping on it. They deny any significant radicular pain or neuropathy. They've had injections in the past of cortisone and has helped with her symptoms. They noticed that there was some increasing pain and and swelling and disability over the last 7 to 10 days especially.  This increase led to them making an appointment.    On examination, there is a positive [place] and Vuong's test. Impingement pain with crossover and weakness to supraspinatus strength testing. There is good distal pulses with good dorsiflexion and palmar flexion strength. There are no cutaneous lesions or lymphadenopathy present. There is approximately 170° of forward flexion and 100° of abduction.    My impression is Right shoulder rotator cuff tendinitis with impingement syndrome.    After a discussion of the multiple options, they consented to a cortisone shot. 1 ml of 40mg/ml Kenalog and 2 ml's of 0.25%Marcaine were injected into the Right shoulder sub acromial space.    This was done with sterile technique and they tolerated it well.During today's visit, there was approximately 20 minutes of face-to-face discussion in regards to the patient's current condition and treatment options.More than 50 % of the time was counseling and coordination of care.

## 2024-12-11 ENCOUNTER — TELEPHONE (OUTPATIENT)
Dept: CARDIOLOGY CLINIC | Age: 79
End: 2024-12-11

## 2024-12-11 DIAGNOSIS — I73.9 CLAUDICATION (HCC): Primary | ICD-10-CM

## 2024-12-11 NOTE — TELEPHONE ENCOUNTER
Please change order for Vascular duplex lower extremity arteries bilateral with MIGUEL ANGEL     It needs to be with MIGUEL ANGEL because it is being flagged according to department and Central Scheduling.

## 2024-12-12 DIAGNOSIS — R09.89 OTHER SPECIFIED SYMPTOMS AND SIGNS INVOLVING THE CIRCULATORY AND RESPIRATORY SYSTEMS: ICD-10-CM

## 2024-12-12 DIAGNOSIS — I25.10 CORONARY ARTERY DISEASE INVOLVING NATIVE CORONARY ARTERY OF NATIVE HEART WITHOUT ANGINA PECTORIS: Primary | ICD-10-CM

## 2024-12-20 ENCOUNTER — PATIENT MESSAGE (OUTPATIENT)
Dept: PRIMARY CARE CLINIC | Age: 79
End: 2024-12-20

## 2024-12-20 DIAGNOSIS — E11.9 TYPE 2 DIABETES MELLITUS WITHOUT COMPLICATION, WITHOUT LONG-TERM CURRENT USE OF INSULIN (HCC): ICD-10-CM

## 2024-12-27 DIAGNOSIS — E11.9 TYPE 2 DIABETES MELLITUS WITHOUT COMPLICATION, WITHOUT LONG-TERM CURRENT USE OF INSULIN (HCC): ICD-10-CM

## 2024-12-27 DIAGNOSIS — E55.9 VITAMIN D DEFICIENCY: ICD-10-CM

## 2024-12-27 DIAGNOSIS — I10 ESSENTIAL HYPERTENSION: ICD-10-CM

## 2024-12-27 DIAGNOSIS — E78.2 MIXED HYPERLIPIDEMIA: ICD-10-CM

## 2024-12-27 LAB
25(OH)D3 SERPL-MCNC: 36.7 NG/ML
ALBUMIN SERPL-MCNC: 4.2 G/DL (ref 3.4–5)
ALBUMIN/GLOB SERPL: 2.2 {RATIO} (ref 1.1–2.2)
ALP SERPL-CCNC: 79 U/L (ref 40–129)
ALT SERPL-CCNC: 7 U/L (ref 10–40)
ANION GAP SERPL CALCULATED.3IONS-SCNC: 8 MMOL/L (ref 3–16)
AST SERPL-CCNC: 18 U/L (ref 15–37)
BASOPHILS # BLD: 0.1 K/UL (ref 0–0.2)
BASOPHILS NFR BLD: 0.8 %
BILIRUB SERPL-MCNC: <0.2 MG/DL (ref 0–1)
BUN SERPL-MCNC: 15 MG/DL (ref 7–20)
CALCIUM SERPL-MCNC: 9.9 MG/DL (ref 8.3–10.6)
CHLORIDE SERPL-SCNC: 102 MMOL/L (ref 99–110)
CHOLEST SERPL-MCNC: 178 MG/DL (ref 0–199)
CO2 SERPL-SCNC: 29 MMOL/L (ref 21–32)
CREAT SERPL-MCNC: 0.6 MG/DL (ref 0.6–1.2)
DEPRECATED RDW RBC AUTO: 18.4 % (ref 12.4–15.4)
EOSINOPHIL # BLD: 0.2 K/UL (ref 0–0.6)
EOSINOPHIL NFR BLD: 2.9 %
EST. AVERAGE GLUCOSE BLD GHB EST-MCNC: 119.8 MG/DL
GFR SERPLBLD CREATININE-BSD FMLA CKD-EPI: >90 ML/MIN/{1.73_M2}
GLUCOSE P FAST SERPL-MCNC: 94 MG/DL (ref 70–99)
HBA1C MFR BLD: 5.8 %
HCT VFR BLD AUTO: 37.2 % (ref 36–48)
HDLC SERPL-MCNC: 81 MG/DL (ref 40–60)
HGB BLD-MCNC: 11.7 G/DL (ref 12–16)
LDL CHOLESTEROL: 76 MG/DL
LYMPHOCYTES # BLD: 1.7 K/UL (ref 1–5.1)
LYMPHOCYTES NFR BLD: 25.5 %
MCH RBC QN AUTO: 25.2 PG (ref 26–34)
MCHC RBC AUTO-ENTMCNC: 31.5 G/DL (ref 31–36)
MCV RBC AUTO: 80.1 FL (ref 80–100)
MONOCYTES # BLD: 0.8 K/UL (ref 0–1.3)
MONOCYTES NFR BLD: 11.5 %
NEUTROPHILS # BLD: 3.9 K/UL (ref 1.7–7.7)
NEUTROPHILS NFR BLD: 59.3 %
PLATELET # BLD AUTO: 299 K/UL (ref 135–450)
PMV BLD AUTO: 8 FL (ref 5–10.5)
POTASSIUM SERPL-SCNC: 4.4 MMOL/L (ref 3.5–5.1)
PROT SERPL-MCNC: 6.1 G/DL (ref 6.4–8.2)
RBC # BLD AUTO: 4.65 M/UL (ref 4–5.2)
SODIUM SERPL-SCNC: 139 MMOL/L (ref 136–145)
T4 FREE SERPL-MCNC: 0.8 NG/DL (ref 0.9–1.8)
TRIGL SERPL-MCNC: 106 MG/DL (ref 0–150)
TSH SERPL DL<=0.005 MIU/L-ACNC: 1.91 UIU/ML (ref 0.27–4.2)
VLDLC SERPL CALC-MCNC: 21 MG/DL
WBC # BLD AUTO: 6.5 K/UL (ref 4–11)

## 2024-12-31 DIAGNOSIS — E78.2 MIXED HYPERLIPIDEMIA: ICD-10-CM

## 2025-01-02 RX ORDER — ATORVASTATIN CALCIUM 40 MG/1
40 TABLET, FILM COATED ORAL NIGHTLY
Qty: 90 TABLET | Refills: 1 | Status: SHIPPED | OUTPATIENT
Start: 2025-01-02

## 2025-01-13 ENCOUNTER — HOSPITAL ENCOUNTER (OUTPATIENT)
Dept: VASCULAR LAB | Age: 80
Discharge: HOME | End: 2025-01-15
Attending: STUDENT IN AN ORGANIZED HEALTH CARE EDUCATION/TRAINING PROGRAM
Payer: MEDICARE

## 2025-01-13 DIAGNOSIS — I73.9 CLAUDICATION (HCC): ICD-10-CM

## 2025-01-13 LAB
VAS LEFT ABI: 1.07
VAS LEFT ARM BP: 122 MMHG
VAS LEFT ATA MID PSV: 69.7 CM/S
VAS LEFT CFA DIST PSV: 176.8 CM/S
VAS LEFT CFA PROX PSV: 127 CM/S
VAS LEFT DORSALIS PEDIS BP: 130 MMHG
VAS LEFT PERONEAL MID PSV: 78.9 CM/S
VAS LEFT PFA PROX PSV: 104 CM/S
VAS LEFT POP A DIST PSV: 80.8 CM/S
VAS LEFT POP A PROX PSV: 73.5 CM/S
VAS LEFT POP A PROX VEL RATIO: 0.68
VAS LEFT PTA BP: 128 MMHG
VAS LEFT PTA MID PSV: 120.9 CM/S
VAS LEFT SFA DIST PSV: 108.2 CM/S
VAS LEFT SFA DIST VEL RATIO: 1.04
VAS LEFT SFA MID PSV: 104.5 CM/S
VAS LEFT SFA MID VEL RATIO: 0.86
VAS LEFT SFA PROX PSV: 121 CM/S
VAS LEFT SFA PROX VEL RATIO: 0.68
VAS RIGHT ABI: 1.05
VAS RIGHT ARM BP: 118 MMHG
VAS RIGHT ATA MID PSV: 56.8 CM/S
VAS RIGHT CFA DIST PSV: 119.2 CM/S
VAS RIGHT CFA PROX PSV: 141.1 CM/S
VAS RIGHT DORSALIS PEDIS BP: 128 MMHG
VAS RIGHT PFA PROX PSV: 131 CM/S
VAS RIGHT POP A DIST PSV: 93.6 CM/S
VAS RIGHT POP A PROX PSV: 80.5 CM/S
VAS RIGHT POP A PROX VEL RATIO: 1.2
VAS RIGHT PTA BP: 126 MMHG
VAS RIGHT PTA MID PSV: 88.7 CM/S
VAS RIGHT SFA DIST PSV: 67.3 CM/S
VAS RIGHT SFA DIST VEL RATIO: 0.59
VAS RIGHT SFA MID PSV: 113.6 CM/S
VAS RIGHT SFA MID VEL RATIO: 1
VAS RIGHT SFA PROX PSV: 113.6 CM/S
VAS RIGHT SFA PROX VEL RATIO: 0.8

## 2025-01-13 PROCEDURE — 93925 LOWER EXTREMITY STUDY: CPT | Performed by: INTERNAL MEDICINE

## 2025-01-13 PROCEDURE — 93925 LOWER EXTREMITY STUDY: CPT

## 2025-01-18 ENCOUNTER — PATIENT MESSAGE (OUTPATIENT)
Dept: PRIMARY CARE CLINIC | Age: 80
End: 2025-01-18

## 2025-01-18 DIAGNOSIS — G89.4 CHRONIC PAIN SYNDROME: ICD-10-CM

## 2025-01-20 RX ORDER — ACETAMINOPHEN AND CODEINE PHOSPHATE 300; 30 MG/1; MG/1
1 TABLET ORAL EVERY 8 HOURS PRN
Qty: 90 TABLET | Refills: 0 | Status: SHIPPED | OUTPATIENT
Start: 2025-01-20 | End: 2025-02-19

## 2025-02-24 RX ORDER — GABAPENTIN 600 MG/1
600 TABLET ORAL 2 TIMES DAILY
Qty: 180 TABLET | Refills: 1 | Status: SHIPPED | OUTPATIENT
Start: 2025-02-24 | End: 2025-08-23

## 2025-02-24 RX ORDER — PANTOPRAZOLE SODIUM 40 MG/1
40 TABLET, DELAYED RELEASE ORAL DAILY
Qty: 90 TABLET | Refills: 1 | Status: SHIPPED | OUTPATIENT
Start: 2025-02-24

## 2025-02-28 SDOH — ECONOMIC STABILITY: INCOME INSECURITY: IN THE LAST 12 MONTHS, WAS THERE A TIME WHEN YOU WERE NOT ABLE TO PAY THE MORTGAGE OR RENT ON TIME?: NO

## 2025-02-28 SDOH — ECONOMIC STABILITY: FOOD INSECURITY: WITHIN THE PAST 12 MONTHS, YOU WORRIED THAT YOUR FOOD WOULD RUN OUT BEFORE YOU GOT MONEY TO BUY MORE.: NEVER TRUE

## 2025-02-28 SDOH — ECONOMIC STABILITY: FOOD INSECURITY: WITHIN THE PAST 12 MONTHS, THE FOOD YOU BOUGHT JUST DIDN'T LAST AND YOU DIDN'T HAVE MONEY TO GET MORE.: NEVER TRUE

## 2025-02-28 ASSESSMENT — PATIENT HEALTH QUESTIONNAIRE - PHQ9
6. FEELING BAD ABOUT YOURSELF - OR THAT YOU ARE A FAILURE OR HAVE LET YOURSELF OR YOUR FAMILY DOWN: NOT AT ALL
2. FEELING DOWN, DEPRESSED OR HOPELESS: NOT AT ALL
SUM OF ALL RESPONSES TO PHQ QUESTIONS 1-9: 4
8. MOVING OR SPEAKING SO SLOWLY THAT OTHER PEOPLE COULD HAVE NOTICED. OR THE OPPOSITE, BEING SO FIGETY OR RESTLESS THAT YOU HAVE BEEN MOVING AROUND A LOT MORE THAN USUAL: SEVERAL DAYS
2. FEELING DOWN, DEPRESSED OR HOPELESS: NOT AT ALL
9. THOUGHTS THAT YOU WOULD BE BETTER OFF DEAD, OR OF HURTING YOURSELF: NOT AT ALL
4. FEELING TIRED OR HAVING LITTLE ENERGY: SEVERAL DAYS
6. FEELING BAD ABOUT YOURSELF - OR THAT YOU ARE A FAILURE OR HAVE LET YOURSELF OR YOUR FAMILY DOWN: NOT AT ALL
SUM OF ALL RESPONSES TO PHQ QUESTIONS 1-9: 4
10. IF YOU CHECKED OFF ANY PROBLEMS, HOW DIFFICULT HAVE THESE PROBLEMS MADE IT FOR YOU TO DO YOUR WORK, TAKE CARE OF THINGS AT HOME, OR GET ALONG WITH OTHER PEOPLE: NOT DIFFICULT AT ALL
9. THOUGHTS THAT YOU WOULD BE BETTER OFF DEAD, OR OF HURTING YOURSELF: NOT AT ALL
8. MOVING OR SPEAKING SO SLOWLY THAT OTHER PEOPLE COULD HAVE NOTICED. OR THE OPPOSITE - BEING SO FIDGETY OR RESTLESS THAT YOU HAVE BEEN MOVING AROUND A LOT MORE THAN USUAL: SEVERAL DAYS
5. POOR APPETITE OR OVEREATING: SEVERAL DAYS
SUM OF ALL RESPONSES TO PHQ QUESTIONS 1-9: 4
SUM OF ALL RESPONSES TO PHQ QUESTIONS 1-9: 4
7. TROUBLE CONCENTRATING ON THINGS, SUCH AS READING THE NEWSPAPER OR WATCHING TELEVISION: SEVERAL DAYS
7. TROUBLE CONCENTRATING ON THINGS, SUCH AS READING THE NEWSPAPER OR WATCHING TELEVISION: SEVERAL DAYS
4. FEELING TIRED OR HAVING LITTLE ENERGY: SEVERAL DAYS
1. LITTLE INTEREST OR PLEASURE IN DOING THINGS: NOT AT ALL
3. TROUBLE FALLING OR STAYING ASLEEP: NOT AT ALL
10. IF YOU CHECKED OFF ANY PROBLEMS, HOW DIFFICULT HAVE THESE PROBLEMS MADE IT FOR YOU TO DO YOUR WORK, TAKE CARE OF THINGS AT HOME, OR GET ALONG WITH OTHER PEOPLE: NOT DIFFICULT AT ALL
5. POOR APPETITE OR OVEREATING: SEVERAL DAYS
1. LITTLE INTEREST OR PLEASURE IN DOING THINGS: NOT AT ALL
3. TROUBLE FALLING OR STAYING ASLEEP: NOT AT ALL
SUM OF ALL RESPONSES TO PHQ QUESTIONS 1-9: 4

## 2025-03-03 ENCOUNTER — OFFICE VISIT (OUTPATIENT)
Dept: PRIMARY CARE CLINIC | Age: 80
End: 2025-03-03

## 2025-03-03 VITALS
WEIGHT: 184 LBS | BODY MASS INDEX: 38.46 KG/M2 | SYSTOLIC BLOOD PRESSURE: 144 MMHG | DIASTOLIC BLOOD PRESSURE: 67 MMHG | HEART RATE: 72 BPM

## 2025-03-03 DIAGNOSIS — G89.4 CHRONIC PAIN SYNDROME: ICD-10-CM

## 2025-03-03 DIAGNOSIS — E78.2 MIXED HYPERLIPIDEMIA: ICD-10-CM

## 2025-03-03 DIAGNOSIS — E11.9 TYPE 2 DIABETES MELLITUS WITHOUT COMPLICATION, WITHOUT LONG-TERM CURRENT USE OF INSULIN (HCC): ICD-10-CM

## 2025-03-03 DIAGNOSIS — G89.29 CHRONIC RIGHT SHOULDER PAIN: ICD-10-CM

## 2025-03-03 DIAGNOSIS — M25.511 CHRONIC RIGHT SHOULDER PAIN: ICD-10-CM

## 2025-03-03 DIAGNOSIS — I10 ESSENTIAL HYPERTENSION: Primary | ICD-10-CM

## 2025-03-03 DIAGNOSIS — G25.81 RESTLESS LEGS SYNDROME (RLS): ICD-10-CM

## 2025-03-03 DIAGNOSIS — F41.8 MIXED ANXIETY AND DEPRESSIVE DISORDER: ICD-10-CM

## 2025-03-03 DIAGNOSIS — I10 ESSENTIAL HYPERTENSION: ICD-10-CM

## 2025-03-03 RX ORDER — AMLODIPINE BESYLATE 5 MG/1
5 TABLET ORAL DAILY
Qty: 90 TABLET | Refills: 1 | Status: SHIPPED | OUTPATIENT
Start: 2025-03-03 | End: 2025-03-03 | Stop reason: SDUPTHER

## 2025-03-03 RX ORDER — ACETAMINOPHEN AND CODEINE PHOSPHATE 300; 30 MG/1; MG/1
1 TABLET ORAL EVERY 8 HOURS PRN
Qty: 90 TABLET | Refills: 0 | Status: SHIPPED | OUTPATIENT
Start: 2025-03-03 | End: 2025-04-02

## 2025-03-03 RX ORDER — AMLODIPINE BESYLATE 5 MG/1
5 TABLET ORAL DAILY
Qty: 90 TABLET | Refills: 1 | Status: SHIPPED | OUTPATIENT
Start: 2025-03-03

## 2025-03-03 ASSESSMENT — ENCOUNTER SYMPTOMS
SHORTNESS OF BREATH: 0
BLOOD IN STOOL: 0
CONSTIPATION: 0
DIARRHEA: 0
ABDOMINAL PAIN: 0

## 2025-03-03 NOTE — PROGRESS NOTES
tablet by mouth 2 times daily for 180 days.  Miguel Angel Moses MD   atorvastatin (LIPITOR) 40 MG tablet Take 1 tablet by mouth at bedtime  Miguel Angel Moses MD   metFORMIN (GLUCOPHAGE) 1000 MG tablet Take 1 tablet by mouth with breakfast and with evening meal  Miguel Angel Moses MD   escitalopram (LEXAPRO) 10 MG tablet Take 1 tablet by mouth at bedtime  Miguel Angel Moses MD   ferrous sulfate (IRON 325) 325 (65 Fe) MG tablet Take 1 tablet by mouth daily (with breakfast)  Tash Basurto MD   DOCUSATE SODIUM PO Take by mouth  Tash Basurto MD   carbidopa-levodopa (SINEMET)  MG per tablet Take 1 tablet by mouth See Admin Instructions 1 tablet 7 times a day - 0900, 1200, 1500, 1700, 1900, 2100, 2300  Tash Basurto MD   Misc. Devices (EXTENDABLE BEDSIDE RAIL) MISC 1 Act by Does not apply route daily (with breakfast)  Miguel Angel Moses MD   aspirin 81 MG chewable tablet Take 1 tablet by mouth daily  Hernan Martínez MD   carvedilol (COREG) 3.125 MG tablet Take 1 tablet by mouth 2 times daily  Patient not taking: Reported on 3/3/2025  Hernan Martínez MD   LORazepam (ATIVAN) 0.5 MG tablet Take 1 tablet by mouth 3 times daily as needed for Anxiety.  Tash Basurto MD   Cholecalciferol (VITAMIN D3) 5000 units CAPS Take 1 capsule by mouth daily  Miguel Angel Moses MD        Social History     Tobacco Use    Smoking status: Former     Current packs/day: 0.00     Average packs/day: 2.0 packs/day for 36.0 years (72.0 ttl pk-yrs)     Types: Cigarettes     Start date: 1963     Quit date: 1999     Years since quittin.8     Passive exposure: Past    Smokeless tobacco: Never   Substance Use Topics    Alcohol use: No     Alcohol/week: 0.0 standard drinks of alcohol        Vitals:    25 1106   BP: (!) 144/67   Pulse: 72   Weight: 83.5 kg (184 lb)     Estimated body mass index is 38.46 kg/m² as calculated from the following:    Height as of 24: 1.473 m (4' 10\").    Weight as

## 2025-03-10 ENCOUNTER — OFFICE VISIT (OUTPATIENT)
Dept: ORTHOPEDIC SURGERY | Age: 80
End: 2025-03-10

## 2025-03-10 VITALS — WEIGHT: 184 LBS | HEIGHT: 58 IN | BODY MASS INDEX: 38.62 KG/M2

## 2025-03-10 DIAGNOSIS — M75.81 ROTATOR CUFF TENDINITIS, RIGHT: ICD-10-CM

## 2025-03-10 DIAGNOSIS — M12.811 ROTATOR CUFF ARTHROPATHY OF RIGHT SHOULDER: Primary | ICD-10-CM

## 2025-03-10 RX ORDER — BUPIVACAINE HYDROCHLORIDE 2.5 MG/ML
2 INJECTION, SOLUTION INFILTRATION; PERINEURAL ONCE
Status: COMPLETED | OUTPATIENT
Start: 2025-03-10 | End: 2025-03-10

## 2025-03-10 RX ORDER — TRIAMCINOLONE ACETONIDE 40 MG/ML
40 INJECTION, SUSPENSION INTRA-ARTICULAR; INTRAMUSCULAR ONCE
Status: COMPLETED | OUTPATIENT
Start: 2025-03-10 | End: 2025-03-10

## 2025-03-10 RX ADMIN — BUPIVACAINE HYDROCHLORIDE 5 MG: 2.5 INJECTION, SOLUTION INFILTRATION; PERINEURAL at 09:47

## 2025-03-10 RX ADMIN — TRIAMCINOLONE ACETONIDE 40 MG: 40 INJECTION, SUSPENSION INTRA-ARTICULAR; INTRAMUSCULAR at 09:47

## 2025-03-10 NOTE — PROGRESS NOTES
tablet Take 1 tablet by mouth every 8 hours as needed for Pain for up to 30 days. Max Daily Amount: 3 tablets 90 tablet 0    amLODIPine (NORVASC) 5 MG tablet Take 1 tablet by mouth daily 90 tablet 1    pantoprazole (PROTONIX) 40 MG tablet Take 1 tablet by mouth daily 90 tablet 1    gabapentin (NEURONTIN) 600 MG tablet Take 1 tablet by mouth 2 times daily for 180 days. 180 tablet 1    atorvastatin (LIPITOR) 40 MG tablet Take 1 tablet by mouth at bedtime 90 tablet 1    metFORMIN (GLUCOPHAGE) 1000 MG tablet Take 1 tablet by mouth with breakfast and with evening meal 180 tablet 1    escitalopram (LEXAPRO) 10 MG tablet Take 1 tablet by mouth at bedtime 90 tablet 1    ferrous sulfate (IRON 325) 325 (65 Fe) MG tablet Take 1 tablet by mouth daily (with breakfast)      DOCUSATE SODIUM PO Take by mouth      carbidopa-levodopa (SINEMET)  MG per tablet Take 1 tablet by mouth See Admin Instructions 1 tablet 7 times a day - 0900, 1200, 1500, 1700, 1900, 2100, 2300      Misc. Devices (EXTENDABLE BEDSIDE RAIL) MISC 1 Act by Does not apply route daily (with breakfast) 2 each 0    aspirin 81 MG chewable tablet Take 1 tablet by mouth daily 30 tablet 3    clopidogrel (PLAVIX) 75 MG tablet Take 1 tablet by mouth daily 30 tablet 3    carvedilol (COREG) 3.125 MG tablet Take 1 tablet by mouth 2 times daily (Patient not taking: Reported on 3/3/2025) 180 tablet 0    LORazepam (ATIVAN) 0.5 MG tablet Take 1 tablet by mouth 3 times daily as needed for Anxiety.      Cholecalciferol (VITAMIN D3) 5000 units CAPS Take 1 capsule by mouth daily 90 capsule 3     No current facility-administered medications on file prior to visit.         Objective:   Height 1.473 m (4' 10\"), weight 83.5 kg (184 lb), not currently breastfeeding.    On examination this a very pleasant 80-year-old female who is alert and oriented x 3 she is get good active full range of motion with pain with crossover and impingement testing negative for sulcus sign or relocation

## 2025-03-28 ENCOUNTER — PATIENT MESSAGE (OUTPATIENT)
Dept: PRIMARY CARE CLINIC | Age: 80
End: 2025-03-28

## 2025-03-31 DIAGNOSIS — F41.8 MIXED ANXIETY AND DEPRESSIVE DISORDER: ICD-10-CM

## 2025-03-31 RX ORDER — ESCITALOPRAM OXALATE 10 MG/1
10 TABLET ORAL NIGHTLY
Qty: 90 TABLET | Refills: 1 | Status: SHIPPED | OUTPATIENT
Start: 2025-03-31

## 2025-04-18 ENCOUNTER — PATIENT MESSAGE (OUTPATIENT)
Dept: PRIMARY CARE CLINIC | Age: 80
End: 2025-04-18

## 2025-04-21 DIAGNOSIS — G89.4 CHRONIC PAIN SYNDROME: ICD-10-CM

## 2025-04-21 DIAGNOSIS — F41.8 MIXED ANXIETY AND DEPRESSIVE DISORDER: ICD-10-CM

## 2025-04-21 RX ORDER — ESCITALOPRAM OXALATE 10 MG/1
10 TABLET ORAL NIGHTLY
Qty: 90 TABLET | Refills: 1 | Status: SHIPPED | OUTPATIENT
Start: 2025-04-21

## 2025-04-21 RX ORDER — ACETAMINOPHEN AND CODEINE PHOSPHATE 300; 30 MG/1; MG/1
1 TABLET ORAL EVERY 8 HOURS PRN
Qty: 90 TABLET | Refills: 0 | Status: SHIPPED | OUTPATIENT
Start: 2025-04-21 | End: 2025-05-21

## 2025-05-01 DIAGNOSIS — E11.9 TYPE 2 DIABETES MELLITUS WITHOUT COMPLICATION, WITHOUT LONG-TERM CURRENT USE OF INSULIN (HCC): ICD-10-CM

## 2025-05-05 ENCOUNTER — PATIENT MESSAGE (OUTPATIENT)
Dept: PRIMARY CARE CLINIC | Age: 80
End: 2025-05-05

## 2025-05-05 ENCOUNTER — OFFICE VISIT (OUTPATIENT)
Age: 80
End: 2025-05-05

## 2025-05-05 VITALS
OXYGEN SATURATION: 98 % | HEART RATE: 74 BPM | BODY MASS INDEX: 39.04 KG/M2 | WEIGHT: 186 LBS | TEMPERATURE: 97.7 F | SYSTOLIC BLOOD PRESSURE: 156 MMHG | HEIGHT: 58 IN | DIASTOLIC BLOOD PRESSURE: 71 MMHG

## 2025-05-05 DIAGNOSIS — Z91.89 AT RISK FOR PNEUMONIA: ICD-10-CM

## 2025-05-05 DIAGNOSIS — R05.9 COUGH, UNSPECIFIED TYPE: ICD-10-CM

## 2025-05-05 DIAGNOSIS — B96.89 ACUTE BACTERIAL SINUSITIS: Primary | ICD-10-CM

## 2025-05-05 DIAGNOSIS — J01.90 ACUTE BACTERIAL SINUSITIS: Primary | ICD-10-CM

## 2025-05-05 DIAGNOSIS — I10 ELEVATED BLOOD PRESSURE READING IN OFFICE WITH DIAGNOSIS OF HYPERTENSION: ICD-10-CM

## 2025-05-05 LAB
Lab: NORMAL
PERFORMING INSTRUMENT: NORMAL
QC PASS/FAIL: NORMAL
SARS-COV-2, POC: NORMAL

## 2025-05-05 RX ORDER — AZITHROMYCIN 250 MG/1
TABLET, FILM COATED ORAL
Qty: 6 TABLET | Refills: 0 | Status: SHIPPED | OUTPATIENT
Start: 2025-05-05 | End: 2025-05-09 | Stop reason: ALTCHOICE

## 2025-05-05 ASSESSMENT — ENCOUNTER SYMPTOMS
SHORTNESS OF BREATH: 0
CONSTIPATION: 0
CHEST TIGHTNESS: 0
DIARRHEA: 0
WHEEZING: 0
ABDOMINAL PAIN: 0
VOMITING: 0
COUGH: 1
NAUSEA: 0
SINUS PRESSURE: 0

## 2025-05-05 NOTE — PROGRESS NOTES
Lucille Lilly (:  1945) is a 80 y.o. female,New patient, here for evaluation of the following chief complaint(s):  Cough (\" Rawness of the chest \" x last night )      ASSESSMENT/PLAN:    ICD-10-CM    1. Acute bacterial sinusitis  J01.90 azithromycin (ZITHROMAX) 250 MG tablet    B96.89       2. Cough, unspecified type  R05.9 POCT COVID-19, Antigen      3. At risk for pneumonia  Z91.89 azithromycin (ZITHROMAX) 250 MG tablet      4. Elevated blood pressure reading in office with diagnosis of hypertension  I10 Amb Referral to Primary Care          Patient presents for cough congestion, ongoing since last night.  Patient states that she has been exposed to out of her  has had he has had symptoms for over 1 week.  POCT COVID-negative.  DDx sinusitis versus seasonal allergies.  Potential for viral sinusitis however given that patient has of exposure to what her  is on his symptoms have lasted for over a week I am concerned for bacterial sinusitis.  Will prescribe azithromycin.  Patient educated on symptomatic care.  Patient's BP elevated repeat elevated.  Patient given referral to PCP to follow-up patient is on antihypertensive medication.  Patient given strict return and ED precautions.    SUBJECTIVE/OBJECTIVE:    History provided by:  Patient   used: No      HPI:   80 y.o. female presents with symptoms of cough congestion ongoing since last night. She states that she has burning sensation in chest. She states that  has had sinus infection ongoing for one week and now she has developed similar symptoms. She does have asthma. She states that she is a former smoker, quit . She did take meloxicam for symptoms, with minimal relief.     Vitals:    25 1314 25 1320   BP: (!) 161/57 (!) 156/71   BP Site: Left Lower Arm Left Lower Arm   Patient Position: Sitting Sitting   BP Cuff Size: Medium Adult Medium Adult   Pulse: 74    Temp: 97.7 °F (36.5 °C)    TempSrc:

## 2025-05-05 NOTE — PATIENT INSTRUCTIONS
Please follow up with your family doctor to ensure symptoms resolve  Please follow up with family doctor for elevated blood pressure  Please go to ED if you develop worsening symptoms, or shortness of breath.    Pt reports it hurts in her low pelvic area when she stands or moves;

## 2025-05-09 ENCOUNTER — TELEPHONE (OUTPATIENT)
Dept: PRIMARY CARE CLINIC | Age: 80
End: 2025-05-09

## 2025-05-09 ENCOUNTER — OFFICE VISIT (OUTPATIENT)
Dept: PRIMARY CARE CLINIC | Age: 80
End: 2025-05-09

## 2025-05-09 VITALS
HEIGHT: 57 IN | BODY MASS INDEX: 39.52 KG/M2 | TEMPERATURE: 97.3 F | SYSTOLIC BLOOD PRESSURE: 152 MMHG | WEIGHT: 183.2 LBS | OXYGEN SATURATION: 98 % | DIASTOLIC BLOOD PRESSURE: 65 MMHG | HEART RATE: 71 BPM | RESPIRATION RATE: 18 BRPM

## 2025-05-09 DIAGNOSIS — F41.8 MIXED ANXIETY AND DEPRESSIVE DISORDER: ICD-10-CM

## 2025-05-09 DIAGNOSIS — J01.90 ACUTE BACTERIAL SINUSITIS: Primary | ICD-10-CM

## 2025-05-09 DIAGNOSIS — I10 ESSENTIAL HYPERTENSION: ICD-10-CM

## 2025-05-09 DIAGNOSIS — B96.89 ACUTE BACTERIAL SINUSITIS: Primary | ICD-10-CM

## 2025-05-09 DIAGNOSIS — E11.9 TYPE 2 DIABETES MELLITUS WITHOUT COMPLICATION, WITHOUT LONG-TERM CURRENT USE OF INSULIN (HCC): ICD-10-CM

## 2025-05-09 DIAGNOSIS — J45.20 MILD INTERMITTENT ASTHMA WITHOUT COMPLICATION: ICD-10-CM

## 2025-05-09 RX ORDER — FEXOFENADINE HCL 180 MG/1
180 TABLET ORAL DAILY PRN
Qty: 30 TABLET | Refills: 0 | Status: SHIPPED | OUTPATIENT
Start: 2025-05-09 | End: 2025-06-08

## 2025-05-09 RX ORDER — BENZONATATE 200 MG/1
200 CAPSULE ORAL 3 TIMES DAILY PRN
Qty: 30 CAPSULE | Refills: 0 | Status: SHIPPED | OUTPATIENT
Start: 2025-05-09 | End: 2025-05-19

## 2025-05-09 RX ORDER — METHYLPREDNISOLONE 4 MG/1
4 TABLET ORAL SEE ADMIN INSTRUCTIONS
Qty: 1 KIT | Refills: 0 | Status: SHIPPED | OUTPATIENT
Start: 2025-05-09

## 2025-05-09 RX ORDER — METHYLPREDNISOLONE 4 MG/1
TABLET ORAL
Qty: 1 KIT | Refills: 0 | Status: SHIPPED | OUTPATIENT
Start: 2025-05-09 | End: 2025-05-09 | Stop reason: ALTCHOICE

## 2025-05-09 RX ORDER — ALBUTEROL SULFATE 0.83 MG/ML
2.5 SOLUTION RESPIRATORY (INHALATION) 4 TIMES DAILY PRN
Qty: 120 EACH | Refills: 3 | Status: SHIPPED | OUTPATIENT
Start: 2025-05-09

## 2025-05-09 ASSESSMENT — ENCOUNTER SYMPTOMS
WHEEZING: 1
CONSTIPATION: 0
DIARRHEA: 0
BLOOD IN STOOL: 0
ABDOMINAL PAIN: 0
COUGH: 1
SHORTNESS OF BREATH: 0
SINUS COMPLAINT: 1

## 2025-05-09 NOTE — PROGRESS NOTES
2025     Lucille Lilly (:  1945) is a 80 y.o. female, here for evaluation of the following medical concerns:    Cough  Associated symptoms include wheezing. Pertinent negatives include no chest pain, chills, fever or shortness of breath.   Sinus Problem  Associated symptoms include congestion and coughing. Pertinent negatives include no chills or shortness of breath.     Patient is 80 years old white female medical history significant for hypertension, hyperlipidemia, anxiety depression, restless leg syndrome chronic pain syndrome diabetes and asthma.  She presented to the office follow-up from recent visit at urgent care.  She she has cough, and congestion for the past few days.  Denies fever and chills denies headache denies shortness of breath or wheezing more than usual.  She was diagnosed with the acute bacterial sinusitis and was discharged home on Z-Chinmay.  She presented to the office today still complaining of lingering cough and nasal congestion.  She is wheezing on physical exam.    Review of Systems   Constitutional:  Negative for activity change, appetite change, chills and fever.   HENT:  Positive for congestion.    Eyes:  Negative for visual disturbance.   Respiratory:  Positive for cough and wheezing. Negative for shortness of breath.    Cardiovascular:  Negative for chest pain, palpitations and leg swelling.   Gastrointestinal:  Negative for abdominal pain, blood in stool, constipation and diarrhea.   Genitourinary:  Negative for difficulty urinating, frequency, hematuria, menstrual problem and urgency.   Neurological:  Negative for dizziness and syncope.   Psychiatric/Behavioral:  Negative for behavioral problems.        Prior to Visit Medications    Medication Sig Taking? Authorizing Provider   methylPREDNISolone (MEDROL DOSEPACK) 4 MG tablet Take as directed for 6 days. Yes Miguel Angel Moses MD   fexofenadine (ALLEGRA) 180 MG tablet Take 1 tablet by mouth daily as needed

## 2025-05-12 RX ORDER — METHYLPREDNISOLONE 4 MG/1
TABLET ORAL
OUTPATIENT
Start: 2025-05-12

## 2025-05-20 DIAGNOSIS — E78.2 MIXED HYPERLIPIDEMIA: ICD-10-CM

## 2025-05-20 DIAGNOSIS — I10 ESSENTIAL HYPERTENSION: ICD-10-CM

## 2025-05-21 RX ORDER — AMLODIPINE BESYLATE 5 MG/1
5 TABLET ORAL DAILY
Qty: 90 TABLET | Refills: 1 | Status: SHIPPED | OUTPATIENT
Start: 2025-05-21

## 2025-05-21 RX ORDER — ATORVASTATIN CALCIUM 40 MG/1
40 TABLET, FILM COATED ORAL NIGHTLY
Qty: 90 TABLET | Refills: 1 | Status: SHIPPED | OUTPATIENT
Start: 2025-05-21

## 2025-05-21 NOTE — TELEPHONE ENCOUNTER
Does the Patient have a central line?  Yes: See Invasive (Oncology) Lines Flowsheet for CVAD documentation.    Right Port lab draw. Patient tolerated procedure well. Denies any complications.  Dr Storm supervising physician.        Last OV:  4/17/23        Next appt:  7/17/23        Last Rx:   Nov/Dec 2022.

## 2025-05-23 NOTE — PROGRESS NOTES
[facility] PAIN PRE-PROCEDURE INSTRUCTIONS    Procedure date_5/30/25________Arrival time__0745__________        Procedure time___0845_________       Screening questions for Pain procedures:  Do you have a current infection? __NO__-SINUS INFECTION RESOLVED_____  Are you currently taking an antibiotic?___NO___  Are you taking a blood thinner?__NO______    It is not necessary to stop eating or drinking prior to this procedure.  We would like you to take your medications for blood pressure as usual.  You may be asked to stop blood thinners such as Coumadin, Plavix, Fragmin, Lovenox, etc., or any anti-inflammatories such as:  Aspirin, Ibuprofen, Advil, Naproxen prior to your procedure.   We also ask that you stop any OTC medications that cause additional bleeding    You must make arrangements for a responsible adult to arrive with you and stay in our waiting area during your procedure.  They will also need to take you home after your procedure.   For your safety you will not be allowed to leave alone or drive yourself home.    Also for your safety, it is strongly suggested that someone stay with you the first 24 hours after your procedure.    For your comfort, please wear simple loose fitting clothing to the center.  Please do not bring valuables.      If you have a living will and a durable power of  for healthcare, please bring in a copy.    You will need to bring a photo ID and insurance card    Our goal is to provide you with excellent care so if you have any questions, please contact us at the [facility] Preadmission Testing Department [phone]

## 2025-05-29 DIAGNOSIS — J01.90 ACUTE BACTERIAL SINUSITIS: ICD-10-CM

## 2025-05-29 DIAGNOSIS — B96.89 ACUTE BACTERIAL SINUSITIS: ICD-10-CM

## 2025-05-30 ENCOUNTER — APPOINTMENT (OUTPATIENT)
Dept: INTERVENTIONAL RADIOLOGY/VASCULAR | Age: 80
End: 2025-05-30
Attending: ANESTHESIOLOGY
Payer: MEDICARE

## 2025-05-30 ENCOUNTER — HOSPITAL ENCOUNTER (OUTPATIENT)
Age: 80
Setting detail: OUTPATIENT SURGERY
Discharge: HOME | End: 2025-05-30
Attending: ANESTHESIOLOGY | Admitting: ANESTHESIOLOGY
Payer: MEDICARE

## 2025-05-30 VITALS
HEIGHT: 58 IN | BODY MASS INDEX: 35.68 KG/M2 | OXYGEN SATURATION: 94 % | TEMPERATURE: 97 F | WEIGHT: 170 LBS | SYSTOLIC BLOOD PRESSURE: 131 MMHG | DIASTOLIC BLOOD PRESSURE: 69 MMHG | HEART RATE: 65 BPM | RESPIRATION RATE: 16 BRPM

## 2025-05-30 PROCEDURE — 6360000002 HC RX W HCPCS: Performed by: ANESTHESIOLOGY

## 2025-05-30 PROCEDURE — 2709999900 HC NON-CHARGEABLE SUPPLY: Performed by: ANESTHESIOLOGY

## 2025-05-30 PROCEDURE — 3610000054 HC PAIN LEVEL 3 BASE (NON-OR): Performed by: ANESTHESIOLOGY

## 2025-05-30 RX ORDER — LIDOCAINE HYDROCHLORIDE 10 MG/ML
INJECTION, SOLUTION EPIDURAL; INFILTRATION; INTRACAUDAL; PERINEURAL PRN
Status: DISCONTINUED | OUTPATIENT
Start: 2025-05-30 | End: 2025-05-30 | Stop reason: ALTCHOICE

## 2025-05-30 RX ORDER — METHYLPREDNISOLONE ACETATE 40 MG/ML
INJECTION, SUSPENSION INTRA-ARTICULAR; INTRALESIONAL; INTRAMUSCULAR; SOFT TISSUE PRN
Status: DISCONTINUED | OUTPATIENT
Start: 2025-05-30 | End: 2025-05-30 | Stop reason: ALTCHOICE

## 2025-05-30 RX ORDER — BENZONATATE 200 MG/1
200 CAPSULE ORAL 3 TIMES DAILY PRN
Qty: 30 CAPSULE | Refills: 0 | Status: SHIPPED | OUTPATIENT
Start: 2025-05-30

## 2025-05-30 ASSESSMENT — PAIN DESCRIPTION - DESCRIPTORS: DESCRIPTORS: ACHING

## 2025-05-30 ASSESSMENT — PAIN - FUNCTIONAL ASSESSMENT
PAIN_FUNCTIONAL_ASSESSMENT: 0-10
PAIN_FUNCTIONAL_ASSESSMENT: PREVENTS OR INTERFERES SOME ACTIVE ACTIVITIES AND ADLS
PAIN_FUNCTIONAL_ASSESSMENT: 0-10

## 2025-05-30 NOTE — TELEPHONE ENCOUNTER
Medication:   Requested Prescriptions     Pending Prescriptions Disp Refills    benzonatate (TESSALON) 200 MG capsule [Pharmacy Med Name: BENZONATATE 200MG CAPSULES] 30 capsule 0     Sig: TAKE 1 CAPSULE BY MOUTH THREE TIMES DAILY AS NEEDED FOR COUGH        Last Filled:  [unfilled]    Patient Phone Number: [phone] (home) [phone] (work)    Last appt: 5/9/2025   Next appt: 7/25/2025    Last OARRS:       7/8/2022     3:18 PM   RX Monitoring   Periodic Controlled Substance Monitoring No signs of potential drug abuse or diversion identified.

## 2025-05-30 NOTE — PROGRESS NOTES
Went over discharge information with patient. Patient understood discharge information. Patient discharged to home.    Jamila BHATTI

## 2025-05-30 NOTE — PROGRESS NOTES
Patient instructed to call for help when getting up and using caution when getting up here and at home due to potential of fall. There might be some numbness to legs after injection. Patient voices understanding.

## 2025-05-30 NOTE — OP NOTE
Patient:  Lucille Lilly  YOB: 1945  Medical Record #:  [id number]   Date:  5/30/2025   Physician:  Chanel Orta MD      PRE-PROCEDURE DIAGNOSIS:  Right sciatic neuropathy (G57.01)    POST-PROCEDURE DIAGNOSIS:  Right sciatic neuropathy (G57.01)    PROCEDURE: RIGHT marielos-sciatic nerve block, with fluoroscopy.        BRIEF HISTORY:  The patient presents today to [facility] for a scheduled sciatic nerve block procedure.  The patient was re-evaluated today and is clinically unchanged as compared to my previous evaluation.  The patient is clinically stable to proceed with the procedure.      PROCEDURE NOTE:  The procedure was again explained to the patient and the previously distributed pre-procedure literature was reviewed.  The options, rationale, and benefits of the procedure including pain relief, functional improvement, and increased mobility, as well as the risks of the procedure including but not limited to infection, bleeding, paresthesia, pain, failure to relieve pain, increased pain, headache, allergic reaction, neurologic impairment, local anesthetic, toxicity, and side effects and the potential side effects of corticosteroids were discussed with the patient and informed written consent was obtained from the patient.      The patient was brought to the fluoroscopy suite and placed in the prone position. The RIGHT sacral and gluteal area was prepped in the usual sterile fashion with Chloraprep and then draped. Intermittent AP fluoroscopy was utilized to localize the radiographic landmarks. A standard perisciatic nerve block approach was used.  The sciatic notch, at its superolateral border was localized at its junction with the ilium rostral to the acetabulum and lateral to the SI joint and lateral to the sciatic nerve. It was localized at the radiographic marker of the overlying sciatic nerve between the lateral aspect of the sacrum and the greater trochanter.   The superficial skin
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
## 2025-06-02 ENCOUNTER — OFFICE VISIT (OUTPATIENT)
Dept: PULMONOLOGY | Age: 80
End: 2025-06-02
Payer: MEDICARE

## 2025-06-02 VITALS
DIASTOLIC BLOOD PRESSURE: 80 MMHG | HEIGHT: 58 IN | WEIGHT: 187.6 LBS | BODY MASS INDEX: 39.38 KG/M2 | SYSTOLIC BLOOD PRESSURE: 130 MMHG | RESPIRATION RATE: 18 BRPM | OXYGEN SATURATION: 97 % | TEMPERATURE: 97.9 F | HEART RATE: 75 BPM

## 2025-06-02 DIAGNOSIS — J40 BRONCHITIS: ICD-10-CM

## 2025-06-02 DIAGNOSIS — J32.9 SINUSITIS, UNSPECIFIED CHRONICITY, UNSPECIFIED LOCATION: ICD-10-CM

## 2025-06-02 DIAGNOSIS — G47.33 OSA ON CPAP: Primary | ICD-10-CM

## 2025-06-02 PROCEDURE — 1090F PRES/ABSN URINE INCON ASSESS: CPT | Performed by: INTERNAL MEDICINE

## 2025-06-02 PROCEDURE — G2211 COMPLEX E/M VISIT ADD ON: HCPCS | Performed by: INTERNAL MEDICINE

## 2025-06-02 PROCEDURE — G8417 CALC BMI ABV UP PARAM F/U: HCPCS | Performed by: INTERNAL MEDICINE

## 2025-06-02 PROCEDURE — G8399 PT W/DXA RESULTS DOCUMENT: HCPCS | Performed by: INTERNAL MEDICINE

## 2025-06-02 PROCEDURE — 1123F ACP DISCUSS/DSCN MKR DOCD: CPT | Performed by: INTERNAL MEDICINE

## 2025-06-02 PROCEDURE — 3079F DIAST BP 80-89 MM HG: CPT | Performed by: INTERNAL MEDICINE

## 2025-06-02 PROCEDURE — 3075F SYST BP GE 130 - 139MM HG: CPT | Performed by: INTERNAL MEDICINE

## 2025-06-02 PROCEDURE — 99214 OFFICE O/P EST MOD 30 MIN: CPT | Performed by: INTERNAL MEDICINE

## 2025-06-02 PROCEDURE — G8427 DOCREV CUR MEDS BY ELIG CLIN: HCPCS | Performed by: INTERNAL MEDICINE

## 2025-06-02 PROCEDURE — 1159F MED LIST DOCD IN RCRD: CPT | Performed by: INTERNAL MEDICINE

## 2025-06-02 PROCEDURE — 1036F TOBACCO NON-USER: CPT | Performed by: INTERNAL MEDICINE

## 2025-06-02 RX ORDER — ALBUTEROL SULFATE 90 UG/1
2 INHALANT RESPIRATORY (INHALATION) EVERY 4 HOURS PRN
Qty: 18 G | Refills: 2 | Status: SHIPPED | OUTPATIENT
Start: 2025-06-02

## 2025-06-02 ASSESSMENT — SLEEP AND FATIGUE QUESTIONNAIRES
HOW LIKELY ARE YOU TO NOD OFF OR FALL ASLEEP WHILE SITTING AND READING: MODERATE CHANCE OF DOZING
HOW LIKELY ARE YOU TO NOD OFF OR FALL ASLEEP IN A CAR, WHILE STOPPED FOR A FEW MINUTES IN TRAFFIC: WOULD NEVER DOZE
HOW LIKELY ARE YOU TO NOD OFF OR FALL ASLEEP WHILE SITTING QUIETLY AFTER LUNCH WITHOUT ALCOHOL: WOULD NEVER DOZE
ESS TOTAL SCORE: 6
HOW LIKELY ARE YOU TO NOD OFF OR FALL ASLEEP WHEN YOU ARE A PASSENGER IN A CAR FOR AN HOUR WITHOUT A BREAK: WOULD NEVER DOZE
HOW LIKELY ARE YOU TO NOD OFF OR FALL ASLEEP WHILE WATCHING TV: SLIGHT CHANCE OF DOZING
HOW LIKELY ARE YOU TO NOD OFF OR FALL ASLEEP WHILE LYING DOWN TO REST IN THE AFTERNOON WHEN CIRCUMSTANCES PERMIT: WOULD NEVER DOZE
HOW LIKELY ARE YOU TO NOD OFF OR FALL ASLEEP WHILE SITTING AND TALKING TO SOMEONE: WOULD NEVER DOZE
HOW LIKELY ARE YOU TO NOD OFF OR FALL ASLEEP WHILE SITTING INACTIVE IN A PUBLIC PLACE: HIGH CHANCE OF DOZING

## 2025-06-02 NOTE — PROGRESS NOTES
Chief complaint  This is a 80 y.o. year old female  who comes to see me with a chief complaint of   Chief Complaint   Patient presents with    Follow-up    Sleep Apnea    .    HPI  Follow up on DOYLE.      Machine:  Patient is using a APAP machine.  Average usage is 9 hrs 4 min 00 sec.  She is meeting 4 or more hours per night 100% of the time.  Average AHI is 1.9      Sinus infection that seems to keep hanging on.  She had prednisone twice and a zpak.  She is still struggling to get over it.           6/2/2025    12:52 PM 5/13/2024    12:54 PM 11/7/2023     8:59 AM 10/11/2022    11:12 AM 10/8/2021    12:40 PM 1/13/2021     2:21 PM 8/19/2019     2:10 PM   Sleep Medicine   Sitting and reading 2 1 1 1 1 2 1   Watching TV 1 1 1 1 0 2 1   Sitting, inactive in a public place (e.g. a theatre or a meeting) 3 1 0 0 0 2 1   As a passenger in a car for an hour without a break 0 1 1 1 0 2 2   Lying down to rest in the afternoon when circumstances permit 0 1 1 0 0 1 3   Sitting and talking to someone 0 1 0 0 0 0 1   Sitting quietly after a lunch without alcohol 0 1 0 0 0 1 1   In a car, while stopped for a few minutes in traffic 0 1 0 0 0 0 0   [place] Sleepiness Score 6 8 4 3 1 10 10         Current Outpatient Medications   Medication Sig Dispense Refill    amoxicillin-clavulanate (AUGMENTIN) 875-125 MG per tablet Take 1 tablet by mouth 2 times daily for 7 days 14 tablet 0    albuterol sulfate HFA (PROVENTIL;VENTOLIN;PROAIR) 108 (90 Base) MCG/ACT inhaler Inhale 2 puffs into the lungs every 4 hours as needed for Wheezing or Shortness of Breath (or cough) 18 g 2    benzonatate (TESSALON) 200 MG capsule TAKE 1 CAPSULE BY MOUTH THREE TIMES DAILY AS NEEDED FOR COUGH 30 capsule 0    Cyanocobalamin (VITAMIN B 12 PO) Take 1 drop by mouth daily      atorvastatin (LIPITOR) 40 MG tablet Take 1 tablet by mouth at bedtime 90 tablet 1    amLODIPine (NORVASC) 5 MG tablet Take 1 tablet by mouth daily 90 tablet 1    fexofenadine (ALLEGRA)

## 2025-06-02 NOTE — PATIENT INSTRUCTIONS
Take augmentin for 7 days    Use albuterol as needed    Continue with CPAP machine    Follow up in 6 months

## 2025-06-05 DIAGNOSIS — E11.9 TYPE 2 DIABETES MELLITUS WITHOUT COMPLICATION, WITHOUT LONG-TERM CURRENT USE OF INSULIN (HCC): ICD-10-CM

## 2025-06-05 DIAGNOSIS — I10 ESSENTIAL HYPERTENSION: Primary | ICD-10-CM

## 2025-06-05 DIAGNOSIS — E78.2 MIXED HYPERLIPIDEMIA: ICD-10-CM

## 2025-06-05 DIAGNOSIS — M47.12 OSTEOARTHRITIS OF CERVICAL SPINE WITH MYELOPATHY: ICD-10-CM

## 2025-06-05 DIAGNOSIS — D50.8 OTHER IRON DEFICIENCY ANEMIA: ICD-10-CM

## 2025-06-05 DIAGNOSIS — E55.9 VITAMIN D DEFICIENCY: ICD-10-CM

## 2025-06-05 DIAGNOSIS — F11.20 NARCOTIC DEPENDENCY, CONTINUOUS (HCC): ICD-10-CM

## 2025-06-05 RX ORDER — ACETAMINOPHEN AND CODEINE PHOSPHATE 300; 30 MG/1; MG/1
1 TABLET ORAL EVERY 8 HOURS PRN
Qty: 30 TABLET | Refills: 0 | Status: SHIPPED | OUTPATIENT
Start: 2025-06-05 | End: 2025-06-15

## 2025-06-17 ENCOUNTER — PATIENT MESSAGE (OUTPATIENT)
Dept: PRIMARY CARE CLINIC | Age: 80
End: 2025-06-17

## 2025-06-17 DIAGNOSIS — G89.4 CHRONIC PAIN SYNDROME: ICD-10-CM

## 2025-06-19 RX ORDER — ACETAMINOPHEN AND CODEINE PHOSPHATE 300; 30 MG/1; MG/1
1 TABLET ORAL EVERY 8 HOURS PRN
Qty: 90 TABLET | Refills: 0 | Status: SHIPPED | OUTPATIENT
Start: 2025-06-19 | End: 2025-07-19

## 2025-06-23 SDOH — HEALTH STABILITY: PHYSICAL HEALTH: ON AVERAGE, HOW MANY MINUTES DO YOU ENGAGE IN EXERCISE AT THIS LEVEL?: 30 MIN

## 2025-06-23 SDOH — HEALTH STABILITY: PHYSICAL HEALTH: ON AVERAGE, HOW MANY DAYS PER WEEK DO YOU ENGAGE IN MODERATE TO STRENUOUS EXERCISE (LIKE A BRISK WALK)?: 3 DAYS

## 2025-06-23 ASSESSMENT — LIFESTYLE VARIABLES
HOW MANY STANDARD DRINKS CONTAINING ALCOHOL DO YOU HAVE ON A TYPICAL DAY: PATIENT DOES NOT DRINK
HOW OFTEN DO YOU HAVE A DRINK CONTAINING ALCOHOL: 1
HOW MANY STANDARD DRINKS CONTAINING ALCOHOL DO YOU HAVE ON A TYPICAL DAY: 0
HOW OFTEN DO YOU HAVE A DRINK CONTAINING ALCOHOL: NEVER
HOW OFTEN DO YOU HAVE SIX OR MORE DRINKS ON ONE OCCASION: 1

## 2025-06-23 ASSESSMENT — PATIENT HEALTH QUESTIONNAIRE - PHQ9
1. LITTLE INTEREST OR PLEASURE IN DOING THINGS: NOT AT ALL
SUM OF ALL RESPONSES TO PHQ QUESTIONS 1-9: 0
2. FEELING DOWN, DEPRESSED OR HOPELESS: NOT AT ALL
SUM OF ALL RESPONSES TO PHQ QUESTIONS 1-9: 0

## 2025-06-24 ENCOUNTER — OFFICE VISIT (OUTPATIENT)
Dept: PRIMARY CARE CLINIC | Age: 80
End: 2025-06-24

## 2025-06-24 VITALS
WEIGHT: 194 LBS | TEMPERATURE: 97.4 F | HEIGHT: 58 IN | SYSTOLIC BLOOD PRESSURE: 138 MMHG | DIASTOLIC BLOOD PRESSURE: 79 MMHG | HEART RATE: 69 BPM | OXYGEN SATURATION: 97 % | BODY MASS INDEX: 40.72 KG/M2

## 2025-06-24 DIAGNOSIS — I10 ESSENTIAL HYPERTENSION: ICD-10-CM

## 2025-06-24 DIAGNOSIS — G25.81 RESTLESS LEGS SYNDROME (RLS): ICD-10-CM

## 2025-06-24 DIAGNOSIS — F41.8 MIXED ANXIETY AND DEPRESSIVE DISORDER: ICD-10-CM

## 2025-06-24 DIAGNOSIS — Z00.00 MEDICARE ANNUAL WELLNESS VISIT, SUBSEQUENT: Primary | ICD-10-CM

## 2025-06-24 DIAGNOSIS — J01.90 ACUTE BACTERIAL SINUSITIS: ICD-10-CM

## 2025-06-24 DIAGNOSIS — E11.9 TYPE 2 DIABETES MELLITUS WITHOUT COMPLICATION, WITHOUT LONG-TERM CURRENT USE OF INSULIN (HCC): ICD-10-CM

## 2025-06-24 DIAGNOSIS — B96.89 ACUTE BACTERIAL SINUSITIS: ICD-10-CM

## 2025-06-24 DIAGNOSIS — E78.2 MIXED HYPERLIPIDEMIA: ICD-10-CM

## 2025-06-24 PROBLEM — E66.813 OBESITY, CLASS 3 (HCC): Status: ACTIVE | Noted: 2025-06-24

## 2025-06-24 RX ORDER — METHYLPREDNISOLONE 4 MG/1
TABLET ORAL
Qty: 1 KIT | Refills: 0 | Status: SHIPPED | OUTPATIENT
Start: 2025-06-24 | End: 2025-06-30

## 2025-06-24 RX ORDER — BENZONATATE 200 MG/1
200 CAPSULE ORAL 3 TIMES DAILY PRN
Qty: 30 CAPSULE | Refills: 0 | Status: SHIPPED | OUTPATIENT
Start: 2025-06-24 | End: 2025-07-04

## 2025-06-24 RX ORDER — BENZONATATE 200 MG/1
200 CAPSULE ORAL 3 TIMES DAILY PRN
Qty: 30 CAPSULE | Refills: 0 | Status: CANCELLED | OUTPATIENT
Start: 2025-06-24

## 2025-06-24 NOTE — PROGRESS NOTES
Medicare Annual Wellness Visit    Lucille Lilly is here for Medicare AWV and Wheezing    Assessment & Plan   Medicare annual wellness visit, subsequent  - Patient lives with her .  She is able to do basic activities of daily living.  She is somewhat forgetful possible mild cognitive impairment    Acute bacterial sinusitis  -He has recurrent sinusitis has been treated with Z-Chinmay antibiotics and several courses of prednisone.  She goes to pulmonologist Dr. Duong.  Will prescribe another course of Medrol Dosepak and Tessalon    Essential hypertension  -Controlled.  Continue amlodipine 5 mg daily.    Mixed hyperlipidemia  -Controlled.  Continue Lipitor 40 mg daily.    Restless legs syndrome (RLS)  -She goes to [facility] Dr. Allen.  She takes Sinemet    Type 2 diabetes mellitus without complication, without long-term current use of insulin (HCC)  -Controlled.  Continue metformin 1000 mg twice daily.    Mixed anxiety and depressive disorder  - Stable.  Continue Lexapro 10 mg daily and Ativan 0.5 mg 3 times a day as needed for anxiety     Return in 1 year (on 6/24/2026) for Medicare AWV.     Subjective       Patient's complete Health Risk Assessment and screening values have been reviewed and are found in Flowsheets. The following problems were reviewed today and where indicated follow up appointments were made and/or referrals ordered.    Positive Risk Factor Screenings with Interventions:          Controlled Medication Review:    Today's Pain Level: No data recorded   Opioid Risk: (Low risk score <55) Opioid risk score: 41    Patient is low risk for opioid use disorder or overdose.    Last PDMP Jeff as Reviewed:  Review User Review Instant Review Result   NOLAN KNIGHT 6/5/2025  2:35 PM     Reviewed PDMP [1]     Last Controlled Substance Monitoring Documentation      Flowsheet Row ED from 7/8/2022 in [facility] Emergency Department   Periodic Controlled Substance Monitoring No

## 2025-06-24 NOTE — PATIENT INSTRUCTIONS
problem with alcohol or drug use, talk to your doctor.   Medicines    Take your medicines exactly as prescribed. Call your doctor if you think you are having a problem with your medicine.     If your doctor recommends aspirin, take the amount directed each day. Make sure you take aspirin and not another kind of pain reliever, such as acetaminophen (Tylenol).   When should you call for help?   Call 911 if you have symptoms of a heart attack. These may include:    Chest pain or pressure, or a strange feeling in the chest.     Sweating.     Shortness of breath.     Pain, pressure, or a strange feeling in the back, neck, jaw, or upper belly or in one or both shoulders or arms.     Lightheadedness or sudden weakness.     A fast or irregular heartbeat.   After you call 911, the  may tell you to chew 1 adult-strength or 2 to 4 low-dose aspirin. Wait for an ambulance. Do not try to drive yourself.  Watch closely for changes in your health, and be sure to contact your doctor if you have any problems.  Where can you learn more?  Go to https://www.[employer].net/patientEd and enter F075 to learn more about \"A Healthy Heart: Care Instructions.\"  Current as of: July 31, 2024  Content Version: 14.5  © 4575-9131 [employer].   Care instructions adapted under license by [employer]. If you have questions about a medical condition or this instruction, always ask your healthcare professional. [employer], disclaims any warranty or liability for your use of this information.    Personalized Preventive Plan for Lucille Lilly - 6/24/2025  Medicare offers a range of preventive health benefits. Some of the tests and screenings are paid in full while other may be subject to a deductible, co-insurance, and/or copay.  Some of these benefits include a comprehensive review of your medical history including lifestyle, illnesses that may run in your family, and various assessments and screenings as

## 2025-07-11 ENCOUNTER — TELEPHONE (OUTPATIENT)
Dept: PRIMARY CARE CLINIC | Age: 80
End: 2025-07-11

## 2025-07-14 NOTE — PROGRESS NOTES
[facility] PRE-OPERATIVE INSTRUCTIONS    Day of Procedure:     7/18/25           Arrival time: 0800               Surgery time: 0900    Take the following medications with a sip of water:  Follow your MD/Surgeons pre-procedure instructions regarding your medications     You may brush your teeth and gargle the morning of your surgery, but do not swallow the water.     Please see your family doctor/pediatrician for a history and physical and/or concerning medications.   Bring any test results/reports from your physicians office.   If you are under the care of a heart doctor or specialist doctor, please be aware that you may be asked to see them for clearance.    You may be asked to stop blood thinners such as Coumadin, Plavix, Fragmin, Lovenox, etc., or any anti-inflammatories such as:  Aspirin, Ibuprofen, Advil, Naproxen prior to your surgery.    We also ask that you stop any over the counter medications such as fish oil, vitamin E, glucosamine, garlic, Multivitamins, COQ 10, etc.    We ask that you do not smoke 24 hours prior to surgery.  We ask that you do not  drink any alcoholic beverages 24 hours prior to surgery     You must make arrangements for a responsible adult to take you home after your surgery.    For your safety, you will not be allowed to leave alone or drive yourself home.  Your surgery will be cancelled if you do not have a ride home.     Also for your safety, you must have someone stay with you the first 24 hours after your surgery.     A parent or legal guardian must accompany a child scheduled for surgery and plan to stay at the hospital until the child is discharged.    Please do not bring other children with you.    For your comfort, please wear simple loose fitting clothing to the hospital.  Please do not bring valuables.    Do not wear any make-up on face or nail polish on your fingers or toes.      For your safety, please do not wear any jewelry or body piercing's on the day of

## 2025-07-16 LAB
BASOPHILS # BLD: 0.1 K/UL (ref 0–0.2)
BASOPHILS NFR BLD: 1.1 %
DEPRECATED RDW RBC AUTO: 18 % (ref 12.4–15.4)
EOSINOPHIL # BLD: 0.2 K/UL (ref 0–0.6)
EOSINOPHIL NFR BLD: 3.5 %
HCT VFR BLD AUTO: 32.9 % (ref 36–48)
HGB BLD-MCNC: 10.1 G/DL (ref 12–16)
LYMPHOCYTES # BLD: 1.5 K/UL (ref 1–5.1)
LYMPHOCYTES NFR BLD: 26.8 %
MCH RBC QN AUTO: 21.9 PG (ref 26–34)
MCHC RBC AUTO-ENTMCNC: 30.9 G/DL (ref 31–36)
MCV RBC AUTO: 70.9 FL (ref 80–100)
MONOCYTES # BLD: 0.6 K/UL (ref 0–1.3)
MONOCYTES NFR BLD: 10.1 %
NEUTROPHILS # BLD: 3.3 K/UL (ref 1.7–7.7)
NEUTROPHILS NFR BLD: 58.5 %
PLATELET # BLD AUTO: 338 K/UL (ref 135–450)
PMV BLD AUTO: 8.1 FL (ref 5–10.5)
RBC # BLD AUTO: 4.63 M/UL (ref 4–5.2)
WBC # BLD AUTO: 5.6 K/UL (ref 4–11)

## 2025-07-18 ENCOUNTER — HOSPITAL ENCOUNTER (OUTPATIENT)
Age: 80
Setting detail: OUTPATIENT SURGERY
Discharge: HOME | End: 2025-07-18
Attending: ANESTHESIOLOGY | Admitting: ANESTHESIOLOGY
Payer: MEDICARE

## 2025-07-18 ENCOUNTER — APPOINTMENT (OUTPATIENT)
Dept: INTERVENTIONAL RADIOLOGY/VASCULAR | Age: 80
End: 2025-07-18
Attending: ANESTHESIOLOGY
Payer: MEDICARE

## 2025-07-18 VITALS
OXYGEN SATURATION: 97 % | SYSTOLIC BLOOD PRESSURE: 143 MMHG | WEIGHT: 190 LBS | RESPIRATION RATE: 16 BRPM | DIASTOLIC BLOOD PRESSURE: 90 MMHG | TEMPERATURE: 97.5 F | HEART RATE: 66 BPM | BODY MASS INDEX: 39.88 KG/M2 | HEIGHT: 58 IN

## 2025-07-18 PROCEDURE — 6360000002 HC RX W HCPCS: Performed by: ANESTHESIOLOGY

## 2025-07-18 PROCEDURE — 3610000054 HC PAIN LEVEL 3 BASE (NON-OR): Performed by: ANESTHESIOLOGY

## 2025-07-18 PROCEDURE — 2709999900 HC NON-CHARGEABLE SUPPLY: Performed by: ANESTHESIOLOGY

## 2025-07-18 RX ORDER — METHYLPREDNISOLONE ACETATE 40 MG/ML
INJECTION, SUSPENSION INTRA-ARTICULAR; INTRALESIONAL; INTRAMUSCULAR; SOFT TISSUE
Status: DISCONTINUED | OUTPATIENT
Start: 2025-07-18 | End: 2025-07-18 | Stop reason: ALTCHOICE

## 2025-07-18 RX ORDER — LIDOCAINE HYDROCHLORIDE 10 MG/ML
INJECTION, SOLUTION EPIDURAL; INFILTRATION; INTRACAUDAL; PERINEURAL
Status: DISCONTINUED | OUTPATIENT
Start: 2025-07-18 | End: 2025-07-18 | Stop reason: ALTCHOICE

## 2025-07-18 ASSESSMENT — PAIN DESCRIPTION - DESCRIPTORS
DESCRIPTORS: ACHING
DESCRIPTORS: ACHING

## 2025-07-18 NOTE — OP NOTE
Patient:  Lucille Lilly  YOB: 1945  Medical Record #:  [id number]   Date:  7/18/2025   Physician:  Chanel Orta MD      PRE-PROCEDURE DIAGNOSIS:  Left sciatic neuropathy (G57.02)    POST-PROCEDURE DIAGNOSIS:  Left sciatic neuropathy (G57.02)    PROCEDURE: LEFT marielos-sciatic nerve block, with fluoroscopy.        BRIEF HISTORY:  The patient presents today to [facility] for a scheduled sciatic nerve block procedure.  The patient was re-evaluated today and is clinically unchanged as compared to my previous evaluation.  The patient is clinically stable to proceed with the procedure.      PROCEDURE NOTE:  The procedure was again explained to the patient and the previously distributed pre-procedure literature was reviewed.  The options, rationale, and benefits of the procedure including pain relief, functional improvement, and increased mobility, as well as the risks of the procedure including but not limited to infection, bleeding, paresthesia, pain, failure to relieve pain, increased pain, headache, allergic reaction, neurologic impairment, local anesthetic, toxicity, and side effects and the potential side effects of corticosteroids were discussed with the patient and informed written consent was obtained from the patient.      The patient was brought to the fluoroscopy suite and placed in the prone position. The LEFT sacral and gluteal area was prepped in the usual sterile fashion with Chloraprep and then draped. Intermittent AP fluoroscopy was utilized to localize the radiographic landmarks. A standard perisciatic nerve block approach was used.  The sciatic notch, at its superolateral border was localized at its junction with the ilium rostral to the acetabulum and lateral to the SI joint and lateral to the sciatic nerve. It was localized at the radiographic marker of the overlying sciatic nerve between the lateral aspect of the sacrum and the greater trochanter.   The superficial skin

## 2025-07-18 NOTE — DISCHARGE INSTRUCTIONS
[facility]   [address], [address]   [place] [address]   [phone]         Patient:  Lucille Lilly  YOB: 1945  Medical Record #:  [id number]   Date:  7/18/2025   Physician:  Chanel Orta MD        Discharge Instructions    Notify Pain Management Services if any of the following occur:    Redness/Swelling at the injection site lasting longer than 2 days  Fever (with redness, swelling, or drainage at the injection site)  Drainage at the injection site  New weakness/ numbness  Severe headache   Loss of bowel/ bladder function    General Instructions:    You may experience numbness for several hours following your treatment.    You should be cautious using those areas which are numb.    Once the numbness wears off, you may apply ice or heat to injection site, if needed.    Do not return to work or drive today    Rest today and return to normal activities tomorrow.    On average, the steroid takes about 1 week to work and can be up to 2 weeks    You should continue to depend on your primary physician for your medical management of conditions not related to your pain management treatment.    Continue to take all your other medications, including blood thinners, as directed by your primary physician unless otherwise instructed. NO changes have been made to your medications. Any changes listed on the discharge are based on patient self reporting. Any EMR warnings regarding drug/drug interactions were dismissed based on current use by the patient, management by prescribing physician and pharmacist and not managed by this physician.    Any problem which relates specifically to a treatment or procedure performed today should be directed to the [facility].       [facility]  Division of Interventional Pain Management  [address] [place] [address], [address]  [place] [address]  [phone]
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
## 2025-07-25 ENCOUNTER — OFFICE VISIT (OUTPATIENT)
Dept: PRIMARY CARE CLINIC | Age: 80
End: 2025-07-25

## 2025-07-25 VITALS
TEMPERATURE: 97.2 F | DIASTOLIC BLOOD PRESSURE: 78 MMHG | OXYGEN SATURATION: 96 % | BODY MASS INDEX: 39.67 KG/M2 | HEART RATE: 69 BPM | WEIGHT: 189 LBS | HEIGHT: 58 IN | SYSTOLIC BLOOD PRESSURE: 142 MMHG

## 2025-07-25 DIAGNOSIS — G25.81 RESTLESS LEGS SYNDROME (RLS): ICD-10-CM

## 2025-07-25 DIAGNOSIS — G24.9 DYSKINESIA: ICD-10-CM

## 2025-07-25 DIAGNOSIS — I10 ESSENTIAL HYPERTENSION: Primary | ICD-10-CM

## 2025-07-25 DIAGNOSIS — F11.20 OPIOID DEPENDENCE WITH CURRENT USE (HCC): ICD-10-CM

## 2025-07-25 DIAGNOSIS — E11.9 TYPE 2 DIABETES MELLITUS WITHOUT COMPLICATION, WITHOUT LONG-TERM CURRENT USE OF INSULIN (HCC): ICD-10-CM

## 2025-07-25 DIAGNOSIS — E66.01 MORBID (SEVERE) OBESITY DUE TO EXCESS CALORIES (HCC): ICD-10-CM

## 2025-07-25 DIAGNOSIS — G47.33 OSA ON CPAP: ICD-10-CM

## 2025-07-25 DIAGNOSIS — F41.8 MIXED ANXIETY AND DEPRESSIVE DISORDER: ICD-10-CM

## 2025-07-25 DIAGNOSIS — F33.1 MAJOR DEPRESSIVE DISORDER, RECURRENT EPISODE, MODERATE (HCC): ICD-10-CM

## 2025-07-25 DIAGNOSIS — G89.4 CHRONIC PAIN SYNDROME: ICD-10-CM

## 2025-07-25 DIAGNOSIS — J45.20 MILD INTERMITTENT ASTHMA WITHOUT COMPLICATION: ICD-10-CM

## 2025-07-25 RX ORDER — LOSARTAN POTASSIUM 50 MG/1
50 TABLET ORAL DAILY
Qty: 90 TABLET | Refills: 1 | Status: SHIPPED | OUTPATIENT
Start: 2025-07-25

## 2025-07-25 ASSESSMENT — ENCOUNTER SYMPTOMS
SHORTNESS OF BREATH: 0
BLOOD IN STOOL: 0
DIARRHEA: 0
ABDOMINAL PAIN: 0
CONSTIPATION: 0

## 2025-07-25 NOTE — PROGRESS NOTES
2025     Lucille Lilly (:  1945) is a 80 y.o. female, here for evaluation of the following medical concerns:      Patient is 80 years old female medical history significant for hypertension, hyperlipidemia, restless leg syndrome with dyskinesia, diabetes, mixed anxiety and depressive disorder, chronic back pain, sleep apnea, obesity.  She presented to the office for regular follow-up.  Her blood pressure is elevated today despite amlodipine 5 mg daily.  She reported that she used to take Avapro in the past but was discontinued due to low BP.  She has hyperlipidemia compliant with Lipitor tolerating statin without side effect.  She has diabetes controlled with metformin denies hypoglycemic episodes.    Review of Systems   Constitutional:  Negative for activity change and appetite change.   Eyes:  Negative for visual disturbance.   Respiratory:  Negative for shortness of breath.    Cardiovascular:  Negative for chest pain and leg swelling.   Gastrointestinal:  Negative for abdominal pain, blood in stool, constipation and diarrhea.   Genitourinary:  Negative for difficulty urinating, frequency, hematuria, menstrual problem and urgency.   Neurological:  Negative for dizziness and syncope.   Psychiatric/Behavioral:  Negative for behavioral problems.        Prior to Visit Medications    Medication Sig Taking? Authorizing Provider   losartan (COZAAR) 50 MG tablet Take 1 tablet by mouth daily Yes Miguel Angel Moses MD   albuterol sulfate HFA (PROVENTIL;VENTOLIN;PROAIR) 108 (90 Base) MCG/ACT inhaler Inhale 2 puffs into the lungs every 4 hours as needed for Wheezing or Shortness of Breath (or cough) Yes Henry Duong DO   Cyanocobalamin (VITAMIN B 12 PO) Take 1 drop by mouth daily Dose:  1000 mcg per mL Yes Provider, MD Tash   atorvastatin (LIPITOR) 40 MG tablet Take 1 tablet by mouth at bedtime Yes Miguel Angel Moses MD   amLODIPine (NORVASC) 5 MG tablet Take 1 tablet by mouth daily Yes

## 2025-07-31 ENCOUNTER — TELEPHONE (OUTPATIENT)
Dept: CARDIOLOGY CLINIC | Age: 80
End: 2025-07-31

## 2025-07-31 NOTE — TELEPHONE ENCOUNTER
CARDIAC CLEARANCE     What type of procedure are you having? EGD, Colonoscopy    Which physician is performing your procedure? Dr. Rodriguez    When is your procedure scheduled? TBD    Where are you having this procedure? [facility]    Are you taking Blood Thinners? Yes   If so what? (Name/dose/frequesncy) ASA 81 MG     Does the surgeon want you to stop your blood thinner?  If so for how long?    Are you having any new or worsening cardiac symptoms? N/A    Phone Number and Contact Name for Physicians office: [phone]    Fax number to send information: [phone]    Cardiac History:  Last office visit with Cardiologist: 10/28/24    Cardiac Procedures:    Cardiac Testing:      Called [email or web address], spoke with Phani who tried transferring me to the MA for Dr. Rodriguez, but they were not available. Would like to know if pt should hold ASA 81 and if so, how long.

## 2025-08-01 DIAGNOSIS — G89.4 CHRONIC PAIN SYNDROME: ICD-10-CM

## 2025-08-01 RX ORDER — ACETAMINOPHEN AND CODEINE PHOSPHATE 300; 30 MG/1; MG/1
1 TABLET ORAL EVERY 8 HOURS PRN
Qty: 90 TABLET | Refills: 0 | Status: SHIPPED | OUTPATIENT
Start: 2025-08-01 | End: 2025-08-31

## 2025-08-01 NOTE — TELEPHONE ENCOUNTER
Per Dr. Ruvalcaba     Cardiac Clearance given for colonoscopy     May hold aspirin.      Please fax a letter.

## (undated) DEVICE — NERVE BLOCK TRAY: Brand: MEDLINE INDUSTRIES, INC.

## (undated) DEVICE — NEEDLE SPINAL 22GA L3.5IN SPINOCAN

## (undated) DEVICE — AVANOS* TUOHY EPIDURAL NEEDLE: Brand: AVANOS

## (undated) DEVICE — NEEDLE QUINCKE 22GX5": Brand: MEDLINE

## (undated) DEVICE — SYRINGE MED 10ML LUERLOCK TIP W/O SFTY DISP

## (undated) DEVICE — MARKER SKIN MINI PUR FINE REGULAR TIP W RUL XL PREP RESIST

## (undated) DEVICE — BITE BLOCK ENDOSCP AD 60 FR W/ ADJ STRP PLAS GRN BLOX

## (undated) DEVICE — Device: Brand: JELCO

## (undated) DEVICE — EPIDURAL TRAY: Brand: MEDLINE INDUSTRIES, INC.

## (undated) DEVICE — MEDIA CONTRAST RX ISOVUE-300 61% 30ML VIALS

## (undated) DEVICE — TRAP POLYP ETRAP

## (undated) DEVICE — 7496-8Z MINI-PLUS KIT: Brand: DEVON

## (undated) DEVICE — ENDOSCOPY KIT: Brand: MEDLINE INDUSTRIES, INC.

## (undated) DEVICE — FORMALIN CLEAR VIAL 20 ML 10%

## (undated) DEVICE — HYPODERMIC SAFETY NEEDLE: Brand: MAGELLAN

## (undated) DEVICE — PORT VLV 2 W NDL FREE SMRTSITE

## (undated) DEVICE — NEEDLE SPNL 22GA L5IN BLK HUB S STL W/ QNCKE PNT W/OUT

## (undated) DEVICE — STANDARD HYPODERMIC NEEDLE,POLYPROPYLENE HUB: Brand: MONOJECT

## (undated) DEVICE — NEEDLE SPNL 25GA L3.5IN BLU HUB S STL REG WALL FIT STYL W/

## (undated) DEVICE — CHLORAPREP 26ML ORANGE

## (undated) DEVICE — STERILE POLYISOPRENE POWDER-FREE SURGICAL GLOVES: Brand: PROTEXIS

## (undated) DEVICE — SNARES COLD OVAL 10MM THIN